# Patient Record
Sex: MALE
[De-identification: names, ages, dates, MRNs, and addresses within clinical notes are randomized per-mention and may not be internally consistent; named-entity substitution may affect disease eponyms.]

---

## 2018-01-16 ENCOUNTER — HOSPITAL ENCOUNTER (INPATIENT)
Dept: HOSPITAL 42 - ED | Age: 83
LOS: 6 days | Discharge: NURSING, REHAB OR LONG-TERM CARE | DRG: 543 | End: 2018-01-22
Attending: INTERNAL MEDICINE | Admitting: INTERNAL MEDICINE
Payer: MEDICARE

## 2018-01-16 VITALS — BODY MASS INDEX: 26.8 KG/M2

## 2018-01-16 DIAGNOSIS — G89.3: ICD-10-CM

## 2018-01-16 DIAGNOSIS — I25.2: ICD-10-CM

## 2018-01-16 DIAGNOSIS — K59.09: ICD-10-CM

## 2018-01-16 DIAGNOSIS — R53.1: ICD-10-CM

## 2018-01-16 DIAGNOSIS — R16.0: ICD-10-CM

## 2018-01-16 DIAGNOSIS — G40.909: ICD-10-CM

## 2018-01-16 DIAGNOSIS — N28.1: ICD-10-CM

## 2018-01-16 DIAGNOSIS — R59.0: ICD-10-CM

## 2018-01-16 DIAGNOSIS — K76.0: ICD-10-CM

## 2018-01-16 DIAGNOSIS — E78.5: ICD-10-CM

## 2018-01-16 DIAGNOSIS — H40.9: ICD-10-CM

## 2018-01-16 DIAGNOSIS — R10.31: ICD-10-CM

## 2018-01-16 DIAGNOSIS — C79.51: Primary | ICD-10-CM

## 2018-01-16 DIAGNOSIS — M19.90: ICD-10-CM

## 2018-01-16 DIAGNOSIS — N40.0: ICD-10-CM

## 2018-01-16 DIAGNOSIS — B95.2: ICD-10-CM

## 2018-01-16 DIAGNOSIS — M51.36: ICD-10-CM

## 2018-01-16 DIAGNOSIS — N39.0: ICD-10-CM

## 2018-01-16 DIAGNOSIS — G47.00: ICD-10-CM

## 2018-01-16 DIAGNOSIS — I25.10: ICD-10-CM

## 2018-01-16 DIAGNOSIS — C61: ICD-10-CM

## 2018-01-16 LAB
ALBUMIN SERPL-MCNC: 4.3 [, G/DL] (ref 3–4.8)
ALBUMIN/GLOB SERPL: 1.3 [,] (ref 1.1–1.8)
ALT SERPL-CCNC: 24 [, U/L] (ref 7–56)
APPEARANCE UR: (no result) [,]
APTT BLD: 31.9 [, SECONDS] (ref 25.1–36.5)
AST SERPL-CCNC: 37 [, U/L] (ref 17–59)
BACTERIA #/AREA URNS HPF: (no result) [,]
BASE EXCESS BLDV CALC-SCNC: 1.6 [, MMOL/L] (ref 0–2)
BASOPHILS # BLD AUTO: 0.01 [, K/MM3] (ref 0–2)
BASOPHILS NFR BLD: 0.2 [, %] (ref 0–3)
BILIRUB UR-MCNC: NEGATIVE [,]
BUN SERPL-MCNC: 26 [, MG/DL] (ref 7–21)
CALCIUM SERPL-MCNC: 9.5 [, MG/DL] (ref 8.4–10.5)
COLOR UR: YELLOW [,]
EOSINOPHIL # BLD: 0 [,] (ref 0–0.7)
EOSINOPHIL NFR BLD: 0.5 [, %] (ref 1.5–5)
EPI CELLS #/AREA URNS HPF: (no result) [, /HPF] (ref 0–5)
ERYTHROCYTE [DISTWIDTH] IN BLOOD BY AUTOMATED COUNT: 13.3 [, %] (ref 11.5–14.5)
GFR NON-AFRICAN AMERICAN: > 60 [,]
GLUCOSE UR STRIP-MCNC: NEGATIVE [, MG/DL]
GRANULOCYTES # BLD: 1.9 [,] (ref 1.4–6.5)
GRANULOCYTES NFR BLD: 30.1 [, %] (ref 50–68)
HGB BLD-MCNC: 11.1 [, G/DL] (ref 14–18)
INR PPP: 1.08 [,] (ref 0.93–1.08)
LEUKOCYTE ESTERASE UR-ACNC: (no result) [, LEU/UL]
LYMPHOCYTES # BLD: 4 [,] (ref 1.2–3.4)
LYMPHOCYTES NFR BLD AUTO: 62.9 [, %] (ref 22–35)
MAGNESIUM SERPL-MCNC: 2.1 [, MG/DL] (ref 1.7–2.2)
MCH RBC QN AUTO: 31.1 [, PG] (ref 25–35)
MCHC RBC AUTO-ENTMCNC: 33 [, G/DL] (ref 31–37)
MCV RBC AUTO: 94.1 [, FL] (ref 80–105)
MONOCYTES # BLD AUTO: 0.4 [,] (ref 0.1–0.6)
MONOCYTES NFR BLD: 6.3 [, %] (ref 1–6)
PH BLDV: 7.29 [,] (ref 7.32–7.43)
PH UR STRIP: 6.5 [,] (ref 4.7–8)
PLATELET # BLD: 168 [, 10^3/UL] (ref 120–450)
PMV BLD AUTO: 9.9 [, FL] (ref 7–11)
PROT UR STRIP-MCNC: (no result) [, MG/DL]
PROTHROMBIN TIME: 12.4 [, SECONDS] (ref 9.4–12.5)
RBC # BLD AUTO: 3.57 [, 10^6/UL] (ref 3.5–6.1)
RBC # UR STRIP: (no result) [,]
SP GR UR STRIP: 1.01 [,] (ref 1–1.03)
URINE NITRATE: NEGATIVE [,]
UROBILINOGEN UR STRIP-ACNC: 0.2 [, E.U./DL]
VENOUS BLOOD FIO2: 21 [, %]
VENOUS BLOOD GAS PCO2: 62 [,] (ref 40–60)
VENOUS BLOOD GAS PO2: 40 [, MM/HG] (ref 30–55)
WBC # BLD AUTO: 6.3 [, 10^3/UL] (ref 4.5–11)

## 2018-01-16 NOTE — CT
PROCEDURE:  CT Abdomen and Pelvis with contrast



HISTORY:

rt sided abdominal pain/b/l cva pain/indweeling ca



COMPARISON:

None.



TECHNIQUE:

Contrast dose: 100 cc of Omni 350



Radiation dose:



Total exam DLP = 719 mGy-cm.



This CT exam was performed using one or more of the following dose 

reduction techniques: Automated exposure control, adjustment of the 

mA and/or kV according to patient size, and/or use of iterative 

reconstruction technique.



FINDINGS:



LOWER THORAX:

Unremarkable. 



LIVER:

Unremarkable. No gross lesion or ductal dilatation. 



GALLBLADDER AND BILE DUCTS:

There is mild distention of the gallbladder but no wall thickening or 

evidence of stones. 



PANCREAS:

Unremarkable. No gross lesion or ductal dilatation.



SPLEEN:

Unremarkable. 



ADRENALS:

Unremarkable. No mass. 



KIDNEYS AND URETERS:

Unremarkable. No hydronephrosis. No solid mass. 



VASCULATURE:

Unremarkable. No aortic aneurysm. 



BOWEL:

Unremarkable. No obstruction. No gross mural thickening. There are no 

acute findings to correlate with the history of abdominal pain



APPENDIX:

Normal appendix. 



PERITONEUM:

Unremarkable. No free fluid. No free air. 



LYMPH NODES:

Multiple in large lymph nodes are seen on the left side of the pelvis 

the largest measuring 3 cm in length. Image 66 series 2. There is 

also para-aortic adenopathy and inguinal adenopathy on the left. 



BLADDER:

There is a Waddell catheter in the bladder 



REPRODUCTIVE:

There is asymmetrical enlargement of the prostate gland extending 

posterior laterally to the left. The finding is suspicious for 

pancreatic malignancy and clinical correlation is recommended. The 

finding is best seen on axial images 81 and 82 of series 2. 



BONES:

No acute fracture. 



OTHER FINDINGS:

None.



IMPRESSION:

Para-aortic, pelvic and inguinal adenopathy on the left.



Asymmetrical enlargement of the prostate suspicious for neoplasm. 

Waddell catheter in the bladder.



No acute findings to correlate with the history of abdominal pain

## 2018-01-16 NOTE — CARD
--------------- APPROVED REPORT --------------





EKG Measurement

Heart [id number]

IL [id number]

[id number]

[id number]

[id number]



<Conclusion>

Normal sinus rhythm

Right bundle branch block

Abnormal ECG

## 2018-01-16 NOTE — US
HISTORY:

ruq sono/ gb lith



COMPARISON:

None.



TECHNIQUE:

Sonographic evaluation of the right upper quadrant of the abdomen.



FINDINGS:



LIVER:

Measures 19.0 cm in length. Diffusely increased echogenicity of the 

liver parenchyma.  No mass. No intrahepatic bile duct dilatation. 



GALLBLADDER:

Unremarkable. No gallstones. 



COMMON BILE DUCT:

Measures 6 mm.  No stones. No dilatation.



PANCREAS:

Unremarkable as visualized. No mass. No ductal dilatation.



RIGHT KIDNEY:

Measures 10.7 cm in length. Normal echogenicity and cortical 

thickness.  No calculus or hydronephrosis.  Lower pole simple 

cortical cyst, 9 x 9 x 11 mm.



AORTA:

No aneurysmal dilatation.



IVC:

Unremarkable.



OTHER FINDINGS:

None .



IMPRESSION:

Mild hepatomegaly with fatty infiltration of the liver.  No evidence 

of cholelithiasis or cholecystitis.  Incidental 11 mm right lower 

pole renal cortical cyst.

## 2018-01-16 NOTE — RAD
HISTORY:

Sepsis Patient  



COMPARISON:

12/11/2016 



FINDINGS:



LUNGS:

No active pulmonary disease.



PLEURA:

No significant pleural effusion identified, no pneumothorax apparent.



CARDIOVASCULAR:

Normal.



OSSEOUS STRUCTURES:

No significant abnormalities.



VISUALIZED UPPER ABDOMEN:

Normal.



OTHER FINDINGS:

None.



IMPRESSION:

No active disease.

## 2018-01-16 NOTE — ED PDOC
Arrival/HPI





- General


Chief Complaint: Abdominal Pain


Time Seen by Provider: 18 08:31


Historian: Patient





- History of Present Illness


Narrative History of Present Illness (Text): 


18 08:39





Am 88 year old male, whose past medical history includes BPH, prostate CA, 

indwelling catheter, CAD s/p NSTEMI, intermittent UTI and seizure disorder, 

presents to the emergency department complaining of 1 month duration right 

sided abdominal pain with no exacerbative or palliative factors. The patient 

states that the pain has culminated over the past 4-5 days. He notes that it is 

a generalized abdominal pain, but is mostly a band-like pain across the lower 

abdomen radiating down his right lower extremity with associated bilateral CVA 

and groin pain, insomnia, and constipation. The patient states that the pain is 

worsened at night after reclining. The patient denies fevers, chills, headache, 

dizziness, chest pain, shortness of breath, dyspnea on exertion, cough, nausea, 

vomiting, diarrhea, neck pain, urinary changes, or any other complaint.





PMD: Dr. Thakur








Time/Duration: Other (1 month)


Symptom Onset: Sudden


Symptom Course: Worsening


Activities at Onset: Rest, Light


Context: Home





Past Medical History





- Provider Review


Nursing Documentation Reviewed: Yes





- Infectious Disease


Hx of Infectious Diseases: None





- Cardiac


Hx Cardiac Disorders: No





- Pulmonary


Hx Respiratory Disorders: No





- Neurological


Hx Neurological Disorder: Yes


Hx Seizures: Yes





- HEENT


Hx HEENT Disorder: Yes


Hx Glaucoma: Yes





- Renal


Hx Renal Disorder: No





- Endocrine/Metabolic


Hx Endocrine Disorders: No





- Hematological/Oncological


Hx Blood Disorders: No





- Integumentary


Hx Dermatological Disorder: No





- Musculoskeletal/Rheumatological


Hx Arthritis: Yes





- Genitourinary/Gynecological


Hx Prostate Cancer: Yes (treated with Luprone shot once a month)





- Psychiatric


Hx Psychophysiologic Disorder: No


Hx Substance Use: No


Other/Comment: patient has indwelling catheter





- Anesthesia


Hx Anesthesia: No





Family/Social History





- Physician Review


Nursing Documentation Reviewed: Yes


Family/Social History: No Known Family HX


Smoking Status: Never Smoked


Hx Alcohol Use: No


Hx Substance Use: No





Allergies/Home Meds


Allergies/Adverse Reactions: 


Allergies





No Known Allergies Allergy (Verified 12/15/16 14:27)


 








Home Medications: 


 Home Meds











 Medication  Instructions  Recorded  Confirmed


 


Bicalutamide [Casodex] 50 mg PO DAILY 16


 


Carbamazepine 200 mg PO TID 16


 


Cyanocobalamin (Vitamin B-12) 1,000 mcg PO DAILY 16





[B-12]   


 


Gemfibrozil 600 mg PO BID 16


 


Leuprolide [Lupron Depot]  16


 


Multivit-Min/FA/Lycopen/Lutein 1 tab PO DAILY 16





[Centrum Silver Tablet]   


 


Omega-3 Fatty Acids [Fish Oil] 100 mg PO DAILY 16


 


Omeprazole 40 mg PO DAILY 16


 


Polyethylene Glycol 3350 [Gavilax] 5 ml PO DAILY 16


 


Ubidecarenone [Coq10] 100 mg PO DAILY 16


 


Latanoprost 0.005% Opht [XALATAN 1 drp OP 18 





2.5 Ml]   


 


Magnesium Gluconate 500 mg PO DAILY 18














Review of Systems





- Physician Review


All systems were reviewed & negative as marked: Yes





- Review of Systems


Constitutional: absent: Fevers, Night Sweats


Eyes: Normal


ENT: Normal


Respiratory: absent: SOB


Cardiovascular: absent: Chest Pain, HOPKINS


Gastrointestinal: Abdominal Pain (Generalized, band -like pain along the lower 

abdomen. Mostly along the RUQ.), Constipation.  absent: Diarrhea, Nausea, 

Vomiting, Appetite Changes


Genitourinary Male: absent: Urinary Output Changes


Musculoskeletal: Back Pain (bilateral CVA pain), Other (Abdominal pain radiates 

down to the right lower extremity. ).  absent: Neck Pain


Skin: Normal


Neurological: absent: Headache, Dizziness


Endocrine: Normal


Hemo/Lymphatic: Normal


Psychiatric: Normal





Physical Exam


Vital Signs Reviewed: Yes


Vital Signs











  Temp Pulse Resp BP Pulse Ox


 


 18 14:34   71  17  158/70 H  98


 


 18 12:17   67  18  160/68 H  97


 


 18 10:10   70  18  157/67 H  98


 


 18 08:14  98.1 F  71  18  146/63  100











Temperature: Afebrile


Blood Pressure: Normal


Pulse: Regular


Respiratory Rate: Normal


Appearance: Positive for: Well-Appearing, Non-Toxic, Comfortable


Pain Distress: None


Mental Status: Positive for: Alert and Oriented X 3





- Systems Exam


Head: Present: Atraumatic, Normocephalic


Pupils: Present: PERRL


Extroacular Muscles: Present: EOMI


Conjunctiva: Present: Normal


Mouth: Present: Moist Mucous Membranes


Neck: Present: Normal Range of Motion


Respiratory/Chest: Present: Clear to Auscultation, Good Air Exchange.  No: 

Respiratory Distress, Accessory Muscle Use


Cardiovascular: Present: Regular Rate and Rhythm, Normal S1, S2.  No: Murmurs


Abdomen: Present: Tenderness (Generalized abdominal tenderness. Most tender on 

the RUQ. )


Genitourinary Male: Present: Other (Indwelling tolentino catheter draining clear 

urine. )


Back: Present: CVA Tenderness (Bilateral CVA Tenderness. )


Upper Extremity: Present: Normal Inspection.  No: Cyanosis, Edema


Lower Extremity: Present: Normal Inspection.  No: Edema


Neurological: Present: GCS=15, CN II-XII Intact, Speech Normal, Motor Func 

Grossly Intact, Normal Sensory Function, Normal Cerebellar Funct, Norm Deep 

Tendon Reflexes, Gait Normal, Memory Normal


Skin: Present: Warm, Dry, Normal Color.  No: Rashes


Psychiatric: Present: Alert, Oriented x 3, Normal Insight, Normal Concentration





Medical Decision Making


ED Course and Treatment: 


18 08:50





Impression:


An 88 year old male presents to the emergency department complaining of 1 month 

duration generalized abdominal pain, mostly along the right upper quadrant 

radiating down to his right lower extremity with associated constipation, 

insomnia, CVA and groin pain. 





Plan:


-- EKG


-- Abdomen/Pelvis CT


-- Chest X-ray 


-- Labs


-- Morphine and Ditropan


-- Blood/ Urine Culture


-- Urinalysis 


-- Reassess and disposition








Progress Notes:





EK18 09:38


Ordered, reviewed, and independently interpreted the EKG.


Rate :  73 BPM


Rhythm : NSR


Interpretation : Concomitant RBBB.  millisecond. No ischemic ST-T 

segments 








CHEST X-RAY


Dictator : Jose aG MD


Report Date : 2018 09:21:02


IMPRESSION: No active disease.





18 11:35


ct a/p scan results appreciated : no acute findings- no bony mets discovered 

however, one wonders if they may be acute and contirbuting to patient's pain. 








serial abdominal exams benign . 








PROCEDURE:  CT Abdomen and Pelvis with contrast


Dictator : Jorge Luis Martinez MD


Report Date : 2018 11:09:54





IMPRESSION:


Para-aortic, pelvic and inguinal adenopathy on the left.





Asymmetrical enlargement of the prostate suspicious for neoplasm. Tolentino 

catheter in the bladder.





No acute findings to correlate with the history of abdominal pain








GALLBLADDER ULTRASOUND


Dictator : Jose Ga MD


Report Date : 2018 13:50:49


IMPRESSION: Mild hepatomegaly with fatty infiltration of the liver.  No 

evidence of cholelithiasis or cholecystitis.  Incidental 11 mm right lower pole 

renal cortical cyst.








- Lab Interpretations


Lab Results: 








 18 09:17 





 18 08:50 





 Lab Results





18 09:17: pO2 40, VBG pH 7.29 L, VBG pCO2 62.0 H, VBG HCO3 29.8 H, VBG 

Total CO2 31.7 H, VBG O2 Sat (Calc) 76.4 H, VBG Base Excess 1.6, VBG Potassium 

3.9, Glucose 96, Lactate 1.6, FiO2 21.0, Sodium 143.0, Chloride 107.0, Venous 

Blood Potassium 3.9


18 09:17: PT 12.4, INR 1.08, APTT 31.9


18 09:17: WBC 6.3  D, RBC 3.57, Hgb 11.1 L, Hct 33.6 L, MCV 94.1, MCH 31.1

, MCHC 33.0, RDW 13.3, Plt Count 168, MPV 9.9, Gran % 30.1 L, Lymph % (Auto) 

62.9 H, Mono % (Auto) 6.3 H, Eos % (Auto) 0.5 L, Baso % (Auto) 0.2, Gran # 1.90

, Lymph # 4.0 H, Mono # 0.4, Eos # 0.0, Baso # 0.01


18 08:50: Sodium 144, Potassium 3.8, Chloride 104, Carbon Dioxide 28, 

Anion Gap 16, BUN 26 H, Creatinine 0.8, Est GFR (African Amer) > 60, Est GFR (

Non-Af Amer) > 60, Random Glucose 96, Calcium 9.5, Magnesium 2.1, Total 

Bilirubin 0.4, AST 37, ALT 24, Alkaline Phosphatase 214 H, Total Protein 7.6, 

Albumin 4.3, Globulin 3.3, Albumin/Globulin Ratio 1.3


18 08:50: Urine Color Yellow, Urine Appearance Turbid, Urine pH 6.5, Ur 

Specific Gravity 1.015, Urine Protein Trace H, Urine Glucose (UA) Negative, 

Urine Ketones Negative, Urine Blood Moderate H, Urine Nitrate Negative, Urine 

Bilirubin Negative, Urine Urobilinogen 0.2, Ur Leukocyte Esterase Small H, 

Urine RBC 5 - 10, Urine WBC 1 - 3, Ur Epithelial Cells 0 - 2, Urine Bacteria Mod








I have reviewed the lab results: Yes





- RAD Interpretation


Radiology Orders: 








18 08:32


CHEST PORTABLE [RAD] Stat 





18 08:34


ABD PELVIS PO & IV CONTRAST [CT] Stat 





18 11:46


GALL BLADDER [US] Stat 














- EKG Interpretation


Interpreted by ED Physician: Yes


Type: 12 lead EKG





- Medication Orders


Current Medication Orders: 











Discontinued Medications





Acetaminophen (Tylenol 325mg Tab)  975 mg PO STAT STA


   Stop: 18 09:46


   Last Admin: 18 09:55  Dose: 975 mg





MAR Pain/Vitals


 Document     18 09:55  JU  (Rec: 18 09:55  JU HUDSON)


     Pain Reassessment


      Is This A Pain ReAssessment?               No


     Sleep


      Is patient sleeping during reassessment?   No


     Presence of Pain


      Presence of Pain                           Yes





Docusate Sodium (Colace)  200 mg PO STAT STA


   Stop: 18 11:34


   Last Admin: 18 12:25  Dose: 200 mg





Last Bowel Movement


 Document     18 12:25  SF  (Rec: 18 12:26  SF  [id number])


     Last Bowel Movement


      Last Bowel Movement                        18





Morphine Sulfate (Morphine)  2 mg IVP STAT STA


   Stop: 18 08:34


   Last Admin: 18 08:45  Dose: 2 mg





MAR Pain Assessment


 Document     18 08:45  JU  (Rec: 18 09:57  JU ADRIAN-PC)


     Pain Reassessment


      Is this a pain reassessment?               No


     Sleep


      Is patient sleeping during reassessment?   No


     Presence of Pain


      Presence of Pain                           Yes


IVP Administration


 Document     18 08:45  SZA  (Rec: 18 09:57  JU ADRIAN-PC)


     Charges for Administration


      # of IVP Administrations                   1


Re-Assess: MAR Pain Assessment


 Document     18 09:45  SZA  (Rec: 18 11:09  JU ADRIAN-PC)


     Pain Reassessment


      Is this a pain reassessment?               No


     Sleep


      Is patient sleeping during reassessment?   No


     Presence of Pain


      Presence of Pain                           Yes


     Pain Scale Used


      Pain Scale Used                            Numeric


     Description


      Description                                Intermittent


      Intensity of Pain at present               4





Morphine Sulfate (Morphine)  4 mg IVP STAT STA


   Stop: 18 11:46


   Last Admin: 18 12:25  Dose: 4 mg





MAR Pain Assessment


 Document     18 12:25  SF  (Rec: 18 12:25  SF  [id number])


     Pain Reassessment


      Is this a pain reassessment?               Yes


     Sleep


      Is patient sleeping during reassessment?   No


     Presence of Pain


      Presence of Pain                           Yes


     Pain Scale Used


      Pain Scale Used                            Numeric


IVP Administration


 Document     18 12:25  SF  (Rec: 18 12:25  SF  [id number])


     Charges for Administration


      # of IVP Administrations                   1





Ondansetron HCl (Zofran Inj)  4 mg IVP STAT STA


   Stop: 18 11:47


   Last Admin: 18 12:26  Dose: 4 mg





IVP Administration


 Document     18 12:26  SF  (Rec: 18 12:26  SF  [id number])


     Charges for Administration


      # of IVP Administrations                   1





Oxybutynin Chloride (Ditropan Tab)  5 mg PO ONCE ONE


   Stop: 18 08:35


   Last Admin: 18 09:55  Dose: 5 mg





Trimethoprim/Sulfamethoxazole (Bactrim Ds Tab)  1 tab PO STAT STA


   PRN Reason: Protocol


   Stop: 18 11:33


   Last Admin: 18 12:25  Dose: 1 tab











- Scribe Statement


The provider has reviewed the documentation as recorded by the Elvira Puckett 





Provider Scribe Attestation:


All medical record entries made by the Scribe were at my direction and 

personally dictated by me. I have reviewed the chart and agree that the record 

accurately reflects my personal performance of the history, physical exam, 

medical decision making, and the department course for this patient. I have 

also personally directed, reviewed, and agree with the discharge instructions 

and disposition.








Disposition/Present on Arrival





- Present on Arrival


Any Indicators Present on Arrival: Yes


History of DVT/PE: No


History of Uncontrolled Diabetes: No


Urinary Catheter: Yes (inserted by Dr Jackson)


History of Decub. Ulcer: No


History Surgical Site Infection Following: None





- Disposition


Have Diagnosis and Disposition been Completed?: Yes


Diagnosis: 


 Urinary tract infection, Intractable abdominal pain, Sciatica of right side





Disposition: HOSPITALIZED


Disposition Time: 15:07


Patient Plan: Admission


Condition: FAIR


Referrals: 


Fanny Turner, [Non-Staff] - Follow up with primary


Forms:  Logentries (English)

## 2018-01-17 RX ADMIN — METRONIDAZOLE SCH MLS/HR: 500 INJECTION, SOLUTION INTRAVENOUS at 11:02

## 2018-01-17 RX ADMIN — CEFTRIAXONE SCH MLS/HR: 1 INJECTION, SOLUTION INTRAVENOUS at 10:00

## 2018-01-17 RX ADMIN — POLYETHYLENE GLYCOL 3350 SCH GM: 17 POWDER, FOR SOLUTION ORAL at 17:50

## 2018-01-17 RX ADMIN — METRONIDAZOLE SCH MLS/HR: 500 INJECTION, SOLUTION INTRAVENOUS at 15:19

## 2018-01-17 RX ADMIN — LATANOPROST SCH ML: 50 SOLUTION OPHTHALMIC at 15:35

## 2018-01-17 NOTE — CON
DATE:  01/17/2018



REQUESTIG PHYSICIAN:  Dr. Dr. Rivero



REASON FOR CONSULTATION:  I have been asked to see this 88-year-old male

known to me for several years from the office with known history of chronic

constipation, submucosal lesion in the terminal ileum which has been stable

for many years, prostate cancer, indwelling urinary catheter, coronary

artery disease, seizure disorder, who comes to the hospital with back pain

and lower abdominal pain.  Patient has been constipated for several days as

he has not been taking his MiraLax.  He denies any rectal bleeding or

melena.  He denies fevers or chills.  CT scan of the abdomen and pelvis

just showed an enlarged prostate with periaortic lymphadenopathy.  Patient

also has been diagnosed in the past with degenerative disc disease in the

lumbar spine with foraminal narrowing at L5-S1 on the left secondary to

osteophyte.



PAST MEDICAL HISTORY:  Again is notable for BPH, prostate cancer, seizure

disorder, coronary artery disease, chronic constipation, submucosal lesion

of the terminal small bowel.



SOCIAL HISTORY:  He is a .  He denies cigarette smoking or alcohol

use.  He lives at home with a son living nearby.



FAMILY HISTORY:  Noncontributory.



REVIEW OF SYSTEMS:  A 14-point review of systems is notable for back pain,

abdominal pain, right lower quadrant and difficulty walking.



MEDICATIONS AT HOME:  Include Casodex 50 mg once a day, carbamazepine 200

mg three times a day, vitamin B12 1000 mcg daily, gemfibrozil 600 mg p.o.

b.i.d., Lupron injections monthly, multivitamin, fish oil 100 mg daily,

omeprazole 40 mg once a day, polyethylene glycol daily, CoQ 100 mg daily,

and Xalatan eyedrops 1 drop daily.



PHYSICAL EXAMINATION

GENERAL:  Elderly male appearing to have lost some weight in no distress. 

He is awake and alert.

VITAL SIGNS:  Reveal temperature of 97.8, blood pressure 133/58, heart rate

of 80.  His BMI is 25.5.

HEENT:  Reveal sclerae to be white.  Conjunctivae pink.

NECK:  Supple.

CHEST:  Lungs are clear.

HEART:  Regular rate and rhythm.

ABDOMEN:  Soft, nontender.

EXTREMITIES:  Show no edema.



Laboratory data reveal white blood cell count 6.3, hemoglobin 11.1. 

Chemistries reveal BUN 26, creatinine 0.8, procalcitonin is 0.05.  AST and

ALT are normal.  Alkaline phosphatase is 214.



IMPRESSION:  An 88-year-old male with prostate cancer with metastasis with

lymphadenopathy in the periaortic area with abdominal pain in the right

lower quadrant associated with constipation.  Ultrasound of the abdomen was

negative.  CT scan of the abdomen shows no acute changes other than the

enlarged prostate and lymphadenopathy.  His abdominal pain has been

improved.  Considering all his comorbidities, we will suggest conservative

treatment.



RECOMMENDATIONS:

1.  Continue MiraLax 17 g once a day.

2.  No further GI workup planned at this time.





__________________________________________

Cristino Chou MD





DD:  01/17/2018 11:21:17

DT:  01/17/2018 11:26:35

Job # [id number]

## 2018-01-17 NOTE — MRI
EXAM:

  MR Lumbar Spine Without Intravenous Contrast



EXAM DATE/TIME:

  1/17/2018 3:33 PM



CLINICAL HISTORY:

  The patient age is 88 years old and is male; Pain; Low back pain; Patient HX: 

Lbp. Can't straighten up and trouble walking. Facility exam id and description: 

Mri spls spinal canal lumbar w/o cont



TECHNIQUE:

  Magnetic resonance images of the lumbar spine without intravenous contrast in 

multiple planes.



COMPARISON:

  No relevant prior studies available.



FINDINGS:

  Vertebrae:  Multiple T1 hypointense masses are identified within the lumbar 

vertebral bodies as well as the upper sacrum. These findings are concerning for 

metastatic disease.  The lumbar vertebral bodies are normal in height, without 

abnormal subluxation.  

  Spinal cord:  The distal end of the conus medullaris ends at L1, normal in 

position.

  Soft tissues: Small T2 hyperintense right renal cysts are visualized.  

  Lymph nodes:  There is retroperitoneal lymphadenopathy. Additional enlarged 

lymph nodes are seen adjacent to the left iliac arteries.  At the level of the 

aortic bifurcation, there is a 2.5 x 1.5 cm lymph node on the left side.



 DISCS/SPINAL CANAL/NEURAL FORAMINA: Degenerative disc disease is noted 

diffusely within the lumbar spine, with a decrease in the T2 signal intensity 

of the discs as well as disc bulge/osteophyte complexes. There is a decrease of 

disc height at L5-S1.  

  L1-L2:  There is no significant narrowing of the thecal sac or neural 

foramina.

  L2-L3:  There is no significant narrowing of the thecal sac. Mild bilateral 

neural foraminal narrowing is identified.

  L3-L4:  There is mild bilateral facet arthropathy. Mild post bulging is 

visualized, without significant narrowing of the thecal sac. Mild right and 

mild-to-moderate left neural foraminal narrowing is identified.

  L4-L5:  There is a broad-based disc bulge, with mild narrowing of the thecal 

sac and narrowing of both lateral recesses. There is bilateral facet 

arthropathy. Mild/moderate bilateral neural foramina is identified.

  L5-S1:  There is no significant narrowing of the thecal sac. Mild/moderate 

left neural foraminal narrowing is visualized. There is facet arthropathy 

bilaterally.



IMPRESSION:     

1.  Multiple masses are identified within the lumbar vertebral bodies as well 

as the upper sacrum. These findings are concerning for metastatic disease.

2.  There is retroperitoneal lymphadenopathy. Additional enlarged lymph nodes 

are seen adjacent to the left iliac arteries.

3.  Degenerative changes are noted diffusely within the lumbar spine, as 

described above.

4.  Mild narrowing of the thecal sac and narrowing of both lateral recesses are 

visualized at L4-5.

5.  Neural foraminal narrowing is identified from L2-3 through L5-S1, as 

detailed above.

6.  Incidental/non-acute findings are described above.

## 2018-01-18 RX ADMIN — CEFTRIAXONE SCH MLS/HR: 1 INJECTION, SOLUTION INTRAVENOUS at 13:18

## 2018-01-18 RX ADMIN — POLYETHYLENE GLYCOL 3350 SCH GM: 17 POWDER, FOR SOLUTION ORAL at 17:05

## 2018-01-18 RX ADMIN — OXYCODONE HYDROCHLORIDE AND ACETAMINOPHEN PRN TAB: 5; 325 TABLET ORAL at 17:10

## 2018-01-18 RX ADMIN — OXYCODONE HYDROCHLORIDE AND ACETAMINOPHEN PRN TAB: 5; 325 TABLET ORAL at 21:22

## 2018-01-18 RX ADMIN — LATANOPROST SCH ML: 50 SOLUTION OPHTHALMIC at 13:17

## 2018-01-18 RX ADMIN — POLYETHYLENE GLYCOL 3350 SCH GM: 17 POWDER, FOR SOLUTION ORAL at 13:16

## 2018-01-18 NOTE — CP.PCM.CON
History of Present Illness





- History of Present Illness


History of Present Illness: 


Mr Kingston is a 88 year old male with a history of high risk prostate cancer 

since 2016 on androgen deprivation therapy.  In 2016, he had an elevation in 

his PSA.  A TRUS with biopsy showed adenocarcinoma of the prostate, Octavio 5+4=

9 in 1/12, Octavio 4+5=9 4/12, [place] 4+4=8 2/12, [place] 4+3=7 1/12.    He 

was on ADT and anti-androgen therapy.  Over the past 3-4 weeks, he has been 

having worsening lower back pain with radiation anteriorly. In light of this, 

he came to the ED for further evaluation and management. He had a CT of the 

abdomen and pelvis on January 16, 2018 revealed para-aortic lymphadenopathy as 

well as pelvic lymphadenopathy.   A MRI of the lumbar spine on January 17, 2018 

revealed retroperitoneal lymphadenopathy.  There was asymmetric prostate 

suspicious for neoplasm. There were metastatic lesions in the lumbar and sacral 

spine.  He is referred to us for consideration of palliative radiation therapy.





Review of Systems





- Constitutional


Constitutional: Weakness





- Musculoskeletal


Musculoskeletal: Back Pain





Past Patient History





- Infectious Disease


Hx of Infectious Diseases: None





- Past Social History


Smoking Status: Never Smoked


Alcohol: None


Home Situation {Lives}: Alone





- CARDIAC


Hx Cardiac Disorders: No





- PULMONARY


Hx Respiratory Disorders: No





- NEUROLOGICAL


Hx Neurological Disorder: Yes


Hx Seizures: Yes





- HEENT


Hx HEENT Problems: Yes


Hx Glaucoma: Yes





- RENAL


Hx Chronic Kidney Disease: No





- ENDOCRINE/METABOLIC


Hx Endocrine Disorders: No





- HEMATOLOGICAL/ONCOLOGICAL


Hx Blood Disorders: No





- INTEGUMENTARY


Hx Dermatological Problems: No





- MUSCULOSKELETAL/RHEUMATOLOGICAL


Hx Arthritis: Yes





- GENITOURINARY/GYNECOLOGICAL


Hx Prostate Cancer: Yes (treated with Luprone shot once a month)





- PSYCHIATRIC


Hx Psychophysiologic Disorder: No


Hx Substance Use: No


Other/Comment: patient has indwelling catheter





- SURGICAL HISTORY


Hx Surgeries: No





- ANESTHESIA


Hx Anesthesia: No





Meds


Allergies/Adverse Reactions: 


 Allergies











Allergy/AdvReac Type Severity Reaction Status Date / Time


 


No Known Allergies Allergy   Verified 12/15/16 14:27














- Medications


Medications: 


 Current Medications





Acetaminophen (Tylenol 325mg Tab)  650 mg PO Q4H PRN


   PRN Reason: Pain, Mild (1-3)


Carbamazepine (Tegretol)  200 mg PO TID [facility]


   PRN Reason: Protocol


   Last Admin: 01/17/18 17:49 Dose:  200 mg


Docusate Sodium (Colace)  200 mg PO DAILY [facility]


Gemfibrozil (Lopid)  600 mg PO BID [facility]


   Last Admin: 01/17/18 17:50 Dose:  600 mg


Ceftriaxone Sodium (Rocephin 1 Gram Ivpb)  1 gm in 100 mls @ 200 mls/hr IV 

DAILY [facility]


   PRN Reason: Protocol


   Last Admin: 01/17/18 10:00 Dose:  200 mls/hr


Latanoprost (Xalatan Opht)  0 ml OD DAILY [facility]


   Last Admin: 01/17/18 15:35 Dose:  2.5 ml


Oxycodone/Acetaminophen (Percocet 5/325 Mg Tab)  1 tab PO Q4H PRN


   PRN Reason: Pain, moderate (4-7)


   Stop: 01/21/18 08:08


Polyethylene Glycol (Miralax)  17 gm PO BID [facility]


   Last Admin: 01/17/18 17:50 Dose:  17 gm











Physical Exam





- Eye Exam


Eye Exam: EOMI





- ENT Exam


ENT Exam: Mucous Membranes Moist





- Respiratory Exam


Respiratory Exam: Clear to Auscultation Bilateral





- Cardiovascular Exam


Cardiovascular Exam: REGULAR RHYTHM





- GI/Abdominal Exam


GI & Abdominal Exam: Normal Bowel Sounds





- Neurological Exam


Neurological exam: CN II-XII Intact, Oriented x3


Additional comments: 


Right lower extremity strength is 4/5. Left lower extremity strength is 4+/5





Results





- Vital Signs


Recent Vital Signs: 


 Last Vital Signs











Temp  97.4 F L  01/18/18 08:02


 


Pulse  82   01/18/18 08:02


 


Resp  22   01/18/18 08:02


 


BP  123/82   01/18/18 08:02


 


Pulse Ox  97   01/18/18 08:02














- Labs


Result Diagrams: 


 01/16/18 09:17





 01/16/18 08:50





Assessment & Plan





- Assessment and Plan (Free Text)


Assessment: 


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  Based on his 

MRI and symptoms, we would recommend palliative radiation therapy to the lower 

lumbar and upper sacral spine for symptomatic management of his pain. We spoke 

to him and his son about radiation therapy.  They are currently undecided about 

treatment. We would also recommend a bone scan as well as PSA given his new 

found metastases so that we know the extent of his disease. We will follow up 

with him tomorrow about his decision.

## 2018-01-18 NOTE — PN
DATE:  01/18/2018



SUBJECTIVE:  The patient has no complaints of any chest pain.  No shortness

of breath.  No headaches.  He says his back pain is controlled.



PHYSICAL EXAMINATION:

VITAL SIGNS:  Temperature is 97.5, pulse is 73, blood pressure is 133/87,

respirations 16.

GENERAL:  The patient is lying in bed, flat, comfortable.

HEENT:  No oral lesion.  Anicteric sclerae.  Moist mucosa.

NECK:  No JVD, adenopathy, or thyromegaly.

CARDIOVASCULAR:  S1 and S2, regular.  No murmurs, rubs, or gallops.

LUNGS:  Clear to auscultation bilaterally.  No wheeze, rales, or rhonchi.

ABDOMEN:  Bowel sounds are positive, soft, nontender and nondistended.

EXTREMITIES:  No cyanosis, clubbing or edema.



Multiple masses identified in the lumbar vertebral bodies as well as the

upper sacrum.  This finding is probably consistent with metastatic disease

with retroperitoneal lymphadenopathy.



ASSESSMENT:

1.  Abdominal pain, improved.

2.  Back pain secondary to metastasis from prostate cancer.

3.  Spinal stenosis.

4.  Hepatomegaly.

5.  An 11 mm right pole renal cyst.

6.  Coronary artery disease.

7.  Prostate cancer.

8.  Osteoarthritis.

9.  Seizure disorder.

10.  Gait dysfunction secondary to back pain.

11.  Pelvic adenopathy.

12.  Urinary tract infection secondary to gram-positive cocci.



PLAN:  The patient's MRI has been reviewed.  I am waiting for Dr. Jackson to

evaluate the patient.  The patient has been seen by Dr. Chou.  He want to

continue his gemfibrozil.  The patient is on MiraLax for constipation.  He

is on Rocephin because of the UTI.  He has gram-positive cocci.  The

patient is going to continue the physical therapy.  We will continue to

follow up the patient closely.  I did speak to the patient's family

yesterday to give them an update on the patient's diagnoses and plan of

care.



__________________________________________

Nathan Rivero MD

 



DD:  01/18/2018 6:15:23

DT:  01/18/2018 7:10:52

Job # [id number]

## 2018-01-18 NOTE — HP
DATE:  01/17/2018



CHIEF COMPLAINT AND HISTORY OF PRESENT ILLNESS:  This is an 88-year-old

male, who is coming into the hospital with a past medical history of

prostate cancer, BPH, has an indwelling catheter, the patient has coronary

artery disease, non-ST elevation MI history.  The patient states that he

has been having one month duration of right-sided abdominal pain.  He says

nothing makes the pain better or worse the pain, it has been getting worse

over the last 4 to 5 days.  He says that the pain is going around his

abdomen, it radiates down his right leg.  He has been having constipation, 

he says it is worse after he lays backwards.  He has no nausea.  No

vomiting. No diarrhea.  No dysuria or frequency.  He also has difficulty in

ambulating.  He says that he has been having back issues and is seeing Dr. Og.  According to the patient's son, he had a CAT scan done about

3 years ago.



REVIEW OF SYMPTOMS:  All other review of symptoms are within normal limits

except as mentioned.



ALLERGIES:  NO KNOWN DRUG ALLERGIES.



HOME MEDICATIONS:  Reviewed on the [facility].



PAST MEDICAL HISTORY:  Please see above, also prostate cancer.



SOCIAL HISTORY:  He does not smoke , drink or use drugs.



FAMILY HISTORY:  Noncontributory.



PHYSICAL EXAMINATION:

VITAL SIGNS:  He has had a temperature of 98.7, pulse of 64, blood pressure

of 138/53, respirations of 19, height is 5 feet 10 inches, weight is 178

pounds, BMI is 25.5.

GENERAL:  The patient lying in bed, uncomfortable, and in no acute

distress.

HEENT:  Atraumatic and normocephalic.  Anicteric sclerae.  Moist mucosa.

Pink conjunctivae.  No oral lesions.

NECK:  No JVD, anterior and posterior adenopathy, thyromegaly, or bruits.

CARDIOVASCULAR:  S1 and S2 regular.  No murmur, rubs, or gallop.

LUNGS:  Clear to auscultation bilaterally.  No wheezes, rales, or rhonchi.

ABDOMEN:  Bowel sounds are positive.  He has right side tenderness. No

rebound.  No guarding

EXTREMITIES:  No cyanosis, clubbing, or edema.

NEUROLOGIC:  No facial asymmetry.  Tongue is midline.  No uvula deviation. 

Power is 5/5 upper extremity and lower extremity.  Sensation intact in

upper extremity and lower extremity.

PSYCHIATRIC:  She is awake, alert and oriented x3.  No anxiety or

depression.  She has normal affect.

GENITOURINARY:  No CVA tenderness.

VASCULAR:  2+ pulses in the carotid pulses and pedal pulses.

SKIN:  No erythema or nodules

SPINE:  Shows normal curvature.



LABORATORY DATA:  White count of 6.3, hemoglobin of 11.1, INR is 1.08.  He

has a VBG that shows a pH of 7.29, his pCO2 is 62, his bicarb is 28,

procalcitonin is less than 0.05.  He has urine that shows blood as

moderate, nitrites are negative.



He has a gallbladder ultrasound that shows mild hepatomegaly with fatty

infiltration of the liver.  No evidence of cholelithiasis, but there is an

11 mm right lower lobe renal cyst.



He had abdominal CT done that shows no acute findings.



His EKG shows sinus rhythm at 73, no ST-T changes.



ASSESSMENT:

1.  Abdominal pain.

2.  Hepatomegaly.

3.  11 mm right pole renal cyst.

4.  Coronary artery disease.

5.  Prostate cancer.

6.  Osteoarthritis.

7.  Seizure disorder.

8.  Gait dysfunction secondary to back pain.

9.  Paraaortic and pelvic adenopathy .



PLAN:  The patient is going to be currently admitted to the hospital.  He

is on gemfibrozil for his dyslipidemia.  He is on MiraLax for his

constipation.  The patient is receiving carbamazepine for his seizures. The

patient has large lymph node seen on the left side, we will get Dr. Jackson to

evaluate the patient.  I have also added an MRI to evaluate the back

because of the patient's history of prostate cancer.  He is going to get

physical therapy.  I have asked physical therapy to evaluate the patient. 

He is going to be on his eyedrops.  I have also asked Dr. Chou to evaluate

the patient.  I spoke to the patient's son to give an update on the

patient's diagnosis and plan of care.  His blood cultures have been

negative.  The patient has urine cultures that are pending.  I have him on

antibiotics for possible UTI.  He is not able to ambulate because of his

back pain.





__________________________________________

Nathan Rivero MD



DD:  01/17/2018 15:37:34

DT:  01/17/2018 23:22:07

[facility] # [id number]

## 2018-01-18 NOTE — PN
DATE:  01/18/2018



SUBJECTIVE:  The patient is lying in bed.  He denies any further abdominal

pain.  He complains of some constipation.  MRI of the lumbar spine showed a

disk herniation at L4 as well as multiple lytic lesions in the lumbar

vertebral body.  He was also noted to have periaortic and retroperitoneal

lymphadenopathy.



PHYSICAL EXAMINATION

VITAL SIGNS:  Reveal temperature 97.4, blood pressure 123/82, heart rate of

82.

HEENT:  Reveals sclerae to be white.  Conjunctivae pink.

NECK:  Supple.

CHEST:  Lungs are clear.

HEART:  Reveals regular rate and rhythm.

ABDOMEN:  Soft, nontender.

EXTREMITIES:  Show no edema.



LABORATORY DATA:  Reveal white blood cell count 6.3, hemoglobin 11.1, BUN

26, creatinine 0.8.



IMPRESSION:

1.  Constipation with resolution of abdominal pain.

2.  Low back pain secondary to prostate cancer with bone mets.  He also has

retroperitoneal lymphadenopathy.  He has been on treatment for his prostate

cancer.



RECOMMENDATIONS:

1.  We will start MiraLax 17 g b.i.d.

2.  Consider radiation therapy evaluation for palliative radiation to his

lytic lesions in his back.

3.  I have discussed this case with Dr. Hugo.





__________________________________________

Cristino Chou MD



DD:  01/18/2018 10:02:28

DT:  01/18/2018 10:05:34

Job # [id number]

## 2018-01-19 RX ADMIN — OXYCODONE HYDROCHLORIDE AND ACETAMINOPHEN PRN TAB: 5; 325 TABLET ORAL at 22:52

## 2018-01-19 RX ADMIN — POLYETHYLENE GLYCOL 3350 SCH GM: 17 POWDER, FOR SOLUTION ORAL at 18:13

## 2018-01-19 RX ADMIN — POLYETHYLENE GLYCOL 3350 SCH GM: 17 POWDER, FOR SOLUTION ORAL at 09:58

## 2018-01-19 RX ADMIN — LATANOPROST SCH: 50 SOLUTION OPHTHALMIC at 10:00

## 2018-01-19 RX ADMIN — OXYCODONE HYDROCHLORIDE AND ACETAMINOPHEN PRN TAB: 5; 325 TABLET ORAL at 07:00

## 2018-01-19 NOTE — NM
PROCEDURE:  Whole Body Bone Scan



HISTORY:

pt w/ prostate cancer w/ bone mets







COMPARISON:

None available.



TECHNIQUE:

Following administration of 27.2 miCu of Tc MDP multiplanar whole 

body images were obtained.



FINDINGS:

Evidence for bony metastatic disease: Thoracolumbar spine, pelvis, 

proximal femurs, ribs, left scapula



Degenerative uptake: None.



Physiologic uptake: Normal physiologic activity in the kidneys.



Other findings: None.



IMPRESSION:

Osseous metastatic disease primarily affecting the axial skeleton.  

Additional abnormal foci of increased uptake identified in left 

greater trochanter, right lesser trochanter, left scapula/glenoid.

## 2018-01-19 NOTE — CP.PCM.PN
Subjective





- Date & Time of Evaluation


Date of Evaluation: 01/19/18


Time of Evaluation: 11:00





- Subjective


Subjective: 


Mr Kingston came down today for a simulation for radiation therapy after 

speaking with the patient.  We then held off on starting his radiation therapy 

after speaking with his son because they are currently waiting to discuss all 

his options further with Dr Jackson before they commit to any local treatment. Dr Jackson had met with them yesterday about various sytemic therapy options, and 

they wanted to speak with him again before they do anything. 





Objective





- Vital Signs/Intake and Output


Vital Signs (last 24 hours): 


 











Temp Pulse Resp BP Pulse Ox


 


 97.9 F   81   22   144/61   97 


 


 01/19/18 07:45  01/19/18 07:45  01/19/18 07:45  01/19/18 07:45  01/19/18 07:45








Intake and Output: 


 











 01/19/18 01/19/18





 06:59 18:59


 


Intake Total 840 


 


Output Total 1325 


 


Balance -485 














- Medications


Medications: 


 Current Medications





Acetaminophen (Tylenol 325mg Tab)  650 mg PO Q4H PRN


   PRN Reason: Pain, Mild (1-3)


Carbamazepine (Tegretol)  200 mg PO TID [facility]


   PRN Reason: Protocol


   Last Admin: 01/19/18 09:58 Dose:  200 mg


Docusate Sodium (Colace)  200 mg PO DAILY [facility]


   Last Admin: 01/19/18 09:57 Dose:  200 mg


Gemfibrozil (Lopid)  600 mg PO BID [facility]


   Last Admin: 01/19/18 09:58 Dose:  600 mg


Latanoprost (Xalatan Opht)  0 ml OD DAILY [facility]


   Last Admin: 01/18/18 13:17 Dose:  2.5 ml


Oxycodone/Acetaminophen (Percocet 5/325 Mg Tab)  1 tab PO Q4H PRN


   PRN Reason: Pain, moderate (4-7)


   Stop: 01/21/18 08:08


   Last Admin: 01/19/18 07:00 Dose:  1 tab


Polyethylene Glycol (Miralax)  17 gm PO BID [facility]


   Last Admin: 01/19/18 09:58 Dose:  17 gm











- Labs


Labs: 


 











PT  12.4 SECONDS (9.4-12.5)   01/16/18  09:17    


 


INR  1.08  (0.93-1.08)   01/16/18  09:17    


 


APTT  31.9 Seconds (25.1-36.5)   01/16/18  09:17

## 2018-01-19 NOTE — PN
DATE:  01/19/2018



SUBJECTIVE:  The patient has no complaints of any chest pain.  No shortness

of breath.  He says the pain medication does not ease back pain.



PHYSICAL EXAMINATION:

VITAL SIGNS:  Temperature is 97.6, pulse of 86, blood pressure of 187/70,

and respirations of 22.

GENERAL:  The patient is lying in bed, flat, comfortable.

HEENT:  No oral lesion.  Anicteric sclerae.  Moist mucosa.

NECK:  No JVD, adenopathy, or thyromegaly.

CARDIOVASCULAR:  S1 and S2, regular.  No murmurs, rubs, or gallops.

LUNGS:  Clear to auscultation bilaterally.  No wheeze, rales, or rhonchi.

ABDOMEN:  Bowel sounds are positive, soft, nontender and nondistended.

EXTREMITIES:  No cyanosis, clubbing or edema.



LABORATORY DATA:  White count of 6.3 and hemoglobin of 11.1.  Creatinine of

0.8.



ASSESSMENT:

1.  Abdominal pain, improved.

2.  Back pain secondary to metastasis from prostate cancer.

3.  Spinal stenosis.

4.  Hepatomegaly.

5.  An 11 mm right pole renal cyst.

6.  Coronary artery disease.

7.  Prostate cancer.

8.  Osteoarthritis.

9.  Seizure disorder.

11.  Gait dysfunction secondary to back pain.

12.  Pelvic adenopathy.

13.  Urinary tract infection secondary to Enterococcus.



PLAN:  The patient has Enterococcus sensitive to ampicillin.  I will change

the patient's antibiotics to ampicillin.  Now, we will discontinue the

patient's Rocephin.  I will ask Dr. Sewell to evaluate the patient for

possible radiation.  She does recommend palliative radiation therapy to

lower back.  She did speak to the patient as well as son.  I will order PSA

_____ 166.  The patient is on gemfibrozil for his dyslipidemia.  He is on

carbamazepine, initially continued and he is on eye drops.  He will most

likely need Transitional Care.





__________________________________________

Nathan Rivero MD





DD:  01/19/2018 6:05:37

DT:  01/19/2018 7:44:15

Job # [id number]

## 2018-01-19 NOTE — CON
DATE:  01/18/2018



 CONSULTATION



CHIEF COMPLAINT:  Back pain.



HISTORY OF PRESENT ILLNESS:  This is an 88-year-old male who has been

seeing my partner Dr. Delacruz for prostate cancer and urinary retention. 

Apparently, the patient had been started on Lupron and Casodex for

high-grade prostate cancer.  The patient initially had done well with a

lowering of his PSA.  The patient has a history of urinary retention and

has been seen as well in the office for home Waddell changes monthly.  In the

last few weeks, the patient's son reports that he has been having

increasing low back pain and leg pain.  He has had some difficulty

ambulating and progressive weakness.  He was brought to the emergency room

and was then admitted.  The patient was seen today by Dr. Sewell in Radiation

Oncology after a CT scan and MRI showed apparent bony metastasis to the

spine.  A  consultation was requested regarding the above.



PAST MEDICAL HISTORY:  Significant for prostate cancer, glaucoma, history

of seizures, hyperlipidemia.



MEDICATIONS:  Include Colace, Lopid, MiraLax, Percocet, Rocephin, Tegretol,

Tylenol and Xalatan.



ALLERGIES:  NO KNOWN DRUG ALLERGIES.



FAMILY HISTORY:  Noncontributory for this admission.



SOCIAL HISTORY:  No current smoking or EtOH use.



REVIEW OF SYSTEMS:  A 12-point review of systems was obtained.  Positive

for weakness, lethargy, back pain, urinary retention, weakness and numbness

of the lower extremities, memory loss and constipation.  Other systems are

negative.



PHYSICAL EXAMINATION:

GENERAL:  The patient is awake and answering questions, although he is a

poor historian.  His son is present and also helping with the history.

VITAL SIGNS:  He is afebrile at 97.4, pulse 86, /80, respirations 22.

NECK:  Supple.  There is no adenopathy.

CHEST:  Exam of the chest reveals a normal inspiratory effort.

CARDIAC:  Exam shows positive S1, S2.

ABDOMEN:  There is some mild peripheral edema noted on abdominal exam.  The

abdomen is soft, nontender, nondistended.  There is no hepatosplenomegaly. 

There is no apparent CVA tenderness.

GENITOURINARY:  Phallus is normal.  There is a Waddell catheter in place

draining clear-colored urine.  Scrotum normal.  Testes bilaterally

descended, nontender, no masses.  Epididymides are normal.



LABORATORY DATA.  Creatinine normal with a GFR greater than 60, alk phos

elevated at 214.  PSA done today is at 166, hemoglobin 11, hematocrit 33.6.

Urinalysis: Urine culture grew Enterococcus faecalis.  On radiologic exam,

the patient had a CT scan of the abdomen and pelvis which showed multiple

enlarged lymph nodes in the left side of the pelvis.  There was also

para-aortic adenopathy and inguinal adenopathy.  The MRI of the spine

showed multiple masses within the lumbar vertebral bodies as well as upper

sacral concerning for metastatic disease.  There was retroperitoneal

adenopathy, degenerative changes, mild narrowing of the thecal sac and

narrowing of both lateral recesses at L4-5, foraminal narrowing L2-3

through L5-S1.



IMPRESSION AND PLAN:  This is an 88-year-old male who appears to have

developed castrate resistant metastatic prostate cancer.  This has

progressed quickly as apparently the last PSA is from our office recently

and were low.  The patient has been seen by Dr. Sewell and offered palliative

x-ray treatment to his spine.  Urologically, for now I would recommend

discontinuing the bicalutamide.  I will check a testosterone level in the

morning.  I think the patient should be started on either Zytiga and

prednisone Xtandi as soon as possible.  As for the fairly of palliative

radiation therapy, my recommendation would be for the patient to receive

Xofigo if possible.  However, this may be difficult to arrange unless Dr. Sewell is able to provide that he here.  Alternatively, the patient could have

palliative radiation.  For now, he should be continued on analgesics.  Also

would recommend the patient be started on Xgeva which he should receive

monthly to improve his bone strength.  Thank you for allowing me to

participate the care of this patient.  I will discuss his care with Dr. Rivero, and I would also recommend and Oncology consult; possibly they

can assist in helping the patient to obtain these medications while in the

hospital.  The patient should continue to follow up with me as well for

Waddell changes and also to continue treatment of his advanced prostate

cancer.







__________________________________________

Price Jackson MD



DD:  01/18/2018 18:35:39

DT:  01/18/2018 18:46:06

Job # [id number]

## 2018-01-19 NOTE — CP.PCM.PN
Subjective





- Date & Time of Evaluation


Date of Evaluation: 01/19/18


Time of Evaluation: 09:30





- Subjective


Subjective: 


Mr Kingston has metastatic prostate cancer to the lumbar-sacral spine.  We 

spoke to him about the palliative radiation therapy today. He is interested in 

proceeding. We will simulate him today.  We will follow up on his bone scan as 

well.





Objective





- Vital Signs/Intake and Output


Vital Signs (last 24 hours): 


 











Temp Pulse Resp BP Pulse Ox


 


 97.9 F   81   22   144/61   97 


 


 01/19/18 07:45  01/19/18 07:45  01/19/18 07:45  01/19/18 07:45  01/19/18 07:45








Intake and Output: 


 











 01/19/18 01/19/18





 06:59 18:59


 


Intake Total 840 


 


Output Total 1325 


 


Balance -485 














- Medications


Medications: 


 Current Medications





Acetaminophen (Tylenol 325mg Tab)  650 mg PO Q4H PRN


   PRN Reason: Pain, Mild (1-3)


Carbamazepine (Tegretol)  200 mg PO TID [facility]


   PRN Reason: Protocol


   Last Admin: 01/18/18 17:05 Dose:  200 mg


Docusate Sodium (Colace)  200 mg PO DAILY [facility]


   Last Admin: 01/18/18 13:16 Dose:  200 mg


Gemfibrozil (Lopid)  600 mg PO BID [facility]


   Last Admin: 01/18/18 17:05 Dose:  600 mg


Latanoprost (Xalatan Opht)  0 ml OD DAILY [facility]


   Last Admin: 01/18/18 13:17 Dose:  2.5 ml


Oxycodone/Acetaminophen (Percocet 5/325 Mg Tab)  1 tab PO Q4H PRN


   PRN Reason: Pain, moderate (4-7)


   Stop: 01/21/18 08:08


   Last Admin: 01/19/18 07:00 Dose:  1 tab


Polyethylene Glycol (Miralax)  17 gm PO BID [facility]


   Last Admin: 01/18/18 17:05 Dose:  17 gm











- Labs


Labs: 


 











PT  12.4 SECONDS (9.4-12.5)   01/16/18  09:17    


 


INR  1.08  (0.93-1.08)   01/16/18  09:17    


 


APTT  31.9 Seconds (25.1-36.5)   01/16/18  09:17

## 2018-01-19 NOTE — PN
DATE:  01/19/2018



SUBJECTIVE:  Patient is lying in bed.  He complains of some low back pain

and has difficulty moving.



PHYSICAL EXAMINATION:

VITAL SIGNS:  Reveal temperature of 97.9, blood pressure of 144/61, heart

rate of 81.

HEENT:  Reveal sclerae to be white.  Conjunctivae pink.

NECK:  Supple.

CHEST:  Reveal lungs to be clear.

HEART:  Reveals regular rate and rhythm.

ABDOMEN:  Soft, nontender.  No mass.

EXTREMITIES:  Show no edema.



LABORATORY DATA:  New laboratory data reveals a PSA of 166.



IMPRESSION:

1.  Prostate cancer with bone metastasis and retroperitoneal adenopathy.

2.  Constipation.  The patient finally move his bowels with MiraLax.



RECOMMENDATIONS:

1.  Palliative chemoradiation, Radiation Therapy has seen the patient.

2.  Continue MiraLax 17 g twice a day.

3.  Await results of bone scan.





__________________________________________

Cristino Chou MD



DD:  01/19/2018 13:54:54

DT:  01/19/2018 13:56:52

Job # [id number]

## 2018-01-20 RX ADMIN — OXYCODONE HYDROCHLORIDE AND ACETAMINOPHEN PRN TAB: 5; 325 TABLET ORAL at 09:56

## 2018-01-20 RX ADMIN — POLYETHYLENE GLYCOL 3350 SCH GM: 17 POWDER, FOR SOLUTION ORAL at 17:43

## 2018-01-20 RX ADMIN — POLYETHYLENE GLYCOL 3350 SCH GM: 17 POWDER, FOR SOLUTION ORAL at 09:53

## 2018-01-20 RX ADMIN — LATANOPROST SCH ML: 50 SOLUTION OPHTHALMIC at 09:56

## 2018-01-20 RX ADMIN — OXYCODONE HYDROCHLORIDE AND ACETAMINOPHEN PRN TAB: 5; 325 TABLET ORAL at 21:12

## 2018-01-21 RX ADMIN — POLYETHYLENE GLYCOL 3350 SCH GM: 17 POWDER, FOR SOLUTION ORAL at 18:33

## 2018-01-21 RX ADMIN — POLYETHYLENE GLYCOL 3350 SCH: 17 POWDER, FOR SOLUTION ORAL at 18:36

## 2018-01-21 RX ADMIN — LATANOPROST SCH ML: 50 SOLUTION OPHTHALMIC at 09:28

## 2018-01-21 RX ADMIN — POLYETHYLENE GLYCOL 3350 SCH GM: 17 POWDER, FOR SOLUTION ORAL at 09:28

## 2018-01-21 NOTE — PN
DATE:  01/21/2018



SUBJECTIVE:  The patient has no complaints of any chest pain or shortness

of breath.  He says his back pain is better with the pain medication.



PHYSICAL EXAMINATION:

VITAL SIGNS:  Temperature is 98.2, pulse of 88, blood pressure is 167/78,

respirations 18.

GENERAL:  The patient is lying in bed, flat, comfortable.

HEENT:  No oral lesion.  Anicteric sclerae.  Moist mucosa.

NECK:  No JVD, adenopathy, or thyromegaly.

CARDIOVASCULAR:  S1 and S2, regular.  No murmurs, rubs, or gallops.

LUNGS:  Clear to auscultation bilaterally.  No wheeze, rales, or rhonchi.

ABDOMEN:  Bowel sounds are positive, soft, nontender and nondistended.

EXTREMITIES:  no cyanosis, clubbing or edema.



LABORATORY DATA:  White count 6.3, hemoglobin is 11.1, creatinine is 0.8. 

Bone scan done shows osseous metastatic disease primarily affecting the

axial skeleton.  There is an increase uptake in left greater trochanter,

left trochanter and left scapula.



ASSESSMENT:

1.  Abdominal pain resolved.

2.  Back pain secondary to metastatic disease and prostate cancer.

3.  Spinal stenosis.

4.  Hepatomegaly.

5.  An 11 mm right pole renal cyst.

6.  Coronary artery disease.

7.  Osteoarthritis.

8.  Gait dysfunction secondary to back pain.

9.  Pelvic adenopathy.

10.  Urinary tract infection secondary to enterococcus.

11.  Seizure disorder.



PLAN:  The patient is currently comfortable.  He is getting physical

therapy.  He is on his eye drops.  He is receiving carbamazepine for

seizures.  He is on Senokot for his constipation.  The patient is on Lopid

for his dyslipidemia.  He is on a fentanyl patch for pain.

He is on Colace.  He is on a heart healthy diet.  He is being followed up

Dr. Jackson and Dr. Chou.  He is waiting to go to the Transitional Care Unit

for rehab.





__________________________________________

Nathan Rivero MD







DD:  01/21/2018 11:16:38

DT:  01/21/2018 13:43:09

Job # [id number]

## 2018-01-21 NOTE — PN
DATE:  01/21/2018



SUBJECTIVE:  The patient is lying in bed, comfortable.  He has been moving

his bowels.  He still complains of occasional low back pain, although this

appears to be controlled with pain medications.  He denies any nausea,

vomiting or abdominal pain.



OBJECTIVE:

VITAL SIGNS:  Reveal temperature of 98.2, blood pressure 167/78, heart rate

of 88.

HEENT:  Reveal sclerae to be white.  Conjunctivae pink.

NECK:  Supple.

CHEST/LUNGS:  Clear.

HEART:  Exam reveals a regular rate and rhythm.

ABDOMEN:  Soft, nontender.  No mass.

EXTREMITIES:  Show no edema.



LABORATORY DATA:  There are no new laboratory data.  Bone scan revealed

multiple distant metastases to spine and ribs as well as the scapula.



IMPRESSION:

1.  Prostate cancer with bone metastases to spine and ribs.

2.  Abdominal pain which is resolved.

3.  Constipation.



RECOMMENDATIONS/PLANS:  For the patient to start radiation in the morning,

followed by palliative chemotherapy.  The patient is to receive a short

course of radiation of approximately 9 to 10 days.





__________________________________________

Cristino Chou MD



DD:  01/21/2018 13:32:59

DT:  01/21/2018 13:35:36

Job # [id number]

## 2018-01-22 VITALS
DIASTOLIC BLOOD PRESSURE: 69 MMHG | HEART RATE: 76 BPM | SYSTOLIC BLOOD PRESSURE: 132 MMHG | OXYGEN SATURATION: 96 % | TEMPERATURE: 97.4 F

## 2018-01-22 VITALS — RESPIRATION RATE: 20 BRPM

## 2018-01-22 RX ADMIN — POLYETHYLENE GLYCOL 3350 SCH GM: 17 POWDER, FOR SOLUTION ORAL at 10:54

## 2018-01-22 RX ADMIN — POLYETHYLENE GLYCOL 3350 SCH GM: 17 POWDER, FOR SOLUTION ORAL at 18:04

## 2018-01-22 RX ADMIN — LATANOPROST SCH ML: 50 SOLUTION OPHTHALMIC at 10:56

## 2018-01-22 NOTE — PN
DATE:  01/20/2018



CHIEF COMPLAINT:  Abdominal pain.



HISTORY OF PRESENT ILLNESS:  Patient with a history of prostate cancer who

now has metastatic disease, presented with abdominal and back pain. 

Imaging has revealed metastatic disease in his spine and lymph nodes.



OBJECTIVE:

GENERAL:  The patient is awake, alert and answering questions.

VITAL SIGNS:  He is afebrile 97.5, /70, pulse 80, respirations 18.

ABDOMEN:  Soft, nontender, nondistended.

BACK:  No CVA tenderness.



The patient had a bone scan which showed evidence of metastatic disease,

primarily affecting the axial skeleton.  There is increased uptake in the

left greater trochanter, right lesser trochanter, scapular glenoid on the

left.   There was nice _____ thoracolumbar spine, pelvis, proximal femurs,

ribs, and the left scapula.  Testosterone level was low at 8.  I discussed

again with the patient, he is currently feeling better.  He has been seen

by Dr. Sewell from Radiation Oncology and

palliative radiation was recommended.  I discussed with the patient that at

this point, I would agree for the plan of the palliative radiation.  When

the patient is done,  I would start him on Zytiga and prednisone or Xtandi.



Plan for now would be to try medical therapy to improve his pain and reduce

the metastatic disease along with the radiation.  I am unsure if the

patient would be a candidate for Xofigo after the radiation; however, I

will refer him for further evaluation once his acute symptoms from the

metastases have resolved.  The patient agrees with this plan.  He has

already been marked, and I would recommend to proceed with palliative

radiation.  I have been working on obtaining the Zytiga for him, and we

will start him on this as soon as he has improved and is discharged as an

outpatient.





__________________________________________

Price Jackson MD





DD:  01/20/2018 15:49:59

DT:  01/20/2018 15:52:25

Job # [id number]

## 2018-01-22 NOTE — CP.PCM.PN
Subjective





- Date & Time of Evaluation


Date of Evaluation: 01/22/18


Time of Evaluation: 01:00





- Subjective


Subjective: 


We spoke to the patient and son again today after the bone scan findings. They 

are agreeable to proceeding with the radiation. We simulated him today and will 

begin shortly.





Objective





- Vital Signs/Intake and Output


Vital Signs (last 24 hours): 


 











Temp Pulse Resp BP Pulse Ox


 


 97.8 F   81   20   136/62   98 


 


 01/22/18 07:30  01/22/18 07:30  01/22/18 07:30  01/22/18 07:30  01/22/18 07:30








Intake and Output: 


 











 01/22/18 01/22/18





 06:59 18:59


 


Intake Total 420 


 


Output Total 1700 


 


Balance -1280 














- Medications


Medications: 


 Current Medications





Acetaminophen (Tylenol 325mg Tab)  650 mg PO Q4H PRN


   PRN Reason: Pain, Mild (1-3)


   Last Admin: 01/22/18 03:26 Dose:  650 mg


Carbamazepine (Tegretol)  200 mg PO TID [facility]


   PRN Reason: Protocol


   Last Admin: 01/22/18 10:54 Dose:  200 mg


Docusate Sodium (Colace)  100 mg PO DAILY [facility]


   Last Admin: 01/22/18 10:54 Dose:  100 mg


Fentanyl (Duragesic)  1 patch TD Q72H [facility]


   Last Admin: 01/21/18 04:30 Dose:  1 patch


Gemfibrozil (Lopid)  600 mg PO BID [facility]


   Last Admin: 01/22/18 10:54 Dose:  600 mg


Latanoprost (Xalatan Opht)  0 ml OD DAILY [facility]


   Last Admin: 01/22/18 10:56 Dose:  1 ml


Oxycodone/Acetaminophen (Percocet 5/325 Mg Tab)  1 tab PO Q4H PRN


   PRN Reason: Pain, moderate (4-7)


   Stop: 01/25/18 09:54


   Last Admin: 01/22/18 10:56 Dose:  1 tab


Polyethylene Glycol (Miralax)  17 gm PO BID [facility]


   Last Admin: 01/22/18 10:54 Dose:  17 gm


Sennosides (Senokot Tab)  8.6 mg PO DAILY [facility]


   Last Admin: 01/22/18 10:54 Dose:  8.6 mg











- Labs


Labs: 


 











PT  12.4 SECONDS (9.4-12.5)   01/16/18  09:17    


 


INR  1.08  (0.93-1.08)   01/16/18  09:17    


 


APTT  31.9 Seconds (25.1-36.5)   01/16/18  09:17

## 2018-01-22 NOTE — PN
DATE:  01/22/2018



SUBJECTIVE:  The patient is lying in bed comfortable.  He still has

occasional back pain and left shoulder pain.  He denies any rectal

bleeding.  He has been having regular bowel movements.  He denies any

rectal bleeding, abdominal pain, nausea or vomiting.



OBJECTIVE:

VITAL SIGNS:  Reveal temperature of 97.8, blood pressure of 136/62, and

heart rate of 81.

HEENT:  Reveal sclerae to be white.  Conjunctivae pink.

NECK:  Supple.

CHEST:  Reveal lungs to be clear.

HEART:  Reveals regular rate and rhythm.

ABDOMEN:  Soft and nontender.

EXTREMITIES:  Show no edema.



LABORATORY DATA:  Reveal no new laboratory data.



IMPRESSION:

1.  Abdominal pain, resolved.

2.  Constipation, improved with MiraLax.

3.  Prostate cancer with multiple bone metastases including lumbar spine,

left scapula, femur, and pelvis.



RECOMMENDATIONS:  Awaiting decision on palliative chemo radiation therapy.





__________________________________________

Cristino Chou MD





DD:  01/22/2018 11:11:38

DT:  01/22/2018 11:15:26

Job # [id number]

## 2018-01-23 NOTE — DS
HISTORY OF PRESENT ILLNESS:  This is an 88-year-old male who had come into

the hospital with abdominal pain.  The patient had abdominal pain that

resolved.  He did have a history of prostate cancer and further evaluation

of his back pain indicated that he had mets.  He had bone scan done that

showed mets mostly in the axial skeleton.  There is also increase uptake in

the left great trochanter.  The patient was being followed by Urology as

well as Radiation Oncology.  Plan was to do radiation and palliative

treatment.  Dr. Jackson will be managing the patient's chemotherapy.  The

patient is feeling well and the pain is controlled with pain medications. 

He is on Percocet.  He is also found to have UTI that was secondary to

Enterococcus that was being treated.  The patient has no complaints of any

headaches or dizziness.  No nausea.  No vomiting.



PHYSICAL EXAMINATION:

VITAL SIGNS:  Temperature is 97.5, pulse of 85, blood pressure 139/57, and

respirations 18.

GENERAL:  The patient is lying in bed, flat, comfortable.

HEENT:  No oral lesion.  Anicteric sclerae.  Moist mucosa.

NECK:  No JVD, adenopathy, or thyromegaly.

CARDIOVASCULAR:  S1 and S2, regular.  No murmurs, rubs, or gallops.

LUNGS:  Clear to auscultation bilaterally.  No wheeze, rales, or rhonchi.

ABDOMEN:  Bowel sounds are positive, soft, nontender and nondistended.

EXTREMITIES:  No cyanosis, clubbing or edema.



ASSESSMENT:

1.  Abdominal pain, resolved.

2.  Back pain secondary to metastatic disease from prostate cancer.

3.  Spinal stenosis.

4.  Hepatomegaly.

5.  An 11-mm right pole renal cyst.

6.  Coronary artery disease.

7.  Osteoarthritis.

8.  Gait dysfunction secondary to back pain.

9.  Pelvic adenopathy.

10.  Urinary tract infection secondary to Enterococcus.

11.  Deconditioning.

12.  Seizure disorder.



PLAN:  The patient is still not walking that well with physical therapy. 

He has been using eye drops for his glaucoma.  He is on carbamazepine for

his seizure.  He is on Lopid for his dyslipidemia.  The patient's pain is

controlled with fentanyl patch.  He is on MiraLax for his constipation as

well as Senokot.  He is on Tylenol as needed for pain, that is mild.











He is on a heart-healthy diet.  Awaiting for discharge to the Transitional

Care Unit.  We will await for TCU bed to be available, so the patient can

be discharged.





__________________________________________

Nathan Rivero MD





DD:  01/22/2018 6:00:56

DT:  01/22/2018 10:22:42

Job # [id number]

## 2018-02-05 ENCOUNTER — HOSPITAL ENCOUNTER (OUTPATIENT)
Dept: HOSPITAL 42 - ED | Age: 83
Setting detail: OBSERVATION
LOS: 3 days | Discharge: NURSING, REHAB OR LONG-TERM CARE | End: 2018-02-08
Attending: INTERNAL MEDICINE | Admitting: INTERNAL MEDICINE
Payer: MEDICARE

## 2018-02-05 VITALS — BODY MASS INDEX: 25.7 KG/M2

## 2018-02-05 DIAGNOSIS — G89.3: ICD-10-CM

## 2018-02-05 DIAGNOSIS — C61: ICD-10-CM

## 2018-02-05 DIAGNOSIS — M54.9: ICD-10-CM

## 2018-02-05 DIAGNOSIS — I25.10: ICD-10-CM

## 2018-02-05 DIAGNOSIS — M48.00: ICD-10-CM

## 2018-02-05 DIAGNOSIS — Z87.891: ICD-10-CM

## 2018-02-05 DIAGNOSIS — N28.1: ICD-10-CM

## 2018-02-05 DIAGNOSIS — Z92.3: ICD-10-CM

## 2018-02-05 DIAGNOSIS — M19.90: ICD-10-CM

## 2018-02-05 DIAGNOSIS — R16.0: ICD-10-CM

## 2018-02-05 DIAGNOSIS — R59.1: ICD-10-CM

## 2018-02-05 DIAGNOSIS — R29.6: ICD-10-CM

## 2018-02-05 DIAGNOSIS — R53.1: Primary | ICD-10-CM

## 2018-02-05 DIAGNOSIS — N13.9: ICD-10-CM

## 2018-02-05 DIAGNOSIS — C79.51: ICD-10-CM

## 2018-02-05 DIAGNOSIS — G40.909: ICD-10-CM

## 2018-02-05 DIAGNOSIS — R26.2: ICD-10-CM

## 2018-02-05 PROCEDURE — 99285 EMERGENCY DEPT VISIT HI MDM: CPT

## 2018-02-05 PROCEDURE — 36415 COLL VENOUS BLD VENIPUNCTURE: CPT

## 2018-02-05 PROCEDURE — 82728 ASSAY OF FERRITIN: CPT

## 2018-02-05 PROCEDURE — 85025 COMPLETE CBC W/AUTO DIFF WBC: CPT

## 2018-02-05 PROCEDURE — 81001 URINALYSIS AUTO W/SCOPE: CPT

## 2018-02-05 PROCEDURE — 83550 IRON BINDING TEST: CPT

## 2018-02-05 PROCEDURE — 97116 GAIT TRAINING THERAPY: CPT

## 2018-02-05 PROCEDURE — 97162 PT EVAL MOD COMPLEX 30 MIN: CPT

## 2018-02-05 PROCEDURE — 97530 THERAPEUTIC ACTIVITIES: CPT

## 2018-02-05 PROCEDURE — 87040 BLOOD CULTURE FOR BACTERIA: CPT

## 2018-02-05 PROCEDURE — 84145 PROCALCITONIN (PCT): CPT

## 2018-02-05 PROCEDURE — 82607 VITAMIN B-12: CPT

## 2018-02-05 PROCEDURE — 87086 URINE CULTURE/COLONY COUNT: CPT

## 2018-02-05 PROCEDURE — 80053 COMPREHEN METABOLIC PANEL: CPT

## 2018-02-05 PROCEDURE — 83540 ASSAY OF IRON: CPT

## 2018-02-05 PROCEDURE — 84153 ASSAY OF PSA TOTAL: CPT

## 2018-02-05 PROCEDURE — 82746 ASSAY OF FOLIC ACID SERUM: CPT

## 2018-02-05 RX ADMIN — LATANOPROST SCH ML: 50 SOLUTION OPHTHALMIC at 22:45

## 2018-02-05 RX ADMIN — OXYCODONE HYDROCHLORIDE AND ACETAMINOPHEN SCH TAB: 5; 325 TABLET ORAL at 22:45

## 2018-02-05 NOTE — ED PDOC
Arrival/HPI





- General


Chief Complaint: Weakness/Neurological Deficit


Time Seen by Provider: 02/05/18 14:30


Historian: Patient, Family (Son Cristino)





- History of Present Illness


Time/Duration: Prior to Arrival


Symptom Onset: Gradual


Symptom Course: Unchanged


Severity Level: Severe


Activities at Onset: Rest


Associated Symptoms (Text): 





02/05/18 14:51


Patient complains of severe generalized weakness and fatigue. He lives at home 

alone and is unable to care for himself. He is unable to ambulate despite using 

a walker. He has prostate cancer with bone metastases. He was discharged from 

the hospital 2 days ago. Visiting nurse spoke with his PMD about making 

arrangements for nursing home placement, and he was directed to the emergency 

department. No fever or chills. No cough congestion or URI. He has several 

falls at home over the weekend, but denies any injury.





Past Medical History





- Infectious Disease


Hx of Infectious Diseases: None





- Cardiac


Hx Cardiac Disorders: Yes





- Pulmonary


Hx Respiratory Disorders: No





- Neurological


Hx Neurological Disorder: Yes


Hx Seizures: Yes





- HEENT


Hx HEENT Disorder: Yes


Hx Glaucoma: Yes





- Renal


Hx Renal Disorder: No





- Endocrine/Metabolic


Hx Endocrine Disorders: No





- Hematological/Oncological


Hx Blood Disorders: No





- Integumentary


Hx Dermatological Disorder: No





- Musculoskeletal/Rheumatological


Hx Arthritis: Yes





- Gastrointestinal


Hx Gastrointestinal Disorders: Yes





- Genitourinary/Gynecological


Hx Genitourinary Disorders: Yes


Hx Reproductive Disorders: No





- Psychiatric


Hx Psychophysiologic Disorder: No


Hx Substance Use: No


Other/Comment: patient has indwelling catheter





- Anesthesia


Hx Anesthesia: No





Family/Social History





- Physician Review


Nursing Documentation Reviewed: Yes


Family/Social History: Unknown Family HX


Smoking Status: Never Smoked


Hx Alcohol Use: No


Hx Substance Use: No





Allergies/Home Meds


Allergies/Adverse Reactions: 


Allergies





No Known Allergies Allergy (Verified 01/22/18 20:06)


 








Home Medications: 


 Home Meds











 Medication  Instructions  Recorded  Confirmed


 


Bicalutamide [Casodex] 50 mg PO DAILY 12/11/16 01/22/18


 


Carbamazepine 200 mg PO TID 12/11/16 01/22/18


 


Cyanocobalamin (Vitamin B-12) 1,000 mcg PO DAILY 12/11/16 01/22/18





[B-12]   


 


Gemfibrozil 600 mg PO BID 12/11/16 01/22/18


 


Multivit-Min/FA/Lycopen/Lutein 1 tab PO DAILY 12/11/16 01/22/18





[Centrum Silver Tablet]   


 


Omega-3 Fatty Acids [Fish Oil] 100 mg PO DAILY 12/11/16 01/22/18


 


Omeprazole 40 mg PO DAILY 12/11/16 01/22/18


 


Polyethylene Glycol 3350 [Gavilax] 5 ml PO DAILY 12/11/16 01/22/18


 


Ubidecarenone [Coq10] 100 mg PO DAILY 12/11/16 01/22/18


 


Latanoprost 0.005% Opht [Xalatan 1 drp OP HS 01/16/18 01/22/18





Opht]   


 


Magnesium Gluconate 500 mg PO DAILY 01/16/18 01/22/18














Review of Systems





- Physician Review


All systems were reviewed & negative as marked: Yes





- Review of Systems


Constitutional: Fatigue.  absent: Fevers


Respiratory: absent: SOB, Cough, Sputum


Cardiovascular: absent: Chest Pain, Palpitations, Syncope


Gastrointestinal: absent: Abdominal Pain, Nausea, Vomiting


Musculoskeletal: Back Pain


Neurological: absent: Headache, Dizziness, Focal Weakness





Physical Exam


Vital Signs











  Temp Pulse Resp BP Pulse Ox


 


 02/05/18 15:17  98.4 F  90  24  130/62  97











Temperature: Afebrile


Blood Pressure: Normal


Pulse: Regular


Respiratory Rate: Normal


Appearance: Positive for: Well-Appearing, Non-Toxic, Uncomfortable, Other (

Chronically ill-appearing)


Pain Distress: None


Mental Status: Positive for: Alert and Oriented X 3





- Systems Exam


Head: Present: Atraumatic, Normocephalic


Pupils: Present: PERRL


Extroacular Muscles: Present: EOMI


Conjunctiva: Present: Normal


Mouth: Present: Moist Mucous Membranes


Pharnyx: No: ERYTHEMA, EXUDATE, TONSILS ENLARGED


Neck: Present: Normal Range of Motion


Respiratory/Chest: Present: Clear to Auscultation, Good Air Exchange, Decreased 

Breath Sounds.  No: Respiratory Distress, Accessory Muscle Use


Cardiovascular: Present: Regular Rate and Rhythm, Normal S1, S2.  No: Murmurs


Abdomen: Present: Normal Bowel Sounds.  No: Tenderness, Distention, Peritoneal 

Signs, Rebound, Guarding


Back: Present: Normal Inspection, Paraspinal Tenderness.  No: CVA Tenderness, 

Midline Tenderness


Upper Extremity: Present: Normal Inspection.  No: Cyanosis, Edema


Lower Extremity: Present: Normal Inspection.  No: Edema


Neurological: Present: GCS=15, CN II-XII Intact, Speech Normal, Motor Func 

Grossly Intact, Other (Unable to ambulate)


Skin: Present: Warm, Dry, Normal Color.  No: Rashes


Psychiatric: Present: Alert, Oriented x 3, Normal Insight, Normal Concentration





Medical Decision Making


ED Course and Treatment: 





02/05/18 16:39


Seen and evaluated in the emergency department by . Patient will 

be placed on a [facility] observation bed with physical therapy consult and 

placement in the morning. Discussed with .





Disposition/Present on Arrival





- Present on Arrival


Any Indicators Present on Arrival: No


History of DVT/PE: No


History of Uncontrolled Diabetes: No


Urinary Catheter: Yes


History of Decub. Ulcer: No


History Surgical Site Infection Following: None





- Disposition


Have Diagnosis and Disposition been Completed?: Yes


Diagnosis: 


 Prostate cancer, Weakness, Unable to ambulate





Disposition: HOSPITALIZED


Disposition Time: 16:40


Patient Plan: Observation


Condition: FAIR


Discharge Instructions (ExitCare):  Weakness (ED)


Referrals: 


Sami Thakur MD [Primary Care Provider] - Follow up with primary


Forms:  [employer] (English)

## 2018-02-06 RX ADMIN — LATANOPROST SCH ML: 50 SOLUTION OPHTHALMIC at 22:32

## 2018-02-06 RX ADMIN — OXYCODONE HYDROCHLORIDE AND ACETAMINOPHEN SCH TAB: 5; 325 TABLET ORAL at 22:32

## 2018-02-06 RX ADMIN — OXYCODONE HYDROCHLORIDE AND ACETAMINOPHEN SCH TAB: 5; 325 TABLET ORAL at 09:46

## 2018-02-06 NOTE — HP
CHIEF COMPLAINT AND HISTORY OF PRESENT ILLNESS:  This is an 88-year-old

male who is coming in to the hospital after having multiple falls at home. 

The patient states that he went home from the transitional care unit 3 days

ago and was having difficulty in ambulating.  I spoke to the patient's son,

Cristino.  He said that he had been having difficulty with ambulating,

especially with weakness in the knees.  The patient says that his knees are

not strong enough to support his body at times and he is falling.  He

denies any back pain.  No new weakness in the arms or the legs, but mostly

in the knees.  He has no complaints of chest pain or shortness of breath. 

No abdominal pain or back pain.  No dysuria, frequency, or nocturia.  All

other review of symptoms are within normal limits except what was

mentioned.



ALLERGIES:  NO KNOWN DRUG ALLERGIES.



HOME MEDICATIONS:  He is on Casodex, carbamazepine, vitamin B12,

gemfibrozil, fish oil, omeprazole, GaviLAX, Xalatan.



SOCIAL HISTORY:  He does not smoke, drink, or use drugs.



FAMILY HISTORY:  Noncontributory.



PAST MEDICAL HISTORY:

1.  Metastatic hormone-resistant prostate cancer, status post radiation,

9/10 treatments.

2.  Urinary retention with chronic Waddell catheter.

3.  Back pain secondary to prostate cancer.

4.  Spinal stenosis.

5.  Hepatomegaly.

6.  Coronary artery disease.

7.  Osteoarthritis.

8.  An 11-mm right renal cyst.

9.  Gait dysfunction secondary to back pain.

10.  UTI secondary to enterococcus.

11.  Pelvic adenopathy.

12.  Seizures.



PHYSICAL EXAMINATION:

VITAL SIGNS:  He has a temperature of 98.4, pulse of 86, blood pressure is

128/65, respirations 18, O2 saturations 97%.  Height is 5 feet 10 inches,

weight is 179 pounds.

GENERAL:  The patient lying in bed, uncomfortable, and in no acute

distress.

HEENT:  Atraumatic and normocephalic.  Anicteric sclerae.  Moist mucosa.

Pink conjunctivae.  No oral lesions.

NECK:  No JVD, anterior and posterior adenopathy, thyromegaly, or bruits.

CARDIOVASCULAR:  S1 and S2 regular.  No murmur, rubs, or gallop.

LUNGS:  Clear to auscultation bilaterally.  No wheezes, rales, or rhonchi.

ABDOMEN:  Bowel sounds are positive.  Soft, nontender and nondistended.  No

hepatosplenomegaly. No rebound and no guarding.

EXTREMITIES:  No cyanosis, clubbing, or edema.

NEUROLOGIC:  No facial asymmetry.  Tongue is midline.  No uvula deviation. 

Power is 5/5 upper extremity and lower extremity.  Sensation intact in

upper extremity and lower extremity.

PSYCHIATRIC:  He is awake, alert and oriented x3.  No anxiety or

depression.  He has normal affect.

GENITOURINARY:  No CVA tenderness.

VASCULAR:  2+ pulses in the carotid pulses and pedal pulses.

SKIN:  No erythema or nodules

SPINE:  Shows normal curvature.



LABORATORY DATA:  No new labs.



ASSESSMENT:

1.  Gait dysfunction.

2.  Metastatic hormone-resistant prostate cancer, status post radiation,

9/10 treatments.

3.  Urinary retention with chronic Waddell catheter.

4.  Back pain secondary to prostate cancer.

5.  Spinal stenosis.

6.  Hepatomegaly.

7.  Coronary artery disease.

8.  Osteoarthritis.

9.  An 11-mm right renal cyst.

10.  Seizures.

11.  Deconditioning.



PLAN:  The patient is going to be admitted to the hospital.  Patient is

going to be brought in as an observation.  He is unable to ambulate.  He

will need physical therapy.  He may need subacute rehab.  I did speak to

Agnes from case management to let her know that the importance of trying

to get this patient to subacute rehab.  The patient has been given the

influenza shot.  He is on fentanyl for pain.  The patient is on Percocet

for pain as well.  He is going to continue with his Tegretol for his

seizures.  The patient is on Xalatan for his eye.  He is on a regular diet.

The patient is not complaining of any pain.  We will attempt to get his

last radiation treatment today.  Hopefully, he can be discharged to a rehab

facility.





__________________________________________

Nathan Rivero MD



DD:  02/06/2018 8:05:50

DT:  02/06/2018 9:38:45

Job # [id number]

## 2018-02-07 RX ADMIN — OXYCODONE HYDROCHLORIDE AND ACETAMINOPHEN SCH TAB: 5; 325 TABLET ORAL at 09:05

## 2018-02-07 RX ADMIN — OXYCODONE HYDROCHLORIDE AND ACETAMINOPHEN SCH TAB: 5; 325 TABLET ORAL at 21:44

## 2018-02-07 RX ADMIN — LATANOPROST SCH ML: 50 SOLUTION OPHTHALMIC at 21:45

## 2018-02-07 NOTE — CON
DATE:  02/07/2018



REASON FOR CONSULTATION:  Stage IV prostatic cancer.



CONSULT REQUESTED BY:  Dr. Hugo.



HISTORY OF PRESENT ILLNESS:  Mr. Kingston is an 88-year-old male, admitted

to the hospital with difficulty in ambulating.  He was admitted earlier in

January with abdominal pain radiating in the front and was found to have

metastatic lesions in the spine.  Nuclear bone scan showed multiple

skeletal masses in the axial skeleton, lumbosacral spine, in humerus and

femur and scapula.  He is currently on fentanyl patch 25 mcg with Percocet

for breakthrough pain.  He completed his last radiation today.  He has an

indwelling Waddell catheter.  He is complaining of generalized weakness in

legs.  He was diagnosed with prostate cancer in 2016.  He has been on

Casodex and Lupron since then.  CAT scan of the abdomen reviewed, done in

January, showed generalized lymphadenopathy.  The largest lymph node in the

abdomen measured 3 cm on the left side of the pelvis.  He has multiple

other comorbid conditions listed below.  Pain is controlled with current

medication regimen.



ALLERGIES:  No known drug allergies.



HOME MEDICATION:  Casodex, carbamazepine, B12, fish oil, omeprazole,

Xalatan.

 

PAST MEDICAL HISTORY:  Urinary retention, Waddell catheter, back pain, spinal

stenosis, hepatomegaly, coronary artery disease, osteoarthritis, renal

cyst, gait dysfunction, seizures.



PERSONAL HISTORY:  Nonsmoker, no history of alcohol abuse.



SOCIAL HISTORY:  Lives at home, alone.



FAMILY HISTORY:  Noncontributory.



REVIEW OF SYSTEMS:  As per HPI.  Rest of 12-point review of systems

reviewed negative.



PHYSICAL EXAMINATION

GENERAL:  Comfortable in bed, in no acute distress

VITAL SIGNS:  Temperature 98.8, heart rate is 80 per minute, blood pressure

120/70, respiratory rate 15 per minute, oxygen saturation 98% on room air.

HEENT:  No lymphadenopathy.  Pallor positive.

CHEST:  Air entry present and equal bilaterally.  No added sounds.

CARDIOPULMONARY:  S1 and S2 normal.  No murmur, no gallop.

ABDOMEN:  Soft, nontender, no hepatosplenomegaly.

EXTREMITIES:  No edema.  Waddell catheter draining clear urine.

SPINE:  Nontender.

SKIN:  No petechia.  No rash.

CNS:  Alert and oriented x3.  No focal sensory or motor deficit.



LABORATORY DATA:  No current labs.  Last PSA was 166.



ASSESSMENT:

1.  Prostate cancer stage IV with metastatic lesions in the bone. 

Intraabdominal lymphadenopathy.

2.  Urinary retention, has an indwelling Waddell catheter.

3.  Spinal stenosis.

4.  Coronary artery disease.

5.  Osteoarthritis.

6.  Deconditioning and gait dysfunction.



PLAN:

1.  He is currently on Casodex and Lupron.  He has metastatic lesions in

the bones and intraabdominal lymph nodes.  I would recommend Zytiga with

prednisone 5 mg p.o. b.i.d.  He will need monthly bisphosphonates for bony

metastatic lesions.  Will monitor calcium and vitamin D.  He will need

calcium and vitamin D supplements.  I had a lengthy discussion with Mr. Kingston today.  He wants me to talk to his son, Cristino, who will discuss

with Dr. Jackson.

2.  Pain is controlled with fentanyl 25 and Percocet for breakthrough pain.

Will recommend continuing the same regimen.  Bowel regimen with that, to be

given Senna, Colace b.i.d.

3.  Gait dysfunction.  He will need subacute rehab.  Will order labs for

the morning, CBC, BMP, PSA.  We will also order first dose of Zometa after

evaluation of renal function.



Thank you Dr. Hugo for allowing us to participate in Mr. Marin'

care.





__________________________________________

Shena Castro MD



DD:  02/07/2018 22:21:38

DT:  02/07/2018 23:16:27

Job # [id number]

## 2018-02-08 VITALS
RESPIRATION RATE: 20 BRPM | TEMPERATURE: 98.2 F | HEART RATE: 74 BPM | DIASTOLIC BLOOD PRESSURE: 63 MMHG | OXYGEN SATURATION: 95 % | SYSTOLIC BLOOD PRESSURE: 141 MMHG

## 2018-02-08 LAB
% IRON SATURATION: 50 % (ref 20–55)
ALBUMIN SERPL-MCNC: 3.3 G/DL (ref 3–4.8)
ALBUMIN/GLOB SERPL: 1.1 {RATIO} (ref 1.1–1.8)
ALT SERPL-CCNC: 22 U/L (ref 7–56)
APPEARANCE UR: CLEAR
AST SERPL-CCNC: 27 U/L (ref 17–59)
BACTERIA #/AREA URNS HPF: (no result) /[HPF]
BASOPHILS # BLD AUTO: 0.01 K/MM3 (ref 0–2)
BASOPHILS NFR BLD: 0.5 % (ref 0–3)
BILIRUB UR-MCNC: NEGATIVE MG/DL
BUN SERPL-MCNC: 20 MG/DL (ref 7–21)
CALCIUM SERPL-MCNC: 9.2 MG/DL (ref 8.4–10.5)
COLOR UR: YELLOW
EOSINOPHIL # BLD: 0 10*3/UL (ref 0–0.7)
EOSINOPHIL NFR BLD: 1.9 % (ref 1.5–5)
EPI CELLS #/AREA URNS HPF: (no result) /HPF (ref 0–5)
ERYTHROCYTE [DISTWIDTH] IN BLOOD BY AUTOMATED COUNT: 13.3 % (ref 11.5–14.5)
FERRITIN SERPL-MCNC: 243 NG/ML
FOLATE SERPL-MCNC: 9.5 NG/ML
GFR NON-AFRICAN AMERICAN: > 60
GLUCOSE UR STRIP-MCNC: NEGATIVE MG/DL
GRANULOCYTES # BLD: 0.6 10*3/UL (ref 1.4–6.5)
GRANULOCYTES NFR BLD: 28.4 % (ref 50–68)
HGB BLD-MCNC: 9.8 G/DL (ref 14–18)
IRON SERPL-MCNC: 115 UG/DL (ref 45–180)
LEUKOCYTE ESTERASE UR-ACNC: (no result) LEU/UL
LYMPHOCYTES # BLD: 1.2 10*3/UL (ref 1.2–3.4)
LYMPHOCYTES NFR BLD AUTO: 56.9 % (ref 22–35)
MCH RBC QN AUTO: 31.2 PG (ref 25–35)
MCHC RBC AUTO-ENTMCNC: 33.2 G/DL (ref 31–37)
MCV RBC AUTO: 93.9 FL (ref 80–105)
MONOCYTES # BLD AUTO: 0.3 10*3/UL (ref 0.1–0.6)
MONOCYTES NFR BLD: 12.3 % (ref 1–6)
PH UR STRIP: 5.5 [PH] (ref 4.7–8)
PLATELET # BLD: 97 10^3/UL (ref 120–450)
PMV BLD AUTO: 9.6 FL (ref 7–11)
PROT UR STRIP-MCNC: NEGATIVE MG/DL
RBC # BLD AUTO: 3.14 10^6/UL (ref 3.5–6.1)
RBC # UR STRIP: (no result) /UL
SP GR UR STRIP: <= 1.005 (ref 1–1.03)
TIBC SERPL-MCNC: 229 UG/DL (ref 261–462)
URINE NITRATE: NEGATIVE
UROBILINOGEN UR STRIP-ACNC: 0.2 E.U./DL
VIT B12 SERPL-MCNC: 325 PG/ML (ref 239–931)
WBC # BLD AUTO: 2.1 10^3/UL (ref 4.5–11)

## 2018-02-08 RX ADMIN — OXYCODONE HYDROCHLORIDE AND ACETAMINOPHEN SCH TAB: 5; 325 TABLET ORAL at 10:41

## 2018-02-08 NOTE — DS
HISTORY OF PRESENT ILLNESS:  This is an 88-year-old male who had come in to

the hospital because of frequent falls.  He was discharged from the

Transitional Care Unit.  He had 10 treatments of radiation for his prostate

cancer to his back.  He is waiting to go to subacute rehab facility for

physical therapy.  Patient's pain is comfortable.  He has no complaints of

any chest pain, shortness of breath, headaches, or dizziness.



PHYSICAL EXAMINATION:

VITAL SIGNS:  Temperature 97.7, pulse is 74, blood pressure is 133/60,

respirations 19.

GENERAL:  The patient is lying in bed, flat, comfortable.

HEENT:  No oral lesion.  Anicteric sclerae.  Moist mucosa.

NECK:  No JVD, adenopathy, or thyromegaly.

CARDIOVASCULAR:  S1 and S2, regular.  No murmurs, rubs, or gallops.

LUNGS:  Clear to auscultation bilaterally.  No wheeze, rales, or rhonchi.

ABDOMEN:  Bowel sounds are positive, soft, nontender and nondistended.

EXTREMITIES:  No cyanosis, clubbing or edema.



ASSESSMENT:

1.  Gait dysfunction.

2.  Falls.

3.  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

out of 10 treatments.

4.  Urinary retention with chronic Waddell catheter secondary to obstructive

uropathy.

5.  Back pain secondary to prostate cancer.

6.  Spinal stenosis.

7.  Hepatomegaly.

8.  Coronary artery disease.

9.  Osteoarthritis.

10.  An 11-mm right renal cyst.

11.  Seizure.

12.  Deconditioning.



PLAN:  The patient is on carbamazepine for his seizures.  He is going to

continue with fentanyl patch for his pain.  He is also getting Percocet for

breakthrough pain.  Patient is on gemfibrozil for his dyslipidemia.  He is

on eye drops.  He is on a regular diet.  He was seen by Physical Therapy

yesterday, and it was recommended that the patient go to subacute rehab.





__________________________________________

Nathan Rivero MD



DD:  02/07/2018 6:20:19

DT:  02/07/2018 11:19:16

Job # [id number]

## 2018-02-08 NOTE — PN
DATE:  02/08/2018



SUBJECTIVE:  The patient has no complaints of any chest pain or shortness

of breath.  No headaches.  He states that he is comfortable.



PHYSICAL EXAMINATION

GENERAL:  The patient is lying in bed, flat, comfortable.

VITAL SIGNS:  Temperature is 97.8, pulse is 78, blood pressure 134/67, and

respirations 19.

HEENT:  No oral lesion.  Anicteric sclerae.  Moist mucosa.

NECK:  No JVD, adenopathy, or thyromegaly.

CARDIOVASCULAR:  S1 and S2, regular.  No murmurs, rubs, or gallops.

LUNGS:  Clear to auscultation bilaterally.  No wheeze, rales, or rhonchi.

ABDOMEN:  Bowel sounds are positive.  Soft, nontender and nondistended.

EXTREMITIES:  No cyanosis, clubbing or edema.



ASSESSMENT

1.  Gait dysfunction.

2.  Falls.

3.  Metastatic hormone-resistant prostate cancer, status post radiation x10

treatments.

4.  Urinary retention, right obstructive uropathy with chronic Waddell

catheter.

5.  Back pain secondary to prostate cancer.

6.  Spinal stenosis.

7.  Hepatomegaly.

8.  Coronary artery disease.

9.  Osteoarthritis.

10.  An 11-mm right renal cyst.

11.  Seizures.

12.  Deconditioning.



PLAN:  The patient is awaiting placement to subacute rehabilitation.  The

, Melodie, did speak with the patient's son.  Unfortunately, [facility] is not in network.  The patient may need to go to a _____ Facility. 

I did speak to the patient's son, Cristino yesterday to explain to him

regarding the subacute rehabilitation.  He said that his dad is not able to

go to [facility] - to him, this is an insurance issue.  I also advised him

that I would not be able to take care of his dad at _____.  The patient is

on fentanyl patch - this will be continued.  He is also receiving Percocet

for breakthrough pain and Tegretol for his seizures.  He is tolerating his

regular diet.

















No other issues according to the nursing staff.











__________________________________________

Nathan Rivero MD



DD:  02/08/2018 6:23:17

DT:  02/08/2018 7:57:38

Job # [id number]

## 2018-02-08 NOTE — CP.PCM.CON
History of Present Illness





- History of Present Illness


History of Present Illness: 


88 year old male with PMH of prostate cancer on Lupron treatment once a month (

hormonal treatment), seizure disorder, glaucoma, history of Klebsiella UTI came 

in to [facility] because of generalized weakness and fatigue. He is being treated for 

the weakness with supportive and is now planned to be transferred to a rehab 

center. There was an apparent episode of fever for this patient, although there 

are no records to support this. The patient denies having fever or chills, no 

headache or dizziness, no chest pain, no SOB, no cough or rhinorrhea, no sore 

throat, no body aches, no abdominal pain, no diarrhea, no dysuria. Infectious 

Diseases consult is requested to further evaluate and manage.





Review of Systems





- Review of Systems


All systems: reviewed and no additional remarkable complaints except (as per HPI

)





Past Patient History





- Infectious Disease


Hx of Infectious Diseases: None





- Past Social History


Smoking Status: Former Smoker





- CARDIAC


Hx Cardiac Disorders: No





- PULMONARY


Hx Respiratory Disorders: Yes (H/O SMOKING CIGARETTES)


Other/Comment: EMPYEMA/SARCOIDOSIS





- NEUROLOGICAL


Hx Neurological Disorder: Yes


Hx Dizziness: Yes (syncope)


Hx Seizures: Yes (last seizure 20 yrs ago)





- HEENT


Hx HEENT Problems: Yes


Hx Cataracts: Yes (with bilateral sx)


Hx Glaucoma: Yes





- RENAL


Hx Chronic Kidney Disease: Yes


Other/Comment: chronic tolentino-Prostate ca.





- ENDOCRINE/METABOLIC


Hx Endocrine Disorders: No





- HEMATOLOGICAL/ONCOLOGICAL


Hx Blood Disorders: Yes (radiation theraphy had 9 rounds already)


Hx Cancer: Yes (prostate ca with mets to bones)





- INTEGUMENTARY


Hx Dermatological Problems: Yes


Other/Comment: 2-5-18 bilateral le edema +2 more to right .Pitting with dry 

thin scaly flakes.





- MUSCULOSKELETAL/RHEUMATOLOGICAL


Hx Arthritis: Yes





- GASTROINTESTINAL


Hx Gastrointestinal Disorders: Yes (constipation)





- GENITOURINARY/GYNECOLOGICAL


Hx Genitourinary Disorders: Yes


Hx Urinary Tract Infection: Yes





- PSYCHIATRIC


Hx Psychophysiologic Disorder: Yes


Hx Substance Use: No





- SURGICAL HISTORY


Hx Surgeries: Yes (lung sx,right foot sx-clubbed foot,bilateral cataract sx.)





- ANESTHESIA


Hx Anesthesia: No





Meds


Allergies/Adverse Reactions: 


 Allergies











Allergy/AdvReac Type Severity Reaction Status Date / Time


 


No Known Allergies Allergy   Verified 02/05/18 18:53














- Medications


Medications: 


 Current Medications





Carbamazepine (Tegretol)  200 mg PO TID [facility]


   PRN Reason: Protocol


   Last Admin: 02/07/18 17:41 Dose:  200 mg


Fentanyl (Duragesic)  1 patch TD Q72H [facility]


   Last Admin: 02/05/18 20:54 Dose:  Not Given


Gemfibrozil (Lopid)  600 mg PO BID [facility]


   Last Admin: 02/07/18 17:42 Dose:  600 mg


Latanoprost (Xalatan Opht)  0.05 ml OD HS [facility]


   Last Admin: 02/07/18 21:45 Dose:  0.05 ml


Oxycodone/Acetaminophen (Percocet 5/325 Mg Tab)  1 tab PO Q12 [facility]


   Stop: 02/08/18 22:01


   Last Admin: 02/07/18 21:44 Dose:  1 tab











Physical Exam





- Constitutional


Appears: Non-toxic, Chronically Ill





- Head Exam


Head Exam: NORMAL INSPECTION





- ENT Exam


ENT Exam: Mucous Membranes Moist





- Neck Exam


Neck exam: Negative for: Meningismus





- Respiratory Exam


Respiratory Exam: Decreased Breath Sounds





- Cardiovascular Exam


Cardiovascular Exam: +S1, +S2





- GI/Abdominal Exam


GI & Abdominal Exam: Soft.  absent: Tenderness





Results





- Vital Signs


Recent Vital Signs: 


 Last Vital Signs











Temp  97.8 F   02/07/18 16:00


 


Pulse  78   02/07/18 16:00


 


Resp  19   02/07/18 16:00


 


BP  134/67   02/07/18 16:00


 


Pulse Ox  97   02/07/18 16:00














- Labs


Result Diagrams: 


 02/08/18 05:30





 02/08/18 05:30





Assessment & Plan





- Assessment and Plan (Free Text)


Plan: 





Assessment


Leukopenia probably from cancer, no evidence of sepsis or infection identified


history of Klebsiella urinary tract infection in a patient with prostate cancer 

and increased urinary incontinence


Mechanical fall, etiology to be determined


prostate cancer on Lupron treatment once a month (hormonal treatment)


seizure disorder


glaucoma





Plan


monitor off antibiotics

## 2018-03-25 ENCOUNTER — HOSPITAL ENCOUNTER (OUTPATIENT)
Dept: HOSPITAL 42 - ED | Age: 83
Setting detail: OBSERVATION
LOS: 3 days | Discharge: NURSING, REHAB OR LONG-TERM CARE | End: 2018-03-28
Attending: INTERNAL MEDICINE | Admitting: INTERNAL MEDICINE
Payer: MEDICARE

## 2018-03-25 VITALS — BODY MASS INDEX: 24.3 KG/M2

## 2018-03-25 DIAGNOSIS — B96.20: ICD-10-CM

## 2018-03-25 DIAGNOSIS — H40.9: ICD-10-CM

## 2018-03-25 DIAGNOSIS — G89.3: ICD-10-CM

## 2018-03-25 DIAGNOSIS — I12.9: ICD-10-CM

## 2018-03-25 DIAGNOSIS — Z92.3: ICD-10-CM
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
## 2018-03-25 DIAGNOSIS — N18.9: ICD-10-CM

## 2018-03-25 DIAGNOSIS — N13.9: ICD-10-CM

## 2018-03-25 DIAGNOSIS — M48.00: ICD-10-CM

## 2018-03-25 DIAGNOSIS — Z66: ICD-10-CM

## 2018-03-25 DIAGNOSIS — Z98.42: ICD-10-CM

## 2018-03-25 DIAGNOSIS — M19.90: ICD-10-CM

## 2018-03-25 DIAGNOSIS — R29.6: ICD-10-CM

## 2018-03-25 DIAGNOSIS — N40.0: ICD-10-CM

## 2018-03-25 DIAGNOSIS — C79.51: ICD-10-CM

## 2018-03-25 DIAGNOSIS — Z98.41: ICD-10-CM

## 2018-03-25 DIAGNOSIS — N28.1: ICD-10-CM

## 2018-03-25 DIAGNOSIS — I25.10: ICD-10-CM

## 2018-03-25 DIAGNOSIS — Z87.440: ICD-10-CM

## 2018-03-25 DIAGNOSIS — Z87.891: ICD-10-CM

## 2018-03-25 DIAGNOSIS — Z85.46: ICD-10-CM

## 2018-03-25 DIAGNOSIS — C61: ICD-10-CM

## 2018-03-25 DIAGNOSIS — D86.9: ICD-10-CM
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
## 2018-03-25 DIAGNOSIS — N39.0: Primary | ICD-10-CM

## 2018-03-25 DIAGNOSIS — G40.909: ICD-10-CM

## 2018-03-25 DIAGNOSIS — Z79.818: ICD-10-CM

## 2018-03-25 DIAGNOSIS — R32: ICD-10-CM

## 2018-03-25 LAB
ALBUMIN SERPL-MCNC: 3.8 G/DL (ref 3–4.8)
ALBUMIN/GLOB SERPL: 1.2 {RATIO} (ref 1.1–1.8)
ALT SERPL-CCNC: 27 U/L (ref 7–56)
APPEARANCE UR: (no result)
AST SERPL-CCNC: 42 U/L (ref 17–59)
BACTERIA #/AREA URNS HPF: (no result) /[HPF]
BASE EXCESS BLDV CALC-SCNC: 1.8 MMOL/L (ref 0–2)
BASOPHILS # BLD AUTO: 0.01 K/MM3 (ref 0–2)
BASOPHILS NFR BLD: 0.2 % (ref 0–3)
BILIRUB UR-MCNC: NEGATIVE MG/DL
BNP SERPL-MCNC: 1200 PG/ML (ref 0–450)
BUN SERPL-MCNC: 27 MG/DL (ref 7–21)
CALCIUM SERPL-MCNC: 9.5 MG/DL (ref 8.4–10.5)
COLOR UR: (no result)
EOSINOPHIL # BLD: 0 10*3/UL (ref 0–0.7)
EOSINOPHIL NFR BLD: 0.5 % (ref 1.5–5)
EPI CELLS #/AREA URNS HPF: (no result) /HPF (ref 0–5)
ERYTHROCYTE [DISTWIDTH] IN BLOOD BY AUTOMATED COUNT: 15.1 % (ref 11.5–14.5)
GFR NON-AFRICAN AMERICAN: > 60
GLUCOSE UR STRIP-MCNC: NEGATIVE MG/DL
GRANULOCYTES # BLD: 1.83 10*3/UL (ref 1.4–6.5)
GRANULOCYTES NFR BLD: 33 % (ref 50–68)
HGB BLD-MCNC: 10.3 G/DL (ref 14–18)
INR PPP: 1.18 (ref 0.93–1.08)
LEUKOCYTE ESTERASE UR-ACNC: (no result) LEU/UL
LYMPHOCYTES # BLD: 3 10*3/UL (ref 1.2–3.4)
LYMPHOCYTES NFR BLD AUTO: 54.2 % (ref 22–35)
MCH RBC QN AUTO: 30.7 PG (ref 25–35)
MCHC RBC AUTO-ENTMCNC: 32.7 G/DL (ref 31–37)
MCV RBC AUTO: 93.8 FL (ref 80–105)
MONOCYTES # BLD AUTO: 0.7 10*3/UL (ref 0.1–0.6)
MONOCYTES NFR BLD: 12.1 % (ref 1–6)
PH BLDV: 7.34 [PH] (ref 7.32–7.43)
PH UR STRIP: 6 [PH] (ref 4.7–8)
PLATELET # BLD: 176 10^3/UL (ref 120–450)
PMV BLD AUTO: 8.8 FL (ref 7–11)
PROT UR STRIP-MCNC: 30 MG/DL
PROTHROMBIN TIME: 13.5 SECONDS (ref 9.4–12.5)
RBC # BLD AUTO: 3.36 10^6/UL (ref 3.5–6.1)
RBC # UR STRIP: (no result) /UL
SP GR UR STRIP: 1.02 (ref 1–1.03)
TROPONIN I SERPL-MCNC: < 0.01 NG/ML
UROBILINOGEN UR STRIP-ACNC: 0.2 E.U./DL
VENOUS BLOOD FIO2: 21 %
VENOUS BLOOD GAS PCO2: 53 (ref 40–60)
VENOUS BLOOD GAS PO2: 32 MM/HG (ref 30–55)
WBC # BLD AUTO: 5.5 10^3/UL (ref 4.5–11)
WBC #/AREA URNS HPF: (no result) /HPF (ref 0–6)

## 2018-03-25 PROCEDURE — 83880 ASSAY OF NATRIURETIC PEPTIDE: CPT

## 2018-03-25 PROCEDURE — 82550 ASSAY OF CK (CPK): CPT

## 2018-03-25 PROCEDURE — 71045 X-RAY EXAM CHEST 1 VIEW: CPT

## 2018-03-25 PROCEDURE — 99285 EMERGENCY DEPT VISIT HI MDM: CPT

## 2018-03-25 PROCEDURE — 83615 LACTATE (LD) (LDH) ENZYME: CPT

## 2018-03-25 PROCEDURE — 72170 X-RAY EXAM OF PELVIS: CPT

## 2018-03-25 PROCEDURE — 80053 COMPREHEN METABOLIC PANEL: CPT

## 2018-03-25 PROCEDURE — 81001 URINALYSIS AUTO W/SCOPE: CPT

## 2018-03-25 PROCEDURE — 97162 PT EVAL MOD COMPLEX 30 MIN: CPT

## 2018-03-25 PROCEDURE — 85610 PROTHROMBIN TIME: CPT

## 2018-03-25 PROCEDURE — 87040 BLOOD CULTURE FOR BACTERIA: CPT

## 2018-03-25 PROCEDURE — 82803 BLOOD GASES ANY COMBINATION: CPT

## 2018-03-25 PROCEDURE — 85025 COMPLETE CBC W/AUTO DIFF WBC: CPT

## 2018-03-25 PROCEDURE — 84484 ASSAY OF TROPONIN QUANT: CPT

## 2018-03-25 PROCEDURE — 97530 THERAPEUTIC ACTIVITIES: CPT

## 2018-03-25 PROCEDURE — 93005 ELECTROCARDIOGRAM TRACING: CPT

## 2018-03-25 PROCEDURE — 87181 SC STD AGAR DILUTION PER AGT: CPT

## 2018-03-25 PROCEDURE — 87086 URINE CULTURE/COLONY COUNT: CPT

## 2018-03-25 NOTE — ED PDOC
Arrival/HPI





- General


Chief Complaint: Weakness/Neurological Deficit


Time Seen by Provider: 03/25/18 18:03


Historian: Patient, Family





- History of Present Illness


Narrative History of Present Illness (Text): 


03/25/18 18:13


88 year old male, whose history includes prostate cancer that has metastasized, 

presents to the Emergency department due to frequent falls. Patient had been 

receiving 10 treatments of radiation to his pelvis and lower spine in January. 

After being discharged from the hospital, patient fell twice. Patient then 

spent time in a rehabilitation facility. Patient returned to his son's home 

approximately 10 days ago, and fell once yesterday and once today. Patient 

states his legs are too weak and cannot support his weight, even with the 

assistance of his walker. Patient's son is not as concerned with injury from 

the falls, but rather the weakness and lack of balance. 


Today, patient also developed a cough that causes abdominal pain that radiates 

around to the back and shoulder. Patient denies any fever, chills, chest pain, 

shortness of breath, nausea, vomiting, diarrhea, urinary symptoms, neck pain, 

headache, or any other complaints.


Time/Duration: 24 hours


Symptom Onset: Gradual


Symptom Course: Unchanged


Activities at Onset: Light


Context: Walking, Home





Past Medical History





- Provider Review


Nursing Documentation Reviewed: Yes





- Infectious Disease


Hx of Infectious Diseases: None





- Cardiac


Hx Cardiac Disorders: No





- Pulmonary


Hx Respiratory Disorders: Yes (H/O SMOKING CIGARETTES)


Other/Comment: EMPYEMA/SARCOIDOSIS





- Neurological


Hx Dizziness: Yes (syncope)


Hx Seizures: Yes (last seizure 20 yrs ago)





- HEENT


Hx Cataracts: Yes (with bilateral sx)





- Renal


Hx Renal Disorder: Yes


Other/Comment: chronic tolentino-Prostate ca.





- Endocrine/Metabolic


Hx Endocrine Disorders: No





- Hematological/Oncological


Hx Blood Disorders: Yes (radiation theraphy had 9 rounds already)


Hx Cancer: Yes (prostate ca with mets to bones)





- Integumentary


Hx Dermatological Disorder: Yes


Other/Comment: 2-5-18 bilateral le edema +2 more to right .Pitting with dry 

thin scaly flakes.





- Musculoskeletal/Rheumatological


Hx Musculoskeletal Disorders: Yes


Hx Falls: Yes (2-4-18)


Hx Unsteady Gait: Yes (walker)





- Gastrointestinal


Hx Gastrointestinal Disorders: Yes (constipation)





- Genitourinary/Gynecological


Hx Urinary Tract Infection: Yes





- Psychiatric


Hx Psychophysiologic Disorder: Yes


Hx Substance Use: No





- Anesthesia


Hx Anesthesia: No





Family/Social History





- Physician Review


Nursing Documentation Reviewed: Yes


Family/Social History: Unknown Family HX


Smoking Status: Former Smoker


Hx Alcohol Use: Yes (occasional)


Hx Substance Use: No





Allergies/Home Meds


Allergies/Adverse Reactions: 


Allergies





No Known Allergies Allergy (Verified 02/05/18 18:53)


 








Home Medications: 


 Home Meds











 Medication  Instructions  Recorded  Confirmed


 


Bicalutamide [Casodex] 1 tab PO DAILY 03/25/18 03/25/18


 


Carbamazepine [Carbatrol] 1 tab PO TID 03/25/18 03/25/18


 


Fentanyl [Fentanyl] 25 mcg TOP Q72H 03/25/18 03/25/18


 


Gemfibrozil [Lopid] 1 tab PO BID 03/25/18 03/25/18


 


Omeprazole [Omeprazole] 1 cap PO DAILY 03/25/18 03/25/18


 


oxyCODONE/Acetaminophen [Percocet 1 tab PO Q12H PRN 03/25/18 03/25/18





5/325 mg Tab]   














Review of Systems





- Physician Review


All systems were reviewed & negative as marked: Yes





- Review of Systems


Constitutional: absent: Fevers, Night Sweats


Respiratory: Cough.  absent: SOB


Cardiovascular: absent: Chest Pain


Gastrointestinal: Abdominal Pain.  absent: Diarrhea, Nausea, Vomiting


Genitourinary Male: absent: Dysuria


Musculoskeletal: Back Pain.  absent: Neck Pain


Neurological: Other (general weakness).  absent: Headache





Physical Exam


Vital Signs Reviewed: Yes


Vital Signs











  Temp Pulse Resp BP Pulse Ox


 


 03/25/18 17:54  98.2 F  98 H  20  158/60 H  98











Temperature: Afebrile


Blood Pressure: Hypertensive


Pulse: Tachycardic


Respiratory Rate: Normal


Appearance: Positive for: Well-Appearing, Non-Toxic, Comfortable


Pain Distress: None


Mental Status: Positive for: Alert and Oriented X 3





- Systems Exam


Head: Present: Atraumatic, Normocephalic


Pupils: Present: PERRL


Extroacular Muscles: Present: EOMI


Conjunctiva: Present: Normal


Mouth: Present: Moist Mucous Membranes


Neck: Present: Normal Range of Motion


Respiratory/Chest: Present: Rhonchi


Cardiovascular: Present: Regular Rate and Rhythm, Normal S1, S2.  No: Murmurs


Abdomen: Present: Normal Bowel Sounds.  No: Tenderness, Distention, Peritoneal 

Signs


Back: Present: Normal Inspection


Upper Extremity: Present: Normal Inspection.  No: Cyanosis, Edema


Lower Extremity: Present: Normal Inspection.  No: Edema


Neurological: Present: GCS=15, CN II-XII Intact, Speech Normal


Skin: Present: Warm, Dry, Normal Color.  No: Rashes


Psychiatric: Present: Alert, Oriented x 3, Normal Insight, Normal Concentration





Medical Decision Making


ED Course and Treatment: 


03/25/18 18:22


Impression:


88 year old male presents to the Emergency department due to frequent falls.





Plan:


-- Chest xray, pelvis xray


-- EKG


-- Urine culture, blood culture


-- Urinalysis


-- VBG


-- Labs


-- Reassess and disposition





Prior Visits:


Notes and results from previous visits were reviewed. Patient was last seen in 

the emergency department on 02/05/18, was diagnosed with Prostate cancer, 

Weakness, Unable to ambulate, and was hospitalized.





Progress Notes:








- Lab Interpretations


Lab Results: 








 03/25/18 19:00 





 03/25/18 19:00 





 Lab Results





03/25/18 19:00: Sodium 144, Chloride 107, Potassium 4.6, Carbon Dioxide 27, 

Anion Gap 15, BUN 27 H, Creatinine 0.9, Est GFR (African Amer) > 60, Est GFR (

Non-Af Amer) > 60, Random Glucose 95, Calcium 9.5, Total Bilirubin 0.2, AST 42, 

ALT 27, Alkaline Phosphatase 146 H D, Lactate Dehydrogenase 441, Total Creatine 

Kinase 29 L, Troponin I < 0.01  D, NT-Pro-B Natriuret Pep 1200 H, Total Protein 

7.0, Albumin 3.8, Globulin 3.2, Albumin/Globulin Ratio 1.2


03/25/18 19:00: pO2 32, VBG pH 7.34, VBG pCO2 53.0, VBG HCO3 28.6 H, VBG Total 

CO2 30.2 H, VBG O2 Sat (Calc) 59.5, VBG Base Excess 1.8, VBG Potassium 4.1, 

Sodium 141.0, Chloride 107.0, Glucose 89, Lactate 0.7, FiO2 21.0, Venous Blood 

Potassium 4.1


03/25/18 19:00: Urine Color Light yellow, Urine Appearance Cloudy, Urine pH 6.0

, Ur Specific Gravity 1.025, Urine Protein 30 H, Urine Glucose (UA) Negative, 

Urine Ketones Negative, Urine Blood Moderate H, Urine Nitrate Positive H, Urine 

Bilirubin Negative, Urine Urobilinogen 0.2, Ur Leukocyte Esterase Moderate H, 

Urine RBC 5 - 10, Urine WBC Tntc, Ur Epithelial Cells 0 - 2, Urine Bacteria 

Small


03/25/18 19:00: PT 13.5 H, INR 1.18 H


03/25/18 19:00: WBC 5.5  D, RBC 3.36 L, Hgb 10.3 L, Hct 31.5 L, MCV 93.8, MCH 

30.7, MCHC 32.7, RDW 15.1 H, Plt Count 176, MPV 8.8, Gran % 33.0 L, Lymph % (

Auto) 54.2 H, Mono % (Auto) 12.1 H, Eos % (Auto) 0.5 L, Baso % (Auto) 0.2, Gran 

# 1.83, Lymph # (Auto) 3.0, Mono # (Auto) 0.7 H, Eos # (Auto) 0.0, Baso # (Auto

) 0.01











- RAD Interpretation


Radiology Orders: 








03/25/18 18:12


CHEST PORTABLE [RAD] Stat 


PELVIS ONE VIEW [RAD] Stat 














- EKG Interpretation


EKG Interpretation (Text): 


03/25/18 18:56


EKG:


Ordered, reviewed, and independently interpreted the EKG.


Rate : 91 BPM


Rhythm : NSR


Interpretation : RBBB.


Interpreted by ED Physician: Yes


Type: 12 lead EKG





- Medication Orders


Current Medication Orders: 








Ceftriaxone Sodium (Rocephin 2 Gm Ivpb)  2 gm in 100 mls @ 100 mls/hr IVPB STAT 

[facility]


   PRN Reason: Protocol


   Stop: 03/25/18 21:44











- PA / NP / Resident Statement


MD/DO has reviewed & agrees with the documentation as recorded.





- Scribe Statement


The provider has reviewed the documentation as recorded by the [employer] Brooks





Provider Scribe Attestation:


All medical record entries made by the Scribe were at my direction and 

personally dictated by me. I have reviewed the chart and agree that the record 

accurately reflects my personal performance of the history, physical exam, 

medical decision making, and the department course for this patient. I have 

also personally directed, reviewed, and agree with the discharge instructions 

and disposition.





Disposition/Present on Arrival





- Present on Arrival


Any Indicators Present on Arrival: No


History of DVT/PE: No


History of Uncontrolled Diabetes: No


Urinary Catheter: Yes


History of Decub. Ulcer: No


History Surgical Site Infection Following: None





- Disposition


Have Diagnosis and Disposition been Completed?: Yes


Diagnosis: 


 Urinary tract infection, Weakness, Unable to ambulate





Disposition: HOSPITALIZED


Disposition Time: 21:07


Patient Plan: Admission


Condition: FAIR


Discharge Instructions (ExitCare):  Weakness (ED)


Referrals: 


Sami Thakur MD [Primary Care Provider] - Follow up with primary


Forms:  [employer] (English)

## 2018-03-26 VITALS — RESPIRATION RATE: 20 BRPM

## 2018-03-26 RX ADMIN — CEFEPIME SCH MLS/HR: 1 INJECTION, SOLUTION INTRAVENOUS at 22:26

## 2018-03-26 RX ADMIN — CEFEPIME SCH MLS/HR: 1 INJECTION, SOLUTION INTRAVENOUS at 14:29

## 2018-03-26 RX ADMIN — OXYCODONE HYDROCHLORIDE AND ACETAMINOPHEN PRN TAB: 5; 325 TABLET ORAL at 00:22

## 2018-03-26 RX ADMIN — CEFEPIME SCH MLS/HR: 1 INJECTION, SOLUTION INTRAVENOUS at 05:33

## 2018-03-26 RX ADMIN — OXYCODONE HYDROCHLORIDE AND ACETAMINOPHEN PRN TAB: 5; 325 TABLET ORAL at 06:10

## 2018-03-26 RX ADMIN — OXYCODONE HYDROCHLORIDE AND ACETAMINOPHEN PRN TAB: 5; 325 TABLET ORAL at 14:42

## 2018-03-26 RX ADMIN — OXYCODONE HYDROCHLORIDE AND ACETAMINOPHEN PRN TAB: 5; 325 TABLET ORAL at 22:34

## 2018-03-26 NOTE — RAD
PROCEDURE:  Radiographs of the pelvis.



HISTORY:

Fall Mets to bone from prostate, ? Fx



COMPARISON:

12/11/2016.



FINDINGS:



BONES:

The pelvic ring is intact.  There is diffuse bone demineralization. 

There is no acute displaced fracture or bone destruction. 







JOINTS:

There is moderate degenerative osteoarthrosis in the hip joints.  

There is also mild degenerative osteoarthrosis in the sacroiliac 

joints. 







OTHER FINDINGS:

None.



IMPRESSION:

No acute displaced fracture or dislocation.  Please note occult 

fractures cannot be excluded on plain radiographs.  If there is a 

persistent clinical concern, an MRI without contrast may be performed 

for further evaluation.

## 2018-03-26 NOTE — HP
CHIEF COMPLAINT AND HISTORY OF PRESENT ILLNESS:  This is an 88-year-old

male who is coming in to the hospital with a history of prostate cancer

with metastasis.  The patient has been having falls at home.  He has been

recently

## 2018-03-26 NOTE — CARD
--------------- APPROVED REPORT --------------





EKG Measurement

Heart [id number]

NC [id number]

[id number]

[id number]

[id number]



<Conclusion>

Normal sinus rhythm

Right bundle branch block

## 2018-03-26 NOTE — RAD
HISTORY:

Cough, Ronchi  



COMPARISON:

01/16/2018. 



FINDINGS:



LUNGS:

The lungs are hyperinflated and there is peribronchial thickening 

with chronic changes in both lungs. There is bibasilar 

atelectasis/scarring. 



PLEURA:

No significant pleural effusion identified, no pneumothorax apparent.



CARDIOVASCULAR:

Normal.



OSSEOUS STRUCTURES:

No significant abnormalities.



VISUALIZED UPPER ABDOMEN:

Normal.



OTHER FINDINGS:

None.



IMPRESSION:

No active pulmonary disease. COPD.

## 2018-03-27 RX ADMIN — CEFEPIME SCH MLS/HR: 1 INJECTION, SOLUTION INTRAVENOUS at 21:51

## 2018-03-27 RX ADMIN — OXYCODONE HYDROCHLORIDE AND ACETAMINOPHEN PRN TAB: 5; 325 TABLET ORAL at 22:48

## 2018-03-27 RX ADMIN — OXYCODONE HYDROCHLORIDE AND ACETAMINOPHEN PRN TAB: 5; 325 TABLET ORAL at 18:31

## 2018-03-27 RX ADMIN — OXYCODONE HYDROCHLORIDE AND ACETAMINOPHEN PRN TAB: 5; 325 TABLET ORAL at 11:11

## 2018-03-27 RX ADMIN — OXYCODONE HYDROCHLORIDE AND ACETAMINOPHEN PRN TAB: 5; 325 TABLET ORAL at 06:38

## 2018-03-27 RX ADMIN — CEFEPIME SCH MLS/HR: 1 INJECTION, SOLUTION INTRAVENOUS at 06:03

## 2018-03-27 RX ADMIN — CEFEPIME SCH MLS/HR: 1 INJECTION, SOLUTION INTRAVENOUS at 13:01

## 2018-03-27 NOTE — HP
CHIEF COMPLAINT AND HISTORY OF PRESENT ILLNESS:  This is an 88-year-old

male who is coming into the hospital after he had a fall.  The patient has

a history of prostate cancer with metastasis and has received radiation, 10

cycles.  The patient had gone to rehab facility after his previous

admission at [facility] and was discharged to home.  The

patient had been at home for about a week and a half and then came into the

ER because he was having difficulty in ambulating.  The patient states that

his legs are very weak.  He is having difficulty in supporting his legs. 

The patient denies any headaches or dizziness.  No dysuria or frequency. 

No nocturia.  No weakness of the arms or legs.  No chest pain.  No

shortness of breath.  All other review of symptoms are within normal limits

except what was mentioned.



ALLERGIES:  NO KNOWN DRUG ALLERGIES.



HOME MEDICATIONS:  Have been reviewed on the [facility].



PAST MEDICAL HISTORY:

1.  Metastatic hormone-resistant prostate cancer, status post radiation.

2.  Urinary retention with Waddell catheter.

3.  Back pain secondary to prostate cancer.

4.  Spinal stenosis.

5.  Hepatomegaly.

6.  Coronary artery disease.

7.  Osteoarthritis.

8.  Gait dysfunction secondary to back pain.

9.  UTI secondary to Enterococcus.

10.  Pelvic adenopathy.

11.  Seizure disorder.



SOCIAL HISTORY:  Does not smoke, drink, or use drugs.



FAMILY HISTORY:  Noncontributory.



PHYSICAL EXAMINATION:

VITAL SIGNS:  The patient has a temperature of 98.2, pulse of 98, blood

pressure is 158/60, respirations 20, O2 saturation 98%.  Height is 5 feet

10 inches.  Weight is 163 pounds.  BMI is 23.4.

GENERAL:  The patient lying in bed, uncomfortable, and in no acute

distress.

HEENT:  Atraumatic and normocephalic.  Anicteric sclerae.  Moist mucosa.

Pink conjunctivae.  No oral lesions.

NECK:  No JVD, anterior and posterior adenopathy, thyromegaly, or bruits.

CARDIOVASCULAR:  S1 and S2 regular.  No murmur, rubs, or gallop.

LUNGS:  Clear to auscultation bilaterally.  No wheezes, rales, or rhonchi.

ABDOMEN:  Bowel sounds are positive.  Soft, nontender and nondistended.  No

hepatosplenomegaly. No rebound and no guarding.

EXTREMITIES:  No cyanosis, clubbing, or edema.

NEUROLOGIC:  No facial asymmetry.  Tongue is midline.  No uvula deviation. 

Power is 5/5 upper extremity and lower extremity.  Sensation intact in

upper extremity and lower extremity.

PSYCHIATRIC:  He is awake, alert and oriented x3.  No anxiety or

depression.  He has normal affect.

GENITOURINARY:  No CVA tenderness.

VASCULAR:  2+ pulses in the carotid pulses and pedal pulses.

SKIN:  No erythema or nodules

SPINE:  Shows normal curvature.

GAIT:  The patient was not seen ambulating.



His urine culture showed gram-negative rods.



LABORATORY DATA:  Labs have been reviewed.  White count of 5.5, hemoglobin

10.3.  Sodium 144, potassium 4.6.  Troponin 0.01.  Urine shows nitrites are

positive, blood is moderate.



ASSESSMENT:

1.  Urinary tract infection.

2.  Gait dysfunction.

3.  Metastatic hormone-resistant prostate cancer.

4.  Back pain.

5.  Spinal stenosis.

6.  Hepatomegaly.

7.  Coronary artery disease.

8.  Osteoarthritis.

9.  Seizure disorder.

10.  Deconditioning.



PLAN:  The patient is currently on cefepime for antibiotics.  He had

cultures that were negative.  The patient has been started on physical

therapy.  The patient would most likely need subacute rehab.  The patient

is going to be on Percocet for pain.  He is on carbamazepine for his

seizure disorder.  I did speak to the patient's son, Cristino, to give him an

update of the patient's diagnosis and plan of care.



__________________________________________

Nathan Rivero MD



DD:  03/27/2018 9:22:37

DT:  03/27/2018 12:03:17

Job # [id number]

## 2018-03-27 NOTE — PN
DATE:  03/27/2018



SUBJECTIVE:  The patient has no complaints of any chest pain.  No shortness

of breath.  No headaches or dizziness.



PHYSICAL EXAMINATION:

VITAL SIGNS:  Temperature is 99.4, pulse is 79, blood pressure is 133/58,

respirations 20.

GENERAL:  The patient is lying in bed, flat, comfortable.

HEENT:  No oral lesion.  Anicteric sclerae.  Moist mucosa.

NECK:  No JVD, adenopathy, or thyromegaly.

CARDIOVASCULAR:  S1 and S2, regular.  No murmurs, rubs, or gallops.

LUNGS:  Clear to auscultation bilaterally.  No wheeze, rales, or rhonchi.

ABDOMEN:  Bowel sounds are positive, soft, nontender and nondistended.

EXTREMITIES:  No cyanosis, clubbing or edema.



LABORATORY DATA:  White count of 5.5, hemoglobin 10.3.  Creatinine 0.9.



ASSESSMENT:

1.  Falls.

2.  Gait dysfunction.

3.  Metastatic hormone-resistant prostate cancer, status post radiation.

4.  Urinary retention secondary to obstructive uropathy.

5.  Back pain secondary to prostate cancer.

6.  Spinal stenosis.

7.  Hepatomegaly.

8.  Coronary artery disease.

9.  Osteoarthritis.

10.  An 11-mm right renal cyst.

11.  Seizure disorder.

12.  Deconditioning.



PLAN:  The patient is currently comfortable.  He is getting physical

therapy.  He has back pain secondary to his metastatic prostate cancer.  I

appreciate Dr. Sewell's note regarding no further radiation.  The patient is

on cefepime for antibiotics, waiting for urine cultures.  The patient is on

Percocet for pain.  He is on Tegretol for his seizure disorders and a

heart-healthy diet.  The patient was seen by Physical Therapy yesterday and

it is recommended the patient go to subacute rehab.  I did speak to the

patient's son yesterday, Cristino, to give him an update on the patient's

diagnosis, plan of care.





__________________________________________

Nathan Rivero MD





DD:  03/27/2018 6:17:47

DT:  03/27/2018 8:10:04

Job # [id number]

## 2018-03-28 RX ADMIN — CEFEPIME SCH MLS/HR: 1 INJECTION, SOLUTION INTRAVENOUS at 05:58

## 2018-03-28 RX ADMIN — OXYCODONE HYDROCHLORIDE AND ACETAMINOPHEN PRN TAB: 5; 325 TABLET ORAL at 10:11

## 2018-03-28 RX ADMIN — CEFEPIME SCH: 1 INJECTION, SOLUTION INTRAVENOUS at 18:51

## 2018-03-28 NOTE — PN
DATE:  03/28/2018



SUBJECTIVE:  Patient was seen earlier this morning in room 576, bed 1.  No

fevers and no chills.



PHYSICAL EXAMINATION:

VITAL SIGNS:  Temperature is 98, blood pressure is 136/60, respiratory rate

of 20, heart rate of 85.

HEENT:  Unremarkable.

NECK:  Supple.

LUNGS:  Have decreased breath sounds.

HEART:  Normal S1, S2.

ABDOMEN:  Soft.



LABORATORY DATA:  Reveals the patient has E. coli in the urine, sensitive

to Cipro and cefepime and ertapenem and cefazolin and blood cultures are

reported to be negative.  Chemistries are noted.  Alkaline phosphatase is

elevated.  BNP is noted.  Ailyn Peter's consultation is appreciated.



ASSESSMENT AND PLAN:  An 88-year-old male who was seen earlier this morning

who has prostate cancer, admitted with Escherichia coli urinary tract

infection, low-grade fevers, tachycardia.  We switched to p.o. antibiotics

and complete therapy.  Overall prognosis is poor.







__________________________________________

Donta Alvarez MD



DD:  03/28/2018 16:53:33

DT:  03/28/2018 18:48:49

Job # [id number]

## 2018-03-28 NOTE — PN
DATE:  03/27/2018



SUBJECTIVE:  The patient is in bed, in no acute distress, nontoxic.



PHYSICAL EXAMINATION

VITAL SIGNS:  Temperature is 99, blood pressure is 130/50, respiratory rate

of 20, heart rate of 92.

HEENT:  Unremarkable.

NECK:  Supple.

LUNGS:  Have decreased breath sounds.

HEART:  Normal S1, S2.

ABDOMEN:  Soft.



LABORATORY EXAMINATION:  Reveals a white count of 5.5, hemoglobin of 10,

platelets of 176.  Chemistries reveal a BUN of 10, creatinine of 0.9. 

Urinalysis is noted and microbiology revels gram-negative serena in the urine.

The blood cultures are reported to be negative.



MEDICATIONS:  The patient is currently on cefepime.



ASSESSMENT AND PLAN:  An 88-year-old male who was seen early this morning

in 576, bed 1 with a history of prostate cancer who was admitted with

gram-negative serena urinary tract infection with low-grade fevers and

tachycardia with a pulse of 92 and 98.  We will check on the

identifications, gram-negative serena.  We will make further recommendations.





__________________________________________

Donta Alvarez MD



DD:  03/27/2018 22:21:03

DT:  03/28/2018 0:02:49

Job # [id number]

## 2018-03-28 NOTE — CP.PCM.CON
History of Present Illness





- History of Present Illness


History of Present Illness: 





Palliative consult requested by Dr MIGUELITO Rivero


Reason: Goals of care/advance care planning/ 





88 year old male with history of metastatic prostate cancer s/p palliative RT 

who presented after suffering a fall at home. He complains of difficulty 

ambulating,back pain and weakness in his legs. He denied fever, chills, nausea,

vomiting, diarrhea, chest pain shortness of breath or dysuria.  





PMHx: hormone resistant metastatic postage cancer, s/p palliate RT , BPH, 

spinal srenosis, CAD, seizure disorder, hepatomegaly, UTI and osteoarthritis.





Social History:Non smoker, no alcohol or drug use. Lives independently, but now 

needs assist with most ADL's 





Family History: Non contributory.





Advance Care Planning: The patient does not have an Advanced Directive. His son

  Cristino Kingston is his POA





Review of System: Weakness in legs, abdominal/back pain,difficulty ambulating, 

otherwise negative review





Past Patient History





- Infectious Disease


Hx of Infectious Diseases: None





- Past Social History


Smoking Status: Former Smoker





- CARDIAC


Hx Cardiac Disorders: No





- PULMONARY


Hx Respiratory Disorders: Yes (H/O SMOKING CIGARETTES)


Other/Comment: EMPYEMA/SARCOIDOSIS





- NEUROLOGICAL


Hx Dizziness: Yes (syncope)


Hx Seizures: Yes (last seizure 20 yrs ago)





- HEENT


Hx Cataracts: Yes (with bilateral sx)





- RENAL


Hx Chronic Kidney Disease: Yes


Other/Comment: chronic tolentino-Prostate ca.





- ENDOCRINE/METABOLIC


Hx Endocrine Disorders: No





- HEMATOLOGICAL/ONCOLOGICAL


Hx Blood Disorders: Yes (radiation theraphy had 9 rounds already)


Hx Cancer: Yes (prostate ca with mets to bones)





- INTEGUMENTARY


Hx Dermatological Problems: Yes


Other/Comment: 2-5-18 bilateral le edema +2 more to right .Pitting with dry 

thin scaly flakes.





- MUSCULOSKELETAL/RHEUMATOLOGICAL


Hx Falls: Yes





- GASTROINTESTINAL


Hx Gastrointestinal Disorders: Yes (constipation)





- GENITOURINARY/GYNECOLOGICAL


Hx Urinary Tract Infection: Yes





- PSYCHIATRIC


Hx Psychophysiologic Disorder: Yes


Hx Substance Use: No





- SURGICAL HISTORY


Hx Surgeries: Yes (lung sx,right foot sx-clubbed foot,bilateral cataract sx.)





- ANESTHESIA


Hx Anesthesia: No





Meds


Allergies/Adverse Reactions: 


 Allergies











Allergy/AdvReac Type Severity Reaction Status Date / Time


 


No Known Allergies Allergy   Verified 02/05/18 18:53














- Medications


Medications: 


 Current Medications





Carbamazepine (Tegretol)  200 mg PO TID RICHAR


   PRN Reason: Protocol


   Last Admin: 03/28/18 09:10 Dose:  200 mg


Fentanyl (Duragesic)  1 patch TD Q72H RICHAR


Cefepime HCl (Maxipime 1gm)  1 gm in 100 mls @ 100 mls/hr IVPB Q8 RICHAR


   PRN Reason: Protocol


   Stop: 04/04/18 06:01


   Last Admin: 03/28/18 05:58 Dose:  100 mls/hr


Oxycodone/Acetaminophen (Percocet 5/325 Mg Tab)  1 tab PO Q4 PRN


   PRN Reason: Pain, moderate (4-7)


   Stop: 03/29/18 00:01


   Last Admin: 03/28/18 10:11 Dose:  1 tab


Senna/Docusate Sodium (Senokot S 50 Mg-8.6 Mg)  1 tab PO DAILY RICHAR











Physical Exam





- Constitutional


Appears: Chronically Ill





- Head Exam


Head Exam: NORMAL INSPECTION





- Eye Exam


Eye Exam: Normal appearance, PERRL





- ENT Exam


ENT Exam: Mucous Membranes Moist, Normal Oropharynx





- Neck Exam


Neck exam: Positive for: Normal Inspection





- Respiratory Exam


Respiratory Exam: Decreased Breath Sounds, NORMAL BREATHING PATTERN





- Cardiovascular Exam


Cardiovascular Exam: REGULAR RHYTHM, +S1, +S2





- GI/Abdominal Exam


GI & Abdominal Exam: Normal Bowel Sounds, Soft, Tenderness





- Extremities Exam


Extremities exam: Positive for: normal capillary refill


Additional comments: 





1 + bilateral edema of both feet 





- Back Exam


Back exam: NORMAL INSPECTION, vertebral tenderness





- Neurological Exam


Neurological exam: Oriented x3





- Skin


Skin Exam: Dry, Pallor





- Additional Findings


Additional findings: 





Palliative performance scale rating 40%





Results





- Vital Signs


Recent Vital Signs: 


 Last Vital Signs











Temp  98.7 F   03/28/18 08:00


 


Pulse  85   03/28/18 08:00


 


Resp  20   03/28/18 08:00


 


BP  141/60   03/28/18 08:00


 


Pulse Ox  96   03/28/18 08:00














- Labs


Result Diagrams: 


 03/25/18 19:00





 03/25/18 19:00





Assessment & Plan





- Assessment and Plan (Free Text)


Assessment: 





88 year old male with history of metastatic prostate cancer, CAD, seizure 

disorder, HTN, hepatomegaly who us admitted with UTI, gait dysfunction, 

abdominal/ back pain.





Mr Kingston is alert, he complains of diffuse abdominal pain which radiates to 

back, states its 10/10. He denies urinary burning.  He state he had a bowel 

movement prior to admission. He is weak. He is having difficulty concentrating 

because he is anxious and uncomfortable. 





Patient was on Fentanyl 25 mcg prior to admission which had states been 

controlling his pain.





I also spoke with patient in the presence of his son regarding advance care 

planning. The patient does not want CPR/intubation/dialysis or permanent 

artificial feeding tube. POLST directive explained.Questions answered. POLST DNR

/DNI completed . A copy is placed in the chart





 Time spent in goals of care and advance care planning, 30 minutes


Plan: 





Pain management: Fentanyl 25mcg transdermal patch now. Continue Percocet for 

break though pain as needed. Would refer back to oncology, may benefit from 

bisphospinate therapy





Opioid induced constipation: Senna/Docusate daily.





UTI: complete antibiotic therapy





Gait dysfunction: continue physical therapy, possible transition to [facility]





Goals of care and advance care planning> POLST: DNR/DNI
History of Present Illness





- History of Present Illness


History of Present Illness: 


88 year old male with PMH of prostate cancer with metastasis on Lupron 

treatment (hormonal treatment) and radiation therapy to the pelvic area and 

lumbar spine, seizure disorder, glaucoma, history of Klebsiella UTI, was 

brought in to [facility] [place] because of increasing weakness and increasing falls. He is 

also having some dysuria. He denies flank pain, no nausea or vomiting, no 

headache or dizziness, no chest pain, no SOB but has some cough, no fever or 

chills, no abdominal pain, no diarrhea. In the ED, urinalysis shows pyuria. 

Infectious Diseases consult is requested to further evaluate and manage.





Review of Systems





- Review of Systems


All systems: reviewed and no additional remarkable complaints except (as per HPI

)





Past Patient History





- Infectious Disease


Hx of Infectious Diseases: None





- Past Social History


Smoking Status: Former Smoker





- CARDIAC


Hx Cardiac Disorders: No





- PULMONARY


Hx Respiratory Disorders: Yes (H/O SMOKING CIGARETTES)


Other/Comment: EMPYEMA/SARCOIDOSIS





- NEUROLOGICAL


Hx Dizziness: Yes (syncope)


Hx Seizures: Yes (last seizure 20 yrs ago)





- HEENT


Hx Cataracts: Yes (with bilateral sx)





- RENAL


Hx Chronic Kidney Disease: Yes


Other/Comment: chronic tolentino-Prostate ca.





- ENDOCRINE/METABOLIC


Hx Endocrine Disorders: No





- HEMATOLOGICAL/ONCOLOGICAL


Hx Blood Disorders: Yes (radiation theraphy had 9 rounds already)


Hx Cancer: Yes (prostate ca with mets to bones)





- INTEGUMENTARY


Hx Dermatological Problems: Yes


Other/Comment: 2-5-18 bilateral le edema +2 more to right .Pitting with dry 

thin scaly flakes.





- MUSCULOSKELETAL/RHEUMATOLOGICAL


Hx Falls: Yes





- GASTROINTESTINAL


Hx Gastrointestinal Disorders: Yes (constipation)





- GENITOURINARY/GYNECOLOGICAL


Hx Urinary Tract Infection: Yes





- PSYCHIATRIC


Hx Psychophysiologic Disorder: Yes


Hx Substance Use: No





- SURGICAL HISTORY


Hx Surgeries: Yes (lung sx,right foot sx-clubbed foot,bilateral cataract sx.)





- ANESTHESIA


Hx Anesthesia: No





Meds


Allergies/Adverse Reactions: 


 Allergies











Allergy/AdvReac Type Severity Reaction Status Date / Time


 


No Known Allergies Allergy   Verified 02/05/18 18:53














- Medications


Medications: 


 Current Medications





Cefepime HCl (Maxipime 1gm)  1 gm in 100 mls @ 100 mls/hr IVPB Q8 RICHAR


   PRN Reason: Protocol


   Stop: 04/04/18 06:01


   Last Admin: 03/26/18 05:33 Dose:  100 mls/hr


Non-Formulary Medication (Carbamazepine [Carbatrol])  1 tab PO TID RICHAR


Oxycodone/Acetaminophen (Percocet 5/325 Mg Tab)  1 tab PO Q4 PRN


   PRN Reason: Pain, moderate (4-7)


   Stop: 03/29/18 00:01


   Last Admin: 03/26/18 06:10 Dose:  1 tab











Physical Exam





- Constitutional


Appears: Chronically Ill





- Head Exam


Head Exam: NORMAL INSPECTION





- ENT Exam


ENT Exam: Mucous Membranes Moist





- Neck Exam


Neck exam: Negative for: Meningismus





- Respiratory Exam


Respiratory Exam: Decreased Breath Sounds





- Cardiovascular Exam


Cardiovascular Exam: +S1, +S2





- GI/Abdominal Exam


GI & Abdominal Exam: Soft.  absent: Tenderness





Results





- Vital Signs


Recent Vital Signs: 


 Last Vital Signs











Temp  98.2 F   03/25/18 17:54


 


Pulse  88   03/25/18 22:14


 


Resp  18   03/25/18 22:14


 


BP  143/67   03/25/18 22:14


 


Pulse Ox  98   03/25/18 22:14














- Labs


Result Diagrams: 


 03/25/18 19:00





 03/25/18 19:00





Assessment & Plan





- Assessment and Plan (Free Text)


Plan: 





Assessment


consider UTI in this patient with prostate cancer with metastasis


history of Klebsiella urinary tract infection


Mechanical fall, etiology to be determined


prostate cancer on Lupron treatment once a month (hormonal treatment)


seizure disorder


glaucoma





Plan


started patient on Cefepime pending blood and urine cx


will monitor clinically
History of Present Illness





- History of Present Illness


History of Present Illness: 


Mr Kingston is a 88 year old male who is known to our department. He has a 

history of high risk prostate cancer since 2016 on androgen deprivation 

therapy.  In 2016, he had an elevation in his PSA.  A TRUS with biopsy showed 

adenocarcinoma of the prostate, Octavio 5+4=9 in 1/12, Octavio 4+5=9 4/12, 

Octavio 4+4=8 2/12, Octavio 4+3=7 1/12.    He was on ADT and anti-androgen 

therapy.  Over the past 3-4 weeks, he has been having worsening lower back pain 

with radiation anteriorly.  He had a CT of the abdomen and pelvis on January 16 , 2018 revealed par-aortic lymphadenopathy as well as pelvic lymphadenopathy.   

A MRI of the lumbar spine on January 17, 2018 revealed retroperitoneal 

lymphadenopathy.  There was asymmetric prostate suspicious for neoplasm. There 

were metastatic lesions in the lumbar and sacral spine.    He received 

palliative radiation to the T12- sacrum as well as bilateral hips which he 

completed on February 6, 2018.  He stated improvement in the pain, however he 

was still having issues with deconditioning.  He stated that since we last saw 

him, he was in rehabilitation center briefly which improved his strength a 

little, however he could not stay longer there due to his insurance.  Since then

, he has been at home using a walker, however he fell over the weekend because 

of his leg weakness and inability to support his weight which was an issue with 

the last admission.  He denies any bowel incontinence. His urinary incontinence 

has been chronic.  He states that he has pain in the lower back with radiation 

to the lower abdomen and bilateral knees.  We were asked to see him for our 

input. 





Review of Systems





- Constitutional


Constitutional: Weakness





- Genitourinary


Genitourinary: Urinary Incontinence





- Musculoskeletal


Musculoskeletal: Abnormal Gait, Back Pain, Muscle Weakness





Past Patient History





- Infectious Disease


Hx of Infectious Diseases: None





- Past Social History


Smoking Status: Former Smoker


Alcohol: None


Home Situation {Lives}: Alone





- CARDIAC


Hx Cardiac Disorders: No





- PULMONARY


Hx Respiratory Disorders: Yes (H/O SMOKING CIGARETTES)


Other/Comment: EMPYEMA/SARCOIDOSIS





- NEUROLOGICAL


Hx Dizziness: Yes (syncope)


Hx Seizures: Yes (last seizure 20 yrs ago)





- HEENT


Hx Cataracts: Yes (with bilateral sx)





- RENAL


Hx Chronic Kidney Disease: Yes


Other/Comment: chronic tolentino-Prostate ca.





- ENDOCRINE/METABOLIC


Hx Endocrine Disorders: No





- HEMATOLOGICAL/ONCOLOGICAL


Hx Blood Disorders: Yes (radiation theraphy had 9 rounds already)


Hx Cancer: Yes (prostate ca with mets to bones)





- INTEGUMENTARY


Hx Dermatological Problems: Yes


Other/Comment: 2-5-18 bilateral le edema +2 more to right .Pitting with dry 

thin scaly flakes.





- MUSCULOSKELETAL/RHEUMATOLOGICAL


Hx Falls: Yes





- GASTROINTESTINAL


Hx Gastrointestinal Disorders: Yes (constipation)





- GENITOURINARY/GYNECOLOGICAL


Hx Urinary Tract Infection: Yes





- PSYCHIATRIC


Hx Psychophysiologic Disorder: Yes


Hx Substance Use: No





- SURGICAL HISTORY


Hx Surgeries: Yes (lung sx,right foot sx-clubbed foot,bilateral cataract sx.)





- ANESTHESIA


Hx Anesthesia: No





Meds


Allergies/Adverse Reactions: 


 Allergies











Allergy/AdvReac Type Severity Reaction Status Date / Time


 


No Known Allergies Allergy   Verified 02/05/18 18:53














- Medications


Medications: 


 Current Medications





Carbamazepine (Tegretol)  200 mg PO TID RICHAR


   PRN Reason: Protocol


Cefepime HCl (Maxipime 1gm)  1 gm in 100 mls @ 100 mls/hr IVPB Q8 RICHAR


   PRN Reason: Protocol


   Stop: 04/04/18 06:01


   Last Admin: 03/26/18 05:33 Dose:  100 mls/hr


Oxycodone/Acetaminophen (Percocet 5/325 Mg Tab)  1 tab PO Q4 PRN


   PRN Reason: Pain, moderate (4-7)


   Stop: 03/29/18 00:01


   Last Admin: 03/26/18 06:10 Dose:  1 tab











Physical Exam





- Eye Exam


Eye Exam: EOMI





- ENT Exam


ENT Exam: Mucous Membranes Moist





- Respiratory Exam


Respiratory Exam: Clear to Auscultation Bilateral





- Cardiovascular Exam


Cardiovascular Exam: REGULAR RHYTHM





- GI/Abdominal Exam


GI & Abdominal Exam: Normal Bowel Sounds





- Neurological Exam


Neurological exam: CN II-XII Intact, Oriented x3


Additional comments: 


Lower extremity strength is 4/5.  Upper extremity strength is intact





Results





- Vital Signs


Recent Vital Signs: 


 Last Vital Signs











Temp  99 F   03/26/18 07:42


 


Pulse  94 H  03/26/18 07:42


 


Resp  20   03/26/18 07:42


 


BP  146/65   03/26/18 07:42


 


Pulse Ox  95   03/26/18 07:42














- Labs


Result Diagrams: 


 03/25/18 19:00





 03/25/18 19:00





Assessment & Plan





- Assessment and Plan (Free Text)


Assessment: 


Mr Kingston has metastatic prostate cancer to the bone.  He completed 

palliative radiation to the lower back and hips in February 2018. He continues 

to suffer from pain in the same region that we previously treated.  He is 

deconditioned which has been an issue even from the prior admission. We stated 

that he may need more support at home given his deconditioned state and the 

fact that he lives alone.  It is not clear whether he has seen the medical 

oncologist regarding bisphosphonates for his bone metastases and continued 

androgen deprivation therapy to keep his cancer controlled. At the moment, 

there is no role for radiation.
rehabilitation facility

## 2018-03-29 VITALS
HEART RATE: 76 BPM | TEMPERATURE: 98.1 F | DIASTOLIC BLOOD PRESSURE: 62 MMHG | SYSTOLIC BLOOD PRESSURE: 136 MMHG | OXYGEN SATURATION: 93 %

## 2018-03-29 NOTE — CON
DATE:  03/28/2018



GENITOURINARY CONSULTATION



CHIEF COMPLAINT:  Weakness and falls.



HISTORY OF PRESENT ILLNESS:  This is an 88-year-old male who is known to

me.  The patient has a history of metastatic prostate cancer.  He recently

received bone radiation and was transferred to rehabilitation.  The patient

completed his rehabilitation and was then discharged home.  At home, the

patient suffered a fall and was feeling weakness and continued pain.  He

was readmitted to the hospital.  He reports his legs are very weak.  He is

having difficulty supporting his weight and ambulating without assistance. 

He denies any headaches or dizziness.  Denies any nausea or vomiting.  The

patient has an indwelling Waddell catheter for urinary retention.  He denies

any gross hematuria.   consultation was requested regarding the above.



PAST MEDICAL HISTORY:  Castrate-resistant metastatic prostate cancer,

urinary retention, back pain, spinal stenosis, coronary artery disease,

gait dysfunction, urinary tract infection, and seizure disorder.



MEDICATIONS:  Currently include Cipro, Duragesic, Percocet, Senokot,

Tegretol.  He received a dose of Maxipime.



ALLERGIES:  NO KNOWN DRUG ALLERGIES.



FAMILY HISTORY:  Noncontributory.



SOCIAL HISTORY:  No smoking or EtOH use.



REVIEW OF SYSTEMS:  The patient is awake and alert, answering questions. 

He complains of weakness, lethargy, back pain, difficulty ambulating,

urinary retention, and some constipation.  Other systems are negative.



PHYSICAL EXAMINATION:

GENERAL:  The patient is awake, alert, and answering questions.

VITAL SIGNS:  He is afebrile.  Temperature of 98.7, pulse 85, /60,

respirations are 20.

NECK:  Supple.  There is no adenopathy.

CHEST:  Revealed normal inspiratory effort.

CARDIAC:  Positive S1, S2.  There is mild-to-moderate peripheral edema

noted.

ABDOMEN:  Soft, nontender, nondistended.  There is no hepatosplenomegaly. 

There is no costovertebral angle tenderness.

GENITOURINARY:  Phallus is normal.  Waddell catheter in place draining

clear-colored urine.  Scrotum is normal.  Testes bilaterally descended,

nontender, no masses, mildly atrophic.  Epididymis are normal.

EXTREMITIES:  There is mild-to-moderate edema.  No cyanosis.



LABORATORY DATA:  WBC count 5.5, creatinine 0.9 with a GFR of greater than

60.  BNP of 1200, alk phos 146.  Urinalysis 5 to 10 rbc's, too numerous to

count wbc's, positive nitrites.



RADIOLOGIC DATA:  Pelvic x-ray showed arthritis in the sacroiliac joints

and hip joints, no acute fractures noted.



IMPRESSION AND PLAN:  This is an 88-year-old male with castrate-resistant

metastatic prostate cancer.  Urologically, I have discussed with the

patient and the patient's son that the plan would be to start the patient

on Xtandi or Zytiga and prednisone.  The patient was supposed to follow up

in my office after leaving rehab; however, he suffered a fall at home and

was readmitted.  Plan for now will be pain control and continue

rehabilitation.  I would recommend a consult with Oncology.  Dr. Castro has

seen him in the past.  Possibly she can facilitate getting the patient the

medication while he is in the hospital and starting him at that time. 

Alternatively, we can consider starting chemotherapy which would be the

next step if the advanced hormonal therapy does not work; however, I will

leave this decision to Dr. Castro.  For now, Waddell catheter should be

remaining in place.  I will check when the patient's last leuprolide

injection was, as he may be due and that we can give to him while he is at

the hospital.  Thank you for allowing me to participate in the care of this

patient.  He is critically ill and in guarded condition.  I will follow him

with you.





__________________________________________

Price Jackson MD





DD:  03/28/2018 18:06:32

DT:  03/28/2018 18:11:21

Job # [id number]

## 2018-03-29 NOTE — DS
HISTORY OF PRESENT ILLNESS:  This is an 88-year-old male who had come into

the hospital and was having difficulty in walking.  The patient was found

to have urinary tract infection secondary to gram-negative rods.  The

patient has no complaints of any headaches or dizziness.  No nausea, no

vomiting.



PHYSICAL EXAMINATION:

VITAL SIGNS:  Temperature is 98.7, pulse of 85, blood pressure is 141/60,

respirations 20, O2 saturation 96%.

GENERAL:  The patient is lying in bed, flat, comfortable.

HEENT:  No oral lesion.  Anicteric sclerae.  Moist mucosa.

NECK:  No JVD, adenopathy, or thyromegaly.

CARDIOVASCULAR:  S1 and S2, regular.  No murmurs, rubs, or gallops.

LUNGS:  Clear to auscultation bilaterally.  No wheeze, rales, or rhonchi.

ABDOMEN:  Bowel sounds are positive, soft, nontender and nondistended.

EXTREMITIES:  No cyanosis, clubbing, or edema.



ASSESSMENT:

1.  Urinary tract infection secondary to gram-negative rods.

2.  Gait dysfunction.

3.  Metastatic-hormone resistant prostate cancer.

4.  Back pain.

5.  Spinal stenosis.

6.  Hepatomegaly.

7.  Coronary artery disease.

8.  Osteoarthritis.

9.  Seizure disorder.

10.  Deconditioning.



PLAN:  The patient is currently on cefepime for antibiotics.  The patient

is on Percocet for pain.  The patient is going to be on Tegretol for

seizure disorder.  The patient is going to subacute rehab, I did speak to

the .  The patient is on a heart-healthy diet.  The patient

was seen by Dr. Sewell for the prostate cancer, no further radiation is

warranted.







__________________________________________

Nathan Rivero MD



DD:  03/28/2018 9:05:37

DT:  03/28/2018 13:04:50

Job # [id number]

## 2018-05-21 ENCOUNTER — HOSPITAL ENCOUNTER (INPATIENT)
Dept: HOSPITAL 42 - ED | Age: 83
LOS: 4 days | Discharge: HOME | DRG: 194 | End: 2018-05-25
Attending: INTERNAL MEDICINE | Admitting: INTERNAL MEDICINE
Payer: MEDICARE

## 2018-05-21 VITALS — BODY MASS INDEX: 24 KG/M2

## 2018-05-21 DIAGNOSIS — C61: ICD-10-CM

## 2018-05-21 DIAGNOSIS — C79.51: ICD-10-CM
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
## 2018-05-21 DIAGNOSIS — Z86.2: ICD-10-CM

## 2018-05-21 DIAGNOSIS — Z79.818: ICD-10-CM

## 2018-05-21 DIAGNOSIS — J18.9: Primary | ICD-10-CM

## 2018-05-21 DIAGNOSIS — N40.0: ICD-10-CM

## 2018-05-21 DIAGNOSIS — R56.9: ICD-10-CM

## 2018-05-21 DIAGNOSIS — G89.3: ICD-10-CM

## 2018-05-21 DIAGNOSIS — F03.90: ICD-10-CM

## 2018-05-21 DIAGNOSIS — Z66: ICD-10-CM

## 2018-05-21 DIAGNOSIS — H40.9: ICD-10-CM

## 2018-05-21 DIAGNOSIS — Z92.3: ICD-10-CM

## 2018-05-21 DIAGNOSIS — Z87.891: ICD-10-CM

## 2018-05-21 DIAGNOSIS — Z87.440: ICD-10-CM

## 2018-05-21 LAB
ALBUMIN SERPL-MCNC: 3 G/DL (ref 3–4.8)
ALBUMIN/GLOB SERPL: 0.9 {RATIO} (ref 1.1–1.8)
ALT SERPL-CCNC: 24 U/L (ref 7–56)
AST SERPL-CCNC: 25 U/L (ref 17–59)
BASOPHILS # BLD AUTO: 0.02 K/MM3 (ref 0–2)
BASOPHILS NFR BLD: 0.2 % (ref 0–3)
BUN SERPL-MCNC: 18 MG/DL (ref 7–21)
CALCIUM SERPL-MCNC: 8.5 MG/DL (ref 8.4–10.5)
EOSINOPHIL # BLD: 0 10*3/UL (ref 0–0.7)
EOSINOPHIL NFR BLD: 0.1 % (ref 1.5–5)
ERYTHROCYTE [DISTWIDTH] IN BLOOD BY AUTOMATED COUNT: 14.7 % (ref 11.5–14.5)
GFR NON-AFRICAN AMERICAN: > 60
GRANULOCYTES # BLD: 7.37 10*3/UL (ref 1.4–6.5)
GRANULOCYTES NFR BLD: 63.6 % (ref 50–68)
HGB BLD-MCNC: 11.4 G/DL (ref 14–18)
LYMPHOCYTES # BLD: 3.1 10*3/UL (ref 1.2–3.4)
LYMPHOCYTES NFR BLD AUTO: 27 % (ref 22–35)
MCH RBC QN AUTO: 30.2 PG (ref 25–35)
MCHC RBC AUTO-ENTMCNC: 33.7 G/DL (ref 31–37)
MCV RBC AUTO: 89.7 FL (ref 80–105)
MONOCYTES # BLD AUTO: 1.1 10*3/UL (ref 0.1–0.6)
MONOCYTES NFR BLD: 9.1 % (ref 1–6)
PLATELET # BLD: 251 10^3/UL (ref 120–450)
PMV BLD AUTO: 9.4 FL (ref 7–11)
RBC # BLD AUTO: 3.77 10^6/UL (ref 3.5–6.1)
TROPONIN I SERPL-MCNC: < 0.01 NG/ML
WBC # BLD AUTO: 11.6 10^3/UL (ref 4.5–11)

## 2018-05-21 RX ADMIN — CEFEPIME SCH MLS/HR: 1 INJECTION, SOLUTION INTRAVENOUS at 21:55

## 2018-05-21 NOTE — CARD
--------------- APPROVED REPORT --------------





EKG Measurement

Heart [id number]

TN [id number]

[id number]

[id number]

[id number]



<Conclusion>

Normal sinus rhythm

Normal ECG

## 2018-05-21 NOTE — RAD
HISTORY:

possible pneumonia  



COMPARISON:

03/25/2018 



FINDINGS:



LUNGS:

Patchy alveolar infiltrates are seen in both upper lobes.



PLEURA:

No significant pleural effusion identified, no pneumothorax apparent.



CARDIOVASCULAR:

Normal.



OSSEOUS STRUCTURES:

No significant abnormalities.



VISUALIZED UPPER ABDOMEN:

Normal.



OTHER FINDINGS:

None.



IMPRESSION:

Bilateral upper lobe pneumonia

## 2018-05-21 NOTE — ED PDOC
Arrival/HPI





- General


Chief Complaint: Shortness Of Breath


Time Seen by Provider: 05/21/18 11:31


Historian: Patient





- History of Present Illness


Narrative History of Present Illness (Text): 





05/21/18 11:30


88 year old male, whose PMH includes prostate CA with metastasize to bones, and 

seizure, who presents to the emergency department complaining of productive 

cough since last week. Patient reports his PMD started him on Mucinex, but it 

had no significant relief. His visiting nurse came and recommended him to come 

to the emergency department for evaluation. Son states he has been feeling 

drowsy with worsening cough. Patient denies chest pain, headache, fever, nausea

, vomiting, diarrhea, or other complaints. 





Time/Duration: 1 week


Symptom Onset: Gradual


Symptom Course: Worsening


Activities at Onset: Rest


Context: Home





Past Medical History





- Provider Review


Nursing Documentation Reviewed: Yes





- Infectious Disease


Hx of Infectious Diseases: None





- Cardiac


Hx Cardiac Disorders: No





- Pulmonary


Hx Respiratory Disorders: Yes (H/O SMOKING CIGARETTES)


Other/Comment: EMPYEMA/SARCOIDOSIS





- Neurological


Hx Dizziness: Yes (syncope)


Hx Seizures: Yes (last seizure 20 yrs ago)





- HEENT


Hx Cataracts: Yes (with bilateral sx)





- Renal


Hx Renal Disorder: Yes


Other/Comment: chronic tolentino-Prostate ca.





- Endocrine/Metabolic


Hx Endocrine Disorders: No





- Hematological/Oncological


Hx Blood Disorders: Yes (radiation theraphy had 9 rounds already)


Hx Cancer: Yes (prostate ca with mets to bones)





- Integumentary


Hx Dermatological Disorder: Yes


Other/Comment: 2-5-18 bilateral le edema +2 more to right .Pitting with dry 

thin scaly flakes.





- Musculoskeletal/Rheumatological


Hx Falls: Yes





- Gastrointestinal


Hx Gastrointestinal Disorders: Yes (constipation)





- Genitourinary/Gynecological


Hx Urinary Tract Infection: Yes





- Psychiatric


Hx Psychophysiologic Disorder: Yes


Hx Substance Use: No





- Anesthesia


Hx Anesthesia: No





Family/Social History





- Physician Review


Nursing Documentation Reviewed: Yes


Family/Social History: Unknown Family HX


Smoking Status: Former Smoker


Hx Alcohol Use: Yes (occasional)


Hx Substance Use: No





Allergies/Home Meds


Allergies/Adverse Reactions: 


Allergies





No Known Allergies Allergy (Verified 02/05/18 18:53)


 








Home Medications: 


 Home Meds











 Medication  Instructions  Recorded  Confirmed


 


Carbamazepine [Carbatrol] 1 tab PO TID 03/25/18 03/25/18


 


oxyCODONE/Acetaminophen [Percocet 1 tab PO Q12H PRN 03/25/18 03/25/18





5/325 mg Tab]   














Review of Systems





- Review of Systems


Constitutional: absent: Fevers


Eyes: absent: Vision Changes


Respiratory: Cough.  absent: SOB


Cardiovascular: absent: Chest Pain


Gastrointestinal: absent: Abdominal Pain


Genitourinary Male: absent: Dysuria


Musculoskeletal: absent: Back Pain


Skin: absent: Rash


Neurological: Other (drowsiness ).  absent: Headache


Endocrine: absent: Diaphoresis





Physical Exam


Vital Signs Reviewed: Yes


Vital Signs











  Temp Pulse Resp BP Pulse Ox


 


 05/21/18 12:27   98 H  20  116/56 L  96


 


 05/21/18 11:23  98.3 F  108 H  19  146/61  92 L











Temperature: Afebrile


Blood Pressure: Normal


Pulse: Tachycardic


Respiratory Rate: Agonal


Appearance: Positive for: Well-Appearing, Non-Toxic, Comfortable


Pain Distress: None


Mental Status: Positive for: Alert and Oriented X 3





- Systems Exam


Head: Present: Atraumatic


Pupils: Present: PERRL


Extroacular Muscles: Present: EOMI


Conjunctiva: Present: Normal


Respiratory/Chest: Present: Rhonchi (bilateral rhonchi ).  No: Clear to 

Auscultation, Respiratory Distress, Accessory Muscle Use, Rales


Cardiovascular: Present: Regular Rate and Rhythm, Normal S1, S2.  No: Murmurs


Abdomen: Present: Normal Bowel Sounds.  No: Tenderness, Distention, Peritoneal 

Signs, Rebound, Guarding


Genitourinary Male: Present: Other (Tolentino catheter in place)


Lower Extremity: Present: Normal Inspection, NORMAL PULSES, Normal ROM, 

Neurovascularly Intact, Capillary Refill < 2 s.  No: Edema, Cyanosis, Tenderness

, Swelling, Erythema, Deformity


Neurological: Present: GCS=15, CN II-XII Intact, Speech Normal


Skin: Present: Warm, Dry, Normal Color.  No: Rashes


Psychiatric: Present: Alert, Oriented x 3, Normal Insight, Normal Concentration





Medical Decision Making


ED Course and Treatment: 





05/21/18 


Impression:


88 year old male, with rhonchi on bilateral lungs complaining of productive 

cough since one week. 





Plan:


-- EKG


-- Chest X-ray


-- Labs


-- Reassess and disposition








Progress Notes:


 EKG:


Ordered, reviewed, and independently interpreted the EKG.


Rate : 100 BPM


Rhythm : sinus tachycardia


Interpretation : LAD. No ST-segment elevations or depressions, no T-wave 

inversions, normal intervals.





05/21/18 12:20


Chest X-ray: Creator : Jorge Luis Martinez MD


COMPARISON: 03/25/2018 


FINDINGS:


LUNGS: Patchy alveolar infiltrates are seen in both upper lobes.


PLEURA: No significant pleural effusion identified, no pneumothorax apparent.


CARDIOVASCULAR: Normal.


OSSEOUS STRUCTURES: No significant abnormalities.


VISUALIZED UPPER ABDOMEN: Normal.


OTHER FINDINGS: None.


IMPRESSION: Bilateral upper lobe pneumonia











05/21/18 13:28


Patient was diagnosed with PNA and will be admitted for airborne observation. 





- Lab Interpretations


Lab Results: 








 05/21/18 11:48 





 05/21/18 11:48 





 Lab Results





05/21/18 11:48: Sodium 136, Potassium 3.6, Chloride 99, Carbon Dioxide 25, 

Anion Gap 16, BUN 18, Creatinine 0.5 L, Est GFR ( Amer) > 60, Est GFR (

Non-Af Amer) > 60, Random Glucose 109, Calcium 8.5, Phosphorus 3.0, Magnesium 

1.8, Total Bilirubin 0.4, AST 25, ALT 24, Alkaline Phosphatase 91, Troponin I < 

0.01, Total Protein 6.3, Albumin 3.0, Globulin 3.3, Albumin/Globulin Ratio 0.9 L


05/21/18 11:48: WBC 11.6 H D, RBC 3.77, Hgb 11.4 L, Hct 33.8 L, MCV 89.7  D, 

MCH 30.2, MCHC 33.7, RDW 14.7 H, Plt Count 251, MPV 9.4, Gran % 63.6, Lymph % (

Auto) 27.0, Mono % (Auto) 9.1 H, Eos % (Auto) 0.1 L, Baso % (Auto) 0.2, Gran # 

7.37 H, Lymph # (Auto) 3.1, Mono # (Auto) 1.1 H, Eos # (Auto) 0.0, Baso # (Auto

) 0.02








I have reviewed the lab results: Yes





- RAD Interpretation


Radiology Orders: 








05/21/18 11:34


CHEST PORTABLE [RAD] Stat 











: Radiologist





- EKG Interpretation


Interpreted by ED Physician: Yes


Type: 12 lead EKG





- Medication Orders


Current Medication Orders: 








Cefepime HCl (Maxipime 2gm)  2 gm in 100 mls @ 100 mls/hr IVPB STAT STA


   PRN Reason: Protocol


   Stop: 05/21/18 14:14


Vancomycin HCl 1.25 gm/ Sodium (Chloride)  500 mls @ 167 mls/hr IVPB ONCE ONE


   Stop: 05/21/18 16:29





Discontinued Medications





Azithromycin (Zithromax)  500 mg PO STAT STA


   PRN Reason: Protocol


   Stop: 05/21/18 13:18











- Scribe Statement


The provider has reviewed the documentation as recorded by the Elvira Zuniga





Provider Scribe Attestation:


All medical record entries made by the Scribe were at my direction and 

personally dictated by me. I have reviewed the chart and agree that the record 

accurately reflects my personal performance of the history, physical exam, 

medical decision making, and the department course for this patient. I have 

also personally directed, reviewed, and agree with the discharge instructions 

and disposition. 





Disposition/Present on Arrival





- Present on Arrival


History of DVT/PE: No


History of Uncontrolled Diabetes: No


Urinary Catheter: No


History of Decub. Ulcer: No


History Surgical Site Infection Following: None





- Disposition


Referrals: 


Smai Thakur MD [Primary Care Provider] - Follow up with primary


Forms:  [employer] (English)

## 2018-05-22 RX ADMIN — CEFEPIME SCH MLS/HR: 1 INJECTION, SOLUTION INTRAVENOUS at 21:05

## 2018-05-22 RX ADMIN — VANCOMYCIN HYDROCHLORIDE SCH MLS/HR: 1 INJECTION, POWDER, LYOPHILIZED, FOR SOLUTION INTRAVENOUS at 05:24

## 2018-05-22 RX ADMIN — CEFEPIME SCH MLS/HR: 1 INJECTION, SOLUTION INTRAVENOUS at 17:53

## 2018-05-22 RX ADMIN — CEFEPIME SCH MLS/HR: 1 INJECTION, SOLUTION INTRAVENOUS at 05:21

## 2018-05-22 RX ADMIN — VANCOMYCIN HYDROCHLORIDE SCH MLS/HR: 1 INJECTION, POWDER, LYOPHILIZED, FOR SOLUTION INTRAVENOUS at 18:57

## 2018-05-22 NOTE — CP.PCM.HP
<Trinity Bo - Last Filed: 05/22/18 12:32>





History of Present Illness





- History of Present Illness


History of Present Illness: 


H&P for LARRY Lechuga PGY2





This is an 89yo male with past medical history with metastatic prostate cancer s

/p ADT and palliative radiation, seizures, glaucoma, UTI and falls who came to 

ED for cough and lethargy x 1 week. Patient states he wasn't feeling well for 

the past week. He was coughing phlegm, but unsure of what color it is. He tried 

Mucinex that was suggested by his PMD without any relief. Patient denies fever, 

chills, chest pain, shortness of breath, nausea/vomiting/diarrhea, numbness or 

tingling. Upon examination, patient is awake and alert but A&O x 1-2. I spoke 

with his son and this is not his baseline. Patient has been lethargic for the 

past week and has been in and out of the hospital and rehab facilities since 

January. 








Past medical history: Metastatic prostate cancer s/p ADT and palliative 

radiation, seizures, glaucoma 


Past surgical history: bilateral cataract surgery 


Home meds: As per MAR


Allergies: NKDA


Social history: Denies EtOH, drug or tobacco use. 





Present on Admission





- Present on Admission


Any Indicators Present on Admission: No





Review of Systems





- Review of Systems


All systems: reviewed and no additional remarkable complaints except


Review of Systems: 





12 point ROS reviewed as per HPI and are otherwise negative. 





Past Patient History





- Infectious Disease


Hx of Infectious Diseases: None





- Past Social History


Smoking Status: Never Smoked





- CARDIAC


Hx Cardiac Disorders: No


Hx Peripheral Edema: Yes (b/l foot/ankle edema +1)





- PULMONARY


Hx Respiratory Disorders: Yes (H/O SMOKING CIGARETTES)


Other/Comment: EMPYEMA/SARCOIDOSIS





- NEUROLOGICAL


Hx Dizziness: Yes (syncope)


Hx Seizures: Yes (last seizure 20 yrs ago)





- HEENT


Hx Cataracts: Yes (with bilateral sx)


Hx Glaucoma: Yes





- RENAL


Hx Chronic Kidney Disease: Yes


Other/Comment: chronic tolentino-Prostate ca.





- ENDOCRINE/METABOLIC


Hx Endocrine Disorders: No





- HEMATOLOGICAL/ONCOLOGICAL


Hx Blood Disorders: Yes (radiation therapy had 9 rounds already)


Hx Cancer: Yes (prostate ca with mets to bones)


Other/Comment: lupro injections once a month





- INTEGUMENTARY


Hx Dermatological Problems: Yes


Other/Comment: b/l ankle foot edema +1 multiple fading bruses to both lower legs





- MUSCULOSKELETAL/RHEUMATOLOGICAL


Hx Musculoskeletal Disorders: Yes


Hx Arthritis: Yes


Hx Back Pain: Yes (sciatica)


Hx Falls: Yes (recent frequent)


Hx Unsteady Gait: Yes (was using walker now bedridden)





- GASTROINTESTINAL


Hx Gastrointestinal Disorders: Yes (constipation)





- GENITOURINARY/GYNECOLOGICAL


Hx Urinary Tract Infection: Yes





- PSYCHIATRIC


Hx Psychophysiologic Disorder: Yes


Hx Substance Use: No





- SURGICAL HISTORY


Hx Surgeries: Yes (lung sx,right foot sx-clubbed foot,bilateral cataract sx.)





- ANESTHESIA


Hx Anesthesia: No





Meds


Allergies/Adverse Reactions: 


 Allergies











Allergy/AdvReac Type Severity Reaction Status Date / Time


 


No Known Allergies Allergy   Verified 02/05/18 18:53














Physical Exam





- Constitutional


Appears: No Acute Distress





- Head Exam


Head Exam: ATRAUMATIC, NORMAL INSPECTION, NORMOCEPHALIC





- Eye Exam


Eye Exam: Normal appearance, PERRL


Pupil Exam: NORMAL ACCOMODATION, PERRL





- ENT Exam


ENT Exam: Mucous Membranes Moist





- Respiratory Exam


Respiratory Exam: Decreased Breath Sounds (on apices ), NORMAL BREATHING 

PATTERN.  absent: Rales, Rhonchi, Wheezes





- Cardiovascular Exam


Cardiovascular Exam: REGULAR RHYTHM, +S1, +S2.  absent: Gallop, Rubs, Systolic 

Murmur





- GI/Abdominal Exam


GI & Abdominal Exam: Normal Bowel Sounds, Soft.  absent: Mass, Rebound, Rigid, 

Tenderness





- Extremities Exam


Extremities exam: Positive for: normal inspection.  Negative for: calf 

tenderness, pedal edema





- Neurological Exam


Neurological exam: Alert, CN II-XII Intact





- Psychiatric Exam


Psychiatric exam: Normal Affect, Normal Mood





- Skin


Skin Exam: Dry, Warm





Results





- Vital Signs


Recent Vital Signs: 





 Last Vital Signs











Temp  97.7 F   05/21/18 23:19


 


Pulse  94 H  05/21/18 23:19


 


Resp  16   05/21/18 23:19


 


BP  121/52 L  05/21/18 23:19


 


Pulse Ox  94 L  05/21/18 23:19














- Labs


Result Diagrams: 


 05/21/18 11:48





 05/21/18 11:48





Assessment & Plan





- Assessment and Plan (Free Text)


Assessment: 


This is an 89yo male with past medical history with metastatic prostate cancer s

/p ADT and palliative radiation, seizures, glaucoma who was admitted for 





1. Hospital Acquired Pneumonia 


2. Delirium 


3. Seizures 


4. 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 


Plan: 


Patient is on IV antibiotics (Cefepime, Doxycycline and Vancomycin). Patient is 

on airborne precaution. ID is on consult. Septic work up is in progress. We 

will continue home  seizure medication, Carbamazapime as well as his pain 

medication for his cancer. As for his delirium, this can be secondary to the 

pneumonia. Recommend continuous patient education and re-orientation. We will 

get a swallow eval since patient has been spitting up a lot of his food as per 

nursing staff. Physical therapy evaluation pending. Patient will be on GI and 

DVT prophylaxis. He is DNR/DNI as per POLST form which was done at last 

admission. 





Case see, discussed and reviewed with Dr. Rivero. 


LARRY Bo PGY2





- Date & Time


Date: 05/22/18


Time: 12:49





<Nathan Rivero S - Last Filed: 05/22/18 16:17>





Results





- Vital Signs


Recent Vital Signs: 





 Last Vital Signs











Temp  97.8 F   05/22/18 15:14


 


Pulse  91 H  05/22/18 15:14


 


Resp  18   05/22/18 15:14


 


BP  133/56 L  05/22/18 15:14


 


Pulse Ox  98   05/22/18 15:14














- Labs


Result Diagrams: 


 05/21/18 11:48





 05/21/18 11:48


Labs: 





 Laboratory Results - last 24 hr











  05/22/18





  07:00


 


Procalcitonin  0.21














Assessment & Plan





- Assessment and Plan (Free Text)


Plan: 





Pt seen and examined. I have reviewed the note of the medical resident and 

agree with it. I have discussed the assessment and plan with the resident.  I 

have reviewed the patient's labs and medications. Pt's son was updated on the 

diagnosis and plan. ID consulted. He is confused. He has a pneumonia on CXR. 

His medical records were reviewed by me from the last admission.

## 2018-05-23 LAB
APPEARANCE UR: CLEAR
BACTERIA #/AREA URNS HPF: (no result) /[HPF]
BILIRUB UR-MCNC: NEGATIVE MG/DL
COLOR UR: (no result)
EPI CELLS #/AREA URNS HPF: (no result) /HPF (ref 0–5)
GLUCOSE UR STRIP-MCNC: NEGATIVE MG/DL
LEUKOCYTE ESTERASE UR-ACNC: (no result) LEU/UL
PH UR STRIP: 6 [PH] (ref 4.7–8)
PROT UR STRIP-MCNC: 30 MG/DL
RBC # UR STRIP: (no result) /UL
RBC #/AREA URNS HPF: (no result) /HPF (ref 0–2)
SP GR UR STRIP: 1.01 (ref 1–1.03)
UROBILINOGEN UR STRIP-ACNC: 0.2 E.U./DL

## 2018-05-23 RX ADMIN — CEFEPIME SCH MLS/HR: 1 INJECTION, SOLUTION INTRAVENOUS at 21:38

## 2018-05-23 RX ADMIN — CEFEPIME SCH MLS/HR: 1 INJECTION, SOLUTION INTRAVENOUS at 04:59

## 2018-05-23 RX ADMIN — CEFEPIME SCH MLS/HR: 1 INJECTION, SOLUTION INTRAVENOUS at 13:57

## 2018-05-23 NOTE — PN
DATE:  05/23/2018



SUBJECTIVE:  The patient was seen earlier this morning.  No fevers, no

chills.  Chronically ill.



PHYSICAL EXAMINATION:

VITAL SIGNS:  Temperature is 97, blood pressure is 130/60, respiratory rate

of 18.

HEENT:  Unremarkable.

NECK:  Supple.

LUNGS:  Have decreased breath sounds.

HEART:  Normal S1, S2.

ABDOMEN:  Soft, nontender.



LABORATORY DATA:  Reveals a white count of 11,000, hemoglobin of 11,

platelets of 251.  Chemistry reveals BUN of 18, creatinine of 0.5. 

Serology is noted.  Microbiology is reviewed.  Blood cultures are negative.

The patient had a CAT scan of the chest, which revealed multifocal

infiltrates in upper and lower lobes consistent with pneumonia.  Of note is

that the patient's urine for Legionella antigen is negative.  The patient's

procalcitonin is 0.21.  Blood cultures are negative.  The patient's

creatinine is normal.  Currently on cefepime and doxycycline.



ASSESSMENT AND PLAN:  This is an 88-year-old male with history of prostate

cancer with metastasis to the bone, history of Escherichia coli. urinary

tract infection, history of Klebsiella urinary tract infection, seizures,

glaucoma, admitted now with:

1.  Bilaterally healthcare-associated pneumonia with a normal

procalcitonin, negative blood cultures, currently on cefepime and

doxycycline.  We maybe able to discontinue the cefepime in the next 24

hours and complete the short course of doxycycline and we will also order a

urinalysis.  Overall prognosis is quite poor.





__________________________________________

Donta Alvarez MD



DD:  05/23/2018 11:14:43

DT:  05/23/2018 13:27:25

Job # [id number]

## 2018-05-23 NOTE — CP.PCM.PN
<Trinity Bo - Last Filed: 05/23/18 09:55>





Subjective





- Date & Time of Evaluation


Date of Evaluation: 05/23/18


Time of Evaluation: 08:00





- Subjective


Subjective: 


Progress Note for LARRY Lechuga PGY2





Patient seen and examined at bedside. There were no acute overnight events as 

per nursing staff. Patient was lethargic on examination, but arousable. He 

denies chest pain, shortness of breath, nausea/vomiting/diarrhea, fever or 

chills, numbness/tingling, dysuria or hematuria. 





Objective





- Vital Signs/Intake and Output


Vital Signs (last 24 hours): 


 











Temp Pulse Resp BP Pulse Ox


 


 97.8 F   91 H  18   133/56 L  98 


 


 05/22/18 15:14  05/22/18 15:14  05/22/18 15:14  05/22/18 15:14  05/22/18 15:14








Intake and Output: 


 











 05/22/18 05/23/18





 18:59 06:59


 


Intake Total  120


 


Output Total  400


 


Balance  -280














- Medications


Medications: 


 Current Medications





Carbamazepine (Tegretol)  200 mg PO TID RICHAR


   PRN Reason: Protocol


   Last Admin: 05/22/18 17:53 Dose:  200 mg


Doxycycline Hyclate (Doryx)  100 mg PO Q12 [facility]AR


   PRN Reason: Protocol


   Last Admin: 05/22/18 21:04 Dose:  100 mg


Famotidine (Pepcid)  40 mg PO HS [facility]


   Last Admin: 05/22/18 21:05 Dose:  40 mg


Fentanyl (Duragesic)  1 patch TD Q72H [facility]


   Last Admin: 05/21/18 22:10 Dose:  1 patch


Fentanyl (Duragesic)  1 patch TD Q72H [facility]


   Last Admin: 05/22/18 18:57 Dose:  1 patch


Heparin Sodium (Porcine) (Heparin)  5,000 units SC Q12 [facility]


   PRN Reason: Protocol


   Last Admin: 05/22/18 21:04 Dose:  5,000 units


Cefepime HCl (Maxipime 2gm)  2 gm in 100 mls @ 100 mls/hr IVPB Q8 [facility]


   PRN Reason: Protocol


   Stop: 05/26/18 22:01


   Last Admin: 05/23/18 04:59 Dose:  100 mls/hr


Oxycodone/Acetaminophen (Percocet 5/325 Mg Tab)  1 tab PO Q4H PRN


   PRN Reason: Pain, moderate (4-7)


   Stop: 05/24/18 21:50











- Constitutional


Appears: No Acute Distress





- Head Exam


Head Exam: ATRAUMATIC, NORMAL INSPECTION, NORMOCEPHALIC





- Eye Exam


Eye Exam: Normal appearance


Pupil Exam: NORMAL ACCOMODATION





- ENT Exam


ENT Exam: Mucous Membranes Moist





- Respiratory Exam


Respiratory Exam: Clear to Ausculation Bilateral, NORMAL BREATHING PATTERN.  

absent: Rales, Rhonchi, Wheezes





- Cardiovascular Exam


Cardiovascular Exam: REGULAR RHYTHM, +S1, +S2.  absent: Gallop, Rubs, Murmur





- GI/Abdominal Exam


GI & Abdominal Exam: Soft, Normal Bowel Sounds.  absent: Tenderness, Mass, 

Rebound





- Extremities Exam


Extremities Exam: Normal Inspection.  absent: Calf Tenderness, Pedal Edema





- Neurological Exam


Neurological Exam: Awake, CN II-XII Intact.  absent: Oriented x3 (A&O x 1-2)





- Psychiatric Exam


Psychiatric exam: Normal Affect, Normal Mood





- Skin


Skin Exam: Dry, Warm





Assessment and Plan





- Assessment and Plan (Free Text)


Assessment: 


This is an 89yo male with past medical history with metastatic prostate cancer s

/p ADT and palliative radiation, seizures, glaucoma who was admitted for 





1. Hospital Acquired Pneumonia 


2. Delirium 


3. Seizures 


4. 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 


Plan: 


Septic work up showed low procalcitonin and blood cultures were negative. Chest 

CT done, final read pending. Patient is on Cefepime and Doxycycline. I spoke 

with ID who reports that the pneumonia is most likely viral secondary to 

aspiration while on chronic pain medication for cancer. Speech and swallow 

evaluated patient who did not see any dysphagia. Try to avoid feeding if 

lethargic. Continue Carbamazapime for history of seizures. Continue pain 

medication. Continue education and re-orientation for delirium. He is on GI and 

DVT prophylaxis. Patient is DNR/DNI as per POLST. Patient lives alone and was 

supposedly discussing hospice in the past. Will consult palliative care. 





Case see, discussed and reviewed with Dr. Rivero. 


LARRY Bo PGY2








<Nathan Rivero - Last Filed: 05/23/18 21:30>





Objective





- Vital Signs/Intake and Output


Vital Signs (last 24 hours): 


 











Temp Pulse Resp BP Pulse Ox


 


 98.4 F   90   20   127/64   97 


 


 05/23/18 14:59  05/23/18 14:59  05/23/18 14:59  05/23/18 14:59  05/23/18 14:59








Intake and Output: 


 











 05/23/18 05/24/18





 18:59 06:59


 


Intake Total 500 


 


Output Total 350 


 


Balance 150 














- Medications


Medications: 


 Current Medications





Carbamazepine (Tegretol)  200 mg PO TID [facility]ICHAR


   PRN Reason: Protocol


   Last Admin: 05/23/18 17:19 Dose:  200 mg


Doxycycline Hyclate (Doryx)  100 mg PO Q12 [facility]


   PRN Reason: Protocol


   Last Admin: 05/23/18 09:07 Dose:  100 mg


Famotidine (Pepcid)  40 mg PO HS [facility]


   Last Admin: 05/22/18 21:05 Dose:  40 mg


Fentanyl (Duragesic)  1 patch TD Q72H [facility]


   Last Admin: 05/21/18 22:10 Dose:  1 patch


Fentanyl (Duragesic)  1 patch TD Q72H [facility]


   Last Admin: 05/22/18 18:57 Dose:  1 patch


Heparin Sodium (Porcine) (Heparin)  5,000 units SC Q12 [facility]


   PRN Reason: Protocol


   Last Admin: 05/23/18 09:08 Dose:  5,000 units


Cefepime HCl (Maxipime 2gm)  2 gm in 100 mls @ 100 mls/hr IVPB Q8 [facility]


   PRN Reason: Protocol


   Stop: 05/26/18 22:01


   Last Admin: 05/23/18 13:57 Dose:  100 mls/hr


Oxycodone/Acetaminophen (Percocet 5/325 Mg Tab)  1 tab PO Q4H PRN


   PRN Reason: Pain, moderate (4-7)


   Stop: 05/24/18 21:50











Assessment and Plan





- Assessment and Plan (Free Text)


Plan: 





Pt seen and examined. I have reviewed the note of the medical resident and 

agree with it. I have discussed the assessment and plan with the resident.  I 

have reviewed the patient's labs and medications. Pt is more alert today. He is 

able to communicate better. Will continue with diet. He is DNR. No pain. CT 

chest reviewed.

## 2018-05-23 NOTE — CT
PROCEDURE:  CT Chest without contrast



HISTORY:

b/l upper infiltrate



COMPARISON:

None.



TECHNIQUE:

Contiguous axial images were obtained through the chest without 

intravenous contrast enhancement. Sagittal and coronal 

reconstructions were performed.







Radiation dose (DLP): 491 mGy-cm. 



This CT exam was performed using one or more of the following dose 

reduction techniques: Automated exposure control, adjustment of the 

mA and/or kV according to patient size, and/or use of iterative 

reconstruction technique.



FINDINGS:



LUNGS:

Multi focal infiltrates are seen in the upper and lower lobes 

bilaterally consistent with pneumonia 



MEDIASTINUM:

Unremarkable thoracic aorta. No aneurysm. Normal sized heart. Main 

pulmonary artery unremarkable. No vascular congestion. No 

lymphadenopathy.



PLEURA:

No pleural fluid. No pneumothorax.



BONES:

Multiple sclerotic lesions are seen in the spine in the sternum 

consistent with metastatic disease 



UPPER ABDOMEN:

Grossly unremarkable.



OTHER FINDINGS:

None.



IMPRESSION:

Multi focal infiltrates are seen in the upper and lower lobes 

bilaterally consistent with pneumonia

## 2018-05-23 NOTE — CON
DATE:  05/22/2018



LOCATION:  The patient is seen in room 576, bed 1 earlier today.  The

patient's son was at the bed in the room next to the patient's bed.



CHIEF COMPLAINT:  Shortness of breath and cough times several days.



HISTORY OF PRESENT ILLNESS:  This is an 88-year-old male with history of

prostate cancer with metastases to the bone, history of E. coli urinary

tract infection, history of Klebsiella urinary tract infection, history of

seizures and glaucoma, who was admitted with diagnosis of pneumonia and

tuberculosis screening.  The patient states he is not having any fevers or

chills.  He does have shortness of breath and cough and cough has a few

days' duration, nonproductive.  He has not had night sweats.  No recent

weight loss.



PAST MEDICAL HISTORY:  Significant for prostate cancer with metastases to

the bone, treated with Lupron hormonal therapy, had radiation to the pelvic

and also with E. coli urinary tract infection, Klebsiella urinary tract

infection, seizures and glaucoma.



REVIEW OF SYSTEMS:  The patient's review of systems also reveals 12-point

review of systems is performed.



PAST SURGICAL HISTORY:  Significant for right foot surgery, lung surgery,

bilateral cataract surgery.



ALLERGIES:  THE PATIENT HAS NO KNOWN ALLERGIES.



SOCIAL HISTORY:  He is [place] in [place], never has been outside of

[place], never had any tuberculosis exposure, has never been in [facility]

and is not working in the healthcare history.  He is an .



MEDICATIONS AT HOME:  Include oxycodone, _____, Duragesic patch and

Tegretol and Carbatrol.



PHYSICAL EXAMINATIN:

GENERAL:  He is in bed, appearing chronically ill, debilitated, wasting.

VITAL SIGNS:  Temperature of 98, blood pressure is 120/50, respiratory rate

of 19, heart rate of 103, BMI of only 24.

HEENT:  Examination of HEENT is temporal wasting.

NECK:  Supple.

LUNGS:  Have decreased breath sounds.

HEART:  Normal S1, S2.

ABDOMEN:  Soft, nontender.  No organomegaly.  No rebound.  No guarding.  No

masses.



LABORATORY DATA:  Laboratory examination reveals a white count 11,600,

hemoglobin of 11, platelets of 251, a 63% granulocytosis.  Chemistries

reveals a BUN of 18, creatinine of 0.5, procalcitonin 0.21.  Microbiology

reveals the blood cultures are negative.  The patient had a chest x-ray,

which reveals patchy alveolar infiltrates, upper lobes.



ASSESSMENT AND PLAN:  An 88-year-old male with prostate cancer with

metastases to the bone, Escherichia coli urinary tract infection,

Klebsiella urinary tract infection, seizures, glaucoma, admitted with

shortness of breath, found to have tachycardia and positive infiltrate.  #1

is bilateral healthcare-associated upper lobe pneumonia with a normal

procalcitonin, less likely it is bacterial.  I strongly doubt tuberculosis

in this patient.  We will discontinue the isolation.  The patient has never

been out of [place] and is not having any fevers and chills.  No risk

factor for tuberculosis.  He did not get BCG treatment for the prostate

cancer, was given hormonal Lupron.  We will treat the patient with

doxycycline and cefepime.  Discontinue the IV vancomycin since the blood

cultures are negative.  We will order a CAT scan of the chest.  We will

also order a QuantiFERON and we will follow with you.  Overall prognosis is

quite poor for this patient who has what appears chronically ill, end

stage, consider hospice setting for this patient.





__________________________________________

Donta Alvarez MD





DD:  05/22/2018 19:21:43

DT:  05/22/2018 19:24:06

Job # [id number]

## 2018-05-24 VITALS — RESPIRATION RATE: 18 BRPM

## 2018-05-24 RX ADMIN — CEFEPIME SCH MLS/HR: 1 INJECTION, SOLUTION INTRAVENOUS at 05:27

## 2018-05-24 RX ADMIN — CEFEPIME SCH MLS/HR: 1 INJECTION, SOLUTION INTRAVENOUS at 14:24

## 2018-05-24 RX ADMIN — CEFEPIME SCH MLS/HR: 1 INJECTION, SOLUTION INTRAVENOUS at 21:47

## 2018-05-24 NOTE — CP.PCM.CON
History of Present Illness





- History of Present Illness


History of Present Illness: 





Palliative consult requested by Dr MIGUELITO Rivero


Reason: Goals of care





88 year old with history of metastatic prostate cancer, seizures, glaucoma and 

UTI who presented to ED with weakness, lethargy and productive cough of one 

week duration. The patient denied fever, chills, nausea, vomiting, chest pain, 

abdominal pain. Chest x ray showed bilateral upper lobe pneumonia which was 

confirmed by CT. Labs; WBC 11.6, Hgb 11.4, Plt 251, Chem/LFT's normal.





Vital Signs; T 98.0. P 89, /80, Resp 18.





PMHx: dementia, glaucoma, UTI, falls, metastatic prostate cancer s/p radiation 

therapy, BPH, indwelling tolentino, seizures. 





Social History: Former smoker, no alcohol or drug use. 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 

caretaker. 





Family History: Non contributory 





Advance care Planing: POLST:DNR/DNI. His son Cristino Hodges is his POA





Review of Systems: AS per HPI, 12 point review others negative





Past Patient History





- Infectious Disease


Hx of Infectious Diseases: None





- Past Social History


Smoking Status: Never Smoked





- CARDIAC


Hx Cardiac Disorders: No





- PULMONARY


Hx Respiratory Disorders: Yes (H/O SMOKING CIGARETTES)


Other/Comment: EMPYEMA/SARCOIDOSIS





- NEUROLOGICAL


Hx Dizziness: Yes (syncope)


Hx Seizures: Yes (last seizure 20 yrs ago)





- HEENT


Hx Cataracts: Yes (with bilateral sx)


Hx Glaucoma: Yes





- RENAL


Hx Chronic Kidney Disease: Yes


Other/Comment: chronic tolentino-Prostate ca.





- ENDOCRINE/METABOLIC


Hx Endocrine Disorders: No





- HEMATOLOGICAL/ONCOLOGICAL


Hx Blood Disorders: Yes (radiation therapy had 9 rounds already)


Hx Cancer: Yes (prostate ca with mets to bones)


Other/Comment: lupro injections once a month





- INTEGUMENTARY


Hx Dermatological Problems: Yes


Other/Comment: b/l ankle foot edema +1 multiple fading bruses to both lower legs





- MUSCULOSKELETAL/RHEUMATOLOGICAL


Hx Arthritis: Yes





- GASTROINTESTINAL


Hx Gastrointestinal Disorders: Yes (constipation)





- GENITOURINARY/GYNECOLOGICAL


Hx Urinary Tract Infection: Yes





- PSYCHIATRIC


Hx Psychophysiologic Disorder: Yes


Hx Substance Use: No





- SURGICAL HISTORY


Hx Surgeries: Yes (lung sx,right foot sx-clubbed foot,bilateral cataract sx.)





- ANESTHESIA


Hx Anesthesia: No





Meds


Home Medications: 


 Home Medication List











 Medication  Instructions  Recorded  Confirmed  Type


 


Doxycycline Hyclate [Doryx] 100 mg PO Q12 #6 cap 05/24/18  Rx











Allergies/Adverse Reactions: 


 Allergies











Allergy/AdvReac Type Severity Reaction Status Date / Time


 


No Known Allergies Allergy   Verified 02/05/18 18:53














- Medications


Medications: 


 Current Medications





Carbamazepine (Tegretol)  200 mg PO TID [facility]ICHAR


   PRN Reason: Protocol


   Last Admin: 05/24/18 14:25 Dose:  200 mg


Doxycycline Hyclate (Doryx)  100 mg PO Q12 [facility]ICHAR


   PRN Reason: Protocol


   Last Admin: 05/24/18 11:11 Dose:  100 mg


Famotidine (Pepcid)  40 mg PO HS [facility]


   Last Admin: 05/23/18 21:39 Dose:  40 mg


Fentanyl (Duragesic)  1 patch TD Q72H [facility]


   Last Admin: 05/21/18 22:10 Dose:  1 patch


Fentanyl (Duragesic)  1 patch TD Q72H [facility]


   Last Admin: 05/22/18 18:57 Dose:  1 patch


Heparin Sodium (Porcine) (Heparin)  5,000 units SC Q12 [facility]ICHAR


   PRN Reason: Protocol


   Last Admin: 05/24/18 11:11 Dose:  5,000 units


Cefepime HCl (Maxipime 2gm)  2 gm in 100 mls @ 100 mls/hr IVPB Q8 [facility]


   PRN Reason: Protocol


   Stop: 05/26/18 22:01


   Last Admin: 05/24/18 14:24 Dose:  100 mls/hr


Oxycodone/Acetaminophen (Percocet 5/325 Mg Tab)  1 tab PO Q4H PRN


   PRN Reason: Pain, moderate (4-7)


   Stop: 05/24/18 21:50











Physical Exam





- Constitutional


Appears: Chronically Ill





- Head Exam


Head Exam: NORMAL INSPECTION





- Eye Exam


Eye Exam: Normal appearance, PERRL





- ENT Exam


ENT Exam: Mucous Membranes Moist, Normal Oropharynx





- Neck Exam


Neck exam: Positive for: Normal Inspection





- Respiratory Exam


Respiratory Exam: Decreased Breath Sounds, Rhonchi, NORMAL BREATHING PATTERN





- Cardiovascular Exam


Cardiovascular Exam: REGULAR RHYTHM, +S1, +S2





- GI/Abdominal Exam


GI & Abdominal Exam: Normal Bowel Sounds, Soft


Additional comments: 





no tenderness





-  Exam


Additional comments: 





indwelling tolentino


 





- Extremities Exam


Extremities exam: Positive for: pedal edema, pedal pulses present


Additional comments: 





right club foot





- Neurological Exam


Neurological exam: Alert


Additional comments: 





oriented to self





- Skin


Skin Exam: Dry, Pallor





- Additional Findings


Additional findings: 





palliative performance scale rating 50 % 





Results





- Vital Signs


Recent Vital Signs: 


 Last Vital Signs











Temp  98 F   05/24/18 15:23


 


Pulse  89   05/24/18 15:23


 


Resp  18   05/24/18 15:23


 


BP  142/50 L  05/24/18 15:23


 


Pulse Ox  94 L  05/24/18 15:23














- Labs


Result Diagrams: 


 05/21/18 11:48





 05/21/18 11:48


Labs: 


 Laboratory Results - last 24 hr











  05/23/18





  18:45


 


Urine Color  Light yellow


 


Urine Appearance  Clear


 


Urine pH  6.0


 


Ur Specific Gravity  1.015


 


Urine Protein  30 H


 


Urine Glucose (UA)  Negative


 


Urine Ketones  Negative


 


Urine Blood  Trace-intact H


 


Urine Nitrate  Negative


 


Urine Bilirubin  Negative


 


Urine Urobilinogen  0.2


 


Ur Leukocyte Esterase  Trace H


 


Urine RBC  0 - 2


 


Urine WBC  1 - 3


 


Ur Epithelial Cells  0 - 2


 


Urine Bacteria  Few














Assessment & Plan





- Assessment and Plan (Free Text)


Assessment: 





88 year old male history of prostate cancer admitted with pneumonia, delirium, 

seizures and pain secondary to metastatic prostate cancer to bone.





 The paitns son Cristino and I spoke via phone. Cristino is aware of his fathers 

medical condition, reason for hospitalization and prognosis. Cristino and I have 

discussed hospice services in the past. Cristino understands that his father is 

meets criteria and is eligible for hospice care. Son states he has been 

thinking about this transition but hasn't been ready to proceed with next step. 

I offered to initiate process while here at the hospital, but Cristino not ready at 

this time. Physical support given. Cristino aware that his father to return home 

tomorrow. Cristino has [address] caret[address] in [address]  and anticipating discharge.





Time spent speaking with son regarding goals of care and hospice services 20 

minutes


Plan: 





Pneumonia:Cepfepime therapy completed. Continue on PO Doxycycline





Pain: Continue Fentanyl 25 mcg transdermal patch. Percocet as needed for 

breakthrough pain





Delirium: Close observation,fall prevention, re- orientation .





Seizure : Continue Tegretol.





Goals of care

## 2018-05-24 NOTE — CP.PCM.PN
<Trinity Bo - Last Filed: 05/24/18 12:44>





Subjective





- Date & Time of Evaluation


Date of Evaluation: 05/24/18


Time of Evaluation: 07:00





- Subjective


Subjective: 


Progress Note for LARRY Lechuga PGY2





Patient seen and examined at bedside. As per nursing staff, patient had some 

confusion overnight and did not get much sleep. Patient is resting in bed upon 

examination. He is A&O to person and place. He denies chest pain, shortness of 

breath, nausea/vomiting/diarrhea, fever or chills, numbness/tingling, dysuria 

or hematuria. 





Objective





- Vital Signs/Intake and Output


Vital Signs (last 24 hours): 


 











Temp Pulse Resp BP Pulse Ox


 


 98.2 F   96 H  20   144/68   97 


 


 05/24/18 08:37  05/24/18 08:37  05/24/18 08:37  05/24/18 08:37  05/24/18 08:37








Intake and Output: 


 











 05/24/18 05/24/18





 06:59 18:59


 


Intake Total 980 


 


Output Total 1025 


 


Balance -45 














- Medications


Medications: 


 Current Medications





Carbamazepine (Tegretol)  200 mg PO TID [facility]


   PRN Reason: Protocol


   Last Admin: 05/24/18 11:12 Dose:  200 mg


Doxycycline Hyclate (Doryx)  100 mg PO Q12 [facility]


   PRN Reason: Protocol


   Last Admin: 05/24/18 11:11 Dose:  100 mg


Famotidine (Pepcid)  40 mg PO HS [facility]


   Last Admin: 05/23/18 21:39 Dose:  40 mg


Fentanyl (Duragesic)  1 patch TD Q72H [facility]


   Last Admin: 05/21/18 22:10 Dose:  1 patch


Fentanyl (Duragesic)  1 patch TD Q72H [facility]


   Last Admin: 05/22/18 18:57 Dose:  1 patch


Heparin Sodium (Porcine) (Heparin)  5,000 units SC Q12 RICHAR


   PRN Reason: Protocol


   Last Admin: 05/24/18 11:11 Dose:  5,000 units


Cefepime HCl (Maxipime 2gm)  2 gm in 100 mls @ 100 mls/hr IVPB Q8 [facility]


   PRN Reason: Protocol


   Stop: 05/26/18 22:01


   Last Admin: 05/24/18 05:27 Dose:  100 mls/hr


Oxycodone/Acetaminophen (Percocet 5/325 Mg Tab)  1 tab PO Q4H PRN


   PRN Reason: Pain, moderate (4-7)


   Stop: 05/24/18 21:50











- Constitutional


Appears: No Acute Distress, Chronically Ill





- Head Exam


Head Exam: ATRAUMATIC, NORMAL INSPECTION, NORMOCEPHALIC





- Eye Exam


Eye Exam: Normal appearance, PERRL


Pupil Exam: NORMAL ACCOMODATION, PERRL





- ENT Exam


ENT Exam: Mucous Membranes Moist





- Neck Exam


Neck Exam: Full ROM





- Respiratory Exam


Respiratory Exam: Clear to Ausculation Bilateral, NORMAL BREATHING PATTERN.  

absent: Rales, Rhonchi, Wheezes





- Cardiovascular Exam


Cardiovascular Exam: REGULAR RHYTHM, +S1, +S2.  absent: Gallop, Rubs, Murmur





- GI/Abdominal Exam


GI & Abdominal Exam: Soft, Normal Bowel Sounds.  absent: Rigid, Tenderness, Mass

, Rebound





- Neurological Exam


Neurological Exam: Awake, CN II-XII Intact.  absent: Oriented x3





- Skin


Skin Exam: Dry, Warm





Assessment and Plan





- Assessment and Plan (Free Text)


Assessment: 





This is an 89yo male with past medical history with metastatic prostate cancer s

/p ADT and palliative radiation, seizures, glaucoma who was admitted for 





1. Hospital Acquired Pneumonia 


2. Delirium 


3. Seizures 


4. 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


5. Indwelling tolentino catheter  


Plan: 





Patient is s/p treatment with Cefepime. He will continue PO Doxycycline. ID 

recommendations were appreciated. We will continue education and re-orientation 

for his delirium. We will continue pain control and carbamazapime. Patient is 

DNR/DNI and palliative care is on consult. He is on GI and DVT 

prophylaxis.nPlan is for patient to be discharged home tomorrow with his 24hr 

health aid. A Prescription for Doxycycline was sent to [facility]'s pharmacy. I 

spoke with his son Cristino today who stated that he agrees with plan of care. 





Case see, discussed and reviewed with Dr. Rivero. 


LARRY Bo PGY2





<Nathan Rivero S - Last Filed: 05/24/18 15:53>





Objective





- Vital Signs/Intake and Output


Vital Signs (last 24 hours): 


 











Temp Pulse Resp BP Pulse Ox


 


 98 F   89   18   142/50 L  94 L


 


 05/24/18 15:23  05/24/18 15:23  05/24/18 15:23  05/24/18 15:23  05/24/18 15:23








Intake and Output: 


 











 05/24/18 05/24/18





 06:59 18:59


 


Intake Total 980 


 


Output Total 1025 


 


Balance -45 














- Medications


Medications: 


 Current Medications





Carbamazepine (Tegretol)  200 mg PO TID [facility]


   PRN Reason: Protocol


   Last Admin: 05/24/18 14:25 Dose:  200 mg


Doxycycline Hyclate (Doryx)  100 mg PO Q12 [facility]


   PRN Reason: Protocol


   Last Admin: 05/24/18 11:11 Dose:  100 mg


Famotidine (Pepcid)  40 mg PO HS [facility]


   Last Admin: 05/23/18 21:39 Dose:  40 mg


Fentanyl (Duragesic)  1 patch TD Q72H [facility]


   Last Admin: 05/21/18 22:10 Dose:  1 patch


Fentanyl (Duragesic)  1 patch TD Q72H [facility]


   Last Admin: 05/22/18 18:57 Dose:  1 patch


Heparin Sodium (Porcine) (Heparin)  5,000 units SC Q12 [facility]


   PRN Reason: Protocol


   Last Admin: 05/24/18 11:11 Dose:  5,000 units


Cefepime HCl (Maxipime 2gm)  2 gm in 100 mls @ 100 mls/hr IVPB Q8 [facility]


   PRN Reason: Protocol


   Stop: 05/26/18 22:01


   Last Admin: 05/24/18 14:24 Dose:  100 mls/hr


Oxycodone/Acetaminophen (Percocet 5/325 Mg Tab)  1 tab PO Q4H PRN


   PRN Reason: Pain, moderate (4-7)


   Stop: 05/24/18 21:50











Assessment and Plan





- Assessment and Plan (Free Text)


Plan: 





Pt seen and examined. I have reviewed the note of the medical resident and 

agree with it. I have discussed the assessment and plan with the resident.  I 

have reviewed the patient's labs and medications. Pt is going home today. He 

has 24 h care at home.

## 2018-05-25 VITALS
SYSTOLIC BLOOD PRESSURE: 130 MMHG | OXYGEN SATURATION: 96 % | HEART RATE: 89 BPM | TEMPERATURE: 98.3 F | DIASTOLIC BLOOD PRESSURE: 51 MMHG

## 2018-05-25 NOTE — CP.PCM.DIS
<Trinity Bo - Last Filed: 05/25/18 06:36>





Provider





- Provider


Date of Admission: 


05/21/18 13:29





Attending physician: 


Nathan Rivero MD





Primary care physician: 


Sami Thakur MD





Consults: 





ID: Dr. Alvarez


Palliative: Iman Paramontdevang 


Time Spent in preparation of Discharge (in minutes): 35





Hospital Course





- Lab Results


Lab Results: 


 Most Recent Lab Values











WBC  11.6 10^3/ul (4.5-11.0)  H D 05/21/18  11:48    


 


RBC  3.77 10^6/uL (3.5-6.1)   05/21/18  11:48    


 


Hgb  11.4 g/dL (14.0-18.0)  L  05/21/18  11:48    


 


Hct  33.8 % (42.0-52.0)  L  05/21/18  11:48    


 


MCV  89.7 fl (80.0-105.0)  D 05/21/18  11:48    


 


MCH  30.2 pg (25.0-35.0)   05/21/18  11:48    


 


MCHC  33.7 g/dl (31.0-37.0)   05/21/18  11:48    


 


RDW  14.7 % (11.5-14.5)  H  05/21/18  11:48    


 


Plt Count  251 10^3/uL (120.0-450.0)   05/21/18  11:48    


 


MPV  9.4 fl (7.0-11.0)   05/21/18  11:48    


 


Gran %  63.6 % (50.0-68.0)   05/21/18  11:48    


 


Lymph % (Auto)  27.0 % (22.0-35.0)   05/21/18  11:48    


 


Mono % (Auto)  9.1 % (1.0-6.0)  H  05/21/18  11:48    


 


Eos % (Auto)  0.1 % (1.5-5.0)  L  05/21/18  11:48    


 


Baso % (Auto)  0.2 % (0.0-3.0)   05/21/18  11:48    


 


Gran #  7.37  (1.4-6.5)  H  05/21/18  11:48    


 


Lymph # (Auto)  3.1  (1.2-3.4)   05/21/18  11:48    


 


Mono # (Auto)  1.1  (0.1-0.6)  H  05/21/18  11:48    


 


Eos # (Auto)  0.0  (0.0-0.7)   05/21/18  11:48    


 


Baso # (Auto)  0.02 K/mm3 (0.0-2.0)   05/21/18  11:48    


 


Sodium  136 mmol/L (132-148)   05/21/18  11:48    


 


Potassium  3.6 mmol/L (3.6-5.0)   05/21/18  11:48    


 


Chloride  99 mmol/L ()   05/21/18  11:48    


 


Carbon Dioxide  25 mmol/L (21-33)   05/21/18  11:48    


 


Anion Gap  16  (10-20)   05/21/18  11:48    


 


BUN  18 mg/dL (7-21)   05/21/18  11:48    


 


Creatinine  0.5 mg/dl (0.8-1.5)  L  05/21/18  11:48    


 


Est GFR ( Amer)  > 60   05/21/18  11:48    


 


Est GFR (Non-Af Amer)  > 60   05/21/18  11:48    


 


Random Glucose  109 mg/dL ()   05/21/18  11:48    


 


Calcium  8.5 mg/dL (8.4-10.5)   05/21/18  11:48    


 


Phosphorus  3.0 mg/dL (2.5-4.5)   05/21/18  11:48    


 


Magnesium  1.8 mg/dL (1.7-2.2)   05/21/18  11:48    


 


Total Bilirubin  0.4 mg/dL (0.2-1.3)   05/21/18  11:48    


 


AST  25 U/L (17-59)   05/21/18  11:48    


 


ALT  24 U/L (7-56)   05/21/18  11:48    


 


Alkaline Phosphatase  91 U/L ()   05/21/18  11:48    


 


Troponin I  < 0.01 ng/mL  05/21/18  11:48    


 


Total Protein  6.3 g/dL (5.8-8.3)   05/21/18  11:48    


 


Albumin  3.0 g/dL (3.0-4.8)   05/21/18  11:48    


 


Globulin  3.3 gm/dL  05/21/18  11:48    


 


Albumin/Globulin Ratio  0.9  (1.1-1.8)  L  05/21/18  11:48    


 


Procalcitonin  0.21 NG/ML (0.19-0.49)   05/22/18  07:00    


 


Urine Color  Light yellow  (YELLOW)   05/23/18  18:45    


 


Urine Appearance  Clear  (CLEAR)   05/23/18  18:45    


 


Urine pH  6.0  (4.7-8.0)   05/23/18  18:45    


 


Ur Specific Gravity  1.015  (1.005-1.035)   05/23/18  18:45    


 


Urine Protein  30 mg/dL (<30 mg/dL)  H  05/23/18  18:45    


 


Urine Glucose (UA)  Negative mg/dL (NEGATIVE)   05/23/18  18:45    


 


Urine Ketones  Negative mg/dL (NEGATIVE)   05/23/18  18:45    


 


Urine Blood  Trace-intact  (NEGATIVE)  H  05/23/18  18:45    


 


Urine Nitrate  Negative  (NEGATIVE)   05/23/18  18:45    


 


Urine Bilirubin  Negative  (NEGATIVE)   05/23/18  18:45    


 


Urine Urobilinogen  0.2 E.U./dL (<1 E.U./dL)   05/23/18  18:45    


 


Ur Leukocyte Esterase  Trace Martita/uL (NEGATIVE)  H  05/23/18  18:45    


 


Urine RBC  0 - 2 /hpf (0-2)   05/23/18  18:45    


 


Urine WBC  1 - 3 /hpf (0-6)   05/23/18  18:45    


 


Ur Epithelial Cells  0 - 2 /hpf (0-5)   05/23/18  18:45    


 


Urine Bacteria  Few  (NEG)   05/23/18  18:45    


 


Ur L.pneumophila Ag  Negative  (NEGATIVE)   05/22/18  15:33    














- Hospital Course


Hospital Course: 


This is an 89yo male with past medical history with metastatic prostate cancer s

/p ADT and palliative radiation, indwelling tolentino, seizures, glaucoma who was 

admitted for hospital acquired pneumonia and delirium. Imaging and labs were 

seen and reviewed. CT chest showed multi-focal pneumonia. Septic work up was 

negative. Patient was placed on Doxycycline and Cefepime. ID was on consult and 

recommended 3 more days of Doxycycline, which was sent to patient's pharmacy. 

Patient was continued on his home medication for pain and seizures. Delirium 

improved. Recommend frequent education and re-orientation. Patient is DNR/DNI 

as per POLST. Palliative care was consulted for possible plans for hospice care 

in the future. This was discussed with patient's son Cristino who states he is not 

ready to place his father on hospice at this time, but will consider it in the 

future. Patient will be discharged home and has 24hr health care. He will 

follow up with his PMD as outpatient. 








- Date & Time of H&P


Date of H&P: 05/22/18


Time of H&P: 07:00





Discharge Exam





- Head Exam


Head Exam: NORMAL INSPECTION





- Eye Exam


Eye Exam: Normal appearance, PERRL


Pupil Exam: NORMAL ACCOMODATION





- Respiratory Exam


Respiratory Exam: Clear to PA & Lateral, NORMAL BREATHING PATTERN, 

UNREMARKABLE.  absent: Rhonchi, Wheezes, Respiratory Distress





- Cardiovascular Exam


Cardiovascular Exam: REGULAR RHYTHM, +S1, +S2.  absent: Gallop, Rubs, Systolic 

Murmur





- GI/Abdominal Exam


GI & Abdominal Exam: Normal Bowel Sounds, Soft, Unremarkable.  absent: Mass, 

Rebound, Rigid, Tenderness





- Extremities Exam


Extremities exam: normal inspection





- Neurological Exam


Neurological exam: CN II-XII Intact





- Skin


Skin Exam: Dry, Warm





Discharge Plan





- Discharge Medications


Prescriptions: 


Doxycycline Hyclate [Doryx] 100 mg PO Q12 #6 cap





- Follow Up Plan


Condition: GOOD


Disposition: HOME/ ROUTINE


Instructions:  Delirium (Confusion)


Additional Instructions: 


1. Follow up with PMD, Dr. Thakur as outpatient 


2. Antibiotics: Doxycycline 100mg twice per day x 3 days was sent to [facility] 


3. Make sure patient is awake and alert during feeding





Referrals: 


Sami Thakur MD [Primary Care Provider] - 





<Nathan Rivero - Last Filed: 05/25/18 08:28>





Provider





- Provider


Date of Admission: 


05/21/18 13:29





Attending physician: 


Nathan Rivero MD





Primary care physician: 


Sami Thakur MD








Hospital Course





- Lab Results


Lab Results: 


 Most Recent Lab Values











WBC  11.6 10^3/ul (4.5-11.0)  H D 05/21/18  11:48    


 


RBC  3.77 10^6/uL (3.5-6.1)   05/21/18  11:48    


 


Hgb  11.4 g/dL (14.0-18.0)  L  05/21/18  11:48    


 


Hct  33.8 % (42.0-52.0)  L  05/21/18  11:48    


 


MCV  89.7 fl (80.0-105.0)  D 05/21/18  11:48    


 


MCH  30.2 pg (25.0-35.0)   05/21/18  11:48    


 


MCHC  33.7 g/dl (31.0-37.0)   05/21/18  11:48    


 


RDW  14.7 % (11.5-14.5)  H  05/21/18  11:48    


 


Plt Count  251 10^3/uL (120.0-450.0)   05/21/18  11:48    


 


MPV  9.4 fl (7.0-11.0)   05/21/18  11:48    


 


Gran %  63.6 % (50.0-68.0)   05/21/18  11:48    


 


Lymph % (Auto)  27.0 % (22.0-35.0)   05/21/18  11:48    


 


Mono % (Auto)  9.1 % (1.0-6.0)  H  05/21/18  11:48    


 


Eos % (Auto)  0.1 % (1.5-5.0)  L  05/21/18  11:48    


 


Baso % (Auto)  0.2 % (0.0-3.0)   05/21/18  11:48    


 


Gran #  7.37  (1.4-6.5)  H  05/21/18  11:48    


 


Lymph # (Auto)  3.1  (1.2-3.4)   05/21/18  11:48    


 


Mono # (Auto)  1.1  (0.1-0.6)  H  05/21/18  11:48    


 


Eos # (Auto)  0.0  (0.0-0.7)   05/21/18  11:48    


 


Baso # (Auto)  0.02 K/mm3 (0.0-2.0)   05/21/18  11:48    


 


Sodium  136 mmol/L (132-148)   05/21/18  11:48    


 


Potassium  3.6 mmol/L (3.6-5.0)   05/21/18  11:48    


 


Chloride  99 mmol/L ()   05/21/18  11:48    


 


Carbon Dioxide  25 mmol/L (21-33)   05/21/18  11:48    


 


Anion Gap  16  (10-20)   05/21/18  11:48    


 


BUN  18 mg/dL (7-21)   05/21/18  11:48    


 


Creatinine  0.5 mg/dl (0.8-1.5)  L  05/21/18  11:48    


 


Est GFR ( Amer)  > 60   05/21/18  11:48    


 


Est GFR (Non-Af Amer)  > 60   05/21/18  11:48    


 


Random Glucose  109 mg/dL ()   05/21/18  11:48    


 


Calcium  8.5 mg/dL (8.4-10.5)   05/21/18  11:48    


 


Phosphorus  3.0 mg/dL (2.5-4.5)   05/21/18  11:48    


 


Magnesium  1.8 mg/dL (1.7-2.2)   05/21/18  11:48    


 


Total Bilirubin  0.4 mg/dL (0.2-1.3)   05/21/18  11:48    


 


AST  25 U/L (17-59)   05/21/18  11:48    


 


ALT  24 U/L (7-56)   05/21/18  11:48    


 


Alkaline Phosphatase  91 U/L ()   05/21/18  11:48    


 


Troponin I  < 0.01 ng/mL  05/21/18  11:48    


 


Total Protein  6.3 g/dL (5.8-8.3)   05/21/18  11:48    


 


Albumin  3.0 g/dL (3.0-4.8)   05/21/18  11:48    


 


Globulin  3.3 gm/dL  05/21/18  11:48    


 


Albumin/Globulin Ratio  0.9  (1.1-1.8)  L  05/21/18  11:48    


 


Procalcitonin  0.21 NG/ML (0.19-0.49)   05/22/18  07:00    


 


Urine Color  Light yellow  (YELLOW)   05/23/18  18:45    


 


Urine Appearance  Clear  (CLEAR)   05/23/18  18:45    


 


Urine pH  6.0  (4.7-8.0)   05/23/18  18:45    


 


Ur Specific Gravity  1.015  (1.005-1.035)   05/23/18  18:45    


 


Urine Protein  30 mg/dL (<30 mg/dL)  H  05/23/18  18:45    


 


Urine Glucose (UA)  Negative mg/dL (NEGATIVE)   05/23/18  18:45    


 


Urine Ketones  Negative mg/dL (NEGATIVE)   05/23/18  18:45    


 


Urine Blood  Trace-intact  (NEGATIVE)  H  05/23/18  18:45    


 


Urine Nitrate  Negative  (NEGATIVE)   05/23/18  18:45    


 


Urine Bilirubin  Negative  (NEGATIVE)   05/23/18  18:45    


 


Urine Urobilinogen  0.2 E.U./dL (<1 E.U./dL)   05/23/18  18:45    


 


Ur Leukocyte Esterase  Trace Martita/uL (NEGATIVE)  H  05/23/18  18:45    


 


Urine RBC  0 - 2 /hpf (0-2)   05/23/18  18:45    


 


Urine WBC  1 - 3 /hpf (0-6)   05/23/18  18:45    


 


Ur Epithelial Cells  0 - 2 /hpf (0-5)   05/23/18  18:45    


 


Urine Bacteria  Few  (NEG)   05/23/18  18:45    


 


Ur L.pneumophila Ag  Negative  (NEGATIVE)   05/22/18  15:33    














- Hospital Course


Hospital Course: 





Pt seen and examined. Agree with above note of medical resident. Son is aware 

of pt going home today. Labs and medications reviewed. Culture is negative. 

DNR.

## 2018-05-25 NOTE — PN
DATE:  05/24/2018



SUBJECTIVE:  The patient is in bed, in no acute distress.  Nontoxic.



PHYSICAL EXAMINATION

VITAL SIGNS:  Temperature is 97, blood pressure is 140/60, respiratory rate

of 18.

HEENT:  Unremarkable.

NECK:  Supple.

LUNGS:  Have decreased breath sounds.

HEART:  Normal S1 and S2.

ABDOMEN:  Soft, nontender.



LABORATORY DATA:  Reveals a white count of 11,600, hemoglobin of 11,

platelets of 251.  Chemistries reveals a BUN of 18, creatinine of 0.5. 

Urinalysis is noted, and serology is noted.  Urine for Legionella antigen

is negative.  The patient's procalcitonin is 0.21.  Microbiology, no

growth.



ASSESSMENT AND PLAN:  This is an 88-year-old male, who was seen earlier

today in 576, bed 1, chronically ill, debilitated with history of prostate

cancer with metastasis to the bone, history of Escherichia coli urinary

tract infection, history of Klebsiella urinary tract infections, seizures,

with bilateral healthcare-associated pneumonia, normal procalcitonin. 

Blood cultures no growth.  Currently on cefepime and doxycycline.  The

patient had a CAT scan of the chest yesterday, which showed multifocal

infiltrates and Dr. Rivero's note is reviewed, and Ailyn Peter's note

is also reviewed.  _____ He is okay for a possible hospice setting.  From

Infectious Disease point of view, we will discontinue the cefepime. 

Recommend 4 to 7 days of antibiotics.  The patient has already received 4

days of cefepime.  We will discontinue cefepime and continue another 3 days

of doxycycline p.o.





__________________________________________

Donta Alvarez MD



DD:  05/24/2018 22:21:26

DT:  05/24/2018 23:17:42

Job # [id number]

## 2018-05-26 LAB — TB ANTIGEN MINUS NIL: 0.03 IU/ML

## 2018-10-01 ENCOUNTER — HOSPITAL ENCOUNTER (INPATIENT)
Dept: HOSPITAL 42 - ED | Age: 83
LOS: 8 days | Discharge: HOME | DRG: 872 | End: 2018-10-09
Attending: INTERNAL MEDICINE | Admitting: INTERNAL MEDICINE
Payer: MEDICARE

## 2018-10-01 VITALS — BODY MASS INDEX: 24.7 KG/M2

## 2018-10-01 DIAGNOSIS — Z87.891: ICD-10-CM

## 2018-10-01 DIAGNOSIS — R74.0: ICD-10-CM

## 2018-10-01 DIAGNOSIS — D86.9: ICD-10-CM

## 2018-10-01 DIAGNOSIS — F03.90: ICD-10-CM

## 2018-10-01 DIAGNOSIS — C79.51: ICD-10-CM

## 2018-10-01 DIAGNOSIS — N39.0: ICD-10-CM

## 2018-10-01 DIAGNOSIS — G40.909: ICD-10-CM

## 2018-10-01 DIAGNOSIS — K59.00: ICD-10-CM

## 2018-10-01 DIAGNOSIS — H40.9: ICD-10-CM

## 2018-10-01 DIAGNOSIS — Z79.818: ICD-10-CM

## 2018-10-01 DIAGNOSIS — N17.9: ICD-10-CM

## 2018-10-01 DIAGNOSIS — A41.59: Primary | ICD-10-CM

## 2018-10-01 DIAGNOSIS — Z87.01: ICD-10-CM

## 2018-10-01 DIAGNOSIS — C61: ICD-10-CM

## 2018-10-01 DIAGNOSIS — R65.20: ICD-10-CM

## 2018-10-01 DIAGNOSIS — Z66: ICD-10-CM

## 2018-10-01 LAB
ALBUMIN SERPL-MCNC: 3.7 G/DL (ref 3–4.8)
ALBUMIN/GLOB SERPL: 1 {RATIO} (ref 1.1–1.8)
ALT SERPL-CCNC: 16 U/L (ref 7–56)
APPEARANCE UR: (no result)
APTT BLD: 27.9 SECONDS (ref 25.1–36.5)
AST SERPL-CCNC: 36 U/L (ref 17–59)
BACTERIA #/AREA URNS HPF: (no result) /[HPF]
BASE EXCESS BLDV CALC-SCNC: -2.9 MMOL/L (ref 0–2)
BASOPHILS # BLD AUTO: 0 K/MM3 (ref 0–2)
BASOPHILS NFR BLD: 0 % (ref 0–3)
BILIRUB DIRECT SERPL-MCNC: 0.6 MG/DL (ref 0–0.4)
BILIRUB UR-MCNC: NEGATIVE MG/DL
BNP SERPL-MCNC: 4640 PG/ML (ref 0–450)
BUN SERPL-MCNC: 60 MG/DL (ref 7–21)
CALCIUM SERPL-MCNC: 8.5 MG/DL (ref 8.4–10.5)
COLOR UR: (no result)
EOSINOPHIL # BLD: 0 10*3/UL (ref 0–0.7)
EOSINOPHIL NFR BLD: 0.1 % (ref 1.5–5)
ERYTHROCYTE [DISTWIDTH] IN BLOOD BY AUTOMATED COUNT: 13.2 % (ref 11.5–14.5)
GFR NON-AFRICAN AMERICAN: 44
GLUCOSE UR STRIP-MCNC: NEGATIVE MG/DL
GRANULOCYTES # BLD: 5.85 10*3/UL (ref 1.4–6.5)
GRANULOCYTES NFR BLD: 78.4 % (ref 50–68)
HGB BLD-MCNC: 11.3 G/DL (ref 14–18)
INR PPP: 1.55
LEUKOCYTE ESTERASE UR-ACNC: (no result) LEU/UL
LIPASE SERPL-CCNC: 31 U/L (ref 23–300)
LYMPHOCYTES # BLD: 1.6 10*3/UL (ref 1.2–3.4)
LYMPHOCYTES NFR BLD AUTO: 20.7 % (ref 22–35)
MCH RBC QN AUTO: 31.1 PG (ref 25–35)
MCHC RBC AUTO-ENTMCNC: 34.5 G/DL (ref 31–37)
MCV RBC AUTO: 90.4 FL (ref 80–105)
MONOCYTES # BLD AUTO: 0.1 10*3/UL (ref 0.1–0.6)
MONOCYTES NFR BLD: 0.8 % (ref 1–6)
PH BLDV: 7.52 [PH] (ref 7.32–7.43)
PH UR STRIP: 8.5 [PH] (ref 4.7–8)
PLATELET # BLD: 157 10^3/UL (ref 120–450)
PMV BLD AUTO: 9.5 FL (ref 7–11)
PROT UR STRIP-MCNC: 100 MG/DL
PROTHROMBIN TIME: 17.8 SECONDS (ref 9.4–12.5)
RBC # BLD AUTO: 3.63 10^6/UL (ref 3.5–6.1)
RBC # UR STRIP: (no result) /UL
RBC #/AREA URNS HPF: (no result) /HPF (ref 0–2)
SP GR UR STRIP: 1.01 (ref 1–1.03)
TROPONIN I SERPL-MCNC: 0.06 NG/ML
UROBILINOGEN UR STRIP-ACNC: 0.2 E.U./DL
VENOUS BLOOD FIO2: 21 %
VENOUS BLOOD GAS PCO2: 22 (ref 40–60)
VENOUS BLOOD GAS PO2: 158 MM/HG (ref 30–55)
WBC # BLD AUTO: 7.5 10^3/UL (ref 4.5–11)
WBC #/AREA URNS HPF: (no result) /HPF (ref 0–6)

## 2018-10-01 NOTE — CT
Date of service: 



10/01/2018



PROCEDURE:  CT HEAD WITHOUT CONTRAST.



HISTORY:

ams



COMPARISON:

12/11/2016



TECHNIQUE:

Axial computed tomography images were obtained through the head/brain 

without intravenous contrast.  



Radiation dose:



Total exam DLP = 921 mGy-cm.



This CT exam was performed using one or more of the following dose 

reduction techniques: Automated exposure control, adjustment of the 

mA and/or kV according to patient size, and/or use of iterative 

reconstruction technique.



FINDINGS:



HEMORRHAGE:

No intracranial hemorrhage. 



BRAIN:

No mass effect or edema.  No atrophy or chronic microvascular 

ischemic changes.



VENTRICLES:

Unremarkable. No hydrocephalus. 



CALVARIUM:

Unremarkable.



PARANASAL SINUSES:

Unremarkable as visualized. No significant inflammatory changes.



MASTOID AIR CELLS:

Unremarkable as visualized. No inflammatory changes.



OTHER FINDINGS:

None.



IMPRESSION:

No acute findings

## 2018-10-01 NOTE — CT
Date of service: 



10/01/2018



PROCEDURE:  CT Abdomen and Pelvis without intravenous contrast



HISTORY:

sepsis h/o of metastatic prostate ca



COMPARISON:

1/16/2018



TECHNIQUE:

Without contrast.. 



Contrast dose: 0



Radiation dose:



Total exam DLP = 1038.86 mGy-cm.



This CT exam was performed using one or more of the following dose 

reduction techniques: Automated exposure control, adjustment of the 

mA and/or kV according to patient size, and/or use of iterative 

reconstruction technique.



FINDINGS:



LOWER THORAX:

1.6 cm irregular left lower lobe nodule.  Recommend further 

evaluation with CT examination of the chest.  Trace left pleural 

effusion. 



LIVER:

Unremarkable. No gross lesion or ductal dilatation.  



GALLBLADDER AND BILE DUCTS:

Unremarkable. 



PANCREAS:

Focal irregular increased attenuation of the retroperitoneal fat 

adjacent to the pancreatic tail.  This could represent focal 

pancreatitis.  Please correlate with clinical and laboratory 

evaluation.  No jose peripancreatic fluid is appreciated.  There is 

no pancreatic mass or ductal dilatation seen.  



SPLEEN:

Unremarkable. 



ADRENALS:

Unremarkable. No mass. 



KIDNEYS AND URETERS:

Unremarkable. No hydronephrosis. No solid mass. 



VASCULATURE:

Unremarkable. No aortic aneurysm. 



BOWEL:

Unremarkable. No obstruction. No gross mural thickening. 



APPENDIX:

Unremarkable. Normal appendix. 



PERITONEUM:

Unremarkable. No free fluid. No free air. 



LYMPH NODES:

Unremarkable. No enlarged lymph nodes. 



BLADDER:

Decompressed around Waddell catheter balloon 



REPRODUCTIVE:

Enlarged prostate. 



BONES:

Multifocal sclerotic osseous metastasis consistent with known 

metastatic prostate malignancy.  This involves thoracolumbar 

vertebral bodies and sacrum, the sternum, and multiple bilateral 

ribs. 



OTHER FINDINGS:

None.



IMPRESSION:

Possible acute focal pancreatitis adjacent to the tip of the 

pancreatic tail.  Please correlate with clinical and laboratory 

evaluation.  Enlarged prostate.  Multifocal sclerotic osseous 

metastasis consistent with known prostate malignancy.  Waddell 

catheter.  Trace left pleural effusion.  1.6 cm irregular nodule in 

left lower lobe for which further evaluation is advised with CT 

examination of the chest.

## 2018-10-01 NOTE — RAD
Date of service: 



10/01/2018



HISTORY:

 sepsis 



COMPARISON:

5/21/2018 



FINDINGS:



LUNGS:

No active pulmonary disease.



PLEURA:

No significant pleural effusion identified, no pneumothorax apparent.



CARDIOVASCULAR:

Normal.



OSSEOUS STRUCTURES:

No significant abnormalities.



VISUALIZED UPPER ABDOMEN:

Normal.



OTHER FINDINGS:

None.



IMPRESSION:

No active disease.

## 2018-10-01 NOTE — CARD
--------------- APPROVED REPORT --------------





Date of service: 10/01/2018



EKG Measurement

Heart [id number]

VA [id number]

[id number]

[id number]

[id number]



<Conclusion>

Sinus tachycardia with fusion complexes

Right bundle branch block

Inferior infarct, age undetermined

Abnormal ECG

## 2018-10-01 NOTE — ED PDOC
Arrival/HPI





- General


Time Seen by Provider: 10/01/18 13:00


Historian: Family


EM Caveat: Altered Mental Status





- Critical Care


Critical Care Minutes: 30 minutes





- History of Present Illness


Narrative History of Present Illness (Text): 





10/01/18 13:17


88 year old male, DNR/DNI, with past medical history of metastatic prostate 

cancer s/p ADT and palliative radiation, seizures, glaucoma, UTI, dementia with 

mild confusion at baseline and falls, presents to the Emergency department 

accompanied by son for evaluation of increasing AMS and shortness of breath 

since 4am this morning. Patient was admitted to the hospital in April for 

similar complaints and was treated for pneumonia and UTI. As per son, who is 

patient's POA, patient is more confused than baseline and has not been receiving

treatment for his malignancy. Son also informs patient is DNR/DNI. At bedside, 

patient is awake, alert and is able to follow some commands but does verbalize 

any complaints. HPI and ROS limited secondary to patient's AMS.





PMD: Dr. Thakur





Time/Duration: 4-6 hours


Symptom Onset: Gradual


Symptom Course: Unchanged


Activities at Onset: Light


Context: Home





Past Medical History





- Provider Review


Nursing Documentation Reviewed: Yes





- Infectious Disease


Hx of Infectious Diseases: None





- Cardiac


Hx Cardiac Disorders: No





- Pulmonary


Hx Respiratory Disorders: Yes (H/O SMOKING CIGARETTES)


Other/Comment: EMPYEMA/SARCOIDOSIS





- Neurological


Hx Dizziness: Yes (syncope)


Hx Seizures: Yes (last seizure 20 yrs ago)





- HEENT


Hx Cataracts: Yes (with bilateral sx)





- Renal


Hx Renal Disorder: Yes


Other/Comment: chronic tolentino-Prostate ca.





- Endocrine/Metabolic


Hx Endocrine Disorders: No





- Hematological/Oncological


Hx Blood Disorders: Yes (radiation theraphy had 9 rounds already)


Hx Cancer: Yes (prostate ca with mets to bones)





- Integumentary


Hx Dermatological Disorder: Yes


Other/Comment: 2-5-18 bilateral le edema +2 more to right .Pitting with dry thin

scaly flakes.





- Musculoskeletal/Rheumatological


Hx Falls: Yes





- Gastrointestinal


Hx Gastrointestinal Disorders: Yes (constipation)





- Genitourinary/Gynecological


Hx Urinary Tract Infection: Yes





- Psychiatric


Hx Psychophysiologic Disorder: Yes


Hx Substance Use: No





- Anesthesia


Hx Anesthesia: No





Family/Social History





- Physician Review


Nursing Documentation Reviewed: Yes


Family/Social History: No Known Family HX


Smoking Status: Former Smoker


Hx Alcohol Use: Yes (occasional)


Hx Substance Use: No





Allergies/Home Meds


Allergies/Adverse Reactions: 


Allergies





No Known Allergies Allergy (Verified 10/01/18 21:18)


   











Review of Systems





- Review of Systems


Systems not reviewed;Unavailable: Altered Mental Status


Constitutional: Fevers


Respiratory: SOB


Neurological: Other (AMS)





Physical Exam


Vital Signs Reviewed: Yes





Vital Signs











  Temp Pulse Resp BP Pulse Ox


 


 10/01/18 13:09  104.6 F H  126 H  18  113/59 L  95











Temperature: Febrile


Blood Pressure: Normal


Pulse: Tachycardic


Respiratory Rate: Normal


Appearance: Positive for: Ill-Appearing, Uncomfortable, Cachectic, Other (Pale)


Pain Distress: None


Mental Status: Positive for: Alert and Oriented X 3


Finger Stick Blood Glucose: 104





- Systems Exam


Head: Present: Atraumatic, Normocephalic


Pupils: Present: PERRL


Extroacular Muscles: Present: EOMI


Conjunctiva: Present: Normal


Mouth: Present: Moist Mucous Membranes


Respiratory/Chest: Present: Other (Coarse breath sounds bilaterally).  No: 

Respiratory Distress, Accessory Muscle Use


Cardiovascular: Present: Regular Rate and Rhythm, Normal S1, S2.  No: Murmurs


Abdomen: Present: Tenderness (nonfocal abdominal tenderness).  No: Distention, 

Peritoneal Signs


Back: Present: Normal Inspection


Upper Extremity: Present: Normal Inspection.  No: Cyanosis, Edema


Lower Extremity: Present: Normal Inspection.  No: Edema


Neurological: Present: GCS=15, CN II-XII Intact


Skin: Present: Warm, Dry, Pale.  No: Rashes


Psychiatric: Present: Alert





Medical Decision Making


ED Course and Treatment: 





10/01/18 13:14


Impression: 88 year old male presents to the Emergency department for evaluation

of increasing AMS and fever  ro sepsis pna, uti





Plan:


-- VBG


-- EKG


-- Labs


-- Chest X-ray


-- IV Fluids


-- Tylenol


-- Vancomycin


-- Zosyn


-- Blood Culture


-- Urine Culture


-- Urinalysis


-- Influenza A B


-- Reassess and disposition





Prior Visits:


Notes and results from previous visits were reviewed. 





Progress Notes:





10/01/18 13:10


EKG: Ordered, reviewed, and independently interpreted the EKG.


Rate : 126 BPM


Rhythm : Sinus Tachycardia


Interpretation : RBBB. No interval change from previous.





10/01/18 15:35


Chest X-ray reviewed by radiologist, shows no acute processes.





CT of Abdomen/Pelvis reviewed by radiologist, shows: 


FINDINGS:


LOWER THORAX:


1.6 cm irregular left lower lobe nodule.  Recommend further evaluation with CT 

examination of the chest.  Trace left pleural effusion. 


LIVER:


Unremarkable. No gross lesion or ductal dilatation.  


GALLBLADDER AND BILE DUCTS:


Unremarkable. 


PANCREAS:


Focal irregular increased attenuation of the retroperitoneal fat adjacent to the

pancreatic tail.  This could represent focal pancreatitis.  Please correlate 

with clinical and laboratory evaluation.  No jsoe peripancreatic fluid is 

appreciated.  There is no pancreatic mass or ductal dilatation seen.  


SPLEEN:


Unremarkable. 


ADRENALS:


Unremarkable. No mass. 


KIDNEYS AND URETERS:


Unremarkable. No hydronephrosis. No solid mass. 


VASCULATURE:


Unremarkable. No aortic aneurysm. 


BOWEL:


Unremarkable. No obstruction. No gross mural thickening. 


APPENDIX:


Unremarkable. Normal appendix. 


PERITONEUM:


Unremarkable. No free fluid. No free air. 


LYMPH NODES:


Unremarkable. No enlarged lymph nodes. 


BLADDER:


Decompressed around Tolentino catheter balloon 


REPRODUCTIVE:


Enlarged prostate. 


BONES:


Multifocal sclerotic osseous metastasis consistent with known metastatic 

prostate malignancy.  This involves thoracolumbar vertebral bodies and sacrum, 

the sternum, and multiple bilateral ribs. 


OTHER FINDINGS:


None.


IMPRESSION:


Possible acute focal pancreatitis adjacent to the tip of the pancreatic tail.  

Please correlate with clinical and laboratory evaluation.  Enlarged prostate.  

Multifocal sclerotic osseous metastasis consistent with known prostate 

malignancy.  Tolentino catheter.  Trace left pleural effusion.  1.6 cm irregular 

nodule in left lower lobe for which further evaluation is advised with CT 

examination of the chest





10/01/18 15:50


CT head reviewed by radiologist, shows no acute findings. 


10/03/18 17:44


pt accepted by brian. antibitoics doesd. urine positive. dni dnr poor 

prognosis. 





- Critical Care


Critical Care Minutes: 30 minutes





- Lab Interpretations


Lab Results: 





                                   Lab Results





10/01/18 13:01: POC Glucose (mg/dL) 104











- RAD Interpretation


Radiology Orders: 











10/01/18 13:14


CHEST PORTABLE [RAD] Stat 











: Radiologist





- Medication Orders


Current Medication Orders: 











Sodium Chloride (Sodium Chloride 0.9%)  1,000 mls @ 999 mls/hr IV .Q1H1M STA


   Stop: 10/01/18 14:15


Sodium Chloride (Sodium Chloride 0.9%)  1,000 mls @ 999 mls/hr IV .Q1H1M STA


   Stop: 10/01/18 14:15











- Scribe Statement


The provider has reviewed the documentation as recorded by the Scribe


Andres Polk.





All medical record entries made by the Samibe were at my direction and 

personally dictated by me. I have reviewed the chart and agree that the record 

accurately reflects my personal performance of the history, physical exam, 

medical decision making, and the department course for this patient. I have also

 personally directed, reviewed, and agree with the discharge instructions and 

disposition.





Disposition/Present on Arrival





- Present on Arrival


Any Indicators Present on Arrival: No


History of DVT/PE: No


History of Uncontrolled Diabetes: No


Urinary Catheter: No


History Surgical Site Infection Following: None





- Disposition


Have Diagnosis and Disposition been Completed?: Yes


Diagnosis: 


 Urinary tract infection, Sepsis, Altered mental state





Disposition: HOSPITALIZED


Disposition Time: 02:00


Patient Problems: 


                             Current Active Problems











Problem Status Onset


 


Altered mental state Acute 


 


Sepsis Acute 


 


Urinary tract infection Acute 











Condition: GUARDED

## 2018-10-02 LAB
ALBUMIN SERPL-MCNC: 3.2 G/DL (ref 3–4.8)
ALBUMIN/GLOB SERPL: 0.9 {RATIO} (ref 1.1–1.8)
ALT SERPL-CCNC: 21 U/L (ref 7–56)
AST SERPL-CCNC: 51 U/L (ref 17–59)
BUN SERPL-MCNC: 58 MG/DL (ref 7–21)
CALCIUM SERPL-MCNC: 8.1 MG/DL (ref 8.4–10.5)
ERYTHROCYTE [DISTWIDTH] IN BLOOD BY AUTOMATED COUNT: 14 % (ref 11.5–14.5)
GFR NON-AFRICAN AMERICAN: 52
HGB BLD-MCNC: 10.2 G/DL (ref 14–18)
MCH RBC QN AUTO: 30.7 PG (ref 25–35)
MCHC RBC AUTO-ENTMCNC: 32.6 G/DL (ref 31–37)
MCV RBC AUTO: 94.3 FL (ref 80–105)
PLATELET # BLD: 150 10^3/UL (ref 120–450)
PMV BLD AUTO: 10 FL (ref 7–11)
RBC # BLD AUTO: 3.32 10^6/UL (ref 3.5–6.1)
WBC # BLD AUTO: 16.8 10^3/UL (ref 4.5–11)

## 2018-10-02 RX ADMIN — MEROPENEM SCH MLS/HR: 1 INJECTION INTRAVENOUS at 10:25

## 2018-10-02 RX ADMIN — MEROPENEM SCH MLS/HR: 1 INJECTION INTRAVENOUS at 21:27

## 2018-10-02 NOTE — CP.PCM.HP
<Trinity Bo - Last Filed: 10/02/18 12:38>





History of Present Illness





- History of Present Illness


History of Present Illness: 





H&P for LARRY Lechuga PGY3





This is an 87yo male with past medical history metastatic prostate cancer s/p 

ADT and palliative radiate, seizures, glaucoma, dementia, UTI w/ indwelling 

tolentino (+ for E.coli and Enterococcus in the past) who was brought to ED by son 

for AMS. Patient was lethargic on exam and A&O x 2. History was obtained through

patient as well as previous records. Son was also at bedside who states that he 

was called by the 24hr health aid who said the patient had a fever and was 

hyperventilating. They decided to call the ambulance and take him to the ED. 

Patient states he has been having some lower abdominal pain. According to ED 

record, son states patient was feeling short of breath and was more confused 

than normal. In the ED, head CT did not show any acute disease and CXR did not 

show evidence of pneumonia. Patient was found to be febrile with positive UA for

UTI. This AM patient states he does not have shortness of breath, chest pain, 

nausea/vomiting/diarrhea, numbness/tingling, fever/chills. 








Past medical history:  metastatic prostate cancer s/p ADT and palliative 

radiate, seizures, glaucoma, dementia, UTI w/ indwelling tolentino (+ for E.coli and

Enterococcus in the past) 


Past surgical history: bilateral cataract surgery 


Home meds: As per MAR


Allergies: NKDA


Social history: Denies EtOH, drug or tobacco use. 


Family history: non-contributory 





Present on Admission





- Present on Admission


Any Indicators Present on Admission: Yes


Urinary Catheter: Yes





Review of Systems





- Review of Systems


All systems: reviewed and no additional remarkable complaints except


Review of Systems: 





12 point ROS reviewed as per HPI and is otherwise negative





Past Patient History





- Infectious Disease


Hx of Infectious Diseases: None





- Past Social History


Smoking Status: Former Smoker





- CARDIAC


Hx Cardiac Disorders: No





- PULMONARY


Hx Respiratory Disorders: Yes (H/O SMOKING CIGARETTES)


Hx Pneumonia: Yes


Other/Comment: EMPYEMA/SARCOIDOSIS





- NEUROLOGICAL


Hx Neurological Disorder: Yes


Hx Dizziness: Yes (syncope)


Hx Seizures: Yes (last seizure 20 yrs ago)





- HEENT


Hx HEENT Problems: Yes


Hx Cataracts: Yes (with bilateral sx)





- RENAL


Hx Chronic Kidney Disease: Yes


Other/Comment: chronic tolentino-Prostate ca.





- ENDOCRINE/METABOLIC


Hx Endocrine Disorders: No





- HEMATOLOGICAL/ONCOLOGICAL


Hx Blood Disorders: Yes (radiation theraphy had 9 rounds already)


Hx Cancer: Yes (prostate ca with mets to bones)





- INTEGUMENTARY


Hx Dermatological Problems: Yes


Other/Comment: 2-5-18 bilateral le edema +2 more to right .Pitting with dry thin

scaly flakes.





- MUSCULOSKELETAL/RHEUMATOLOGICAL


Hx Musculoskeletal Disorders: Yes


Hx Falls: Yes





- GASTROINTESTINAL


Hx Gastrointestinal Disorders: Yes (constipation)





- GENITOURINARY/GYNECOLOGICAL


Hx Urinary Tract Infection: Yes





- PSYCHIATRIC


Hx Psychophysiologic Disorder: Yes


Hx Substance Use: No





- SURGICAL HISTORY


Hx Surgeries: Yes (lung sx,right foot sx-clubbed foot,bilateral cataract sx.)





- ANESTHESIA


Hx Anesthesia: No





Meds


Allergies/Adverse Reactions: 


                                    Allergies











Allergy/AdvReac Type Severity Reaction Status Date / Time


 


No Known Allergies Allergy   Verified 10/01/18 21:18














Physical Exam





- Constitutional


Appears: No Acute Distress





- Head Exam


Head Exam: ATRAUMATIC, NORMAL INSPECTION, NORMOCEPHALIC





- Eye Exam


Eye Exam: Normal appearance, PERRL


Pupil Exam: NORMAL ACCOMODATION, PERRL





- ENT Exam


ENT Exam: Mucous Membranes Moist





- Respiratory Exam


Respiratory Exam: Clear to Auscultation Bilateral, NORMAL BREATHING PATTERN.  

absent: Rales, Rhonchi, Wheezes





- Cardiovascular Exam


Cardiovascular Exam: REGULAR RHYTHM, +S1, +S2.  absent: Gallop, Rubs, Systolic 

Murmur





- GI/Abdominal Exam


GI & Abdominal Exam: Normal Bowel Sounds, Soft, Tenderness (suprapubic ).  

absent: Mass, Rebound, Rigid





- Extremities Exam


Extremities exam: Negative for: calf tenderness, pedal edema





- Neurological Exam


Neurological exam: Alert, CN II-XII Intact





- Psychiatric Exam


Psychiatric exam: Normal Affect, Normal Mood





- Skin


Skin Exam: Dry, Warm





Results





- Vital Signs


Recent Vital Signs: 





                                Last Vital Signs











Temp  97.8 F   10/02/18 00:01


 


Pulse  89   10/02/18 01:45


 


Resp  20   10/02/18 00:01


 


BP  104/58 L  10/02/18 00:01


 


Pulse Ox  97   10/02/18 00:01














- Labs


Result Diagrams: 


                                 10/02/18 06:30





                                 10/02/18 06:30


Labs: 





                         Laboratory Results - last 24 hr











  10/01/18 10/01/18 10/01/18





  13:01 13:10 13:10


 


WBC    7.5  D


 


RBC    3.63


 


Hgb    11.3 L


 


Hct    32.8 L


 


MCV    90.4


 


MCH    31.1


 


MCHC    34.5


 


RDW    13.2


 


Plt Count    157


 


MPV    9.5


 


Gran %    78.4 H


 


Lymph % (Auto)    20.7 L


 


Mono % (Auto)    0.8 L


 


Eos % (Auto)    0.1 L


 


Baso % (Auto)    0.0


 


Gran #    5.85


 


Lymph # (Auto)    1.6


 


Mono # (Auto)    0.1


 


Eos # (Auto)    0.0


 


Baso # (Auto)    0.00


 


PT   


 


INR   


 


APTT   


 


pO2   158 H 


 


VBG pH   7.52 H 


 


VBG pCO2   22.0 L 


 


VBG HCO3   18.0 L 


 


VBG Total CO2   18.7 L 


 


VBG O2 Sat (Calc)   97.3 H 


 


VBG Base Excess   -2.9 L 


 


VBG Potassium   4.7 


 


Sodium   125.0 L 


 


Chloride   96.0 L 


 


Glucose   108 


 


Lactate   1.8 


 


FiO2   21.0 


 


Potassium   


 


Carbon Dioxide   


 


Anion Gap   


 


BUN   


 


Creatinine   


 


Est GFR ( Amer)   


 


Est GFR (Non-Af Amer)   


 


POC Glucose (mg/dL)  104  


 


Random Glucose   


 


Calcium   


 


Magnesium   


 


Total Bilirubin   


 


Direct Bilirubin   


 


AST   


 


ALT   


 


Alkaline Phosphatase   


 


Lactate Dehydrogenase   


 


Total Creatine Kinase   


 


Troponin I   


 


NT-Pro-B Natriuret Pep   


 


Total Protein   


 


Albumin   


 


Globulin   


 


Albumin/Globulin Ratio   


 


Lipase   


 


Venous Blood Potassium   4.7 


 


Urine Color   


 


Urine Appearance   


 


Urine pH   


 


Ur Specific Gravity   


 


Urine Protein   


 


Urine Glucose (UA)   


 


Urine Ketones   


 


Urine Blood   


 


Urine Nitrate   


 


Urine Bilirubin   


 


Urine Urobilinogen   


 


Ur Leukocyte Esterase   


 


Urine RBC   


 


Urine WBC   


 


Urine Bacteria   


 


Influenza Typ A,B (EIA)   














  10/01/18 10/01/18 10/01/18





  13:10 13:10 13:50


 


WBC   


 


RBC   


 


Hgb   


 


Hct   


 


MCV   


 


MCH   


 


MCHC   


 


RDW   


 


Plt Count   


 


MPV   


 


Gran %   


 


Lymph % (Auto)   


 


Mono % (Auto)   


 


Eos % (Auto)   


 


Baso % (Auto)   


 


Gran #   


 


Lymph # (Auto)   


 


Mono # (Auto)   


 


Eos # (Auto)   


 


Baso # (Auto)   


 


PT  17.8 H  


 


INR  1.55  


 


APTT  27.9  


 


pO2   


 


VBG pH   


 


VBG pCO2   


 


VBG HCO3   


 


VBG Total CO2   


 


VBG O2 Sat (Calc)   


 


VBG Base Excess   


 


VBG Potassium   


 


Sodium   129 L 


 


Chloride   95 L 


 


Glucose   


 


Lactate   


 


FiO2   


 


Potassium   4.9 


 


Carbon Dioxide   17 L 


 


Anion Gap   22 H 


 


BUN   60 H 


 


Creatinine   1.5 


 


Est GFR ( Amer)   53 


 


Est GFR (Non-Af Amer)   44 


 


POC Glucose (mg/dL)   


 


Random Glucose   111 H 


 


Calcium   8.5 


 


Magnesium   1.6 L 


 


Total Bilirubin   0.7 


 


Direct Bilirubin   0.6 H 


 


AST   36 


 


ALT   16 


 


Alkaline Phosphatase   150 H D 


 


Lactate Dehydrogenase   606 


 


Total Creatine Kinase   109 


 


Troponin I   0.06  D 


 


NT-Pro-B Natriuret Pep   4640 H 


 


Total Protein   7.3 


 


Albumin   3.7 


 


Globulin   3.7 


 


Albumin/Globulin Ratio   1.0 L 


 


Lipase   31 


 


Venous Blood Potassium   


 


Urine Color    Dark yellow


 


Urine Appearance    Sl cloudy


 


Urine pH    8.5


 


Ur Specific Gravity    1.015


 


Urine Protein    100 H


 


Urine Glucose (UA)    Negative


 


Urine Ketones    Negative


 


Urine Blood    Large H


 


Urine Nitrate    Positive H


 


Urine Bilirubin    Negative


 


Urine Urobilinogen    0.2


 


Ur Leukocyte Esterase    Large H


 


Urine RBC    Tntc


 


Urine WBC    Tntc


 


Urine Bacteria    Large


 


Influenza Typ A,B (EIA)   














  10/01/18





  15:30


 


WBC 


 


RBC 


 


Hgb 


 


Hct 


 


MCV 


 


MCH 


 


MCHC 


 


RDW 


 


Plt Count 


 


MPV 


 


Gran % 


 


Lymph % (Auto) 


 


Mono % (Auto) 


 


Eos % (Auto) 


 


Baso % (Auto) 


 


Gran # 


 


Lymph # (Auto) 


 


Mono # (Auto) 


 


Eos # (Auto) 


 


Baso # (Auto) 


 


PT 


 


INR 


 


APTT 


 


pO2 


 


VBG pH 


 


VBG pCO2 


 


VBG HCO3 


 


VBG Total CO2 


 


VBG O2 Sat (Calc) 


 


VBG Base Excess 


 


VBG Potassium 


 


Sodium 


 


Chloride 


 


Glucose 


 


Lactate 


 


FiO2 


 


Potassium 


 


Carbon Dioxide 


 


Anion Gap 


 


BUN 


 


Creatinine 


 


Est GFR ( Amer) 


 


Est GFR (Non-Af Amer) 


 


POC Glucose (mg/dL) 


 


Random Glucose 


 


Calcium 


 


Magnesium 


 


Total Bilirubin 


 


Direct Bilirubin 


 


AST 


 


ALT 


 


Alkaline Phosphatase 


 


Lactate Dehydrogenase 


 


Total Creatine Kinase 


 


Troponin I 


 


NT-Pro-B Natriuret Pep 


 


Total Protein 


 


Albumin 


 


Globulin 


 


Albumin/Globulin Ratio 


 


Lipase 


 


Venous Blood Potassium 


 


Urine Color 


 


Urine Appearance 


 


Urine pH 


 


Ur Specific Gravity 


 


Urine Protein 


 


Urine Glucose (UA) 


 


Urine Ketones 


 


Urine Blood 


 


Urine Nitrate 


 


Urine Bilirubin 


 


Urine Urobilinogen 


 


Ur Leukocyte Esterase 


 


Urine RBC 


 


Urine WBC 


 


Urine Bacteria 


 


Influenza Typ A,B (EIA)  Negative for flu a/b














Assessment & Plan





- Assessment and Plan (Free Text)


Assessment: 


This is an 87yo male with past medical history metastatic prostate cancer s/p 

ADT and palliative radiate, seizures, glaucoma, dementia, UTI w/ indwelling 

tolentino (+ for E.coli and Enterococcus in the past) who is admitted for 





1. AMS


   - most likely secondary to UTI with underlying dementia 


2. Sepsis secondary to UTI


   - UA positive 


3. Seizures 


4. Metastatic prostate ca 


   - currently not on treatment 


Plan: 


ID is on consult. Septic work up pending. Patient started on Merrem. Tolentino 

catheter changed in ED. Tylenol prn fever. Continue home meds of Tegetrol. 

Patient is NPO for now and will get formal swallow eval. Will advance diet after

evaluation. As per POLST, patient is DNR/DNI. Patient has 24hr home health aid 

and is mostly bed bound. He will be d/c home once he is ready for d/c. 





Case seen, discussed and reviewed with Dr. Rivero. 


LARRY Bo PGY3





- Date & Time


Date: 10/02/18


Time: 12:39





<Nathan Rivero S - Last Filed: 10/02/18 15:54>





Results





- Vital Signs


Recent Vital Signs: 





                                Last Vital Signs











Temp  97.6 F   10/02/18 12:00


 


Pulse  92 H  10/02/18 14:00


 


Resp  18   10/02/18 12:00


 


BP  103/49 L  10/02/18 12:00


 


Pulse Ox  97   10/02/18 06:00














- Labs


Result Diagrams: 


                                 10/02/18 06:30





                                 10/02/18 06:30


Labs: 





                         Laboratory Results - last 24 hr











  10/01/18 10/02/18 10/02/18





  15:30 06:30 06:30


 


WBC   16.8 H D 


 


RBC   3.32 L 


 


Hgb   10.2 L 


 


Hct   31.3 L 


 


MCV   94.3  D 


 


MCH   30.7 


 


MCHC   32.6 


 


RDW   14.0 


 


Plt Count   150 


 


MPV   10.0 


 


Sodium    134


 


Potassium    4.3


 


Chloride    101


 


Carbon Dioxide    21


 


Anion Gap    17


 


BUN    58 H


 


Creatinine    1.3


 


Est GFR ( Amer)    > 60


 


Est GFR (Non-Af Amer)    52


 


Random Glucose    69 L


 


Calcium    8.1 L


 


Phosphorus    6.4 H


 


Magnesium    2.1


 


Total Bilirubin    0.4


 


AST    51


 


ALT    21


 


Alkaline Phosphatase    98


 


Total Protein    6.7


 


Albumin    3.2


 


Globulin    3.6


 


Albumin/Globulin Ratio    0.9 L


 


Influenza Typ A,B (EIA)  Negative for flu a/b  














Assessment & Plan





- Assessment and Plan (Free Text)


Plan: 





Pt seen and examined by me. I have reviewed the note by the medical resident. 

The case was discussed and reviewed with the resident. I reviewed the 

medications and labs.Pt with confusion due to delirium. He may have UTI. Pt was 

started on IV Abx. Pt is DNR/DNI.Pt with an aid at home.

## 2018-10-03 RX ADMIN — MEROPENEM SCH MLS/HR: 1 INJECTION INTRAVENOUS at 13:06

## 2018-10-03 RX ADMIN — MEROPENEM SCH MLS/HR: 1 INJECTION INTRAVENOUS at 21:59

## 2018-10-03 RX ADMIN — POLYETHYLENE GLYCOL 3350 SCH GM: 17 POWDER, FOR SOLUTION ORAL at 10:05

## 2018-10-03 RX ADMIN — POLYETHYLENE GLYCOL 3350 SCH GM: 17 POWDER, FOR SOLUTION ORAL at 17:57

## 2018-10-03 NOTE — PN
DATE:  10/03/2018



SUBJECTIVE:  Patient is in bed in no acute distress.



PHYSICAL EXAMINATION:

VITAL SIGNS:  Temperature of 98, blood pressure is 130/60, respiratory rate

of 18, heart rate of 93.

HEENT:  Examination of HEENT is unremarkable.

NECK:  Supple.

LUNGS:  Have decreased breath sounds.

HEART:  Normal S1, S2.

ABDOMEN:  Soft, nontender.



LABORATORY EXAMINATION:  White count of 16,800, hemoglobin of 10.  BUN of

58, creatinine of 1.3.  Urinalysis is noted.  Influenza is negative. 

Microbiology reveals gram-negative serena in the blood and gram-negative serena

in the urine.



ASSESSMENT AND PLAN:  An 88-year-old male with history of prostate cancer,

metastasis to the bone, history of Escherichia coli, chronic Waddell

catheter, Klebsiella urinary tract infection, history of seizures,

glaucoma.  Admitted with fever and leukocytosis.

1.  Severe sepsis with gram-negative serena bacteremia secondary to

gram-negative serena in the urine as the source with acute kidney injury.  We

will treat with meropenem pending identification of gram-negative serena in

the blood and urine.





__________________________________________

Donta Alvarez MD





DD:  10/03/2018 22:23:40

DT:  10/03/2018 22:25:31

Job # [id number]

## 2018-10-03 NOTE — CON
DATE:  10/02/2018



LOCATION:  The patient was seen earlier this morning in room 277, bed 1.



CHIEF COMPLAINT:  Weakness times days.



HISTORY OF PRESENT ILLNESS:  This is an 88-year-old male with history of

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 coli urinary tract

infection, chronic Waddell catheter, history of Klebsiella urinary tract

infections, history of seizures, glaucoma.  The patient has received Lupron

hormonal therapy, had radiation to the pelvis.  Infectious Disease

consultation requested.



REVIEW OF SYSTEMS:  Reveals the patient is a poor historian.  There has

been fevers reported.  Mild shortness of breath and no chest pain, no

abdominal pain, diarrhea.  No headaches or blurred vision.  No new rash, no

joint pain.  A 12-point review of systems was performed.



PAST MEDICAL HISTORY:  Significant for prostate cancer, metastasis to the

bone, E. coli urinary tract infection, Klebsiella urinary tract infection,

seizures, glaucoma.  The patient had radiation, on Lupron therapy and the

patient with renal disease, arthritis.



PAST SURGICAL HISTORY:  Significant for right foot surgery, lung surgery,

bilateral cataract surgery.



ALLERGIES:  THE PATIENT HAS NO KNOWN ALLERGIES.



MEDICATIONS AT HOME:  Reviewed.



PHYSICAL EXAMINATION

VITAL SIGNS:  The patient's temperature is 97, T-max is 104.6, heart rate

of 98, up to 126, respiratory rate of 20, blood pressure is 103/40.

HEENT:  Examination of HEENT is unremarkable.

NECK:  Supple.

LUNGS:  Have decreased breath sounds.

HEART:  Normal S1, S2.

ABDOMEN:  Soft, nontender.  No organomegaly.  No rebound.



LABORATORY DATA:  Laboratory examination reveals a white count is up to

16,800.  It was 7.85 yesterday.  Coagulation is noted.  Chemistries are

noted.  The patient's creatinine of 1.5.  It was 0.5.  BNP is 4640. 

Alkaline phosphatase 150.  Urinalysis is noted, wbc's too numerous to

count, large bacteria.  Influenza is negative.  Blood cultures are reported

to be Gram-negative serena in the blood.



ASSESSMENT AND PLAN:  An 88-year-old male with prostate cancer with

metastasis to the bone with history of Escherichia coli., Klebsiella

urinary tract infection, history of glaucoma, admitted with a temperature

of 104, leukocytosis with severe sepsis, Gram-negative serena bacteremia urine

as the source from chronic Waddell catheter and acute kidney injury.  We will

treat the patient with meropenem pending identification with Gram-negative

serena in the blood.  We are also awaiting for urine culture.  The patient

does have an elevated alkaline phosphatase, did have a CAT scan of the

abdomen and pelvis, unremarkable gallbladder and bile duct.  We will follow

with you.





__________________________________________

Donta Alvarez MD



DD:  10/02/2018 20:02:33

DT:  10/03/2018 2:20:25

Job # [id number]

## 2018-10-03 NOTE — CP.PCM.PN
<Trinity Bo - Last Filed: 10/03/18 11:03>





Subjective





- Date & Time of Evaluation


Date of Evaluation: 10/03/18


Time of Evaluation: 07:00





- Subjective


Subjective: 


Medicine Progress note for LARRY Lechuga PGY3





Patient seen and examined at bedside. No acute overnight events as per nursing 

staff. Patient is awake and alert this AM. Mentation has improved. He is 

complaining of suprapubic tenderness but otherwise denies chest pain, shortness 

of breath, nausea/vomiting/diarrhea, fever/chills, numbness/tingling. 





Objective





- Vital Signs/Intake and Output


Vital Signs (last 24 hours): 


                                        











Temp Pulse Resp BP Pulse Ox


 


 98.6 F   108 H  22   128/68   96 


 


 10/03/18 06:00  10/03/18 06:00  10/03/18 06:00  10/03/18 06:00  10/03/18 06:00








Intake and Output: 


                                        











 10/03/18 10/03/18





 06:59 18:59


 


Intake Total 720 


 


Output Total 1900 


 


Balance -1180 














- Medications


Medications: 


                               Current Medications





Acetaminophen (Tylenol 325mg Tab)  650 mg PO Q6H PRN


   PRN Reason: Fever >100.4 F


Carbamazepine (Tegretol)  200 mg PO TID [facility]; Protocol


   Last Admin: 10/02/18 17:22 Dose:  200 mg


Meropenem (Merrem Iv 1 Gm Premix)  50 mls @ 100 mls/hr IVPB Q12 [facility]; Protocol


   Last Admin: 10/02/18 21:27 Dose:  100 mls/hr











- Labs


Labs: 


                                        





                                 10/02/18 06:30 





                                 10/02/18 06:30 





                                        











PT  17.8 SECONDS (9.4-12.5)  H  10/01/18  13:10    


 


INR  1.55   10/01/18  13:10    


 


APTT  27.9 Seconds (25.1-36.5)   10/01/18  13:10    














- Constitutional


Appears: No Acute Distress





- Head Exam


Head Exam: ATRAUMATIC, NORMAL INSPECTION, NORMOCEPHALIC





- Eye Exam


Eye Exam: Normal appearance, PERRL


Pupil Exam: NORMAL ACCOMODATION, PERRL





- ENT Exam


ENT Exam: Mucous Membranes Moist





- Respiratory Exam


Respiratory Exam: Clear to Ausculation Bilateral, NORMAL BREATHING PATTERN.  

absent: Rales, Rhonchi, Wheezes





- Cardiovascular Exam


Cardiovascular Exam: REGULAR RHYTHM, +S1, +S2.  absent: Gallop, Rubs, Murmur





- GI/Abdominal Exam


GI & Abdominal Exam: Soft, Tenderness (suprapubic ), Normal Bowel Sounds.  

absent: Rigid, Mass, Rebound





- Extremities Exam


Extremities Exam: Normal Capillary Refill, Normal Inspection.  absent: Calf 

Tenderness, Pedal Edema





- Neurological Exam


Neurological Exam: Alert, Awake, CN II-XII Intact.  absent: Oriented x3 (A&O x 

1-2 )





- Psychiatric Exam


Psychiatric exam: Normal Affect, Normal Mood





- Skin


Skin Exam: Dry, Warm





Assessment and Plan





- Assessment and Plan (Free Text)


Assessment: 


1. AMS- resolved 


   - secondary to UTI with underlying dementia 


2. Sepsis secondary to UTI


   - UA positive 


3. Seizures 


4. Metastatic prostate ca 


   - currently not on treatment 


5. Constipation 


Plan: 





Labs and imaging reviewed. Awaiting urine culture. ID is on consult. Patient is 

on Merrem. Will continue Tegetrol for history of seizures. Patient's mentation 

is now at baseline. He passed swallow eval and diet adjusted accordingly. 

Patient placed on Miralax for constipation. Patient has not had BM in 2 days as 

per nurse. I spoke with son at length today to give him an update. The son 

verbalized and agreed with the plan of possible d/c home tomorrow once urine 

culture comes back. 





Case seen, discussed and reviewed with Dr. Rivero. 


LARRY Bo PGY3 





<Nathan Rivero S - Last Filed: 10/09/18 20:41>





Objective





- Vital Signs/Intake and Output


Vital Signs (last 24 hours): 


                                        











Temp Pulse Resp BP Pulse Ox


 


 97.8 F   94 H  18   112/50 L  97 


 


 10/09/18 14:00  10/09/18 14:00  10/09/18 14:00  10/09/18 14:00  10/09/18 14:00











- Labs


Labs: 


                                        





                                 10/07/18 06:30 





                                 10/08/18 07:30 





                                        











PT  17.8 SECONDS (9.4-12.5)  H  10/01/18  13:10    


 


INR  1.55   10/01/18  13:10    


 


APTT  27.9 Seconds (25.1-36.5)   10/01/18  13:10    














Assessment and Plan





- Assessment and Plan (Free Text)


Plan: 





Pt seen and examined by me. This is a late entry.I have reviewed the note by the

medical resident. The case was discussed and reviewed with the resident. I 

reviewed the medications and labs. Pt with sepsis and is on IV Abx. He is being 

followed by ID. He is on Miralax for constipation. D/C planning for him to go 

home with services. He is on Tegretol for Sz d/o. Eating well. Pain is 

controlled.

## 2018-10-04 RX ADMIN — MEROPENEM SCH MLS/HR: 1 INJECTION INTRAVENOUS at 10:37

## 2018-10-04 RX ADMIN — MEROPENEM SCH MLS/HR: 1 INJECTION INTRAVENOUS at 23:11

## 2018-10-04 RX ADMIN — POLYETHYLENE GLYCOL 3350 SCH: 17 POWDER, FOR SOLUTION ORAL at 10:38

## 2018-10-04 NOTE — CP.PCM.DIS
Provider





- Provider


Date of Admission: 


10/01/18 15:39





Attending physician: 


Nathan Rivero MD





Primary care physician: 


Sami Thakur MD








Hospital Course





- Lab Results


Lab Results: 


                                  Micro Results





10/01/18 20:14   Urine,Clean Catch   Urine Culture - Preliminary


                            Gram Negative Ben


10/01/18 13:10   Blood   Gram Stain - Final


10/01/18 13:10   Blood   Gram Stain - Final





                             Most Recent Lab Values











WBC  16.8 10^3/ul (4.5-11.0)  H D 10/02/18  06:30    


 


RBC  3.32 10^6/uL (3.5-6.1)  L  10/02/18  06:30    


 


Hgb  10.2 g/dL (14.0-18.0)  L  10/02/18  06:30    


 


Hct  31.3 % (42.0-52.0)  L  10/02/18  06:30    


 


MCV  94.3 fl (80.0-105.0)  D 10/02/18  06:30    


 


MCH  30.7 pg (25.0-35.0)   10/02/18  06:30    


 


MCHC  32.6 g/dl (31.0-37.0)   10/02/18  06:30    


 


RDW  14.0 % (11.5-14.5)   10/02/18  06:30    


 


Plt Count  150 10^3/uL (120.0-450.0)   10/02/18  06:30    


 


MPV  10.0 fl (7.0-11.0)   10/02/18  06:30    


 


Gran %  78.4 % (50.0-68.0)  H  10/01/18  13:10    


 


Lymph % (Auto)  20.7 % (22.0-35.0)  L  10/01/18  13:10    


 


Mono % (Auto)  0.8 % (1.0-6.0)  L  10/01/18  13:10    


 


Eos % (Auto)  0.1 % (1.5-5.0)  L  10/01/18  13:10    


 


Baso % (Auto)  0.0 % (0.0-3.0)   10/01/18  13:10    


 


Gran #  5.85  (1.4-6.5)   10/01/18  13:10    


 


Lymph # (Auto)  1.6  (1.2-3.4)   10/01/18  13:10    


 


Mono # (Auto)  0.1  (0.1-0.6)   10/01/18  13:10    


 


Eos # (Auto)  0.0  (0.0-0.7)   10/01/18  13:10    


 


Baso # (Auto)  0.00 K/mm3 (0.0-2.0)   10/01/18  13:10    


 


PT  17.8 SECONDS (9.4-12.5)  H  10/01/18  13:10    


 


INR  1.55   10/01/18  13:10    


 


APTT  27.9 Seconds (25.1-36.5)   10/01/18  13:10    


 


pO2  158 mm/Hg (30-55)  H  10/01/18  13:10    


 


VBG pH  7.52  (7.32-7.43)  H  10/01/18  13:10    


 


VBG pCO2  22.0  (40-60)  L  10/01/18  13:10    


 


VBG HCO3  18.0 mmol/l (21-28)  L  10/01/18  13:10    


 


VBG Total CO2  18.7 mmol.L (22-28)  L  10/01/18  13:10    


 


VBG O2 Sat (Calc)  97.3 % (40-65)  H  10/01/18  13:10    


 


VBG Base Excess  -2.9 mmol/L (0.0-2.0)  L  10/01/18  13:10    


 


VBG Potassium  4.7 mmol/L (3.6-5.2)   10/01/18  13:10    


 


Sodium  125.0 mmol/L (132-148)  L  10/01/18  13:10    


 


Chloride  96.0 mmol/L ()  L  10/01/18  13:10    


 


Glucose  108 mg/dl ()   10/01/18  13:10    


 


Lactate  1.8 mmol/L (0.7-2.1)   10/01/18  13:10    


 


FiO2  21.0 %  10/01/18  13:10    


 


Sodium  134 mmol/L (132-148)   10/02/18  06:30    


 


Potassium  4.3 mmol/L (3.6-5.0)   10/02/18  06:30    


 


Chloride  101 mmol/L ()   10/02/18  06:30    


 


Carbon Dioxide  21 mmol/L (21-33)   10/02/18  06:30    


 


Anion Gap  17  (10-20)   10/02/18  06:30    


 


BUN  58 mg/dL (7-21)  H  10/02/18  06:30    


 


Creatinine  1.3 mg/dl (0.8-1.5)   10/02/18  06:30    


 


Est GFR ( Amer)  > 60   10/02/18  06:30    


 


Est GFR (Non-Af Amer)  52   10/02/18  06:30    


 


POC Glucose (mg/dL)  104 mg/dL ()   10/01/18  13:01    


 


Random Glucose  69 mg/dL ()  L  10/02/18  06:30    


 


Calcium  8.1 mg/dL (8.4-10.5)  L  10/02/18  06:30    


 


Phosphorus  6.4 mg/dL (2.5-4.5)  H  10/02/18  06:30    


 


Magnesium  2.1 mg/dL (1.7-2.2)   10/02/18  06:30    


 


Total Bilirubin  0.4 mg/dL (0.2-1.3)   10/02/18  06:30    


 


Direct Bilirubin  0.6 mg/dL (0.0-0.4)  H  10/01/18  13:10    


 


AST  51 U/L (17-59)   10/02/18  06:30    


 


ALT  21 U/L (7-56)   10/02/18  06:30    


 


Alkaline Phosphatase  98 U/L ()   10/02/18  06:30    


 


Lactate Dehydrogenase  606 U/L (333-699)   10/01/18  13:10    


 


Total Creatine Kinase  109 U/L ()   10/01/18  13:10    


 


Troponin I  0.06 ng/mL D 10/01/18  13:10    


 


NT-Pro-B Natriuret Pep  4640 pg/mL (0-450)  H  10/01/18  13:10    


 


Total Protein  6.7 g/dL (5.8-8.3)   10/02/18  06:30    


 


Albumin  3.2 g/dL (3.0-4.8)   10/02/18  06:30    


 


Globulin  3.6 gm/dL  10/02/18  06:30    


 


Albumin/Globulin Ratio  0.9  (1.1-1.8)  L  10/02/18  06:30    


 


Lipase  31 U/L ()   10/01/18  13:10    


 


Venous Blood Potassium  4.7 mmol/L (3.6-5.2)   10/01/18  13:10    


 


Urine Color  Dark yellow  (YELLOW)   10/01/18  13:50    


 


Urine Appearance  Sl cloudy  (CLEAR)   10/01/18  13:50    


 


Urine pH  8.5  (4.7-8.0)   10/01/18  13:50    


 


Ur Specific Gravity  1.015  (1.005-1.035)   10/01/18  13:50    


 


Urine Protein  100 mg/dL (<30 mg/dL)  H  10/01/18  13:50    


 


Urine Glucose (UA)  Negative mg/dL (NEGATIVE)   10/01/18  13:50    


 


Urine Ketones  Negative mg/dL (NEGATIVE)   10/01/18  13:50    


 


Urine Blood  Large  (NEGATIVE)  H  10/01/18  13:50    


 


Urine Nitrate  Positive  (NEGATIVE)  H  10/01/18  13:50    


 


Urine Bilirubin  Negative  (NEGATIVE)   10/01/18  13:50    


 


Urine Urobilinogen  0.2 E.U./dL (<1 E.U./dL)   10/01/18  13:50    


 


Ur Leukocyte Esterase  Large Martita/uL (NEGATIVE)  H  10/01/18  13:50    


 


Urine RBC  Tntc /hpf (0-2)   10/01/18  13:50    


 


Urine WBC  Tntc /hpf (0-6)   10/01/18  13:50    


 


Urine Bacteria  Large  (NEG)   10/01/18  13:50    


 


Influenza Typ A,B (EIA)  Negative for flu a/b  (NEGATIVE)   10/01/18  15:30    














Discharge Exam





- Head Exam


Head Exam: ATRAUMATIC, NORMAL INSPECTION, NORMOCEPHALIC





Discharge Plan





- Follow Up Plan


Condition: GUARDED


Disposition: HOME/ ROUTINE

## 2018-10-04 NOTE — CP.PCM.PN
<Trinity Bo - Last Filed: 10/04/18 13:42>





Subjective





- Date & Time of Evaluation


Date of Evaluation: 10/04/18


Time of Evaluation: 07:00





- Subjective


Subjective: 





Medicine Progress Note for LARRY Lechuga PGY3





Patient seen and examined at bedside. There were no acute overnight events as 

per nursing staff. Patient is pleasantly confused. He reports some pain in his 

suprapubic region upon palpation, but denies chest pain, shortness of breath, 

nausea/vomiting/diarrhea, fever/chills, numbness/tingling. 





Objective





- Vital Signs/Intake and Output


Vital Signs (last 24 hours): 


                                        











Temp Pulse Resp BP Pulse Ox


 


 98.7 F   86   20   163/71 H  97 


 


 10/04/18 12:00  10/04/18 12:00  10/04/18 12:00  10/04/18 12:00  10/04/18 06:00








Intake and Output: 


                                        











 10/04/18 10/04/18





 06:59 18:59


 


Intake Total 1480 


 


Output Total 1900 


 


Balance -420 














- Medications


Medications: 


                               Current Medications





Acetaminophen (Tylenol 325mg Tab)  650 mg PO Q6H PRN


   PRN Reason: Fever >100.4 F


Carbamazepine (Tegretol)  200 mg PO TID [facility]; Protocol


   Last Admin: 10/04/18 13:28 Dose:  200 mg


Fentanyl (Duragesic)  1 patch TD Q72H [facility]


   Last Admin: 10/03/18 13:06 Dose:  1 patch


Meropenem (Merrem Iv 1 Gm Premix)  50 mls @ 100 mls/hr IVPB Q12 [facility]; Protocol


   Last Admin: 10/04/18 10:37 Dose:  100 mls/hr


Polyethylene Glycol (Miralax)  17 gm PO BID [facility]


   Last Admin: 10/04/18 10:38 Dose:  Not Given











- Labs


Labs: 


                                        





                                 10/02/18 06:30 





                                 10/02/18 06:30 





                                        











PT  17.8 SECONDS (9.4-12.5)  H  10/01/18  13:10    


 


INR  1.55   10/01/18  13:10    


 


APTT  27.9 Seconds (25.1-36.5)   10/01/18  13:10    














- Constitutional


Appears: No Acute Distress





- Head Exam


Head Exam: ATRAUMATIC, NORMAL INSPECTION, NORMOCEPHALIC





- Eye Exam


Eye Exam: Normal appearance, PERRL


Pupil Exam: NORMAL ACCOMODATION





- ENT Exam


ENT Exam: Mucous Membranes Moist





- Respiratory Exam


Respiratory Exam: Clear to Ausculation Bilateral, NORMAL BREATHING PATTERN.  

absent: Rales, Rhonchi, Wheezes





- Cardiovascular Exam


Cardiovascular Exam: REGULAR RHYTHM, +S1, +S2.  absent: Gallop, Rubs, Murmur





- GI/Abdominal Exam


GI & Abdominal Exam: Soft, Tenderness (suprapubic ), Normal Bowel Sounds.  

absent: Rigid, Mass, Rebound





- Extremities Exam


Extremities Exam: Normal Inspection.  absent: Calf Tenderness, Pedal Edema





- Neurological Exam


Neurological Exam: Alert, Awake, CN II-XII Intact





- Psychiatric Exam


Psychiatric exam: Normal Affect, Normal Mood





- Skin


Skin Exam: Dry, Warm





Assessment and Plan





- Assessment and Plan (Free Text)


Assessment: 


1. Sepsis 


   - secondary to UTI and bacteremia


   - blood cultures + for proteus 


   - Urine culture + for Citrobacter


2. Seizures 


3. Metastatic prostate ca 


   - currently not on treatment 


4. Constipation - resolved 


Plan: 


Labs and imaging reviewed. Patient had CT A/P which showed possible focal 

pancreatitis, but lipase as negative and patient does not have upper abdominal 

pain on exam. ID is following and patient is on Merrem. Repeat blood cultures se

nt. Pain is controlled with fentanyl patch. He is on Miralax once daily. Will 

continue Tegetrol for seizures. I spoke with son today to give him an update on 

the plan. 





Case seen, discussed and reviewed with Dr. Rivero. 


LARRY Bo PGY3





<Nathan Rivero S - Last Filed: 10/07/18 14:58>





Objective





- Vital Signs/Intake and Output


Vital Signs (last 24 hours): 


                                        











Temp Pulse Resp BP Pulse Ox


 


 98.2 F   78   20   130/68   97 


 


 10/07/18 08:44  10/07/18 08:44  10/07/18 08:44  10/07/18 08:44  10/07/18 10:15








Intake and Output: 


                                        











 10/07/18 10/07/18





 06:59 18:59


 


Output Total 500 


 


Balance -500 














- Medications


Medications: 


                               Current Medications





Acetaminophen (Tylenol 325mg Tab)  650 mg PO Q6H PRN


   PRN Reason: Fever >100.4 F


Carbamazepine (Tegretol)  200 mg PO TID [facility]; Protocol


   Last Admin: 10/07/18 13:17 Dose:  200 mg


Fentanyl (Duragesic)  1 patch TD Q72H [facility]


   Last Admin: 10/06/18 15:47 Dose:  1 patch


Meropenem (Merrem Iv 1 Gm Premix)  50 mls @ 100 mls/hr IVPB Q12 [facility]; Protocol


   Last Admin: 10/07/18 09:48 Dose:  100 mls/hr


Polyethylene Glycol (Miralax)  17 gm PO DAILY [facility]


   Last Admin: 10/07/18 09:49 Dose:  17 gm











- Labs


Labs: 


                                        





                                 10/07/18 06:30 





                                 10/07/18 06:30 





                                        











PT  17.8 SECONDS (9.4-12.5)  H  10/01/18  13:10    


 


INR  1.55   10/01/18  13:10    


 


APTT  27.9 Seconds (25.1-36.5)   10/01/18  13:10    














Assessment and Plan





- Assessment and Plan (Free Text)


Plan: 





Pt seen and examined by me. I have reviewed the note by the medical resident. 

The case was discussed and reviewed with the resident. I reviewed the 

medications and labs. Pt with sepsis. CT of ab/pelvis was reviewed. ID is 

following pt. Continue with Miralax for constipation. He is on a Fentanyl patch 

for pain. He is DNR.

## 2018-10-05 RX ADMIN — MEROPENEM SCH MLS/HR: 1 INJECTION INTRAVENOUS at 22:25

## 2018-10-05 RX ADMIN — MEROPENEM SCH MLS/HR: 1 INJECTION INTRAVENOUS at 10:44

## 2018-10-05 RX ADMIN — POLYETHYLENE GLYCOL 3350 SCH GM: 17 POWDER, FOR SOLUTION ORAL at 10:45

## 2018-10-05 NOTE — PN
DATE:  10/04/2018



SUBJECTIVE:  Patient is in bed in no acute distress, nontoxic.  No fevers

and chills.



PHYSICAL EXAMINATION:

VITAL SIGNS:  Temperature is 97, blood pressure is 160/70, respiratory rate

of 18.

HEENT:  Unremarkable.

NECK:  Supple.

LUNGS:  Have decreased breath sounds.

HEART:  Normal S1, S2.

ABDOMEN:  Soft.



LABORATORY EXAMINATION:  Reveals the urine has Proteus mirabilis sensitive

to Cipro, sensitive to ertapenem, sensitive to meropenem and Zosyn.  The

urine culture has Citrobacter and sensitivity is noted.



ASSESSMENT AND PLAN:  An 88-year-old male with a history of prostate cancer

with metastasis to the bone with a history of Escherichia coli urinary

tract infection, Klebsiella urinary tract infection, admitted with severe

sepsis with Proteus mirabilis bacteremia, however, the urine culture is

growing Citrobacter in the urine and the patient with acute kidney injury,

on meropenem.  We will check on the sensitivity, CAT scan of the abdomen

and pelvis.  Concerned about GI source, not evident as far as biliary tree

is concerned.  We will follow with you.





__________________________________________

Donta Alvarez MD





DD:  10/04/2018 22:23:38

DT:  10/04/2018 22:25:12

Job # [id number]

## 2018-10-05 NOTE — CP.PCM.PN
<Trinity Bo - Last Filed: 10/05/18 11:10>





Subjective





- Date & Time of Evaluation


Date of Evaluation: 10/05/18


Time of Evaluation: 07:00





- Subjective


Subjective: 





Medicine Progress Note for LARRY Lechuga PGY3





Patient seen and examined at bedside. There were no acute overnight events as 

per nursing staff. He is a little confused this AM. He reports some pain in his 

RLQ of his abdomen that does not radiate. Patient denies chest pain, shortness 

of breath, nausea/vomiting/diarrhea, fever/chills, numbness/tingling. 








Objective





- Vital Signs/Intake and Output


Vital Signs (last 24 hours): 


                                        











Temp Pulse Resp BP Pulse Ox


 


 98 F   91 H  18   159/74 H  94 L


 


 10/05/18 08:30  10/05/18 08:30  10/05/18 08:30  10/05/18 08:30  10/05/18 08:30








Intake and Output: 


                                        











 10/05/18 10/05/18





 06:59 18:59


 


Intake Total 640 


 


Output Total 1000 


 


Balance -360 














- Medications


Medications: 


                               Current Medications





Acetaminophen (Tylenol 325mg Tab)  650 mg PO Q6H PRN


   PRN Reason: Fever >100.4 F


Carbamazepine (Tegretol)  200 mg PO TID [facility]; Protocol


   Last Admin: 10/05/18 10:45 Dose:  200 mg


Fentanyl (Duragesic)  1 patch TD Q72H [facility]


   Last Admin: 10/03/18 13:06 Dose:  1 patch


Meropenem (Merrem Iv 1 Gm Premix)  50 mls @ 100 mls/hr IVPB Q12 [facility]; Protocol


   Last Admin: 10/05/18 10:44 Dose:  100 mls/hr


Polyethylene Glycol (Miralax)  17 gm PO DAILY [facility]


   Last Admin: 10/05/18 10:45 Dose:  17 gm











- Labs


Labs: 


                                        





                                 10/02/18 06:30 





                                 10/02/18 06:30 





                                        











PT  17.8 SECONDS (9.4-12.5)  H  10/01/18  13:10    


 


INR  1.55   10/01/18  13:10    


 


APTT  27.9 Seconds (25.1-36.5)   10/01/18  13:10    














- Constitutional


Appears: No Acute Distress





- Head Exam


Head Exam: ATRAUMATIC, NORMAL INSPECTION, NORMOCEPHALIC





- Eye Exam


Eye Exam: Normal appearance, PERRL


Pupil Exam: NORMAL ACCOMODATION, PERRL





- ENT Exam


ENT Exam: Mucous Membranes Moist





- Respiratory Exam


Respiratory Exam: Clear to Ausculation Bilateral, NORMAL BREATHING PATTERN.  

absent: Rales, Rhonchi, Wheezes





- Cardiovascular Exam


Cardiovascular Exam: REGULAR RHYTHM, +S1, +S2.  absent: Gallop, Rubs, Murmur





- GI/Abdominal Exam


GI & Abdominal Exam: Soft, Tenderness (RLQ and suprapubic ), Normal Bowel 

Sounds.  absent: Rigid, Mass, Rebound





- Extremities Exam


Extremities Exam: Normal Inspection.  absent: Calf Tenderness, Pedal Edema





- Neurological Exam


Neurological Exam: Alert, Awake, CN II-XII Intact, Oriented x3





- Psychiatric Exam


Psychiatric exam: Normal Affect, Normal Mood





- Skin


Skin Exam: Dry, Warm





Assessment and Plan





- Assessment and Plan (Free Text)


Assessment: 


1. Sepsis 


   - secondary to UTI and bacteremia


   - blood cultures + for proteus 


   - Urine culture + for Citrobacter


2. Seizures 


3. Metastatic prostate ca 


   - currently not on treatment 


Plan: 


Labs and imaging reviewed. Repeat blood cultures pending. Pain is controlled and

patient on stool softener. He is tolerating diet. Patient is on Tegetrol for 

seizures. ID is on consult and patient is on Merrem for the infection. I spoke 

with son today. 





Case seen, discussed and reviewed with Dr. Rivero. 


LARRY Bo PGY3





<Nathan Rivero S - Last Filed: 10/07/18 14:56>





Objective





- Vital Signs/Intake and Output


Vital Signs (last 24 hours): 


                                        











Temp Pulse Resp BP Pulse Ox


 


 98.2 F   78   20   130/68   97 


 


 10/07/18 08:44  10/07/18 08:44  10/07/18 08:44  10/07/18 08:44  10/07/18 10:15








Intake and Output: 


                                        











 10/07/18 10/07/18





 06:59 18:59


 


Output Total 500 


 


Balance -500 














- Medications


Medications: 


                               Current Medications





Acetaminophen (Tylenol 325mg Tab)  650 mg PO Q6H PRN


   PRN Reason: Fever >100.4 F


Carbamazepine (Tegretol)  200 mg PO TID [facility]; Protocol


   Last Admin: 10/07/18 13:17 Dose:  200 mg


Fentanyl (Duragesic)  1 patch TD Q72H [facility]


   Last Admin: 10/06/18 15:47 Dose:  1 patch


Meropenem (Merrem Iv 1 Gm Premix)  50 mls @ 100 mls/hr IVPB Q12 [facility]; Protocol


   Last Admin: 10/07/18 09:48 Dose:  100 mls/hr


Polyethylene Glycol (Miralax)  17 gm PO DAILY [facility]


   Last Admin: 10/07/18 09:49 Dose:  17 gm











- Labs


Labs: 


                                        





                                 10/07/18 06:30 





                                 10/07/18 06:30 





                                        











PT  17.8 SECONDS (9.4-12.5)  H  10/01/18  13:10    


 


INR  1.55   10/01/18  13:10    


 


APTT  27.9 Seconds (25.1-36.5)   10/01/18  13:10    














Assessment and Plan





- Assessment and Plan (Free Text)


Plan: 





Pt seen and examined by me. I have reviewed the note by the medical resident. 

The case was discussed and reviewed with the resident. I reviewed the 

medications and labs. Pt with sepsis and on Abx. His pain is controlled. He is 

eating well. 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 Family has been 

updated. He is DNR.

## 2018-10-05 NOTE — CP.PCM.PN
Subjective





- Date & Time of Evaluation


Date of Evaluation: 10/05/18


Time of Evaluation: 09:50





- Subjective


Subjective: 





Comfortable in bed, no abdominal pain, no nausea or vomiting, no fevers.





Objective





- Vital Signs/Intake and Output


Vital Signs (last 24 hours): 


                                        











Temp Pulse Resp BP Pulse Ox


 


 97.7 F   94 H  18   123/53 L  96 


 


 10/05/18 14:00  10/05/18 14:00  10/05/18 14:00  10/05/18 14:00  10/05/18 14:00








Intake and Output: 


                                        











 10/05/18 10/05/18





 06:59 18:59


 


Intake Total 640 


 


Output Total 1000 750


 


Balance -360 -750














- Medications


Medications: 


                               Current Medications





Acetaminophen (Tylenol 325mg Tab)  650 mg PO Q6H PRN


   PRN Reason: Fever >100.4 F


Carbamazepine (Tegretol)  200 mg PO TID [facility]; Protocol


   Last Admin: 10/05/18 14:12 Dose:  200 mg


Fentanyl (Duragesic)  1 patch TD Q72H [facility]


   Last Admin: 10/03/18 13:06 Dose:  1 patch


Meropenem (Merrem Iv 1 Gm Premix)  50 mls @ 100 mls/hr IVPB Q12 [facility]; Protocol


   Last Admin: 10/05/18 10:44 Dose:  100 mls/hr


Polyethylene Glycol (Miralax)  17 gm PO DAILY [facility]


   Last Admin: 10/05/18 10:45 Dose:  17 gm











- Labs


Labs: 


                                        





                                 10/02/18 06:30 





                                 10/02/18 06:30 





                                        











PT  17.8 SECONDS (9.4-12.5)  H  10/01/18  13:10    


 


INR  1.55   10/01/18  13:10    


 


APTT  27.9 Seconds (25.1-36.5)   10/01/18  13:10    














- Constitutional


Appears: Chronically Ill





- Head Exam


Head Exam: NORMAL INSPECTION





- Neck Exam


Neck Exam: absent: Meningismus





- Respiratory Exam


Respiratory Exam: Decreased Breath Sounds





- Cardiovascular Exam


Cardiovascular Exam: +S1, +S2





- GI/Abdominal Exam


GI & Abdominal Exam: Soft.  absent: Tenderness





Assessment and Plan





- Assessment and Plan (Free Text)


Plan: 





Assessment


Proteus bacteremia, source unclear, concerned about intra-abdominal infection


history of UTI in this patient with prostate cancer with metastasis


history of Klebsiella urinary tract infection


Mechanical fall, etiology to be determined


prostate cancer on Lupron treatment once a month (hormonal treatment)


seizure disorder


glaucoma





Plan


continue Merrem and follow up blood cx

## 2018-10-06 RX ADMIN — POLYETHYLENE GLYCOL 3350 SCH GM: 17 POWDER, FOR SOLUTION ORAL at 09:28

## 2018-10-06 RX ADMIN — MEROPENEM SCH MLS/HR: 1 INJECTION INTRAVENOUS at 22:07

## 2018-10-06 RX ADMIN — MEROPENEM SCH MLS/HR: 1 INJECTION INTRAVENOUS at 09:27

## 2018-10-06 NOTE — CP.PCM.PN
Subjective





- Date & Time of Evaluation


Date of Evaluation: 10/06/18


Time of Evaluation: 14:35





- Subjective


Subjective: 





No fevers, comfortable.





Objective





- Vital Signs/Intake and Output


Vital Signs (last 24 hours): 


                                        











Temp Pulse Resp BP Pulse Ox


 


 98 F   93 H  18   136/66   97 


 


 10/06/18 08:22  10/06/18 08:22  10/06/18 08:22  10/06/18 08:22  10/06/18 11:13








Intake and Output: 


                                        











 10/06/18 10/06/18





 06:59 18:59


 


Intake Total 180 600


 


Output Total  400


 


Balance 180 200














- Medications


Medications: 


                               Current Medications





Acetaminophen (Tylenol 325mg Tab)  650 mg PO Q6H PRN


   PRN Reason: Fever >100.4 F


Carbamazepine (Tegretol)  200 mg PO TID [facility]; Protocol


   Last Admin: 10/06/18 15:46 Dose:  200 mg


Fentanyl (Duragesic)  1 patch TD Q72H [facility]


   Last Admin: 10/06/18 15:47 Dose:  1 patch


Meropenem (Merrem Iv 1 Gm Premix)  50 mls @ 100 mls/hr IVPB Q12 [facility]; Protocol


   Last Admin: 10/06/18 09:27 Dose:  100 mls/hr


Polyethylene Glycol (Miralax)  17 gm PO DAILY [facility]


   Last Admin: 10/06/18 09:28 Dose:  17 gm











- Labs


Labs: 


                                        





                                 10/02/18 06:30 





                                 10/02/18 06:30 





                                        











PT  17.8 SECONDS (9.4-12.5)  H  10/01/18  13:10    


 


INR  1.55   10/01/18  13:10    


 


APTT  27.9 Seconds (25.1-36.5)   10/01/18  13:10    














- Constitutional


Appears: Non-toxic, Chronically Ill





- Head Exam


Head Exam: NORMAL INSPECTION





- Respiratory Exam


Respiratory Exam: Decreased Breath Sounds





- Cardiovascular Exam


Cardiovascular Exam: +S1, +S2





- GI/Abdominal Exam


GI & Abdominal Exam: Soft.  absent: Tenderness





Assessment and Plan





- Assessment and Plan (Free Text)


Plan: 





Assessment


Proteus bacteremia, source unclear, concerned about intra-abdominal infection


history of UTI in this patient with prostate cancer with metastasis


history of Klebsiella urinary tract infection


Mechanical fall, etiology to be determined


prostate cancer on Lupron treatment once a month (hormonal treatment)


seizure disorder


glaucoma





Plan


continue Merrem day 5 to complete 14 days of therapy and follow up repeat blood 

cx

## 2018-10-06 NOTE — CP.PCM.PN
<Trinity Bo - Last Filed: 10/06/18 12:53>





Subjective





- Date & Time of Evaluation


Date of Evaluation: 10/06/18


Time of Evaluation: 07:00





- Subjective


Subjective: 





Medicine Progress Note for LARRY Lechuga PGY3





Patient seen and examined at bedside. There were no acute overnight events as 

per nursing staff. Patient is resting comfortably in bed. He states he feels 

well and would like to go home. He denies abdominal pain, chest pain, shortness 

of breath, nausea/vomiting/diarrhea, fever/chills, numbness/tingling or any 

pain. 





Objective





- Vital Signs/Intake and Output


Vital Signs (last 24 hours): 


                                        











Temp Pulse Resp BP Pulse Ox


 


 98 F   93 H  18   136/66   97 


 


 10/06/18 08:22  10/06/18 08:22  10/06/18 08:22  10/06/18 08:22  10/06/18 11:13








Intake and Output: 


                                        











 10/06/18 10/06/18





 06:59 18:59


 


Intake Total 180 


 


Balance 180 














- Medications


Medications: 


                               Current Medications





Acetaminophen (Tylenol 325mg Tab)  650 mg PO Q6H PRN


   PRN Reason: Fever >100.4 F


Carbamazepine (Tegretol)  200 mg PO TID [facility]; Protocol


   Last Admin: 10/06/18 09:28 Dose:  200 mg


Fentanyl (Duragesic)  1 patch TD Q72H [facility]


   Last Admin: 10/03/18 13:06 Dose:  1 patch


Meropenem (Merrem Iv 1 Gm Premix)  50 mls @ 100 mls/hr IVPB Q12 [facility]; Protocol


   Last Admin: 10/06/18 09:27 Dose:  100 mls/hr


Polyethylene Glycol (Miralax)  17 gm PO DAILY [facility]


   Last Admin: 10/06/18 09:28 Dose:  17 gm











- Labs


Labs: 


                                        





                                 10/02/18 06:30 





                                 10/02/18 06:30 





                                        











PT  17.8 SECONDS (9.4-12.5)  H  10/01/18  13:10    


 


INR  1.55   10/01/18  13:10    


 


APTT  27.9 Seconds (25.1-36.5)   10/01/18  13:10    














- Constitutional


Appears: No Acute Distress





- Head Exam


Head Exam: ATRAUMATIC, NORMAL INSPECTION, NORMOCEPHALIC





- Eye Exam


Eye Exam: Normal appearance, PERRL


Pupil Exam: NORMAL ACCOMODATION, PERRL





- ENT Exam


ENT Exam: Mucous Membranes Moist





- Respiratory Exam


Respiratory Exam: Clear to Ausculation Bilateral, NORMAL BREATHING PATTERN.  

absent: Rales, Rhonchi, Wheezes





- Cardiovascular Exam


Cardiovascular Exam: REGULAR RHYTHM, +S1, +S2.  absent: Gallop, Rubs, Murmur





- GI/Abdominal Exam


GI & Abdominal Exam: Soft, Tenderness (suprapubic ), Normal Bowel Sounds.  

absent: Rigid, Mass, Rebound





- Extremities Exam


Extremities Exam: Normal Capillary Refill, Normal Inspection.  absent: Calf 

Tenderness, Pedal Edema





- Neurological Exam


Neurological Exam: Alert, Awake, CN II-XII Intact.  absent: Oriented x3





- Skin


Skin Exam: Dry, Intact, Warm





Assessment and Plan





- Assessment and Plan (Free Text)


Assessment: 





1. Sepsis 


   - secondary to UTI and bacteremia


   - blood cultures + for proteus 


   - Urine culture + for Citrobacter


   - repeat blood cultures negative thus far 


2. Seizures 


3. Metastatic prostate ca 


   - currently not on treatment 


Plan: 


Labs and imaging reviewed. Repeat blood cultures negative thus far. Patient on 

Merrem. Will speak to ID regarding recommendations for antibiotics for d/c. Pain

is controlled and patient is tolerating diet. Continue Tegetrol for seizures. I 

spoke with son today regarding discharge plan. 





Case seen, discussed and reviewed with Dr. Rivero. 


LARRY Bo PGY3








<Nathan Rivero S - Last Filed: 10/06/18 14:25>





Objective





- Vital Signs/Intake and Output


Vital Signs (last 24 hours): 


                                        











Temp Pulse Resp BP Pulse Ox


 


 98 F   93 H  18   136/66   97 


 


 10/06/18 08:22  10/06/18 08:22  10/06/18 08:22  10/06/18 08:22  10/06/18 11:13








Intake and Output: 


                                        











 10/06/18 10/06/18





 06:59 18:59


 


Intake Total 180 


 


Balance 180 














- Medications


Medications: 


                               Current Medications





Acetaminophen (Tylenol 325mg Tab)  650 mg PO Q6H PRN


   PRN Reason: Fever >100.4 F


Carbamazepine (Tegretol)  200 mg PO TID [facility]; Protocol


   Last Admin: 10/06/18 09:28 Dose:  200 mg


Fentanyl (Duragesic)  1 patch TD Q72H [facility]


   Last Admin: 10/03/18 13:06 Dose:  1 patch


Meropenem (Merrem Iv 1 Gm Premix)  50 mls @ 100 mls/hr IVPB Q12 [facility]; Protocol


   Last Admin: 10/06/18 09:27 Dose:  100 mls/hr


Polyethylene Glycol (Miralax)  17 gm PO DAILY [facility]


   Last Admin: 10/06/18 09:28 Dose:  17 gm











- Labs


Labs: 


                                        





                                 10/02/18 06:30 





                                 10/02/18 06:30 





                                        











PT  17.8 SECONDS (9.4-12.5)  H  10/01/18  13:10    


 


INR  1.55   10/01/18  13:10    


 


APTT  27.9 Seconds (25.1-36.5)   10/01/18  13:10    














Assessment and Plan





- Assessment and Plan (Free Text)


Plan: 





Pt seen and examined by me. I have reviewed the note by the medical resident. 

The case was discussed and reviewed with the resident. I reviewed the 

medications and labs. PT is on Abx. ID following pt. Tolerating diet. Tegetrol 

for Sz. Plan to D/C home. Pt is awake and alert. Delerium has improved. The pt 

has Prostate Ca but is stable at this point. No active intervention is needed.

## 2018-10-07 LAB
ALBUMIN SERPL-MCNC: 3.1 G/DL (ref 3–4.8)
ALBUMIN/GLOB SERPL: 0.9 {RATIO} (ref 1.1–1.8)
ALT SERPL-CCNC: 155 U/L (ref 7–56)
AST SERPL-CCNC: 145 U/L (ref 17–59)
BUN SERPL-MCNC: 26 MG/DL (ref 7–21)
CALCIUM SERPL-MCNC: 8.6 MG/DL (ref 8.4–10.5)
ERYTHROCYTE [DISTWIDTH] IN BLOOD BY AUTOMATED COUNT: 13.7 % (ref 11.5–14.5)
GFR NON-AFRICAN AMERICAN: > 60
HGB BLD-MCNC: 10.4 G/DL (ref 14–18)
MCH RBC QN AUTO: 30.4 PG (ref 25–35)
MCHC RBC AUTO-ENTMCNC: 31.9 G/DL (ref 31–37)
MCV RBC AUTO: 95.3 FL (ref 80–105)
PLATELET # BLD: 196 10^3/UL (ref 120–450)
PMV BLD AUTO: 9.4 FL (ref 7–11)
RBC # BLD AUTO: 3.42 10^6/UL (ref 3.5–6.1)
WBC # BLD AUTO: 7.6 10^3/UL (ref 4.5–11)

## 2018-10-07 RX ADMIN — MEROPENEM SCH MLS/HR: 1 INJECTION INTRAVENOUS at 22:36

## 2018-10-07 RX ADMIN — MEROPENEM SCH MLS/HR: 1 INJECTION INTRAVENOUS at 09:48

## 2018-10-07 RX ADMIN — POLYETHYLENE GLYCOL 3350 SCH GM: 17 POWDER, FOR SOLUTION ORAL at 09:49

## 2018-10-07 NOTE — CP.PCM.PN
Subjective





- Date & Time of Evaluation


Date of Evaluation: 10/07/18


Time of Evaluation: 12:25





- Subjective


Subjective: 


Comfortable, no fevers, no abdominal pain, no nausea.





Objective





- Vital Signs/Intake and Output


Vital Signs (last 24 hours): 


                                        











Temp Pulse Resp BP Pulse Ox


 


 98.2 F   78   20   130/68   97 


 


 10/07/18 08:44  10/07/18 08:44  10/07/18 08:44  10/07/18 08:44  10/07/18 08:44








Intake and Output: 


                                        











 10/07/18 10/07/18





 06:59 18:59


 


Output Total 500 


 


Balance -500 














- Medications


Medications: 


                               Current Medications





Acetaminophen (Tylenol 325mg Tab)  650 mg PO Q6H PRN


   PRN Reason: Fever >100.4 F


Carbamazepine (Tegretol)  200 mg PO TID [facility]; Protocol


   Last Admin: 10/06/18 17:41 Dose:  Not Given


Fentanyl (Duragesic)  1 patch TD Q72H [facility]


   Last Admin: 10/06/18 15:47 Dose:  1 patch


Meropenem (Merrem Iv 1 Gm Premix)  50 mls @ 100 mls/hr IVPB Q12 [facility]; Protocol


   Last Admin: 10/06/18 22:07 Dose:  100 mls/hr


Polyethylene Glycol (Miralax)  17 gm PO DAILY [facility]


   Last Admin: 10/06/18 09:28 Dose:  17 gm











- Labs


Labs: 


                                        





                                 10/07/18 06:30 





                                 10/07/18 06:30 





                                        











PT  17.8 SECONDS (9.4-12.5)  H  10/01/18  13:10    


 


INR  1.55   10/01/18  13:10    


 


APTT  27.9 Seconds (25.1-36.5)   10/01/18  13:10    














- Constitutional


Appears: No Acute Distress, Chronically Ill





- Head Exam


Head Exam: NORMAL INSPECTION





- Respiratory Exam


Respiratory Exam: Decreased Breath Sounds





- Cardiovascular Exam


Cardiovascular Exam: +S1, +S2





- GI/Abdominal Exam


GI & Abdominal Exam: Soft.  absent: Tenderness





Assessment and Plan





- Assessment and Plan (Free Text)


Plan: 





Assessment


Proteus bacteremia, source unclear, concerned about intra-abdominal infection


history of UTI in this patient with prostate cancer with metastasis


history of Klebsiella urinary tract infection


Mechanical fall, etiology to be determined


prostate cancer on Lupron treatment once a month (hormonal treatment)


seizure disorder


glaucoma





Plan


continue Merrem day 6 to complete 14 days of therapy and follow up repeat blood 

cx


AST, ALT elevated - will get abdominal ultrasound

## 2018-10-07 NOTE — CP.PCM.PN
<Trinity Bo - Last Filed: 10/07/18 09:28>





Subjective





- Date & Time of Evaluation


Date of Evaluation: 10/07/18


Time of Evaluation: 07:00





- Subjective


Subjective: 





Medicine Progress Note for LARRY Lechuga PGY3





Patient seen and examined at bedside. I spoke with nursing staff and they did 

not report any acute overnight events. Patient reports feeling well this 

morning. He denies chest pain, shortness of breath, abdominal pain, 

nausea/vomiting/diarrhea, fever/chills, numbness/tingling. 





Objective





- Vital Signs/Intake and Output


Vital Signs (last 24 hours): 


                                        











Temp Pulse Resp BP Pulse Ox


 


 98.5 F   83   18   135/60   96 


 


 10/06/18 22:00  10/06/18 22:00  10/06/18 22:00  10/06/18 22:00  10/06/18 22:00








Intake and Output: 


                                        











 10/06/18 10/07/18





 18:59 06:59


 


Intake Total 600 


 


Output Total 400 500


 


Balance 200 -500














- Medications


Medications: 


                               Current Medications





Acetaminophen (Tylenol 325mg Tab)  650 mg PO Q6H PRN


   PRN Reason: Fever >100.4 F


Carbamazepine (Tegretol)  200 mg PO TID [facility]; Protocol


   Last Admin: 10/06/18 17:41 Dose:  Not Given


Fentanyl (Duragesic)  1 patch TD Q72H [facility]


   Last Admin: 10/06/18 15:47 Dose:  1 patch


Meropenem (Merrem Iv 1 Gm Premix)  50 mls @ 100 mls/hr IVPB Q12 [facility]; Protocol


   Last Admin: 10/06/18 22:07 Dose:  100 mls/hr


Polyethylene Glycol (Miralax)  17 gm PO DAILY [facility]


   Last Admin: 10/06/18 09:28 Dose:  17 gm











- Labs


Labs: 


                                        





                                 10/02/18 06:30 





                                 10/02/18 06:30 





                                        











PT  17.8 SECONDS (9.4-12.5)  H  10/01/18  13:10    


 


INR  1.55   10/01/18  13:10    


 


APTT  27.9 Seconds (25.1-36.5)   10/01/18  13:10    














- Constitutional


Appears: No Acute Distress





- Head Exam


Head Exam: ATRAUMATIC, NORMAL INSPECTION, NORMOCEPHALIC





- Eye Exam


Eye Exam: Normal appearance, PERRL


Pupil Exam: NORMAL ACCOMODATION





- ENT Exam


ENT Exam: Mucous Membranes Moist





- Respiratory Exam


Respiratory Exam: Clear to Ausculation Bilateral, NORMAL BREATHING PATTERN.  

absent: Rales, Rhonchi, Wheezes





- Cardiovascular Exam


Cardiovascular Exam: REGULAR RHYTHM, +S1, +S2.  absent: Gallop, Rubs, Murmur





- GI/Abdominal Exam


GI & Abdominal Exam: Soft, Tenderness (suprapubic ), Normal Bowel Sounds.  

absent: Rigid, Mass, Rebound





- Extremities Exam


Extremities Exam: Normal Capillary Refill, Normal Inspection.  absent: Calf 

Tenderness, Pedal Edema





- Neurological Exam


Neurological Exam: Alert, Awake, CN II-XII Intact





- Psychiatric Exam


Psychiatric exam: Normal Affect, Normal Mood





- Skin


Skin Exam: Dry, Intact, Normal Color, Warm





Assessment and Plan





- Assessment and Plan (Free Text)


Assessment: 





1. Sepsis - improved


   - secondary to UTI and bacteremia


   - blood cultures + for proteus 


   - Urine culture + for Citrobacter


   - repeat blood cultures negative thus far 


2. Transaminitis 


   - can be secondary to antibiotics 


3. Seizures 


4. Metastatic prostate ca 


   - currently not on treatment 


Plan: 


Labs and imaging reviewed. Patient is on IV Merrem. I spoke with Dr. Mercado who 

recommends patient to complete 14days of antibiotics. This is day #6. Patient 

can be d/c home with Ertapenem tomorrow once PICC line is established. Patient 

has transaminitis. Will monitor. Pain is controlled and he is tolerating diet. 

Continue Carbamazipine for seizures. Spoke with son today about the plan. 





Case seen, discussed and reviewed with Dr. Rivero. 


LARRY Bo PGY3





<Nathan Rivero S - Last Filed: 10/07/18 14:55>





Objective





- Vital Signs/Intake and Output


Vital Signs (last 24 hours): 


                                        











Temp Pulse Resp BP Pulse Ox


 


 98.2 F   78   20   130/68   97 


 


 10/07/18 08:44  10/07/18 08:44  10/07/18 08:44  10/07/18 08:44  10/07/18 10:15








Intake and Output: 


                                        











 10/07/18 10/07/18





 06:59 18:59


 


Output Total 500 


 


Balance -500 














- Medications


Medications: 


                               Current Medications





Acetaminophen (Tylenol 325mg Tab)  650 mg PO Q6H PRN


   PRN Reason: Fever >100.4 F


Carbamazepine (Tegretol)  200 mg PO TID [facility]; Protocol


   Last Admin: 10/07/18 13:17 Dose:  200 mg


Fentanyl (Duragesic)  1 patch TD Q72H [facility]


   Last Admin: 10/06/18 15:47 Dose:  1 patch


Meropenem (Merrem Iv 1 Gm Premix)  50 mls @ 100 mls/hr IVPB Q12 [facility]; Protocol


   Last Admin: 10/07/18 09:48 Dose:  100 mls/hr


Polyethylene Glycol (Miralax)  17 gm PO DAILY [facility]


   Last Admin: 10/07/18 09:49 Dose:  17 gm











- Labs


Labs: 


                                        





                                 10/07/18 06:30 





                                 10/07/18 06:30 





                                        











PT  17.8 SECONDS (9.4-12.5)  H  10/01/18  13:10    


 


INR  1.55   10/01/18  13:10    


 


APTT  27.9 Seconds (25.1-36.5)   10/01/18  13:10    














Assessment and Plan





- Assessment and Plan (Free Text)


Assessment: 





Pt seen and examined by me. I have reviewed the note by the medical resident. 

The case was discussed and reviewed with the resident. I reviewed the 

medications and labs. Pt with sepsis. He is on IV Abx. He has elevated LFTs. 

Will follow. Pain is controlled. He is DNR. He is going to go home once cleared.

He is eating well and denies any pain.

## 2018-10-08 LAB
ALBUMIN SERPL-MCNC: 3.2 G/DL (ref 3–4.8)
ALBUMIN/GLOB SERPL: 0.9 {RATIO} (ref 1.1–1.8)
ALT SERPL-CCNC: 167 U/L (ref 7–56)
AST SERPL-CCNC: 156 U/L (ref 17–59)
BUN SERPL-MCNC: 23 MG/DL (ref 7–21)
CALCIUM SERPL-MCNC: 8.4 MG/DL (ref 8.4–10.5)
GFR NON-AFRICAN AMERICAN: > 60

## 2018-10-08 PROCEDURE — B54MZZA ULTRASONOGRAPHY OF RIGHT UPPER EXTREMITY VEINS, GUIDANCE: ICD-10-PCS | Performed by: INTERNAL MEDICINE

## 2018-10-08 PROCEDURE — 05HY33Z INSERTION OF INFUSION DEVICE INTO UPPER VEIN, PERCUTANEOUS APPROACH: ICD-10-PCS | Performed by: INTERNAL MEDICINE

## 2018-10-08 RX ADMIN — POLYETHYLENE GLYCOL 3350 SCH GM: 17 POWDER, FOR SOLUTION ORAL at 10:07

## 2018-10-08 RX ADMIN — MEROPENEM SCH MLS/HR: 1 INJECTION INTRAVENOUS at 05:51

## 2018-10-08 RX ADMIN — MEROPENEM SCH MLS/HR: 1 INJECTION INTRAVENOUS at 14:07

## 2018-10-08 RX ADMIN — MEROPENEM SCH MLS/HR: 1 INJECTION INTRAVENOUS at 21:21

## 2018-10-08 NOTE — CP.PCM.DIS
<Trinity Bo - Last Filed: 10/08/18 13:22>





Provider





- Provider


Date of Admission: 


10/01/18 15:39





Attending physician: 


Nathan Rivero MD





Primary care physician: 


Sami Thakur MD





Consults: 





ID: [id number] 


Time Spent in preparation of Discharge (in minutes): 35





Hospital Course





- Lab Results


Lab Results: 


                                  Micro Results





10/04/18 18:27   Blood   Blood Culture - Preliminary


                            NO GROWTH AFTER 3 DAYS


10/04/18 18:27   Blood   Blood Culture - Preliminary


                            NO GROWTH AFTER 3 DAYS


10/01/18 13:10   Blood   Blood Culture - Final


                            Proteus Mirabilis


10/01/18 13:10   Blood   Gram Stain - Final


10/01/18 13:10   Blood   Blood Culture - Final


                            Proteus Mirabilis


10/01/18 13:10   Blood   Gram Stain - Final


10/01/18 20:14   Urine,Clean Catch   Urine Culture - Final


                            Citrobacter Diversus





                             Most Recent Lab Values











WBC  7.6 10^3/ul (4.5-11.0)  D 10/07/18  06:30    


 


RBC  3.42 10^6/uL (3.5-6.1)  L  10/07/18  06:30    


 


Hgb  10.4 g/dL (14.0-18.0)  L  10/07/18  06:30    


 


Hct  32.6 % (42.0-52.0)  L  10/07/18  06:30    


 


MCV  95.3 fl (80.0-105.0)   10/07/18  06:30    


 


MCH  30.4 pg (25.0-35.0)   10/07/18  06:30    


 


MCHC  31.9 g/dl (31.0-37.0)   10/07/18  06:30    


 


RDW  13.7 % (11.5-14.5)   10/07/18  06:30    


 


Plt Count  196 10^3/uL (120.0-450.0)   10/07/18  06:30    


 


MPV  9.4 fl (7.0-11.0)   10/07/18  06:30    


 


Gran %  78.4 % (50.0-68.0)  H  10/01/18  13:10    


 


Lymph % (Auto)  20.7 % (22.0-35.0)  L  10/01/18  13:10    


 


Mono % (Auto)  0.8 % (1.0-6.0)  L  10/01/18  13:10    


 


Eos % (Auto)  0.1 % (1.5-5.0)  L  10/01/18  13:10    


 


Baso % (Auto)  0.0 % (0.0-3.0)   10/01/18  13:10    


 


Gran #  5.85  (1.4-6.5)   10/01/18  13:10    


 


Lymph # (Auto)  1.6  (1.2-3.4)   10/01/18  13:10    


 


Mono # (Auto)  0.1  (0.1-0.6)   10/01/18  13:10    


 


Eos # (Auto)  0.0  (0.0-0.7)   10/01/18  13:10    


 


Baso # (Auto)  0.00 K/mm3 (0.0-2.0)   10/01/18  13:10    


 


PT  17.8 SECONDS (9.4-12.5)  H  10/01/18  13:10    


 


INR  1.55   10/01/18  13:10    


 


APTT  27.9 Seconds (25.1-36.5)   10/01/18  13:10    


 


pO2  158 mm/Hg (30-55)  H  10/01/18  13:10    


 


VBG pH  7.52  (7.32-7.43)  H  10/01/18  13:10    


 


VBG pCO2  22.0  (40-60)  L  10/01/18  13:10    


 


VBG HCO3  18.0 mmol/l (21-28)  L  10/01/18  13:10    


 


VBG Total CO2  18.7 mmol.L (22-28)  L  10/01/18  13:10    


 


VBG O2 Sat (Calc)  97.3 % (40-65)  H  10/01/18  13:10    


 


VBG Base Excess  -2.9 mmol/L (0.0-2.0)  L  10/01/18  13:10    


 


VBG Potassium  4.7 mmol/L (3.6-5.2)   10/01/18  13:10    


 


Sodium  125.0 mmol/L (132-148)  L  10/01/18  13:10    


 


Chloride  96.0 mmol/L ()  L  10/01/18  13:10    


 


Glucose  108 mg/dl ()   10/01/18  13:10    


 


Lactate  1.8 mmol/L (0.7-2.1)   10/01/18  13:10    


 


FiO2  21.0 %  10/01/18  13:10    


 


Sodium  139 mmol/L (132-148)   10/08/18  07:30    


 


Potassium  4.0 mmol/L (3.6-5.0)   10/08/18  07:30    


 


Chloride  106 mmol/L ()   10/08/18  07:30    


 


Carbon Dioxide  26 mmol/L (21-33)   10/08/18  07:30    


 


Anion Gap  11  (10-20)   10/08/18  07:30    


 


BUN  23 mg/dL (7-21)  H  10/08/18  07:30    


 


Creatinine  0.5 mg/dl (0.8-1.5)  L  10/08/18  07:30    


 


Est GFR ( Amer)  > 60   10/08/18  07:30    


 


Est GFR (Non-Af Amer)  > 60   10/08/18  07:30    


 


POC Glucose (mg/dL)  104 mg/dL ()   10/01/18  13:01    


 


Random Glucose  87 mg/dL ()   10/08/18  07:30    


 


Calcium  8.4 mg/dL (8.4-10.5)   10/08/18  07:30    


 


Phosphorus  6.4 mg/dL (2.5-4.5)  H  10/02/18  06:30    


 


Magnesium  2.1 mg/dL (1.7-2.2)   10/02/18  06:30    


 


Total Bilirubin  0.3 mg/dL (0.2-1.3)   10/08/18  07:30    


 


Direct Bilirubin  0.6 mg/dL (0.0-0.4)  H  10/01/18  13:10    


 


AST  156 U/L (17-59)  H  10/08/18  07:30    


 


ALT  167 U/L (7-56)  H  10/08/18  07:30    


 


Alkaline Phosphatase  138 U/L ()  H  10/08/18  07:30    


 


Lactate Dehydrogenase  606 U/L (333-699)   10/01/18  13:10    


 


Total Creatine Kinase  109 U/L ()   10/01/18  13:10    


 


Troponin I  0.06 ng/mL D 10/01/18  13:10    


 


NT-Pro-B Natriuret Pep  4640 pg/mL (0-450)  H  10/01/18  13:10    


 


Total Protein  6.9 g/dL (5.8-8.3)   10/08/18  07:30    


 


Albumin  3.2 g/dL (3.0-4.8)   10/08/18  07:30    


 


Globulin  3.6 gm/dL  10/08/18  07:30    


 


Albumin/Globulin Ratio  0.9  (1.1-1.8)  L  10/08/18  07:30    


 


Lipase  31 U/L ()   10/01/18  13:10    


 


Venous Blood Potassium  4.7 mmol/L (3.6-5.2)   10/01/18  13:10    


 


Urine Color  Dark yellow  (YELLOW)   10/01/18  13:50    


 


Urine Appearance  Sl cloudy  (CLEAR)   10/01/18  13:50    


 


Urine pH  8.5  (4.7-8.0)   10/01/18  13:50    


 


Ur Specific Gravity  1.015  (1.005-1.035)   10/01/18  13:50    


 


Urine Protein  100 mg/dL (<30 mg/dL)  H  10/01/18  13:50    


 


Urine Glucose (UA)  Negative mg/dL (NEGATIVE)   10/01/18  13:50    


 


Urine Ketones  Negative mg/dL (NEGATIVE)   10/01/18  13:50    


 


Urine Blood  Large  (NEGATIVE)  H  10/01/18  13:50    


 


Urine Nitrate  Positive  (NEGATIVE)  H  10/01/18  13:50    


 


Urine Bilirubin  Negative  (NEGATIVE)   10/01/18  13:50    


 


Urine Urobilinogen  0.2 E.U./dL (<1 E.U./dL)   10/01/18  13:50    


 


Ur Leukocyte Esterase  Large Martita/uL (NEGATIVE)  H  10/01/18  13:50    


 


Urine RBC  Tntc /hpf (0-2)   10/01/18  13:50    


 


Urine WBC  Tntc /hpf (0-6)   10/01/18  13:50    


 


Urine Bacteria  Large  (NEG)   10/01/18  13:50    


 


Influenza Typ A,B (EIA)  Negative for flu a/b  (NEGATIVE)   10/01/18  15:30    














- Hospital Course


Hospital Course: 





This is an 87yo male with past medical history metastatic prostate cancer s/p 

ADT and palliative radiate, seizures, glaucoma, dementia, UTI w/ indwelling 

tolentino (+ for E.coli and Enterococcus in the past) who came for lethargy and AMS.

Patient was found to have sepsis with positive urine culture for citrobacter and

blood culture for proteus. CT A/P was done which did not show acute abnormality.

Patient was place on IV Merrem. ID was consulted. AMS resolved. Patient's home 

medication was continued. Patient completed 7 out of 14 days of IV antibiotics. 

He had midline placed. He has a 24hr health aid. He will be d.c home with IV 

Ertapenem once daily for 7 days. Patient was found to have mild transaminitis. 

He had abdominal ultrasound done which showed increased hepatic echogenicity, 

but no acute disease in preliminary read. Will follow up final read. Patient was

given script to have repeat blood work done in one week after antibiotics are 

completed. I spoke with son who verbalized and agreed with discharge plan. 








- Date & Time of H&P


Date of H&P: 10/02/18


Time of H&P: 06:30





Discharge Exam





- Head Exam


Head Exam: NORMAL INSPECTION, NORMOCEPHALIC





- Eye Exam


Eye Exam: Normal appearance, PERRL


Pupil Exam: NORMAL ACCOMODATION, PERRL





- ENT Exam


ENT Exam: Mucous Membranes Moist





- Neck Exam


Neck exam: Full Rom





- Respiratory Exam


Respiratory Exam: Clear to PA & Lateral, NORMAL BREATHING PATTERN, UNREMARKABLE.

 absent: Rales, Rhonchi, Wheezes, Respiratory Distress





- Cardiovascular Exam


Cardiovascular Exam: REGULAR RHYTHM, +S1, +S2.  absent: Gallop, Rubs, Systolic 

Murmur





- GI/Abdominal Exam


GI & Abdominal Exam: Normal Bowel Sounds, Soft, Tenderness (mild LLQ/RLQ), 

Unremarkable.  absent: Mass, Rebound, Rigid





- Extremities Exam


Extremities exam: normal inspection





- Neurological Exam


Neurological exam: Alert, CN II-XII Intact





- Skin


Skin Exam: Dry, Intact, Normal Color, Warm





Discharge Plan





- Follow Up Plan


Condition: GUARDED


Disposition: HOME/ ROUTINE


Instructions:  Urinary Tract Infections in Adults, Sepsis in Adults, How to Care

for a Central Line Catheter, Influenza Virus Vaccine (Inactivated), Pneumococcal

Polysaccharide Vaccine (23-Valent)


Additional Instructions: 


1. Continue home medications





2. Complete IV antibiotics for  1 week 





3. Repeat liver enzyme test after antibiotics. 


Referrals: 


Sami Thakur MD [Primary Care Provider] - 





<Nathan Rivero - Last Filed: 10/09/18 18:24>





Provider





- Provider


Date of Admission: 


10/01/18 15:39





Attending physician: 


Nathan Rivero MD





Primary care physician: 


Sami Thakur MD








Hospital Course





- Lab Results


Lab Results: 


                                  Micro Results





10/04/18 18:27   Blood   Blood Culture - Preliminary


                            NO GROWTH AFTER 4 DAYS


10/04/18 18:27   Blood   Blood Culture - Preliminary


                            NO GROWTH AFTER 4 DAYS


10/01/18 13:10   Blood   Blood Culture - Final


                            Proteus Mirabilis


10/01/18 13:10   Blood   Gram Stain - Final


10/01/18 13:10   Blood   Blood Culture - Final


                            Proteus Mirabilis


10/01/18 13:10   Blood   Gram Stain - Final


10/01/18 20:14   Urine,Clean Catch   Urine Culture - Final


                            Citrobacter Diversus





                             Most Recent Lab Values











WBC  7.6 10^3/ul (4.5-11.0)  D 10/07/18  06:30    


 


RBC  3.42 10^6/uL (3.5-6.1)  L  10/07/18  06:30    


 


Hgb  10.4 g/dL (14.0-18.0)  L  10/07/18  06:30    


 


Hct  32.6 % (42.0-52.0)  L  10/07/18  06:30    


 


MCV  95.3 fl (80.0-105.0)   10/07/18  06:30    


 


MCH  30.4 pg (25.0-35.0)   10/07/18  06:30    


 


MCHC  31.9 g/dl (31.0-37.0)   10/07/18  06:30    


 


RDW  13.7 % (11.5-14.5)   10/07/18  06:30    


 


Plt Count  196 10^3/uL (120.0-450.0)   10/07/18  06:30    


 


MPV  9.4 fl (7.0-11.0)   10/07/18  06:30    


 


Gran %  78.4 % (50.0-68.0)  H  10/01/18  13:10    


 


Lymph % (Auto)  20.7 % (22.0-35.0)  L  10/01/18  13:10    


 


Mono % (Auto)  0.8 % (1.0-6.0)  L  10/01/18  13:10    


 


Eos % (Auto)  0.1 % (1.5-5.0)  L  10/01/18  13:10    


 


Baso % (Auto)  0.0 % (0.0-3.0)   10/01/18  13:10    


 


Gran #  5.85  (1.4-6.5)   10/01/18  13:10    


 


Lymph # (Auto)  1.6  (1.2-3.4)   10/01/18  13:10    


 


Mono # (Auto)  0.1  (0.1-0.6)   10/01/18  13:10    


 


Eos # (Auto)  0.0  (0.0-0.7)   10/01/18  13:10    


 


Baso # (Auto)  0.00 K/mm3 (0.0-2.0)   10/01/18  13:10    


 


PT  17.8 SECONDS (9.4-12.5)  H  10/01/18  13:10    


 


INR  1.55   10/01/18  13:10    


 


APTT  27.9 Seconds (25.1-36.5)   10/01/18  13:10    


 


pO2  158 mm/Hg (30-55)  H  10/01/18  13:10    


 


VBG pH  7.52  (7.32-7.43)  H  10/01/18  13:10    


 


VBG pCO2  22.0  (40-60)  L  10/01/18  13:10    


 


VBG HCO3  18.0 mmol/l (21-28)  L  10/01/18  13:10    


 


VBG Total CO2  18.7 mmol.L (22-28)  L  10/01/18  13:10    


 


VBG O2 Sat (Calc)  97.3 % (40-65)  H  10/01/18  13:10    


 


VBG Base Excess  -2.9 mmol/L (0.0-2.0)  L  10/01/18  13:10    


 


VBG Potassium  4.7 mmol/L (3.6-5.2)   10/01/18  13:10    


 


Sodium  125.0 mmol/L (132-148)  L  10/01/18  13:10    


 


Chloride  96.0 mmol/L ()  L  10/01/18  13:10    


 


Glucose  108 mg/dl ()   10/01/18  13:10    


 


Lactate  1.8 mmol/L (0.7-2.1)   10/01/18  13:10    


 


FiO2  21.0 %  10/01/18  13:10    


 


Sodium  139 mmol/L (132-148)   10/08/18  07:30    


 


Potassium  4.0 mmol/L (3.6-5.0)   10/08/18  07:30    


 


Chloride  106 mmol/L ()   10/08/18  07:30    


 


Carbon Dioxide  26 mmol/L (21-33)   10/08/18  07:30    


 


Anion Gap  11  (10-20)   10/08/18  07:30    


 


BUN  23 mg/dL (7-21)  H  10/08/18  07:30    


 


Creatinine  0.5 mg/dl (0.8-1.5)  L  10/08/18  07:30    


 


Est GFR ( Amer)  > 60   10/08/18  07:30    


 


Est GFR (Non-Af Amer)  > 60   10/08/18  07:30    


 


POC Glucose (mg/dL)  104 mg/dL ()   10/01/18  13:01    


 


Random Glucose  87 mg/dL ()   10/08/18  07:30    


 


Calcium  8.4 mg/dL (8.4-10.5)   10/08/18  07:30    


 


Phosphorus  6.4 mg/dL (2.5-4.5)  H  10/02/18  06:30    


 


Magnesium  2.1 mg/dL (1.7-2.2)   10/02/18  06:30    


 


Total Bilirubin  0.3 mg/dL (0.2-1.3)   10/08/18  07:30    


 


Direct Bilirubin  0.6 mg/dL (0.0-0.4)  H  10/01/18  13:10    


 


AST  156 U/L (17-59)  H  10/08/18  07:30    


 


ALT  167 U/L (7-56)  H  10/08/18  07:30    


 


Alkaline Phosphatase  138 U/L ()  H  10/08/18  07:30    


 


Lactate Dehydrogenase  606 U/L (333-699)   10/01/18  13:10    


 


Total Creatine Kinase  109 U/L ()   10/01/18  13:10    


 


Troponin I  0.06 ng/mL D 10/01/18  13:10    


 


NT-Pro-B Natriuret Pep  4640 pg/mL (0-450)  H  10/01/18  13:10    


 


Total Protein  6.9 g/dL (5.8-8.3)   10/08/18  07:30    


 


Albumin  3.2 g/dL (3.0-4.8)   10/08/18  07:30    


 


Globulin  3.6 gm/dL  10/08/18  07:30    


 


Albumin/Globulin Ratio  0.9  (1.1-1.8)  L  10/08/18  07:30    


 


Lipase  31 U/L ()   10/01/18  13:10    


 


Venous Blood Potassium  4.7 mmol/L (3.6-5.2)   10/01/18  13:10    


 


Urine Color  Dark yellow  (YELLOW)   10/01/18  13:50    


 


Urine Appearance  Sl cloudy  (CLEAR)   10/01/18  13:50    


 


Urine pH  8.5  (4.7-8.0)   10/01/18  13:50    


 


Ur Specific Gravity  1.015  (1.005-1.035)   10/01/18  13:50    


 


Urine Protein  100 mg/dL (<30 mg/dL)  H  10/01/18  13:50    


 


Urine Glucose (UA)  Negative mg/dL (NEGATIVE)   10/01/18  13:50    


 


Urine Ketones  Negative mg/dL (NEGATIVE)   10/01/18  13:50    


 


Urine Blood  Large  (NEGATIVE)  H  10/01/18  13:50    


 


Urine Nitrate  Positive  (NEGATIVE)  H  10/01/18  13:50    


 


Urine Bilirubin  Negative  (NEGATIVE)   10/01/18  13:50    


 


Urine Urobilinogen  0.2 E.U./dL (<1 E.U./dL)   10/01/18  13:50    


 


Ur Leukocyte Esterase  Large Martita/uL (NEGATIVE)  H  10/01/18  13:50    


 


Urine RBC  Tntc /hpf (0-2)   10/01/18  13:50    


 


Urine WBC  Tntc /hpf (0-6)   10/01/18  13:50    


 


Urine Bacteria  Large  (NEG)   10/01/18  13:50    


 


Influenza Typ A,B (EIA)  Negative for flu a/b  (NEGATIVE)   10/01/18  15:30    














- Hospital Course


Hospital Course: 





Pt seen and examined by me. This is a late entry. I have reviewed the note by 

the medical resident. The case was discussed and reviewed with the resident. I r

eviewed the medications and labs. Pt with sepsis that has improved. Pt will 

continue with IV Abx. He has a midline. He will get his Abx at homes. His US of 

the liver was done and reviewed. He has prostate cancer and no further treatment

will be done.

## 2018-10-08 NOTE — US
Date of service: 



10/08/2018



HISTORY:

liver enzymes elevation



COMPARISON:

None.



TECHNIQUE:

Sonographic evaluation of the abdomen.



FINDINGS:



LIVER:

Measures 19.2 cm.  Diffusely increased echogenicity of the liver 

parenchyma. Consistent with fatty infiltration.  Smooth contour.  No 

mass.  No biliary dilatation.  Normal hepatopetal portal venous flow. 



GALLBLADDER:

Unremarkable. No gallstones.



COMMON BILE DUCT:

Measures  mm. 5



PANCREAS:

Unremarkable as visualized. No mass. No ductal dilatation.



RIGHT KIDNEY:

Measures 10.4cm.  Normal echogenicity. No calculus, mass, or 

hydronephrosis.



LEFT KIDNEY:

Measures 11.6cm.  Normal echogenicity. No calculus, mass, or 

hydronephrosis.



SPLEEN:

Normal in size and contour. No mass.



AORTA:

No aneurysmal dilatation. 



IVC:

Unremarkable. 



OTHER FINDINGS:

None. 



IMPRESSION:

Mild hepatomegaly with diffuse fatty infiltration of the liver.  No 

evidence of biliary obstruction.  Otherwise unremarkable examination.

## 2018-10-08 NOTE — CP.PCM.PN
Subjective





- Date & Time of Evaluation


Date of Evaluation: 10/08/18


Time of Evaluation: 11:50





- Subjective


Subjective: 





Afebrile, no fevers, not in distress.





Objective





- Vital Signs/Intake and Output


Vital Signs (last 24 hours): 


                                        











Temp Pulse Resp BP Pulse Ox


 


 98.2 F   83   16   120/44 L  96 


 


 10/08/18 14:00  10/08/18 14:00  10/08/18 14:00  10/08/18 14:00  10/08/18 14:00








Intake and Output: 


                                        











 10/08/18 10/09/18





 18:59 06:59


 


Intake Total 420 


 


Balance 420 














- Medications


Medications: 


                               Current Medications





Acetaminophen (Tylenol 325mg Tab)  650 mg PO Q6H PRN


   PRN Reason: Fever >100.4 F


Carbamazepine (Tegretol)  200 mg PO TID RICHAR; Protocol


   Last Admin: 10/08/18 18:04 Dose:  200 mg


Fentanyl (Duragesic)  1 patch TD Q72H RICHAR


   Last Admin: 10/06/18 15:47 Dose:  1 patch


Meropenem (Merrem Iv 1 Gm Premix)  1 gm in 50 mls @ 100 mls/hr IVPB Q8 RICHAR; 

Protocol


   Last Admin: 10/08/18 21:21 Dose:  100 mls/hr











- Labs


Labs: 


                                        





                                 10/07/18 06:30 





                                 10/08/18 07:30 





                                        











PT  17.8 SECONDS (9.4-12.5)  H  10/01/18  13:10    


 


INR  1.55   10/01/18  13:10    


 


APTT  27.9 Seconds (25.1-36.5)   10/01/18  13:10    














- Constitutional


Appears: Chronically Ill





- Head Exam


Head Exam: NORMAL INSPECTION





- Respiratory Exam


Respiratory Exam: Decreased Breath Sounds





- Cardiovascular Exam


Cardiovascular Exam: +S1, +S2





- GI/Abdominal Exam


GI & Abdominal Exam: Soft.  absent: Tenderness





Assessment and Plan





- Assessment and Plan (Free Text)


Plan: 





Assessment


Proteus bacteremia, source unclear, concerned about intra-abdominal infection


history of UTI in this patient with prostate cancer with metastasis


history of Klebsiella urinary tract infection


Mechanical fall, etiology to be determined


prostate cancer on Lupron treatment once a month (hormonal treatment)


seizure disorder


glaucoma





Plan


continue Merrem day 7 to complete 14 days of therapy; repeat blood cx are 

negative


AST, ALT elevated - abdominal ultrasound is showing fatty liver and that is 

probably the reason for the mild AST, ALT elevation

## 2018-10-09 VITALS — DIASTOLIC BLOOD PRESSURE: 50 MMHG | HEART RATE: 94 BPM | OXYGEN SATURATION: 97 % | SYSTOLIC BLOOD PRESSURE: 112 MMHG

## 2018-10-09 VITALS — TEMPERATURE: 97.8 F | RESPIRATION RATE: 18 BRPM

## 2018-10-09 RX ADMIN — MEROPENEM SCH MLS/HR: 1 INJECTION INTRAVENOUS at 05:52

## 2018-10-09 RX ADMIN — MEROPENEM SCH MLS/HR: 1 INJECTION INTRAVENOUS at 14:00

## 2018-10-09 NOTE — CP.PCM.PN
Subjective





- Date & Time of Evaluation


Date of Evaluation: 10/09/18


Time of Evaluation: 12:30





- Subjective


Subjective: 





No fevers, not in distress.





Objective





- Vital Signs/Intake and Output


Vital Signs (last 24 hours): 


                                        











Temp Pulse Resp BP Pulse Ox


 


 97.8 F   94 H  18   112/50 L  97 


 


 10/09/18 14:00  10/09/18 14:00  10/09/18 14:00  10/09/18 14:00  10/09/18 14:00











- Labs


Labs: 


                                        





                                 10/07/18 06:30 





                                 10/08/18 07:30 





                                        











PT  17.8 SECONDS (9.4-12.5)  H  10/01/18  13:10    


 


INR  1.55   10/01/18  13:10    


 


APTT  27.9 Seconds (25.1-36.5)   10/01/18  13:10    














- Constitutional


Appears: Chronically Ill





- Head Exam


Head Exam: NORMAL INSPECTION





- Respiratory Exam


Respiratory Exam: Decreased Breath Sounds





- Cardiovascular Exam


Cardiovascular Exam: +S1, +S2





- GI/Abdominal Exam


GI & Abdominal Exam: Soft.  absent: Tenderness





Assessment and Plan





- Assessment and Plan (Free Text)


Plan: 





Assessment


Proteus bacteremia, source unclear, concerned about intra-abdominal infection


history of UTI in this patient with prostate cancer with metastasis


history of Klebsiella urinary tract infection


Mechanical fall, etiology to be determined


prostate cancer on Lupron treatment once a month (hormonal treatment)


seizure disorder


glaucoma





Plan


continue Merrem day 8 to complete 14 days of therapy; repeat blood cx are 

negative


AST, ALT elevated - abdominal ultrasound is showing fatty liver and that is 

probably the reason for the mild AST, ALT elevation

## 2018-10-09 NOTE — CP.PCM.PN
<Trinity Bo - Last Filed: 10/09/18 08:47>





Subjective





- Date & Time of Evaluation


Date of Evaluation: 10/09/18


Time of Evaluation: 07:00





- Subjective


Subjective: 





Medicine Progress Note for Sherice Lechuga PGY3





Patient seen and examined at bedside. There were no acute overnight events as p

er nursing staff. Patient states he had loose stools yesterday which have 

resolved. Patient denies abdominal pain, chest pain, shortness of breath, 

nausea/vomiting/diarrhea, fever/chills, numbness/tingling. 





Objective





- Vital Signs/Intake and Output


Vital Signs (last 24 hours): 


                                        











Temp Pulse Resp BP Pulse Ox


 


 98 F   81   20   176/102 H  95 


 


 10/08/18 22:38  10/08/18 22:38  10/08/18 22:38  10/08/18 22:38  10/08/18 22:38








Intake and Output: 


                                        











 10/08/18 10/09/18





 18:59 06:59


 


Intake Total 420 180


 


Output Total  1900


 


Balance 420 -1720














- Medications


Medications: 


                               Current Medications





Acetaminophen (Tylenol 325mg Tab)  650 mg PO Q6H PRN


   PRN Reason: Fever >100.4 F


Carbamazepine (Tegretol)  200 mg PO TID RICHAR; Protocol


   Last Admin: 10/08/18 18:04 Dose:  200 mg


Fentanyl (Duragesic)  1 patch TD Q72H RICHAR


   Last Admin: 10/06/18 15:47 Dose:  1 patch


Meropenem (Merrem Iv 1 Gm Premix)  1 gm in 50 mls @ 100 mls/hr IVPB Q8 RICHAR; 

Protocol


   Last Admin: 10/09/18 05:52 Dose:  100 mls/hr











- Labs


Labs: 


                                        





                                 10/07/18 06:30 





                                 10/08/18 07:30 





                                        











PT  17.8 SECONDS (9.4-12.5)  H  10/01/18  13:10    


 


INR  1.55   10/01/18  13:10    


 


APTT  27.9 Seconds (25.1-36.5)   10/01/18  13:10    














- Constitutional


Appears: No Acute Distress





- Head Exam


Head Exam: ATRAUMATIC, NORMAL INSPECTION, NORMOCEPHALIC





- Eye Exam


Eye Exam: EOMI, Normal appearance, PERRL


Pupil Exam: NORMAL ACCOMODATION, PERRL





- ENT Exam


ENT Exam: Mucous Membranes Moist





- Neck Exam


Neck Exam: Full ROM, Normal Inspection.  absent: Tenderness





- Respiratory Exam


Respiratory Exam: Clear to Ausculation Bilateral, NORMAL BREATHING PATTERN.  

absent: Rales, Rhonchi, Wheezes





- Cardiovascular Exam


Cardiovascular Exam: REGULAR RHYTHM, +S1, +S2.  absent: Gallop, Rubs, Murmur





- GI/Abdominal Exam


GI & Abdominal Exam: Soft, Normal Bowel Sounds.  absent: Rigid, Tenderness, 

Mass, Rebound





- Extremities Exam


Extremities Exam: Normal Inspection.  absent: Calf Tenderness, Pedal Edema





- Neurological Exam


Neurological Exam: Alert, Awake, CN II-XII Intact





- Psychiatric Exam


Psychiatric exam: Normal Affect, Normal Mood





- Skin


Skin Exam: Dry, Normal Color, Warm





Assessment and Plan





- Assessment and Plan (Free Text)


Assessment: 


1. Sepsis - improved


   - secondary to UTI and bacteremia


   - blood cultures + for proteus 


   - Urine culture + for Citrobacter


   - repeat blood cultures negative thus far 


2. Transaminitis 


   - can be secondary to antibiotics 


3. Seizures 


4. Metastatic prostate ca 


   - currently not on treatment 


Plan: 


Labs and imaging reviewed. Patient was supposed to be d/c yesterday home with IV

ertapenem once daily. Awaiting insurance approval. Once approved patient can be 

d/c home. Abdominal US was done which did not show any acute abnormalities. Pain

is controlled and patient is tolerating diet. Will continue Tegetrol and Merrem.

Patient has Midline in place. I spoke with son to update him on the plan. 





Case seen, discussed and reviewed with Dr. Rivero. 


LARRY Bo PGY3





<Nathan Rivero S - Last Filed: 10/09/18 17:52>





Objective





- Vital Signs/Intake and Output


Vital Signs (last 24 hours): 


                                        











Temp Pulse Resp BP Pulse Ox


 


 97.8 F   94 H  18   112/50 L  97 


 


 10/09/18 14:00  10/09/18 14:00  10/09/18 14:00  10/09/18 14:00  10/09/18 14:00








Intake and Output: 


                                        











 10/09/18 10/09/18





 06:59 18:59


 


Intake Total 180 


 


Output Total 1900 


 


Balance -1720 














- Labs


Labs: 


                                        





                                 10/07/18 06:30 





                                 10/08/18 07:30 





                                        











PT  17.8 SECONDS (9.4-12.5)  H  10/01/18  13:10    


 


INR  1.55   10/01/18  13:10    


 


APTT  27.9 Seconds (25.1-36.5)   10/01/18  13:10    














Assessment and Plan





- Assessment and Plan (Free Text)


Plan: 





Pt seen and examined by me. I have reviewed the note by the medical resident. 

The case was discussed and reviewed with the resident. I reviewed the 

medications and labs. Pt with sepsis and is going to continue with Ertapenem. He

is eating well. He denies any pain. He has been able to sleep. He has a midline 

for IV Abx. Plan is for D/C and finish outpt Abx.

## 2018-12-19 ENCOUNTER — HOSPITAL ENCOUNTER (EMERGENCY)
Dept: HOSPITAL 42 - ED | Age: 83
Discharge: HOME | End: 2018-12-19
Payer: MEDICARE

## 2018-12-19 VITALS
HEART RATE: 91 BPM | OXYGEN SATURATION: 100 % | SYSTOLIC BLOOD PRESSURE: 123 MMHG | DIASTOLIC BLOOD PRESSURE: 60 MMHG | TEMPERATURE: 98.2 F

## 2018-12-19 VITALS — RESPIRATION RATE: 18 BRPM

## 2018-12-19 VITALS — BODY MASS INDEX: 24.7 KG/M2

## 2018-12-19 DIAGNOSIS — Z87.891: ICD-10-CM

## 2018-12-19 DIAGNOSIS — Z85.46: ICD-10-CM

## 2018-12-19 DIAGNOSIS — N39.0: Primary | ICD-10-CM

## 2018-12-19 DIAGNOSIS — T83.9XXA: ICD-10-CM

## 2018-12-19 LAB
APPEARANCE UR: (no result)
BACTERIA #/AREA URNS HPF: (no result) /HPF
BILIRUB UR-MCNC: NEGATIVE MG/DL
COLOR UR: YELLOW
EPI CELLS #/AREA URNS HPF: (no result) /HPF (ref 0–5)
GLUCOSE UR STRIP-MCNC: NEGATIVE MG/DL
LEUKOCYTE ESTERASE UR-ACNC: (no result) LEU/UL
PH UR STRIP: >=9 [PH] (ref 4.7–8)
PROT UR STRIP-MCNC: 30 MG/DL
RBC # UR STRIP: (no result) /UL
RBC #/AREA URNS HPF: (no result) /HPF (ref 0–2)
SP GR UR STRIP: 1.01 (ref 1–1.03)
UROBILINOGEN UR STRIP-ACNC: 0.2 E.U./DL
WBC #/AREA URNS HPF: (no result) /HPF (ref 0–6)

## 2018-12-19 NOTE — ED PDOC
Arrival/HPI





- General


Chief Complaint: Male Genitourinary


Time Seen by Provider: 12/19/18 02:55


Historian: Patient, Family





- History of Present Illness


Narrative History of Present Illness (Text): 


12/19/18 03:03


Javier Kingston is an  88 year old male, whose past medical history in 

metastatic prostate cancer s/p ADT and palliative radiations, seizures, 

dementia, recurrent UTIs with inswelling folwy and glaucoma, who presents to the

Emergency department brought in by EMS accompanied by son complaining of urinary

retention. As per son, patient was fine earlier this evening as emptied his 

tolentino bag at 21:00. Son states at 22:00 patient began complaining of lower 

abdominal pain and noted patient's tolentino was not draining urine. Patient denies 

any fever, chills, nausea, vomiting, diarrhea, back pain, neck pain, headache, 

dizziness, or any other complaints.


Symptom Onset: Gradual


Symptom Course: Unchanged


Activities at Onset: Light


Context: Home





Past Medical History





- Provider Review


Nursing Documentation Reviewed: Yes





- Infectious Disease


Hx of Infectious Diseases: None





- Cardiac


Hx Pacemaker: No





- Pulmonary


Hx Respiratory Disorders: Yes (H/O SMOKING CIGARETTES)


Other/Comment: EMPYEMA/SARCOIDOSIS





- Neurological


Hx Dizziness: Yes (syncope)


Hx Seizures: Yes (last seizure 20 yrs ago)





- HEENT


Hx Cataracts: Yes (with bilateral sx)





- Renal


Hx Renal Disorder: Yes


Other/Comment: chronic tolentino-Prostate ca.





- Endocrine/Metabolic


Hx Endocrine Disorders: No





- Hematological/Oncological


Hx Cancer: Yes (prostate)





- Integumentary


Hx Dermatological Disorder: Yes


Other/Comment: 2-5-18 bilateral le edema +2 more to right .Pitting with dry thin

scaly flakes.





- Musculoskeletal/Rheumatological


Hx Falls: Yes





- Gastrointestinal


Hx Gastrointestinal Disorders: Yes (constipation)





- Genitourinary/Gynecological


Hx Urinary Tract Infection: Yes





- Psychiatric


Hx Psychophysiologic Disorder: Yes


Hx Substance Use: No





- Surgical History


Hx Mastectomy: No





- Anesthesia


Hx Anesthesia: No





Family/Social History





- Physician Review


Nursing Documentation Reviewed: Yes


Family/Social History: Unknown Family HX


Smoking Status: Former Smoker


Hx Alcohol Use: Yes (occasional)


Hx Substance Use: No





Allergies/Home Meds


Allergies/Adverse Reactions: 


Allergies





No Known Allergies Allergy (Verified 10/01/18 21:18)


   











Review of Systems





- Physician Review


All systems were reviewed & negative as marked: Yes





- Review of Systems


Constitutional: absent: Fevers


Respiratory: absent: SOB


Cardiovascular: absent: Chest Pain


Gastrointestinal: Abdominal Pain.  absent: Diarrhea, Nausea, Vomiting


Genitourinary Male: Urinary Output Changes (+urinary retention)





Physical Exam


Vital Signs Reviewed: Yes


Temperature: Afebrile


Blood Pressure: Normal


Pulse: Regular


Respiratory Rate: Normal


Appearance: Positive for: Well-Appearing, Non-Toxic, Comfortable


Pain Distress: None


Mental Status: Positive for: Alert and Oriented X 3





- Systems Exam


Head: Present: Atraumatic, Normocephalic


Pupils: Present: PERRL


Extroacular Muscles: Present: EOMI


Conjunctiva: Present: Normal


Mouth: Present: Moist Mucous Membranes


Neck: Present: Normal Range of Motion


Respiratory/Chest: Present: Clear to Auscultation, Good Air Exchange.  No: 

Respiratory Distress, Accessory Muscle Use


Cardiovascular: Present: Regular Rate and Rhythm, Normal S1, S2.  No: Murmurs


Abdomen: Present: Tenderness (Mild lower abdominal tenderness).  No: Distention,

Peritoneal Signs


Back: Present: Normal Inspection


Upper Extremity: Present: Normal Inspection.  No: Cyanosis, Edema


Lower Extremity: Present: Normal Inspection.  No: Edema


Neurological: Present: GCS=15, CN II-XII Intact, Speech Normal


Skin: Present: Warm, Dry, Normal Color.  No: Rashes


Psychiatric: Present: Alert, Oriented x 3, Normal Insight, Normal Concentration





Medical Decision Making


ED Course and Treatment: 


12/19/18 03:03


Impression:


88 year old male complaining of urinary retention with lower abdominal pain.





Plan:


-- Tolentino catheter change


-- Urinalysis, urine cultures


-- Reassess and disposition





Prior Visits:


Notes and results from previous visits were reviewed.





Progress Notes:


Tolentino catheter removed and replaced by RN without difficulty. Pt tolerated 

procedure well. Tolentino draining >900cc of urine.





12/19/18 05:14


On re-evaluation, patient feels better and is in no acute distress. I have 

discussed the results and plan with the patient and family, who express 

understanding. Family in agreement with plan to be discharged home. Patient is 

stable for discharge. Patient and family were instructed to follow up with 

physician or return if symptoms worsen or new concerning symptoms arise.








- Lab Interpretations


I have reviewed the lab results: Yes





- Scribe Statement


The provider has reviewed the documentation as recorded by the Scribe


Erica Sparkszon





Provider Scribe Attestation:


All medical record entries made by the Scribe were at my direction and 

personally dictated by me. I have reviewed the chart and agree that the record 

accurately reflects my personal performance of the history, physical exam, 

medical decision making, and the department course for this patient. I have also

personally directed, reviewed, and agree with the discharge instructions and 

disposition.








Disposition/Present on Arrival





- Present on Arrival


Any Indicators Present on Arrival: No


History of DVT/PE: No


History of Uncontrolled Diabetes: No


Urinary Catheter: No


History of Decub. Ulcer: No


History Surgical Site Infection Following: None





- Disposition


Have Diagnosis and Disposition been Completed?: Yes


Diagnosis: 


 Urinary tract infection, Tolentino catheter problem





Disposition: HOME/ ROUTINE


Disposition Time: 05:15


Condition: STABLE


Discharge Instructions (ExitCare):  Urinary Tract Infections in Adults


Prescriptions: 


Cephalexin [Keflex] 500 mg PO BID #14 capsule


Referrals: 


Sami Thakur MD [Primary Care Provider] - Follow up with primary


Forms:  [facility] (English)

## 2019-01-24 ENCOUNTER — HOSPITAL ENCOUNTER (INPATIENT)
Dept: HOSPITAL 42 - ED | Age: 84
LOS: 5 days | Discharge: NURSING, REHAB OR LONG-TERM CARE | DRG: 698 | End: 2019-01-29
Attending: INTERNAL MEDICINE | Admitting: INTERNAL MEDICINE
Payer: MEDICARE

## 2019-01-24 VITALS — BODY MASS INDEX: 27.6 KG/M2

## 2019-01-24 DIAGNOSIS — N39.0: ICD-10-CM

## 2019-01-24 DIAGNOSIS — R31.0: ICD-10-CM

## 2019-01-24 DIAGNOSIS — G89.29: ICD-10-CM

## 2019-01-24 DIAGNOSIS — E87.5: ICD-10-CM

## 2019-01-24 DIAGNOSIS — Z87.440: ICD-10-CM

## 2019-01-24 DIAGNOSIS — F05: ICD-10-CM

## 2019-01-24 DIAGNOSIS — E87.1: ICD-10-CM

## 2019-01-24 DIAGNOSIS — T83.511A: Primary | ICD-10-CM

## 2019-01-24 DIAGNOSIS — Z79.818: ICD-10-CM
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
## 2019-01-24 DIAGNOSIS — C61: ICD-10-CM

## 2019-01-24 DIAGNOSIS — Z99.3: ICD-10-CM

## 2019-01-24 DIAGNOSIS — B96.4: ICD-10-CM

## 2019-01-24 DIAGNOSIS — Z87.891: ICD-10-CM

## 2019-01-24 DIAGNOSIS — B95.2: ICD-10-CM

## 2019-01-24 DIAGNOSIS — G40.909: ICD-10-CM

## 2019-01-24 DIAGNOSIS — F03.90: ICD-10-CM

## 2019-01-24 DIAGNOSIS — R10.30: ICD-10-CM

## 2019-01-24 DIAGNOSIS — A41.9: ICD-10-CM

## 2019-01-24 DIAGNOSIS — H40.9: ICD-10-CM

## 2019-01-24 LAB
ALBUMIN SERPL-MCNC: 4.3 G/DL (ref 3–4.8)
ALBUMIN/GLOB SERPL: 1.1 {RATIO} (ref 1.1–1.8)
ALT SERPL-CCNC: 17 U/L (ref 7–56)
APPEARANCE UR: (no result)
AST SERPL-CCNC: 32 U/L (ref 17–59)
BACTERIA #/AREA URNS HPF: (no result) /HPF
BASE EXCESS BLDV CALC-SCNC: -2.2 MMOL/L (ref 0–2)
BASOPHILS # BLD AUTO: 0 K/MM3 (ref 0–2)
BASOPHILS NFR BLD: 0 % (ref 0–3)
BILIRUB UR-MCNC: NEGATIVE MG/DL
BUN SERPL-MCNC: 41 MG/DL (ref 7–21)
CALCIUM SERPL-MCNC: 9.3 MG/DL (ref 8.4–10.5)
COLOR UR: (no result)
EOSINOPHIL # BLD: 0 10*3/UL (ref 0–0.7)
EOSINOPHIL NFR BLD: 0.1 % (ref 1.5–5)
EPI CELLS #/AREA URNS HPF: (no result) /HPF (ref 0–5)
ERYTHROCYTE [DISTWIDTH] IN BLOOD BY AUTOMATED COUNT: 13 % (ref 11.5–14.5)
GFR NON-AFRICAN AMERICAN: > 60
GLUCOSE UR STRIP-MCNC: NEGATIVE MG/DL
GRANULOCYTES # BLD: 10.53 10*3/UL (ref 1.4–6.5)
GRANULOCYTES NFR BLD: 81.8 % (ref 50–68)
HGB BLD-MCNC: 12.4 G/DL (ref 14–18)
LEUKOCYTE ESTERASE UR-ACNC: (no result) LEU/UL
LYMPHOCYTES # BLD: 1.7 10*3/UL (ref 1.2–3.4)
LYMPHOCYTES NFR BLD AUTO: 13 % (ref 22–35)
MCH RBC QN AUTO: 30.5 PG (ref 25–35)
MCHC RBC AUTO-ENTMCNC: 34 G/DL (ref 31–37)
MCV RBC AUTO: 89.7 FL (ref 80–105)
MONOCYTES # BLD AUTO: 0.7 10*3/UL (ref 0.1–0.6)
MONOCYTES NFR BLD: 5.1 % (ref 1–6)
OSMOLALITY,URINE: 223 MOSM/KG (ref 300–1000)
PH BLDV: 7.36 [PH] (ref 7.32–7.43)
PH UR STRIP: 8.5 [PH] (ref 4.7–8)
PLATELET # BLD: 196 10^3/UL (ref 120–450)
PMV BLD AUTO: 8.9 FL (ref 7–11)
PROT UR STRIP-MCNC: 100 MG/DL
RBC # BLD AUTO: 4.07 10^6/UL (ref 3.5–6.1)
RBC # UR STRIP: (no result) /UL
SP GR UR STRIP: 1.02 (ref 1–1.03)
UROBILINOGEN UR STRIP-ACNC: 0.2 E.U./DL
VENOUS BLOOD FIO2: 21 %
VENOUS BLOOD GAS PCO2: 41 (ref 40–60)
VENOUS BLOOD GAS PO2: 49 MM/HG (ref 30–55)
WBC # BLD AUTO: 12.9 10^3/UL (ref 4.5–11)
WBC #/AREA URNS HPF: (no result) /HPF (ref 0–6)

## 2019-01-24 RX ADMIN — MEROPENEM SCH MLS/HR: 1 INJECTION INTRAVENOUS at 14:56

## 2019-01-24 RX ADMIN — MEROPENEM SCH MLS/HR: 1 INJECTION INTRAVENOUS at 21:21

## 2019-01-24 NOTE — ED PDOC
Arrival/HPI





- General


Time Seen by Provider: 01/24/19 05:33


Historian: Patient





- History of Present Illness


Narrative History of Present Illness (Text): 





01/24/19 05:42


89 year old male, whose past medical history in metastatic prostate cancer s/p 

ADT and palliative radiations, seizures, dementia, recurrent UTIs with 

indwelling tolentino and glaucoma, who presents to the Emergency department brought 

in by EMS accompanied by son complaining of lower abdominal pain, since 

yesterday.  Patient states tolentino was last changed on 1/15/19, and is in place 

due to his prostate issues.  Patient states he noticed his tolentino wasn't draining

yesterday.  Patient is unsure of abdominal pain started before or after tolentino 

stopped draining.  Tolentino bag has a small amount of dark, tea colored, urine.  

Patient denies any fevers, chills, headache, dizziness, chest pain, shortness of

breath, cough, nausea, vomiting, diarrhea, or any other complaint. 





Time/Duration: Prior to Arrival, 24 hours


Symptom Onset: Gradual


Symptom Course: Unchanged


Quality: Pressure


Activities at Onset: Light





Past Medical History





- Provider Review


Nursing Documentation Reviewed: Yes





- Infectious Disease


Hx of Infectious Diseases: None





- Cardiac


Hx Pacemaker: No





- Pulmonary


Hx Respiratory Disorders: Yes (H/O SMOKING CIGARETTES)


Other/Comment: EMPYEMA/SARCOIDOSIS





- Neurological


Hx Dizziness: Yes (syncope)


Hx Seizures: Yes (last seizure 20 yrs ago)





- HEENT


Hx Cataracts: Yes (with bilateral sx)





- Renal


Hx Renal Disorder: Yes


Other/Comment: chronic tolentino-Prostate ca.





- Endocrine/Metabolic


Hx Endocrine Disorders: No





- Hematological/Oncological


Hx Cancer: Yes (prostate)





- Integumentary


Hx Dermatological Disorder: Yes


Other/Comment: 2-5-18 bilateral le edema +2 more to right .Pitting with dry thin

scaly flakes.





- Musculoskeletal/Rheumatological


Hx Falls: Yes





- Gastrointestinal


Hx Gastrointestinal Disorders: Yes (constipation)





- Genitourinary/Gynecological


Hx Urinary Tract Infection: Yes





- Psychiatric


Hx Psychophysiologic Disorder: Yes


Hx Substance Use: No





- Surgical History


Hx Mastectomy: No





- Anesthesia


Hx Anesthesia: No





Family/Social History





- Physician Review


Nursing Documentation Reviewed: Yes


Family/Social History: No Known Family HX


Smoking Status: Former Smoker


Hx Alcohol Use: Yes (occasional)


Hx Substance Use: No





Allergies/Home Meds


Allergies/Adverse Reactions: 


Allergies





No Known Allergies Allergy (Verified 01/24/19 16:26)


   








Home Medications: 


                                    Home Meds











 Medication  Instructions  Recorded  Confirmed


 


Fentanyl [Duragesic Patch] 50 mcg TD Q72 01/24/19 01/24/19


 


Gemfibrozil [Lopid] 600 mg PO BID 01/24/19 01/24/19


 


Omeprazole 40 mg PO DAILY 01/24/19 01/24/19














Review of Systems





- Physician Review


All systems were reviewed & negative as marked: Yes





- Review of Systems


Constitutional: absent: Fevers, Night Sweats


Respiratory: absent: SOB, Cough


Cardiovascular: absent: Chest Pain


Gastrointestinal: Abdominal Pain.  absent: Diarrhea, Nausea, Vomiting


Neurological: absent: Headache, Dizziness





Physical Exam


Vital Signs Reviewed: Yes





Vital Signs











  Temp Pulse Resp BP Pulse Ox


 


 01/24/19 05:34  98.2 F  100 H  17  174/80 H  97











Temperature: Afebrile


Blood Pressure: Hypertensive


Pulse: Tachycardic


Respiratory Rate: Normal


Appearance: Positive for: Well-Appearing, Non-Toxic, Comfortable


Pain Distress: None


Mental Status: Positive for: Alert and Oriented X 3





- Systems Exam


Head: Present: Atraumatic, Normocephalic


Pupils: Present: PERRL


Extroacular Muscles: Present: EOMI


Conjunctiva: Present: Normal


Mouth: Present: Moist Mucous Membranes


Neck: Present: Normal Range of Motion


Respiratory/Chest: Present: Clear to Auscultation, Good Air Exchange.  No: 

Respiratory Distress, Accessory Muscle Use


Cardiovascular: Present: Regular Rate and Rhythm, Normal S1, S2.  No: Murmurs


Abdomen: Present: Tenderness (Tenderness in right and left lower quadrants), 

Other (Tolentino in place).  No: Distention, Peritoneal Signs


Back: Present: Normal Inspection


Upper Extremity: Present: Normal Inspection.  No: Cyanosis, Edema


Lower Extremity: Present: Normal Inspection.  No: Edema


Neurological: Present: GCS=15, CN II-XII Intact, Speech Normal


Skin: Present: Warm, Dry, Normal Color.  No: Rashes


Psychiatric: Present: Alert, Oriented x 3, Normal Insight, Normal Concentration





Medical Decision Making


ED Course and Treatment: 





01/24/19 05:49


Impression: 89 year old male presents with lower abdominal pain.





Plan:


-- CMP 


-- CBC


-- Urinalysis 


-- Reassess and disposition





Prior Visits:


Notes and results from previous visits were reviewed.





Progress Notes: 


UA done and UTI noted. Patient tachycardic with leukocytosis, so will admit for 

further management.





Case discussed with Dr. Rivero who accepts patient to his service for 

admission.





- Scribe Statement


The provider has reviewed the documentation as recorded by the Samibe


Jose Cao





Provider Scribe Attestation:


All medical record entries made by the Scribe were at my direction and 

personally dictated by me. I have reviewed the chart and agree that the record 

accurately reflects my personal performance of the history, physical exam, 

medical decision making, and the department course for this patient. I have also

 personally directed, reviewed, and agree with the discharge instructions and 

disposition.











Disposition/Present on Arrival





- Present on Arrival


Any Indicators Present on Arrival: No


History of DVT/PE: No


History of Uncontrolled Diabetes: No


Urinary Catheter: No


History of Decub. Ulcer: No


History Surgical Site Infection Following: None





- Disposition


Have Diagnosis and Disposition been Completed?: Yes


Diagnosis: 


 Urinary tract infection





Disposition: HOSPITALIZED


Disposition Time: 06:54


Patient Problems: 


                             Current Active Problems











Problem Status Onset


 


Sepsis Acute 


 


Urinary tract infection Acute 











Condition: FAIR

## 2019-01-24 NOTE — CP.PCM.CON
History of Present Illness





- History of Present Illness


History of Present Illness: 


89 year old male with PMH of prostate cancer with metastasis on Lupron treatment

(hormonal treatment) and radiation therapy to the pelvic area and lumbar spine, 

seizure disorder, glaucoma, history of Klebsiella UTI, history of Proteus b

acteremia was brought in to [facility] [place] because of lower abdominal pain for the past 1-2

days, associated with dark colored urine. The patient denies diarrhea, no 

constipation, no fever or chills, no nausea or vomiting, no headache or 

dizziness, no chest pain, no SOB, no sore throat, no rhinorrhea, no dysphagia, 

no easy bruisability. Urinalysis is showing hematuria and pyuria. Infectious 

diseases consult is requested to further evaluate and manage.





Past Medical and surgical history: as above


Personal and social history: denies alcohol abuse, no illicit drug use, no 

smokin


family history: non-contributory





Review of Systems





- Review of Systems


All systems: reviewed and no additional remarkable complaints except (as per 

HPI)





Past Patient History





- Infectious Disease


Hx of Infectious Diseases: None





- Past Social History


Smoking Status: Former Smoker





- CARDIAC


Hx Pacemaker: No





- PULMONARY


Hx Respiratory Disorders: Yes (H/O SMOKING CIGARETTES)


Other/Comment: EMPYEMA/SARCOIDOSIS





- NEUROLOGICAL


Hx Dizziness: Yes (syncope)


Hx Seizures: Yes (last seizure 20 yrs ago)





- HEENT


Hx Cataracts: Yes (with bilateral sx)





- RENAL


Hx Chronic Kidney Disease: Yes


Other/Comment: chronic tolentino-Prostate ca.





- ENDOCRINE/METABOLIC


Hx Endocrine Disorders: No





- HEMATOLOGICAL/ONCOLOGICAL


Hx Cancer: Yes (prostate)





- INTEGUMENTARY


Hx Dermatological Problems: Yes


Other/Comment: 2-5-18 bilateral le edema +2 more to right .Pitting with dry thin

scaly flakes.





- MUSCULOSKELETAL/RHEUMATOLOGICAL


Hx Falls: Yes





- GASTROINTESTINAL


Hx Gastrointestinal Disorders: Yes (constipation)





- GENITOURINARY/GYNECOLOGICAL


Hx Urinary Tract Infection: Yes





- PSYCHIATRIC


Hx Psychophysiologic Disorder: Yes


Hx Substance Use: No





- SURGICAL HISTORY


Hx Mastectomy: No





- ANESTHESIA


Hx Anesthesia: No





Meds


Allergies/Adverse Reactions: 


                                    Allergies











Allergy/AdvReac Type Severity Reaction Status Date / Time


 


No Known Allergies Allergy   Verified 10/01/18 21:18














- Medications


Medications: 


                               Current Medications





Carbamazepine (Tegretol)  200 mg PO TID RICHAR; Protocol


Sodium Chloride (Sodium Chloride 0.9%)  1,000 mls @ 60 mls/hr IV .[id number] RICHAR


Vancomycin HCl 1.5 gm/ Sodium (Chloride)  500 mls @ 167 mls/hr IVPB ONCE ONE; 

Protocol


   Stop: 01/24/19 12:34


Meropenem (Merrem Iv 1 Gm Premix)  50 mls @ 100 mls/hr IVPB Q8 RICHAR; Protocol











Physical Exam





- Constitutional


Appears: Non-toxic, No Acute Distress, Chronically Ill





- Head Exam


Head Exam: NORMAL INSPECTION





- ENT Exam


ENT Exam: Mucous Membranes Moist





- Neck Exam


Neck exam: Negative for: Lymphadenopathy, Meningismus





- Respiratory Exam


Respiratory Exam: absent: Rales, Rhonchi





- Cardiovascular Exam


Cardiovascular Exam: +S1, +S2





- GI/Abdominal Exam


GI & Abdominal Exam: Soft, Tenderness (mild, lower quadrants).  absent: 

Distended, Firm, Guarding, Rebound


Additional comments: 





indwelling Tolentino catheter in place, with dark colored urine





Results





- Vital Signs


Recent Vital Signs: 


                                Last Vital Signs











Temp  98.6 F   01/24/19 08:30


 


Pulse  100 H  01/24/19 08:30


 


Resp  18   01/24/19 08:30


 


BP  139/52 L  01/24/19 08:30


 


Pulse Ox  96   01/24/19 08:30














- Labs


Result Diagrams: 


                                 01/24/19 06:10





                                 01/24/19 06:10


Labs: 


                         Laboratory Results - last 24 hr











  01/24/19 01/24/19 01/24/19





  06:10 06:10 06:10


 


WBC  12.9 H  


 


RBC  4.07  


 


Hgb  12.4 L D  


 


Hct  36.5 L  


 


MCV  89.7  D  


 


MCH  30.5  


 


MCHC  34.0  


 


RDW  13.0  


 


Plt Count  196  


 


MPV  8.9  


 


Gran %  81.8 H  


 


Lymph % (Auto)  13.0 L  


 


Mono % (Auto)  5.1  


 


Eos % (Auto)  0.1 L  


 


Baso % (Auto)  0.0  


 


Gran #  10.53 H  


 


Lymph # (Auto)  1.7  


 


Mono # (Auto)  0.7 H  


 


Eos # (Auto)  0.0  


 


Baso # (Auto)  0.00  


 


pO2   


 


VBG pH   


 


VBG pCO2   


 


VBG HCO3   


 


VBG Total CO2   


 


VBG O2 Sat (Calc)   


 


VBG Base Excess   


 


VBG Potassium   


 


Glucose   


 


Lactate   


 


FiO2   


 


Sodium    126 L


 


Potassium    5.5 H


 


Chloride    91 L


 


Carbon Dioxide    21


 


Anion Gap    19


 


BUN    41 H


 


Creatinine    0.9


 


Est GFR ( Amer)    > 60


 


Est GFR (Non-Af Amer)    > 60


 


Random Glucose    171 H


 


Calcium    9.3


 


Total Bilirubin    0.5


 


AST    32


 


ALT    17


 


Alkaline Phosphatase    145 H


 


Total Protein    8.0


 


Albumin    4.3


 


Globulin    3.8


 


Albumin/Globulin Ratio    1.1


 


Venous Blood Potassium   


 


Urine Color   Red 


 


Urine Appearance   Cloudy 


 


Urine pH   8.5 


 


Ur Specific Gravity   1.020 


 


Urine Protein   100 H 


 


Urine Glucose (UA)   Negative 


 


Urine Ketones   Negative 


 


Urine Blood   Large H 


 


Urine Nitrate   Positive H 


 


Urine Bilirubin   Negative 


 


Urine Urobilinogen   0.2 


 


Ur Leukocyte Esterase   Moderate H 


 


Urine RBC   2 - 5 H 


 


Urine WBC   15 - 20 H 


 


Ur Epithelial Cells   0 - 2 


 


Urine Bacteria   Many 














  01/24/19





  07:25


 


WBC 


 


RBC 


 


Hgb 


 


Hct 


 


MCV 


 


MCH 


 


MCHC 


 


RDW 


 


Plt Count 


 


MPV 


 


Gran % 


 


Lymph % (Auto) 


 


Mono % (Auto) 


 


Eos % (Auto) 


 


Baso % (Auto) 


 


Gran # 


 


Lymph # (Auto) 


 


Mono # (Auto) 


 


Eos # (Auto) 


 


Baso # (Auto) 


 


pO2  49


 


VBG pH  7.36


 


VBG pCO2  41.0


 


VBG HCO3  23.2


 


VBG Total CO2  24.5


 


VBG O2 Sat (Calc)  86.9 H


 


VBG Base Excess  -2.2 L


 


VBG Potassium  5.2


 


Glucose  TEST NOT PERFORMED


 


Lactate  1.6


 


FiO2  21.0


 


Sodium  123.0 L


 


Potassium 


 


Chloride  92.0 L


 


Carbon Dioxide 


 


Anion Gap 


 


BUN 


 


Creatinine 


 


Est GFR ( Amer) 


 


Est GFR (Non-Af Amer) 


 


Random Glucose 


 


Calcium 


 


Total Bilirubin 


 


AST 


 


ALT 


 


Alkaline Phosphatase 


 


Total Protein 


 


Albumin 


 


Globulin 


 


Albumin/Globulin Ratio 


 


Venous Blood Potassium  5.2


 


Urine Color 


 


Urine Appearance 


 


Urine pH 


 


Ur Specific Gravity 


 


Urine Protein 


 


Urine Glucose (UA) 


 


Urine Ketones 


 


Urine Blood 


 


Urine Nitrate 


 


Urine Bilirubin 


 


Urine Urobilinogen 


 


Ur Leukocyte Esterase 


 


Urine RBC 


 


Urine WBC 


 


Ur Epithelial Cells 


 


Urine Bacteria 














Assessment & Plan





- Assessment and Plan (Free Text)


Plan: 





Assessment


systemic inflammatory response syndrome, consider sepsis due to complicated UTI 

in this patient with indwelling Tolentino catheter and metastatic prostate cancer


history of Proteus bacteremia, source unclear, concerned about intra-abdominal 

infection


history of UTI in this patient with prostate cancer with metastasis


history of Klebsiella urinary tract infection


Mechanical fall, etiology to be determined


prostate cancer on Lupron treatment once a month (hormonal treatment)


seizure disorder


glaucoma





Plan


started Vancomycin and Merrem pending blood and urine cx


would recommends Urology evaluation because of the dark colored urine


will monitor clinically

## 2019-01-24 NOTE — RAD
Date of service: 



01/24/2019



PROCEDURE:  CHEST RADIOGRAPH, 1 VIEW



HISTORY:

r/o pneumonia



COMPARISON:

10/01/2018



FINDINGS:



LUNGS:

Clear.



PLEURA:

No pneumothorax or pleural fluid seen.



CARDIOVASCULAR:

 No aortic atherosclerotic calcification present. Mild cardiomegaly



OSSEOUS STRUCTURES:

No significant abnormalities.



VISUALIZED UPPER ABDOMEN:

Normal.



OTHER FINDINGS:

None. 



IMPRESSION:

No active disease.

## 2019-01-24 NOTE — CP.PCM.HP
<Nayeli Kaplan - Last Filed: 01/24/19 19:38>





History of Present Illness





- History of Present Illness


History of Present Illness: 





89yo male PMHx metastatic prostate cancer s/p ADT and palliative radiation on 

Lupron, seizures, glaucoma, dementia, UTI w/ indwelling tolentino (+ for E.coli, 

Klebsiella, and Enterococcus in the past) brought to ED by son for ower 

abdominal pain for the past 1-2 days, associated with dark colored urine. 

Patient was slightly lethargic and history obtained from both son and patient. 

Patient has visiting nurse come every month to change catheter; last change was 

1/15/19. However over the past 1-2 days he was noted to be tired and with 

decreased and dark UO. Patient denied other complaints of fever, chills, 

headache, dizziness, chest pain, palpitations, SOB, cough, nausea, vomiting, 

bowel complaints, pain/swelling in his legs bilaterally.  





PMHx: metastatic prostate cancer s/p ADT and palliative radiation on Lupron, 

seizures, glaucoma, dementia, UTI w/ indwelling tolentino (+ for E.coli, Klebsiella,

and Enterococcus in the past)


PSurgHx: bilateral cataract surgery 


Home meds: As per MAR


Allergies: NKDA


SocHx: Denies EtOH, drug or tobacco use. Patient lives at home and has a 24 hr 

home aid. he is not active and is wheelchair bound. 


FamHx: non-contributory 





Present on Admission





- Present on Admission


Any Indicators Present on Admission: Yes


Urinary Catheter: Yes





Review of Systems





- Review of Systems


All systems: reviewed and no additional remarkable complaints except


Review of Systems: 





as per HPI





Past Patient History





- Infectious Disease


Hx of Infectious Diseases: None





- Past Social History


Smoking Status: Former Smoker





- CARDIAC


Hx Pacemaker: No





- PULMONARY


Hx Respiratory Disorders: Yes (H/O SMOKING CIGARETTES)


Other/Comment: EMPYEMA/SARCOIDOSIS





- NEUROLOGICAL


Hx Dizziness: Yes (syncope)


Hx Seizures: Yes (last seizure 20 yrs ago)





- HEENT


Hx Cataracts: Yes (with bilateral sx)





- RENAL


Hx Chronic Kidney Disease: Yes


Other/Comment: chronic tolentino-Prostate ca.





- ENDOCRINE/METABOLIC


Hx Endocrine Disorders: No





- HEMATOLOGICAL/ONCOLOGICAL


Hx Cancer: Yes (prostate)





- INTEGUMENTARY


Hx Dermatological Problems: Yes


Other/Comment: 2-5-18 bilateral le edema +2 more to right .Pitting with dry thin

scaly flakes.





- MUSCULOSKELETAL/RHEUMATOLOGICAL


Hx Falls: Yes





- GASTROINTESTINAL


Hx Gastrointestinal Disorders: Yes (constipation)





- GENITOURINARY/GYNECOLOGICAL


Hx Urinary Tract Infection: Yes





- PSYCHIATRIC


Hx Psychophysiologic Disorder: Yes


Hx Substance Use: No





- SURGICAL HISTORY


Hx Mastectomy: No





- ANESTHESIA


Hx Anesthesia: No





Meds


Allergies/Adverse Reactions: 


                                    Allergies











Allergy/AdvReac Type Severity Reaction Status Date / Time


 


No Known Allergies Allergy   Verified 01/24/19 16:26














Physical Exam





- Constitutional


Appears: Non-toxic, No Acute Distress, Chronically Ill





- Head Exam


Head Exam: ATRAUMATIC, NORMAL INSPECTION, NORMOCEPHALIC





- Eye Exam


Eye Exam: EOMI, Normal appearance.  absent: Conjunctival injection, Scleral 

icterus





- ENT Exam


ENT Exam: Mucous Membranes Moist





- Neck Exam


Neck exam: Positive for: Normal Inspection.  Negative for: Lymphadenopathy





- Respiratory Exam


Respiratory Exam: Decreased Breath Sounds, NORMAL BREATHING PATTERN.  absent: 

Accessory Muscle Use, Respiratory Distress





- Cardiovascular Exam


Cardiovascular Exam: +S1, +S2





- GI/Abdominal Exam


GI & Abdominal Exam: Normal Bowel Sounds, Soft.  absent: Distended, Firm, 

Guarding, Rigid, Tenderness





-  Exam


Additional comments: 





tolentino in place- hematuria noted





- Extremities Exam


Extremities exam: Positive for: normal capillary refill, normal inspection, 

pedal pulses present.  Negative for: pedal edema





- Neurological Exam


Neurological exam: Alert, Oriented x3





- Psychiatric Exam


Psychiatric exam: Normal Affect, Normal Mood





- Skin


Skin Exam: Dry, Intact





Results





- Vital Signs


Recent Vital Signs: 





                                Last Vital Signs











Temp  98.2 F   01/24/19 05:34


 


Pulse  101 H  01/24/19 07:00


 


Resp  15   01/24/19 07:00


 


BP  128/69   01/24/19 07:00


 


Pulse Ox  95   01/24/19 07:00














- Labs


Result Diagrams: 


                                 01/24/19 06:10





                                 01/24/19 06:10


Labs: 





                         Laboratory Results - last 24 hr











  01/24/19 01/24/19 01/24/19





  06:10 06:10 06:10


 


WBC  12.9 H  


 


RBC  4.07  


 


Hgb  12.4 L D  


 


Hct  36.5 L  


 


MCV  89.7  D  


 


MCH  30.5  


 


MCHC  34.0  


 


RDW  13.0  


 


Plt Count  196  


 


MPV  8.9  


 


Gran %  81.8 H  


 


Lymph % (Auto)  13.0 L  


 


Mono % (Auto)  5.1  


 


Eos % (Auto)  0.1 L  


 


Baso % (Auto)  0.0  


 


Gran #  10.53 H  


 


Lymph # (Auto)  1.7  


 


Mono # (Auto)  0.7 H  


 


Eos # (Auto)  0.0  


 


Baso # (Auto)  0.00  


 


pO2   


 


VBG pH   


 


VBG pCO2   


 


VBG HCO3   


 


VBG Total CO2   


 


VBG O2 Sat (Calc)   


 


VBG Base Excess   


 


VBG Potassium   


 


Lactate   


 


FiO2   


 


Sodium    126 L


 


Potassium    5.5 H


 


Chloride    91 L


 


Carbon Dioxide    21


 


Anion Gap    19


 


BUN    41 H


 


Creatinine    0.9


 


Est GFR ( Amer)    > 60


 


Est GFR (Non-Af Amer)    > 60


 


Random Glucose    171 H


 


Calcium    9.3


 


Total Bilirubin    0.5


 


AST    32


 


ALT    17


 


Alkaline Phosphatase    145 H


 


Total Protein    8.0


 


Albumin    4.3


 


Globulin    3.8


 


Albumin/Globulin Ratio    1.1


 


Venous Blood Potassium   


 


Urine Color   Red 


 


Urine Appearance   Cloudy 


 


Urine pH   8.5 


 


Ur Specific Gravity   1.020 


 


Urine Protein   100 H 


 


Urine Glucose (UA)   Negative 


 


Urine Ketones   Negative 


 


Urine Blood   Large H 


 


Urine Nitrate   Positive H 


 


Urine Bilirubin   Negative 


 


Urine Urobilinogen   0.2 


 


Ur Leukocyte Esterase   Moderate H 


 


Urine RBC   2 - 5 H 


 


Urine WBC   15 - 20 H 


 


Ur Epithelial Cells   0 - 2 


 


Urine Bacteria   Many 














  01/24/19





  07:25


 


WBC 


 


RBC 


 


Hgb 


 


Hct 


 


MCV 


 


MCH 


 


MCHC 


 


RDW 


 


Plt Count 


 


MPV 


 


Gran % 


 


Lymph % (Auto) 


 


Mono % (Auto) 


 


Eos % (Auto) 


 


Baso % (Auto) 


 


Gran # 


 


Lymph # (Auto) 


 


Mono # (Auto) 


 


Eos # (Auto) 


 


Baso # (Auto) 


 


pO2  49


 


VBG pH  7.36


 


VBG pCO2  41.0


 


VBG HCO3  23.2


 


VBG Total CO2  24.5


 


VBG O2 Sat (Calc)  86.9 H


 


VBG Base Excess  -2.2 L


 


VBG Potassium  5.2


 


Lactate  1.6


 


FiO2  21.0


 


Sodium  123.0 L


 


Potassium 


 


Chloride  92.0 L


 


Carbon Dioxide 


 


Anion Gap 


 


BUN 


 


Creatinine 


 


Est GFR ( Amer) 


 


Est GFR (Non-Af Amer) 


 


Random Glucose 


 


Calcium 


 


Total Bilirubin 


 


AST 


 


ALT 


 


Alkaline Phosphatase 


 


Total Protein 


 


Albumin 


 


Globulin 


 


Albumin/Globulin Ratio 


 


Venous Blood Potassium  5.2


 


Urine Color 


 


Urine Appearance 


 


Urine pH 


 


Ur Specific Gravity 


 


Urine Protein 


 


Urine Glucose (UA) 


 


Urine Ketones 


 


Urine Blood 


 


Urine Nitrate 


 


Urine Bilirubin 


 


Urine Urobilinogen 


 


Ur Leukocyte Esterase 


 


Urine RBC 


 


Urine WBC 


 


Ur Epithelial Cells 


 


Urine Bacteria 














Assessment & Plan





- Assessment and Plan (Free Text)


Assessment: 





1. Sepsis likely secondary to complicated UTI with indwelling tolentino catheter


2. Delirium likely secondary to UTI


3. Abdominal pain likely secondary to urinary retention


4. Chronic indwelling tolentino catheter


5. Prostate ca on Lupro monthly


6. Hyponatremia


7. Gross hematuria


8. Hyperkalemia


9. Chronic pain


10. Seizure disorder


11. Wheelchair bound


12. Glaucoma


Plan: 





Patient's blood work, vitals, and imaging noted. Patient has blood and urine 

cultures pending. Merrem and Vancomycin started as per ID for suspected UTI. 

Procalcitonin <0.05. CXR unremarkable. Will monitor patient closely for temp. If

patient becomes delirious recommend redirection. Monitor patient's Is and Os 

strictly. In light of patient's hyponatremia- f/u Urine Na, Urine Osm, Serum 

Osm. Monitor patient potassium. Continue patient's tegretol for seizure 

disorder. Seizure precautions and prn ativan in place. Urology consulted for 

gross hematuria with history of chronic indwelling tolentino. Patient's fentanyl 

patch on hold at this time as it is changed every 72 hours- last change was 

1/23/18. PT eval placed for deconditioning. Patient HoB above 30 degrees and 

fall risk protocol in place. HHD diet ordered. Will continue to monitor patient 

closely.





Discussed with Dr. Josefa Kaplan PGY3





<Nathan Rivero - Last Filed: 01/24/19 20:32>





Results





- Vital Signs


Recent Vital Signs: 





                                Last Vital Signs











Temp  98.6 F   01/24/19 08:30


 


Pulse  10 L  01/24/19 19:21


 


Resp  18   01/24/19 19:21


 


BP  139/52 L  01/24/19 08:30


 


Pulse Ox  96   01/24/19 08:30














- Labs


Result Diagrams: 


                                 01/24/19 06:10





                                 01/24/19 06:10


Labs: 





                         Laboratory Results - last 24 hr











  01/24/19 01/24/19 01/24/19





  06:10 06:10 06:10


 


WBC  12.9 H  


 


RBC  4.07  


 


Hgb  12.4 L D  


 


Hct  36.5 L  


 


MCV  89.7  D  


 


MCH  30.5  


 


MCHC  34.0  


 


RDW  13.0  


 


Plt Count  196  


 


MPV  8.9  


 


Gran %  81.8 H  


 


Lymph % (Auto)  13.0 L  


 


Mono % (Auto)  5.1  


 


Eos % (Auto)  0.1 L  


 


Baso % (Auto)  0.0  


 


Gran #  10.53 H  


 


Lymph # (Auto)  1.7  


 


Mono # (Auto)  0.7 H  


 


Eos # (Auto)  0.0  


 


Baso # (Auto)  0.00  


 


pO2   


 


VBG pH   


 


VBG pCO2   


 


VBG HCO3   


 


VBG Total CO2   


 


VBG O2 Sat (Calc)   


 


VBG Base Excess   


 


VBG Potassium   


 


Glucose   


 


Lactate   


 


FiO2   


 


Sodium    126 L


 


Potassium    5.5 H


 


Chloride    91 L


 


Carbon Dioxide    21


 


Anion Gap    19


 


BUN    41 H


 


Creatinine    0.9


 


Est GFR ( Amer)    > 60


 


Est GFR (Non-Af Amer)    > 60


 


Random Glucose    171 H


 


Serum Osmolality   


 


Calcium    9.3


 


Total Bilirubin    0.5


 


AST    32


 


ALT    17


 


Alkaline Phosphatase    145 H


 


Total Protein    8.0


 


Albumin    4.3


 


Globulin    3.8


 


Albumin/Globulin Ratio    1.1


 


Procalcitonin   


 


Venous Blood Potassium   


 


Urine Color   Red 


 


Urine Appearance   Cloudy 


 


Urine pH   8.5 


 


Ur Specific Gravity   1.020 


 


Urine Protein   100 H 


 


Urine Glucose (UA)   Negative 


 


Urine Ketones   Negative 


 


Urine Blood   Large H 


 


Urine Nitrate   Positive H 


 


Urine Bilirubin   Negative 


 


Urine Urobilinogen   0.2 


 


Ur Leukocyte Esterase   Moderate H 


 


Urine RBC   2 - 5 H 


 


Urine WBC   15 - 20 H 


 


Ur Epithelial Cells   0 - 2 


 


Urine Bacteria   Many 


 


Urine Osmolality   


 


Ur Random Sodium   














  01/24/19 01/24/19 01/24/19





  06:10 07:25 07:25


 


WBC   


 


RBC   


 


Hgb   


 


Hct   


 


MCV   


 


MCH   


 


MCHC   


 


RDW   


 


Plt Count   


 


MPV   


 


Gran %   


 


Lymph % (Auto)   


 


Mono % (Auto)   


 


Eos % (Auto)   


 


Baso % (Auto)   


 


Gran #   


 


Lymph # (Auto)   


 


Mono # (Auto)   


 


Eos # (Auto)   


 


Baso # (Auto)   


 


pO2    49


 


VBG pH    7.36


 


VBG pCO2    41.0


 


VBG HCO3    23.2


 


VBG Total CO2    24.5


 


VBG O2 Sat (Calc)    86.9 H


 


VBG Base Excess    -2.2 L


 


VBG Potassium    5.2


 


Glucose    TEST NOT PERFORMED


 


Lactate    1.6


 


FiO2    21.0


 


Sodium    123.0 L


 


Potassium   


 


Chloride    92.0 L


 


Carbon Dioxide   


 


Anion Gap   


 


BUN   


 


Creatinine   


 


Est GFR ( Amer)   


 


Est GFR (Non-Af Amer)   


 


Random Glucose   


 


Serum Osmolality  273  


 


Calcium   


 


Total Bilirubin   


 


AST   


 


ALT   


 


Alkaline Phosphatase   


 


Total Protein   


 


Albumin   


 


Globulin   


 


Albumin/Globulin Ratio   


 


Procalcitonin   < 0.05 L 


 


Venous Blood Potassium    5.2


 


Urine Color   


 


Urine Appearance   


 


Urine pH   


 


Ur Specific Gravity   


 


Urine Protein   


 


Urine Glucose (UA)   


 


Urine Ketones   


 


Urine Blood   


 


Urine Nitrate   


 


Urine Bilirubin   


 


Urine Urobilinogen   


 


Ur Leukocyte Esterase   


 


Urine RBC   


 


Urine WBC   


 


Ur Epithelial Cells   


 


Urine Bacteria   


 


Urine Osmolality   


 


Ur Random Sodium   














  01/24/19





  15:37


 


WBC 


 


RBC 


 


Hgb 


 


Hct 


 


MCV 


 


MCH 


 


MCHC 


 


RDW 


 


Plt Count 


 


MPV 


 


Gran % 


 


Lymph % (Auto) 


 


Mono % (Auto) 


 


Eos % (Auto) 


 


Baso % (Auto) 


 


Gran # 


 


Lymph # (Auto) 


 


Mono # (Auto) 


 


Eos # (Auto) 


 


Baso # (Auto) 


 


pO2 


 


VBG pH 


 


VBG pCO2 


 


VBG HCO3 


 


VBG Total CO2 


 


VBG O2 Sat (Calc) 


 


VBG Base Excess 


 


VBG Potassium 


 


Glucose 


 


Lactate 


 


FiO2 


 


Sodium 


 


Potassium 


 


Chloride 


 


Carbon Dioxide 


 


Anion Gap 


 


BUN 


 


Creatinine 


 


Est GFR ( Amer) 


 


Est GFR (Non-Af Amer) 


 


Random Glucose 


 


Serum Osmolality 


 


Calcium 


 


Total Bilirubin 


 


AST 


 


ALT 


 


Alkaline Phosphatase 


 


Total Protein 


 


Albumin 


 


Globulin 


 


Albumin/Globulin Ratio 


 


Procalcitonin 


 


Venous Blood Potassium 


 


Urine Color 


 


Urine Appearance 


 


Urine pH 


 


Ur Specific Gravity 


 


Urine Protein 


 


Urine Glucose (UA) 


 


Urine Ketones 


 


Urine Blood 


 


Urine Nitrate 


 


Urine Bilirubin 


 


Urine Urobilinogen 


 


Ur Leukocyte Esterase 


 


Urine RBC 


 


Urine WBC 


 


Ur Epithelial Cells 


 


Urine Bacteria 


 


Urine Osmolality  223 L


 


Ur Random Sodium  16














Assessment & Plan





- Assessment and Plan (Free Text)


Plan: 





Pt seen and examined by me. I have reviewed the note of the medical resident and

I agree with it. I have discussed the assessment and plan with the resident. I 

have reviewed the medications and the last labs.Pt with sepsis and will be on 

Merrem and Vanco. ID will be consulted. CXR was reviewed. Pt with Delerium due 

to sepsis and UTI. Pt with hyponatremia and will need further workup. He will 

continue with Tegretol for his Sz d/o. Tolentino was changed. Spoke to son at 

bedside. He is home bound. He has hx of Prostate Ca and has seen urology and 

followed up.

## 2019-01-25 LAB
ALBUMIN SERPL-MCNC: 3.5 G/DL (ref 3–4.8)
ALBUMIN/GLOB SERPL: 1 {RATIO} (ref 1.1–1.8)
ALT SERPL-CCNC: 26 U/L (ref 7–56)
AST SERPL-CCNC: 35 U/L (ref 17–59)
BASOPHILS # BLD AUTO: 0.01 K/MM3 (ref 0–2)
BASOPHILS NFR BLD: 0.2 % (ref 0–3)
BUN SERPL-MCNC: 34 MG/DL (ref 7–21)
CALCIUM SERPL-MCNC: 9 MG/DL (ref 8.4–10.5)
EOSINOPHIL # BLD: 0 10*3/UL (ref 0–0.7)
EOSINOPHIL NFR BLD: 0.2 % (ref 1.5–5)
ERYTHROCYTE [DISTWIDTH] IN BLOOD BY AUTOMATED COUNT: 13.3 % (ref 11.5–14.5)
GFR NON-AFRICAN AMERICAN: > 60
GRANULOCYTES # BLD: 3.27 10*3/UL (ref 1.4–6.5)
GRANULOCYTES NFR BLD: 49.4 % (ref 50–68)
HGB BLD-MCNC: 9.6 G/DL (ref 14–18)
LYMPHOCYTES # BLD: 2.8 10*3/UL (ref 1.2–3.4)
LYMPHOCYTES NFR BLD AUTO: 42.2 % (ref 22–35)
MCH RBC QN AUTO: 30.5 PG (ref 25–35)
MCHC RBC AUTO-ENTMCNC: 33.2 G/DL (ref 31–37)
MCV RBC AUTO: 91.7 FL (ref 80–105)
MONOCYTES # BLD AUTO: 0.5 10*3/UL (ref 0.1–0.6)
MONOCYTES NFR BLD: 8 % (ref 1–6)
PLATELET # BLD: 146 10^3/UL (ref 120–450)
PMV BLD AUTO: 8.9 FL (ref 7–11)
RBC # BLD AUTO: 3.15 10^6/UL (ref 3.5–6.1)
WBC # BLD AUTO: 6.6 10^3/UL (ref 4.5–11)

## 2019-01-25 RX ADMIN — MEROPENEM SCH MLS/HR: 1 INJECTION INTRAVENOUS at 13:43

## 2019-01-25 RX ADMIN — MEROPENEM SCH MLS/HR: 1 INJECTION INTRAVENOUS at 21:11

## 2019-01-25 RX ADMIN — VANCOMYCIN HYDROCHLORIDE SCH MLS/HR: 1 INJECTION, POWDER, LYOPHILIZED, FOR SOLUTION INTRAVENOUS at 14:58

## 2019-01-25 RX ADMIN — MEROPENEM SCH MLS/HR: 1 INJECTION INTRAVENOUS at 05:00

## 2019-01-25 RX ADMIN — PANTOPRAZOLE SODIUM SCH MG: 40 TABLET, DELAYED RELEASE ORAL at 08:10

## 2019-01-25 NOTE — CP.PCM.PN
Subjective





- Date & Time of Evaluation


Date of Evaluation: 01/25/19


Time of Evaluation: 10:10





- Subjective


Subjective: 





Comfortable in bed, not in distress, still with dark urine. No fevers.





Objective





- Vital Signs/Intake and Output


Vital Signs (last 24 hours): 


                                        











Temp Pulse Resp BP Pulse Ox


 


 98.6 F   100 H  18   139/52 L  96 


 


 01/24/19 08:30  01/24/19 08:30  01/24/19 08:30  01/24/19 08:30  01/24/19 08:30











- Medications


Medications: 


                               Current Medications





Carbamazepine (Tegretol)  200 mg PO TID [facility]; Protocol


   Last Admin: 01/24/19 11:38 Dose:  200 mg


Sodium Chloride (Sodium Chloride 0.9%)  1,000 mls @ 60 mls/hr IV .[id number] [facility]


   Last Admin: 01/24/19 11:40 Dose:  60 mls/hr


Meropenem (Merrem Iv 1 Gm Premix)  1 gm in 50 mls @ 100 mls/hr IVPB Q8 [facility]; 

Protocol











- Labs


Labs: 


                                        





                                 01/24/19 06:10 





                                 01/24/19 06:10 











- Constitutional


Appears: No Acute Distress, Chronically Ill





- Head Exam


Head Exam: NORMAL INSPECTION





- Respiratory Exam


Respiratory Exam: Decreased Breath Sounds





- Cardiovascular Exam


Cardiovascular Exam: +S1, +S2





- GI/Abdominal Exam


GI & Abdominal Exam: Soft.  absent: Tenderness





Assessment and Plan





- Assessment and Plan (Free Text)


Plan: 


Assessment


consider sepsis due to complicated UTI in this patient with indwelling [place] 

catheter and metastatic prostate cancer, growing gram positive cocci and gram 

negative bacilli in the urine


history of Proteus bacteremia, source unclear, concerned about intra-abdominal 

infection


history of UTI in this patient with prostate cancer with metastasis


history of Klebsiella urinary tract infection


Mechanical fall, etiology to be determined


prostate cancer on Lupron treatment once a month (hormonal treatment)


seizure disorder


glaucoma





Plan


continue Vancomycin and Merrem day 2 pending identification and sensitivities of

the bacteria in the urine; blood cx are negative so far


follow up Urology evaluation and recommendations 


will continue to monitor clinically

## 2019-01-26 LAB
ALBUMIN SERPL-MCNC: 3.7 G/DL (ref 3–4.8)
ALBUMIN/GLOB SERPL: 1 {RATIO} (ref 1.1–1.8)
ALT SERPL-CCNC: 28 U/L (ref 7–56)
AST SERPL-CCNC: 42 U/L (ref 17–59)
BASOPHILS # BLD AUTO: 0.01 K/MM3 (ref 0–2)
BASOPHILS NFR BLD: 0.2 % (ref 0–3)
BUN SERPL-MCNC: 32 MG/DL (ref 7–21)
CALCIUM SERPL-MCNC: 9.1 MG/DL (ref 8.4–10.5)
EOSINOPHIL # BLD: 0 10*3/UL (ref 0–0.7)
EOSINOPHIL NFR BLD: 0.5 % (ref 1.5–5)
ERYTHROCYTE [DISTWIDTH] IN BLOOD BY AUTOMATED COUNT: 13.2 % (ref 11.5–14.5)
GFR NON-AFRICAN AMERICAN: > 60
HGB BLD-MCNC: 10.3 G/DL (ref 14–18)
LYMPHOCYTES # BLD: 2.3 10*3/UL (ref 1.2–3.4)
LYMPHOCYTES NFR BLD AUTO: 40.5 % (ref 22–35)
MCH RBC QN AUTO: 30.7 PG (ref 25–35)
MCHC RBC AUTO-ENTMCNC: 33 G/DL (ref 31–37)
MCV RBC AUTO: 92.9 FL (ref 80–105)
MONOCYTES # BLD AUTO: 0.7 10*3/UL (ref 0.1–0.6)
MONOCYTES NFR BLD: 12.3 % (ref 1–6)
PLATELET # BLD: 143 10^3/UL (ref 120–450)
PMV BLD AUTO: 8.9 FL (ref 7–11)
RBC # BLD AUTO: 3.36 10^6/UL (ref 3.5–6.1)
WBC # BLD AUTO: 5.6 10^3/UL (ref 4.5–11)

## 2019-01-26 RX ADMIN — PANTOPRAZOLE SODIUM SCH MG: 40 TABLET, DELAYED RELEASE ORAL at 10:12

## 2019-01-26 RX ADMIN — VANCOMYCIN HYDROCHLORIDE SCH MLS/HR: 1 INJECTION, POWDER, LYOPHILIZED, FOR SOLUTION INTRAVENOUS at 02:31

## 2019-01-26 RX ADMIN — PIPERACILLIN AND TAZOBACTAM SCH MLS/HR: 3; .375 INJECTION, POWDER, LYOPHILIZED, FOR SOLUTION INTRAVENOUS; PARENTERAL at 21:13

## 2019-01-26 RX ADMIN — MEROPENEM SCH MLS/HR: 1 INJECTION INTRAVENOUS at 05:38

## 2019-01-26 RX ADMIN — PIPERACILLIN AND TAZOBACTAM SCH MLS/HR: 3; .375 INJECTION, POWDER, LYOPHILIZED, FOR SOLUTION INTRAVENOUS; PARENTERAL at 14:54

## 2019-01-26 NOTE — CP.PCM.PN
<EusebioNayeli carroll - Last Filed: 01/26/19 12:57>





Subjective





- Date & Time of Evaluation


Date of Evaluation: 01/25/19


Time of Evaluation: 07:00





- Subjective


Subjective: 





[facility] progress note for Dr. Rivero





Patient seen and examined at bedside. Nursing reported no acute events 

overnight. Patient making good clear urine and denied fever, chills, headache, 

dizziness, chest pain, palpitations, SOB, cough, abd pain, nausea, vomiting, 

bowel complaints, pain/swelling legs bilaterally. 





Objective





- Vital Signs/Intake and Output


Vital Signs (last 24 hours): 


                                        











Temp Pulse Resp BP Pulse Ox


 


 97.6 F   95 H  18   141/70   97 


 


 01/26/19 06:00  01/26/19 06:00  01/26/19 06:00  01/26/19 06:00  01/26/19 06:00








Intake and Output: 


                                        











 01/26/19 01/26/19





 06:59 18:59


 


Intake Total 300 


 


Output Total 1500 


 


Balance -1200 














- Medications


Medications: 


                               Current Medications





Carbamazepine (Tegretol)  200 mg PO TID [facility]; Protocol


   Last Admin: 01/26/19 10:24 Dose:  200 mg


Fentanyl (Duragesic)  1 patch TD Q72 [facility]


   Last Admin: 01/25/19 15:34 Dose:  1 patch


Piperacillin Sod/Tazobactam Sod (Zosyn 3.375 In Ns 100ml)  100 mls @ 25 mls/hr 

IVPB Q8 [facility]; Protocol


   Stop: 02/05/19 14:01


Lorazepam (Ativan)  1 mg IVP Q6H PRN; Protocol


   PRN Reason: Seizure activity


Pantoprazole Sodium (Protonix Ec Tab)  40 mg PO ACB [facility]


   Last Admin: 01/26/19 10:12 Dose:  40 mg











- Labs


Labs: 


                                        





                                 01/26/19 06:35 





                                 01/26/19 06:35 











- Additional Findings


Additional findings: 





- Constitutional


Appears: Non-toxic, No Acute Distress





- Head Exam


Head Exam: ATRAUMATIC, NORMAL INSPECTION, NORMOCEPHALIC





- Eye Exam


Eye Exam: EOMI, Normal appearance, PERRL.  absent: Conjunctival injection, 

Scleral icterus





- ENT Exam


ENT Exam: Mucous Membranes Moist





- Neck Exam


Neck Exam: absent: Lymphadenopathy





- Respiratory Exam


Respiratory Exam: Decreased Breath Sounds, Clear to Ausculation Bilateral, 

NORMAL BREATHING PATTERN.  absent: Respiratory Distress





- Cardiovascular Exam


Cardiovascular Exam: +S1, +S2.  absent: Bradycardia, Tachycardia





- GI/Abdominal Exam


GI & Abdominal Exam: Soft, Normal Bowel Sounds.  absent: Firm, Guarding, Rigid, 

Tenderness





- Extremities Exam


Extremities Exam: Normal Capillary Refill.  absent: Calf Tenderness, Pedal Edema





- Neurological Exam


Neurological Exam: Alert, Awake, CN II-XII Intact





- Psychiatric Exam


Psychiatric exam: Normal Affect, Normal Mood





- Skin


Skin Exam: Dry, Intact, Normal Color, Warm





Assessment and Plan





- Assessment and Plan (Free Text)


Assessment: 





1. Sepsis likely secondary to complicated UTI with indwelling tolentino catheter- 

improving


2. Acute UTI pending culture and sensitivity


3. Delirium likely secondary to UTI- improved


4. Abdominal pain likely secondary to urinary retention- resolved


5. Chronic indwelling tolentino catheter


6. Prostate ca on Lupro monthly


7. Chronic pain


8. Seizure disorder


9. Wheelchair bound


10. Glaucoma


Plan: 





Patient's blood work, vitals, and imaging noted. Continue Merrem and Vancomycin 

as per ID. Patient blood cultures prelim negative x 2 and urine culture +Proteus

and +E. Faecalis pending sensitivity. Procalcitonin <0.05. CXR unremarkable. 

Will monitor patient closely for temp. If patient becomes delirious recommend 

redirection. Monitor patient's Is and Os strictly. Continue patient's tegretol 

for seizure disorder. Seizure precautions and prn ativan in place. Urology 

consulted- f/u reccs. Patent home dose Fentanyl patch restarted after confirming

with son.  PT on board. HoB above 30 degrees and fall risk protocol in place. 

HHD diet ordered. Will continue to monitor patient closely.





Discussed with Dr. Josefa Kaplan PGY3





<Nathan Rivero S - Last Filed: 01/26/19 19:50>





Objective





- Vital Signs/Intake and Output


Vital Signs (last 24 hours): 


                                        











Temp Pulse Resp BP Pulse Ox


 


 97.6 F   95 H  18   141/70   97 


 


 01/26/19 06:00  01/26/19 06:00  01/26/19 06:00  01/26/19 06:00  01/26/19 06:00











- Medications


Medications: 


                               Current Medications





Carbamazepine (Tegretol)  200 mg PO TID [facility]; Protocol


   Last Admin: 01/26/19 17:51 Dose:  200 mg


Fentanyl (Duragesic)  1 patch TD Q72 [facility]


   Last Admin: 01/25/19 15:34 Dose:  1 patch


Piperacillin Sod/Tazobactam Sod (Zosyn 3.375 In Ns 100ml)  100 mls @ 25 mls/hr 

IVPB Q8 [facility]; Protocol


   Stop: 02/05/19 14:01


   Last Admin: 01/26/19 14:54 Dose:  25 mls/hr


Lorazepam (Ativan)  1 mg IVP Q6H PRN; Protocol


   PRN Reason: Seizure activity


Pantoprazole Sodium (Protonix Ec Tab)  40 mg PO ACB [facility]


   Last Admin: 01/26/19 10:12 Dose:  40 mg











- Labs


Labs: 


                                        





                                 01/26/19 06:35 





                                 01/26/19 06:35 











Assessment and Plan





- Assessment and Plan (Free Text)


Plan: 





Pt seen and examined by me. I have reviewed the note of the medical resident and

I agree with it. I have discussed the assessment and plan with the resident. I 

have reviewed the medications and the last labs. Pt with sepsis due to UTI that 

has improved. He will continue with Tegretol for his seizures. The hyperkalemia 

nd hyponatremia have improved. Pt in on Fentanyl patch for pain. Eating well.

## 2019-01-26 NOTE — RAD
Date of service: 



01/26/2019



HISTORY:

 spiked temp overnight 



COMPARISON:

Comparison is made with 01/24/2019



TECHNIQUE:

Chest PA and lateral



FINDINGS:



LUNGS:

No significant interval changes noted.  Again seen are small 

opacities at the left lower lung.



PLEURA:

No significant pleural effusion identified. No pneumothorax apparent.



CARDIOVASCULAR:

No aortic atherosclerotic calcification present.



Normal cardiac size. No pulmonary vascular congestion. 



OSSEOUS STRUCTURES:

No significant abnormalities.



VISUALIZED UPPER ABDOMEN:

Normal.



OTHER FINDINGS:

None.



IMPRESSION:

No significant interval changes noted since the prior exam.

## 2019-01-26 NOTE — PN
DATE:  01/26/2019



SUBJECTIVE:  The patient is seen earlier today in 561, bed 1.  He is eating

breakfast.  No fevers, no chills.



PHYSICAL EXAMINATION:

VITAL SIGNS:  Temperature is 97, T-max was 101.1 yesterday, heart rate of

111, respiratory rate of 18.

HEENT:  Unremarkable.

NECK:  Supple.

LUNGS:  Have decreased breath sounds.

HEART:  Normal S1, S2.

ABDOMEN:  Soft and nontender.



LABORATORY DATA:  White count of 5.6, hemoglobin of 10, platelets of 143

and the chemistries reveals a BUN of 32, creatinine of 0.5.  Procalcitonin

is 0.05.  Urinalysis is noted and microbiology reveals urine culture with

Proteus mirabilis sensitive to Cipro, ertapenem, gentamicin, meropenem,

Zosyn and Enterococcus which is sensitive to ampicillin, that was

01/24/2019.  Blood cultures are no growth.  Review of orders reveals the

patient to be on IV vancomycin and meropenem.



ASSESSMENT AND PLAN:  This is an 89-year-old male who is comfortable and

seen earlier today, having breakfast, doing well.  Sepsis with a

complicated urinary tract infection with Proteus and Enterococcus with

metastatic prostate cancer with a history of Proteus bacteremia, history of

prostate cancer, day #3 of vancomycin, meropenem, we will discontinue the

vancomycin and meropenem and treat with Zosyn which has both the

Enterococcus and Proteus activity.







__________________________________________

Donta Alvarez MD





DD:  01/26/2019 11:46:36

DT:  01/26/2019 11:48:53

Job # [id number]

## 2019-01-27 LAB
ALBUMIN SERPL-MCNC: 3.8 G/DL (ref 3–4.8)
ALBUMIN/GLOB SERPL: 1 {RATIO} (ref 1.1–1.8)
ALT SERPL-CCNC: 28 U/L (ref 7–56)
AST SERPL-CCNC: 53 U/L (ref 17–59)
BASOPHILS # BLD AUTO: 0.01 K/MM3 (ref 0–2)
BASOPHILS NFR BLD: 0.2 % (ref 0–3)
BUN SERPL-MCNC: 24 MG/DL (ref 7–21)
CALCIUM SERPL-MCNC: 9.1 MG/DL (ref 8.4–10.5)
EOSINOPHIL # BLD: 0 10*3/UL (ref 0–0.7)
EOSINOPHIL NFR BLD: 0.5 % (ref 1.5–5)
ERYTHROCYTE [DISTWIDTH] IN BLOOD BY AUTOMATED COUNT: 13.2 % (ref 11.5–14.5)
GFR NON-AFRICAN AMERICAN: > 60
HGB BLD-MCNC: 10.5 G/DL (ref 14–18)
LYMPHOCYTES # BLD: 2.7 10*3/UL (ref 1.2–3.4)
LYMPHOCYTES NFR BLD AUTO: 47.1 % (ref 22–35)
MCH RBC QN AUTO: 31.1 PG (ref 25–35)
MCHC RBC AUTO-ENTMCNC: 33.5 G/DL (ref 31–37)
MCV RBC AUTO: 92.6 FL (ref 80–105)
MONOCYTES # BLD AUTO: 0.6 10*3/UL (ref 0.1–0.6)
MONOCYTES NFR BLD: 9.7 % (ref 1–6)
PLATELET # BLD: 160 10^3/UL (ref 120–450)
PMV BLD AUTO: 9.3 FL (ref 7–11)
RBC # BLD AUTO: 3.38 10^6/UL (ref 3.5–6.1)
WBC # BLD AUTO: 5.8 10^3/UL (ref 4.5–11)

## 2019-01-27 RX ADMIN — PIPERACILLIN AND TAZOBACTAM SCH MLS/HR: 3; .375 INJECTION, POWDER, LYOPHILIZED, FOR SOLUTION INTRAVENOUS; PARENTERAL at 05:10

## 2019-01-27 RX ADMIN — GUAIFENESIN AND DEXTROMETHORPHAN HYDROBROMIDE SCH TAB: 600; 30 TABLET, EXTENDED RELEASE ORAL at 14:46

## 2019-01-27 RX ADMIN — GUAIFENESIN AND DEXTROMETHORPHAN HYDROBROMIDE SCH TAB: 600; 30 TABLET, EXTENDED RELEASE ORAL at 17:58

## 2019-01-27 RX ADMIN — PANTOPRAZOLE SODIUM SCH MG: 40 TABLET, DELAYED RELEASE ORAL at 08:22

## 2019-01-27 RX ADMIN — PIPERACILLIN AND TAZOBACTAM SCH MLS/HR: 3; .375 INJECTION, POWDER, LYOPHILIZED, FOR SOLUTION INTRAVENOUS; PARENTERAL at 22:01

## 2019-01-27 RX ADMIN — PIPERACILLIN AND TAZOBACTAM SCH MLS/HR: 3; .375 INJECTION, POWDER, LYOPHILIZED, FOR SOLUTION INTRAVENOUS; PARENTERAL at 14:46

## 2019-01-27 NOTE — PN
DATE:  01/27/2019



SUBJECTIVE:  The patient seen earlier today in 561, bed 1.  He has had an

uneventful night.  He says he is comfortable.  There has been no fevers and

no chills.



PHYSICAL EXAMINATION

VITAL SIGNS:  Temperature is 97, blood pressure is 160/80, respiratory rate

of 18, heart rate of 99.

HEENT:  Unremarkable.

NECK:  Supple.

LUNGS:  Have decreased breath sounds.

HEART:  Normal S1, S2.

ABDOMEN:  Soft.



LABORATORY EXAMINATION:  Reveals a white count of 5.8, hemoglobin of 10,

platelets of 160.  BUN of 24, creatinine of 0.5.  Urinalysis is noted. 

Microbiology reviewed and review of orders reveals the patient to be on

Zosyn.



The patient's chest x-ray from yesterday reveals no significant change

since there is a small opacity of the left lower lung.



ASSESSMENT AND PLAN:  An 89-year-old male who is comfortable and seen

earlier today.  He is doing well and complicated urinary tract infection

with Proteus, Enterococcus with metastatic prostate cancer and with a

history of Proteus bacteremia, history of prostate cancer.  Today is day #4

of Zosyn for Proteus and Enterococcus, complicated urinary tract infection.

We will continue the Zosyn at this time.  We will follow with you.







__________________________________________

Donta Alvarez MD





DD:  01/27/2019 12:26:16

DT:  01/27/2019 12:28:06

Job # [id number]

## 2019-01-27 NOTE — CP.PCM.PN
<EusebioNayeli carroll - Last Filed: 01/27/19 11:18>





Subjective





- Date & Time of Evaluation


Date of Evaluation: 01/27/19


Time of Evaluation: 07:15





- Subjective


Subjective: 





[facility] progress note for Dr. Rivero





Patient seen and examined at bedside. As per nursing patient was afebrile with 

no acute events. Patient is voiding well and abdominal discomfort has resolved. 

Denied acute complaints of fever, chills, headache, chest pain, SOB, abd pain, 

nausea, vomiting, bowel complaints, pain/swelling in his legs bilaterally. 





Objective





- Vital Signs/Intake and Output


Vital Signs (last 24 hours): 


                                        











Temp Pulse Resp BP Pulse Ox


 


 97.3 F L  99 H  16   163/81 H  98 


 


 01/27/19 06:00  01/27/19 06:00  01/27/19 06:00  01/27/19 06:00  01/27/19 06:00








Intake and Output: 


                                        











 01/27/19 01/27/19





 06:59 18:59


 


Intake Total 120 


 


Output Total 1200 


 


Balance -1080 














- Medications


Medications: 


                               Current Medications





Carbamazepine (Tegretol)  200 mg PO TID [facility]; Protocol


   Last Admin: 01/26/19 17:51 Dose:  200 mg


Fentanyl (Duragesic)  1 patch TD Q72 [facility]


   Last Admin: 01/25/19 15:34 Dose:  1 patch


Piperacillin Sod/Tazobactam Sod (Zosyn 3.375 In Ns 100ml)  100 mls @ 25 mls/hr 

IVPB Q8 [facility]; Protocol


   Stop: 02/05/19 14:01


   Last Admin: 01/27/19 05:10 Dose:  25 mls/hr


Lorazepam (Ativan)  1 mg IVP Q6H PRN; Protocol


   PRN Reason: Seizure activity


   Last Admin: 01/26/19 21:39 Dose:  1 mg


Pantoprazole Sodium (Protonix Ec Tab)  40 mg PO ACB [facility]


   Last Admin: 01/26/19 10:12 Dose:  40 mg











- Labs


Labs: 


                                        





                                 01/26/19 06:35 





                                 01/26/19 06:35 











- Additional Findings


Additional findings: 





- Constitutional


Appears: Non-toxic, No Acute Distress





- Head Exam


Head Exam: ATRAUMATIC, NORMAL INSPECTION, NORMOCEPHALIC





- Eye Exam


Eye Exam: EOMI, Normal appearance, PERRL.  absent: Conjunctival injection, 

Scleral icterus





- ENT Exam


ENT Exam: Mucous Membranes Moist





- Neck Exam


Neck Exam: absent: Lymphadenopathy





- Respiratory Exam


Respiratory Exam: Decreased Breath Sounds, Clear to Ausculation Bilateral, 

NORMAL BREATHING PATTERN.  absent: Respiratory Distress





- Cardiovascular Exam


Cardiovascular Exam: +S1, +S2.  absent: Bradycardia, Tachycardia





- GI/Abdominal Exam


GI & Abdominal Exam: Soft, Normal Bowel Sounds.  absent: Firm, Guarding, Rigid, 

Tenderness





- Extremities Exam


Extremities Exam: Normal Capillary Refill.  absent: Calf Tenderness, Pedal Edema





- Neurological Exam


Neurological Exam: Alert, Awake, CN II-XII Intact





- Psychiatric Exam


Psychiatric exam: Normal Affect, Normal Mood





- Skin


Skin Exam: Dry, Intact, Normal Color, Warm





Assessment and Plan





- Assessment and Plan (Free Text)


Assessment: 





1. Sepsis likely secondary to complicated UTI with indwelling tolentino catheter- 

improving


2. Acute UTI 2/2 Proteus Mirabilis and E. Faecalis


3. Delirium likely secondary to UTI- resolved


4. Abdominal pain likely secondary to urinary retention- resolved


5. Chronic indwelling tolentino catheter


6. Prostate ca on Lupro monthly


7. Chronic pain


8. Seizure disorder


9. Wheelchair bound


10. Glaucoma


Plan: 





Patient's vitals, blood work, and imaging reviewed in chart. Repeat CXR no 

change from on admission. Pending repeat blood and urine cx from when patient 

spiked temp 2 days prior. Patient has been afebrile for 48 hours since. Urine 

culture on admission +Proteus and +E. Faecalis. Patient's abx adjusted as per 

ID. Started on Zosyn for 10 days. Patient received 3 days of Vanc and Merrem. 

Procalcitonin <0.05. Will monitor patient closely for temp. If patient becomes 

delirious recommend redirection. Monitor patient's Is and Os strictly. Patient 

has negative fluid balance. Continue patient's tegretol for seizure disorder. 

PRN ativan for seizures in place. Urology consulted- f/u reccs. Patent pain 

controlled with home Fentanyl patch. PT on board. HoB above 30 degrees and fall 

risk protocol in place. HHD diet ordered. GI ppx and SCDs on board. High fall 

risk and Seizure precautions in place.





Discussed with Dr. Josefa Kaplan PGY3





<Nathan Rivero - Last Filed: 01/27/19 11:49>





Objective





- Vital Signs/Intake and Output


Vital Signs (last 24 hours): 


                                        











Temp Pulse Resp BP Pulse Ox


 


 97.3 F L  99 H  16   163/81 H  96 


 


 01/27/19 08:30  01/27/19 08:30  01/27/19 08:30  01/27/19 08:30  01/27/19 08:30








Intake and Output: 


                                        











 01/27/19 01/27/19





 06:59 18:59


 


Intake Total 120 


 


Output Total 1200 


 


Balance -1080 














- Medications


Medications: 


                               Current Medications





Carbamazepine (Tegretol)  200 mg PO TID [facility]; Protocol


   Last Admin: 01/27/19 10:12 Dose:  200 mg


Fentanyl (Duragesic)  1 patch TD Q72 [facility]


   Last Admin: 01/25/19 15:34 Dose:  1 patch


Piperacillin Sod/Tazobactam Sod (Zosyn 3.375 In Ns 100ml)  100 mls @ 25 mls/hr 

IVPB Q8 [facility]; Protocol


   Stop: 02/05/19 14:01


   Last Admin: 01/27/19 05:10 Dose:  25 mls/hr


Lorazepam (Ativan)  1 mg IVP Q6H PRN; Protocol


   PRN Reason: Seizure activity


   Last Admin: 01/26/19 21:39 Dose:  1 mg


Pantoprazole Sodium (Protonix Ec Tab)  40 mg PO ACB [facility]


   Last Admin: 01/27/19 08:22 Dose:  40 mg











- Labs


Labs: 


                                        





                                 01/27/19 07:30 





                                 01/27/19 07:30 











Assessment and Plan





- Assessment and Plan (Free Text)


Plan: 





Pt seen and examined by me. I have reviewed the note of the medical resident and

I agree with it. I have discussed the assessment and plan with the resident. I 

have reviewed the medications and the last labs.Pt with sepsis due to UTI that 

has improved. Pt is on Zosyn for Abx. The UCx is positive. THe pt's delerium is 

resolved. Hyponatremia and hyperkalemia is resolved. Pain is controlled for F

entanyl patch. He is eating well.

## 2019-01-27 NOTE — PCM.URO
Urology Progress Note





- Subjective


Other: maintain tolentino.  continue  antibiotics.  will follow along





- Objective


Lab Results Last 24 Hours: 


                         Laboratory Results - last 24 hr











  01/27/19 01/27/19





  07:30 07:30


 


WBC  5.8 


 


RBC  3.38 L 


 


Hgb  10.5 L 


 


Hct  31.3 L 


 


MCV  92.6 


 


MCH  31.1 


 


MCHC  33.5 


 


RDW  13.2 


 


Plt Count  160 


 


MPV  9.3 


 


Neut % (Auto)  42.5 L 


 


Lymph % (Auto)  47.1 H 


 


Mono % (Auto)  9.7 H 


 


Eos % (Auto)  0.5 L 


 


Baso % (Auto)  0.2 


 


Lymph # (Auto)  2.7 


 


Mono # (Auto)  0.6 


 


Eos # (Auto)  0.0 


 


Baso # (Auto)  0.01 


 


Absolute Neuts (auto)  2.46 


 


Sodium   139


 


Potassium   3.8


 


Chloride   104


 


Carbon Dioxide   27


 


Anion Gap   11


 


BUN   24 H


 


Creatinine   0.5 L


 


Est GFR ( Amer)   > 60


 


Est GFR (Non-Af Amer)   > 60


 


Random Glucose   99


 


Calcium   9.1


 


Total Bilirubin   0.3


 


AST   53


 


ALT   28


 


Alkaline Phosphatase   105


 


Total Protein   7.5


 


Albumin   3.8


 


Globulin   3.7


 


Albumin/Globulin Ratio   1.0 L











Intake & Output: 


                                 Intake & Output











 01/27/19 01/27/19 01/28/19





 06:59 18:59 06:59


 


Intake Total 120 870 


 


Output Total 1200 800 


 


Balance -1080 70 


 


Intake:   


 


  IV  150 


 


    Right Hand  150 


 


  Oral 120 720 


 


Output:   


 


  Urine 1200 800 


 


    Urine, Voided 1200 800 


 


Other:   


 


  # Bowel Movements 1 2 











Vital Signs: 


                               Vital Signs - 24 hr











  01/26/19 01/27/19 01/27/19





  22:44 06:00 08:30


 


Temperature 98.2 F 97.3 F L 97.3 F L


 


Pulse Rate 94 H 99 H 99 H


 


Respiratory 18 16 16





Rate   


 


Blood Pressure 142/56 L 163/81 H 163/81 H


 


O2 Sat by Pulse 96 98 96





Oximetry   














  01/27/19





  14:00


 


Temperature 98 F


 


Pulse Rate 86


 


Respiratory 18





Rate 


 


Blood Pressure 130/66


 


O2 Sat by Pulse 96





Oximetry

## 2019-01-28 VITALS — HEART RATE: 88 BPM

## 2019-01-28 RX ADMIN — PIPERACILLIN AND TAZOBACTAM SCH MLS/HR: 3; .375 INJECTION, POWDER, LYOPHILIZED, FOR SOLUTION INTRAVENOUS; PARENTERAL at 22:33

## 2019-01-28 RX ADMIN — GUAIFENESIN AND DEXTROMETHORPHAN HYDROBROMIDE SCH TAB: 600; 30 TABLET, EXTENDED RELEASE ORAL at 09:51

## 2019-01-28 RX ADMIN — PIPERACILLIN AND TAZOBACTAM SCH MLS/HR: 3; .375 INJECTION, POWDER, LYOPHILIZED, FOR SOLUTION INTRAVENOUS; PARENTERAL at 13:55

## 2019-01-28 RX ADMIN — PIPERACILLIN AND TAZOBACTAM SCH MLS/HR: 3; .375 INJECTION, POWDER, LYOPHILIZED, FOR SOLUTION INTRAVENOUS; PARENTERAL at 05:17

## 2019-01-28 RX ADMIN — PANTOPRAZOLE SODIUM SCH MG: 40 TABLET, DELAYED RELEASE ORAL at 09:49

## 2019-01-28 RX ADMIN — GUAIFENESIN AND DEXTROMETHORPHAN HYDROBROMIDE SCH TAB: 600; 30 TABLET, EXTENDED RELEASE ORAL at 17:22

## 2019-01-28 NOTE — CP.PCM.PN
<EusebioNayeli - Last Filed: 01/28/19 17:16>





Subjective





- Date & Time of Evaluation


Date of Evaluation: 01/28/19


Time of Evaluation: 07:35





- Subjective


Subjective: 





[facility] progress note for Dr. Rivero





Patient seen and examined at bedside. As per nursing no acute events overnight. 

Patient remained afebrile and this AM he denied any acute fever, chills, 

headache, dizziness, chest pain, palpitations, SOB, abd pain, nausea, vomiting, 

bowel/bladder complaints, pain/swelling in his legs b/l. Patient is making good 

UO but darker than the day before. Continues to complain of a mild cough.





Objective





- Vital Signs/Intake and Output


Vital Signs (last 24 hours): 


                                        











Temp Pulse Resp BP Pulse Ox


 


 99 F   88   20   135/74   95 


 


 01/28/19 14:00  01/28/19 14:00  01/28/19 14:00  01/28/19 14:00  01/28/19 14:00








Intake and Output: 


                                        











 01/28/19 01/28/19





 06:59 18:59


 


Intake Total 320 


 


Output Total 600 


 


Balance -280 














- Medications


Medications: 


                               Current Medications





Carbamazepine (Tegretol)  200 mg PO TID [facility]; Protocol


   Last Admin: 01/28/19 13:56 Dose:  200 mg


Fentanyl (Duragesic)  1 patch TD Q72 [facility]


   Last Admin: 01/28/19 09:46 Dose:  1 patch


Guaifenesin (Robitussin)  200 mg PO Q4H PRN


   PRN Reason: Cough and congestion


Guaifenesin/Dextromethorphan (Mucinex-Dm 600-30 Mg)  1 tab PO BID R[facility]AR


   Last Admin: 01/28/19 09:51 Dose:  1 tab


Piperacillin Sod/Tazobactam Sod (Zosyn 3.375 In Ns 100ml)  100 mls @ 25 mls/hr 

IVPB Q8 [facility]; Protocol


   Stop: 02/05/19 14:01


   Last Admin: 01/28/19 13:55 Dose:  25 mls/hr


Lorazepam (Ativan)  1 mg IVP Q6H PRN; Protocol


   PRN Reason: Seizure activity


   Last Admin: 01/27/19 22:00 Dose:  1 mg


Pantoprazole Sodium (Protonix Ec Tab)  40 mg PO ACB R[facility]AR


   Last Admin: 01/28/19 09:49 Dose:  40 mg











- Labs


Labs: 


                                        





                                 01/27/19 07:30 





                                 01/27/19 07:30 











Assessment and Plan





- Assessment and Plan (Free Text)


Assessment: 





1. Complicated UTI with indwelling tolentino catheter- improving


2. Chronic indwelling tolentino catheter


3. Prostate ca on Lupro monthly


4. Chronic pain


5. Seizure disorder


6. Wheelchair bound


7. Glaucoma


Plan: 





Patient's vitals, blood work, and imaging reviewed in chart. Repeat CXR no 

change from on admission. Repeat blood culture negative prelim x 2. Urine 

culture on admission +Proteus and +E. Faecalis. Patient's abx adjusted as per 

ID- patient to continue Zosyn. Procalcitonin <0.05. If patient becomes delirious

recommend redirection. Monitor patient's Is and Os strictly. Patient has 

negative fluid balance. Continue patient's tegretol for seizure disorder. PRN 

ativan for seizures in place. Urology on board. Patent pain controlled with home

Fentanyl patch. PT on board. HoB above 30 degrees and fall risk protocol in 

place. HHD diet ordered. GI ppx and SCDs on board. High fall risk and Seizure 

precautions in place. Patient pending placement in TCU vs [facility] as he needs IV abx

for 3-5 more days.





Discussed with Dr. Josfea Kaplan PGY3





<Nathan Rivero S - Last Filed: 01/28/19 17:28>





Objective





- Vital Signs/Intake and Output


Vital Signs (last 24 hours): 


                                        











Temp Pulse Resp BP Pulse Ox


 


 99 F   88   20   135/74   95 


 


 01/28/19 14:00  01/28/19 14:00  01/28/19 14:00  01/28/19 14:00  01/28/19 14:00








Intake and Output: 


                                        











 01/28/19 01/28/19





 06:59 18:59


 


Intake Total 320 


 


Output Total 600 


 


Balance -280 














- Medications


Medications: 


                               Current Medications





Carbamazepine (Tegretol)  200 mg PO TID [facility]; Protocol


   Last Admin: 01/28/19 17:22 Dose:  200 mg


Fentanyl (Duragesic)  1 patch TD Q72 RICHAR


   Last Admin: 01/28/19 09:46 Dose:  1 patch


Guaifenesin (Robitussin)  200 mg PO Q4H PRN


   PRN Reason: Cough and congestion


Guaifenesin/Dextromethorphan (Mucinex-Dm 600-30 Mg)  1 tab PO BID RICHAR


   Last Admin: 01/28/19 17:22 Dose:  1 tab


Piperacillin Sod/Tazobactam Sod (Zosyn 3.375 In Ns 100ml)  100 mls @ 25 mls/hr 

IVPB Q8 RICHAR; Protocol


   Stop: 02/05/19 14:01


   Last Admin: 01/28/19 13:55 Dose:  25 mls/hr


Lorazepam (Ativan)  1 mg IVP Q6H PRN; Protocol


   PRN Reason: Seizure activity


   Last Admin: 01/27/19 22:00 Dose:  1 mg


Pantoprazole Sodium (Protonix Ec Tab)  40 mg PO ACB RICHAR


   Last Admin: 01/28/19 09:49 Dose:  40 mg











- Labs


Labs: 


                                        





                                 01/27/19 07:30 





                                 01/27/19 07:30 











Assessment and Plan





- Assessment and Plan (Free Text)


Plan: 





Pt seen and examined by me. I have reviewed the note of the medical resident and

I agree with it. I have discussed the assessment and plan with the resident. I 

have reviewed the medications and the last labs. Pt with sepsis due to UTI. He 

is on Abx. Pt needs 3 more days of Abx. May need TCU or [facility]. His hyponatremia nd

hyperkalemia is better. He has prostate cancer. Urology is following.He has a 

tolentino that is draining clear urine. No pain.

## 2019-01-28 NOTE — CP.PCM.DIS
Provider





- Provider


Date of Admission: 


01/24/19 06:54





Attending physician: 


Nathan Rivero MD





Primary care physician: 


Sami Thakur MD





Consults: 








01/24/19 08:22


Consult [Physician Consult] Routine 


   Comment: 


   Consulting Provider: Donta Alvarez


   Consulting Physician: Donta Alvarez


   Reason for Consult: UTI





01/24/19 19:27


Physician Consult Routine 


   Comment: 


   Consulting Provider: Price Jackson


   Consulting Physician: Price Jackson


   Reason for Consult: dark urine from indwelling tolentino catheter





01/24/19 19:49


Case Management Referral Routine 


   Comment: NEEDS 24-7 CARE,HAS ONE. CONFUSED,ASSIST TO WALK


   Physician Instructions: 


   Reason For Exam: EVALUATION


   Reason for Referral:  Eval


Inpatient NP Core Measures Referral Routine 


   Comment: 


   Physician Instructions: 


   Reason For Exam: EVALUATION


Nursing Referral for Wound Care Routine 


   Comment: A RISK FOR SKIN BREAKDOWN


   Physician Instructions: 


   Reason For Exam: EVALUATION


Transition In Care/Readmission Reduction Routine 


   Comment: 


   Physician Instructions: 


   Reason For Exam: EVALUATION





01/24/19 19:55


Nursing Referral for Palliative Care Routine 


   Comment: 


   Physician Instructions: 


   Reason For Exam: EVALUATION


Social Work Referral Routine 


   Comment: DISCHARGE PLANNING WITH ASSISTANCE AT HOME


   Physician Instructions: 


   Reason For Exam: EVALUATION-HAS 24-7 CARE





01/25/19 08:38


Physician Consult Routine 


   Comment: 


   Consulting Provider: Chase Lyons


   Consulting Physician: Chase Lyons


   Reason for Consult: uti














Hospital Course





- Lab Results


Lab Results: 


                                  Micro Results





01/24/19 07:30   Blood-Venous   Blood Culture - Preliminary


                            NO GROWTH AFTER 4 DAYS


01/24/19 07:10   Blood-Venous   Blood Culture - Preliminary


                            NO GROWTH AFTER 4 DAYS


01/26/19 13:24   Blood-Venous   Blood Culture - Preliminary


                            NO GROWTH AFTER 24 HOURS


01/26/19 13:00   Blood-Venous   Blood Culture - Preliminary


                            NO GROWTH AFTER 24 HOURS


01/24/19 06:00   Urine,Clean Catch   Urine Culture - Final


                            Proteus Mirabilis


                            Enterococcus Faecalis





                             Most Recent Lab Values











WBC  5.8 10^3/uL (4.5-11.0)   01/27/19  07:30    


 


RBC  3.38 10^6/uL (3.5-6.1)  L  01/27/19  07:30    


 


Hgb  10.5 g/dL (14.0-18.0)  L  01/27/19  07:30    


 


Hct  31.3 % (42.0-52.0)  L  01/27/19  07:30    


 


MCV  92.6 fl (80.0-105.0)   01/27/19  07:30    


 


MCH  31.1 pg (25.0-35.0)   01/27/19  07:30    


 


MCHC  33.5 g/dl (31.0-37.0)   01/27/19  07:30    


 


RDW  13.2 % (11.5-14.5)   01/27/19  07:30    


 


Plt Count  160 10^3/uL (120.0-450.0)   01/27/19  07:30    


 


MPV  9.3 fl (7.0-11.0)   01/27/19  07:30    


 


Gran %  49.4 % (50.0-68.0)  L  01/25/19  06:30    


 


Neut % (Auto)  42.5 % (50.0-68.0)  L  01/27/19  07:30    


 


Lymph % (Auto)  47.1 % (22.0-35.0)  H  01/27/19  07:30    


 


Mono % (Auto)  9.7 % (1.0-6.0)  H  01/27/19  07:30    


 


Eos % (Auto)  0.5 % (1.5-5.0)  L  01/27/19  07:30    


 


Baso % (Auto)  0.2 % (0.0-3.0)   01/27/19  07:30    


 


Gran #  3.27  (1.4-6.5)   01/25/19  06:30    


 


Lymph # (Auto)  2.7  (1.2-3.4)   01/27/19  07:30    


 


Mono # (Auto)  0.6  (0.1-0.6)   01/27/19  07:30    


 


Eos # (Auto)  0.0  (0.0-0.7)   01/27/19  07:30    


 


Baso # (Auto)  0.01 K/mm3 (0.0-2.0)   01/27/19  07:30    


 


Absolute Neuts (auto)  2.46  (1.4-6.5)   01/27/19  07:30    


 


pO2  49 mm/Hg (30-55)   01/24/19  07:25    


 


VBG pH  7.36  (7.32-7.43)   01/24/19  07:25    


 


VBG pCO2  41.0  (40-60)   01/24/19  07:25    


 


VBG HCO3  23.2 mmol/l (21-28)   01/24/19  07:25    


 


VBG Total CO2  24.5 mmol.L (22-28)   01/24/19  07:25    


 


VBG O2 Sat (Calc)  86.9 % (40-65)  H  01/24/19  07:25    


 


VBG Base Excess  -2.2 mmol/L (0.0-2.0)  L  01/24/19  07:25    


 


VBG Potassium  5.2 mmol/L (3.6-5.2)   01/24/19  07:25    


 


Sodium  123.0 mmol/L (132-148)  L  01/24/19  07:25    


 


Chloride  92.0 mmol/L ()  L  01/24/19  07:25    


 


Glucose  TEST NOT PERFORMED   01/24/19  07:25    


 


Lactate  1.6 mmol/L (0.7-2.1)   01/24/19  07:25    


 


FiO2  21.0 %  01/24/19  07:25    


 


Sodium  139 mmol/L (132-148)   01/27/19  07:30    


 


Potassium  3.8 mmol/L (3.6-5.0)   01/27/19  07:30    


 


Chloride  104 mmol/L ()   01/27/19  07:30    


 


Carbon Dioxide  27 mmol/L (21-33)   01/27/19  07:30    


 


Anion Gap  11  (10-20)   01/27/19  07:30    


 


BUN  24 mg/dL (7-21)  H  01/27/19  07:30    


 


Creatinine  0.5 mg/dl (0.8-1.5)  L  01/27/19  07:30    


 


Est GFR ( Amer)  > 60   01/27/19  07:30    


 


Est GFR (Non-Af Amer)  > 60   01/27/19  07:30    


 


Random Glucose  99 mg/dL ()   01/27/19  07:30    


 


Serum Osmolality  273 mosm/kg (272-300)   01/24/19  06:10    


 


Calcium  9.1 mg/dL (8.4-10.5)   01/27/19  07:30    


 


Phosphorus  4.5 mg/dL (2.5-4.5)   01/25/19  06:30    


 


Magnesium  1.9 mg/dL (1.7-2.2)   01/25/19  06:30    


 


Total Bilirubin  0.3 mg/dL (0.2-1.3)   01/27/19  07:30    


 


AST  53 U/L (17-59)   01/27/19  07:30    


 


ALT  28 U/L (7-56)   01/27/19  07:30    


 


Alkaline Phosphatase  105 U/L ()   01/27/19  07:30    


 


Total Protein  7.5 g/dL (5.8-8.3)   01/27/19  07:30    


 


Albumin  3.8 g/dL (3.0-4.8)   01/27/19  07:30    


 


Globulin  3.7 gm/dL  01/27/19  07:30    


 


Albumin/Globulin Ratio  1.0  (1.1-1.8)  L  01/27/19  07:30    


 


Procalcitonin  < 0.05 NG/ML (0.19-0.49)  L  01/24/19  07:25    


 


Venous Blood Potassium  5.2 mmol/L (3.6-5.2)   01/24/19  07:25    


 


Urine Color  Red  (YELLOW)   01/24/19  06:10    


 


Urine Appearance  Cloudy  (CLEAR)   01/24/19  06:10    


 


Urine pH  8.5  (4.7-8.0)   01/24/19  06:10    


 


Ur Specific Gravity  1.020  (1.005-1.035)   01/24/19  06:10    


 


Urine Protein  100 mg/dL (<30 mg/dL)  H  01/24/19  06:10    


 


Urine Glucose (UA)  Negative mg/dL (NEGATIVE)   01/24/19  06:10    


 


Urine Ketones  Negative mg/dL (NEGATIVE)   01/24/19  06:10    


 


Urine Blood  Large  (NEGATIVE)  H  01/24/19  06:10    


 


Urine Nitrate  Positive  (NEGATIVE)  H  01/24/19  06:10    


 


Urine Bilirubin  Negative  (NEGATIVE)   01/24/19  06:10    


 


Urine Urobilinogen  0.2 E.U./dL (<1 E.U./dL)   01/24/19  06:10    


 


Ur Leukocyte Esterase  Moderate Martita/uL (NEGATIVE)  H  01/24/19  06:10    


 


Urine RBC  2 - 5 /hpf (0-2)  H  01/24/19  06:10    


 


Urine WBC  15 - 20 /hpf (0-6)  H  01/24/19  06:10    


 


Ur Epithelial Cells  0 - 2 /hpf (0-5)   01/24/19  06:10    


 


Urine Bacteria  Many /hpf (NONE)   01/24/19  06:10    


 


Urine Osmolality  223 mosm/kg (300-1000)  L  01/24/19  15:37    


 


Ur Random Sodium  16 meq/L  01/24/19  15:37    














Discharge Exam





- Head Exam


Head Exam: ATRAUMATIC, NORMAL INSPECTION, NORMOCEPHALIC





Discharge Plan





- Follow Up Plan


Condition: FAIR


Disposition: HOME/ ROUTINE


Referrals: 


Sami Thakur MD [Primary Care Provider] -

## 2019-01-28 NOTE — CP.PCM.PN
Subjective





- Date & Time of Evaluation


Date of Evaluation: 01/28/19


Time of Evaluation: 09:30





- Subjective


Subjective: 





Comfortable, no fevers, no abdominal pain.





Objective





- Vital Signs/Intake and Output


Vital Signs (last 24 hours): 


                                        











Temp Pulse Resp BP Pulse Ox


 


 98 F   86   20   169/75 H  96 


 


 01/28/19 06:00  01/28/19 06:00  01/28/19 06:00  01/28/19 06:00  01/28/19 06:00








Intake and Output: 


                                        











 01/28/19 01/28/19





 06:59 18:59


 


Intake Total 320 


 


Output Total 600 


 


Balance -280 














- Medications


Medications: 


                               Current Medications





Carbamazepine (Tegretol)  200 mg PO TID On license of [facility]; Protocol


   Last Admin: 01/28/19 09:48 Dose:  200 mg


Fentanyl (Duragesic)  1 patch TD Q72 On license of [facility]


   Last Admin: 01/28/19 09:46 Dose:  1 patch


Guaifenesin (Robitussin)  200 mg PO Q4H PRN


   PRN Reason: Cough and congestion


Guaifenesin/Dextromethorphan (Mucinex-Dm 600-30 Mg)  1 tab PO BID On license of [facility]


   Last Admin: 01/27/19 17:58 Dose:  1 tab


Piperacillin Sod/Tazobactam Sod (Zosyn 3.375 In Ns 100ml)  100 mls @ 25 mls/hr 

IVPB Q8 On license of [facility]; Protocol


   Stop: 02/05/19 14:01


   Last Admin: 01/28/19 05:17 Dose:  25 mls/hr


Lorazepam (Ativan)  1 mg IVP Q6H PRN; Protocol


   PRN Reason: Seizure activity


   Last Admin: 01/27/19 22:00 Dose:  1 mg


Pantoprazole Sodium (Protonix Ec Tab)  40 mg PO ACB On license of [facility]


   Last Admin: 01/28/19 09:49 Dose:  40 mg











- Labs


Labs: 


                                        





                                 01/27/19 07:30 





                                 01/27/19 07:30 











- Constitutional


Appears: No Acute Distress, Chronically Ill





- Head Exam


Head Exam: NORMAL INSPECTION





- Respiratory Exam


Respiratory Exam: Decreased Breath Sounds





- Cardiovascular Exam


Cardiovascular Exam: +S1, +S2





- GI/Abdominal Exam


GI & Abdominal Exam: Soft.  absent: Tenderness





Assessment and Plan





- Assessment and Plan (Free Text)


Plan: 





Assessment


consider sepsis due to complicated UTI in this patient with indwelling Waddell 

catheter and metastatic prostate cancer, growing Proteus and E. faecalis in the 

urine


history of Proteus bacteremia, source unclear, concerned about intra-abdominal 

infection


history of UTI in this patient with prostate cancer with metastasis


history of Klebsiella urinary tract infection


Mechanical fall, etiology to be determined


prostate cancer on Lupron treatment once a month (hormonal treatment)


seizure disorder


glaucoma





Plan


continue Zosyn day 5 to complete 7-10 days


follow up further Urology recommendations 


will continue to monitor clinically

## 2019-01-29 ENCOUNTER — HOSPITAL ENCOUNTER (INPATIENT)
Dept: HOSPITAL 42 - TRCU | Age: 84
LOS: 4 days | Discharge: HOME | DRG: 698 | End: 2019-02-02
Attending: INTERNAL MEDICINE | Admitting: INTERNAL MEDICINE
Payer: MEDICARE

## 2019-01-29 VITALS
SYSTOLIC BLOOD PRESSURE: 160 MMHG | OXYGEN SATURATION: 98 % | DIASTOLIC BLOOD PRESSURE: 68 MMHG | RESPIRATION RATE: 18 BRPM | TEMPERATURE: 98.5 F

## 2019-01-29 VITALS — BODY MASS INDEX: 27.6 KG/M2

## 2019-01-29 DIAGNOSIS — G89.29: ICD-10-CM

## 2019-01-29 DIAGNOSIS — K21.9: ICD-10-CM

## 2019-01-29 DIAGNOSIS — H40.9: ICD-10-CM

## 2019-01-29 DIAGNOSIS — F03.90: ICD-10-CM

## 2019-01-29 DIAGNOSIS — T83.511A: Primary | ICD-10-CM

## 2019-01-29 DIAGNOSIS — Z99.3: ICD-10-CM

## 2019-01-29 DIAGNOSIS — C61: ICD-10-CM

## 2019-01-29 DIAGNOSIS — Z79.2: ICD-10-CM

## 2019-01-29 DIAGNOSIS — Z66: ICD-10-CM

## 2019-01-29 DIAGNOSIS — G40.909: ICD-10-CM

## 2019-01-29 DIAGNOSIS — Z91.81: ICD-10-CM

## 2019-01-29 DIAGNOSIS — A41.9: ICD-10-CM

## 2019-01-29 DIAGNOSIS — Z87.891: ICD-10-CM
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
## 2019-01-29 DIAGNOSIS — N39.0: ICD-10-CM

## 2019-01-29 DIAGNOSIS — J06.9: ICD-10-CM

## 2019-01-29 DIAGNOSIS — Z79.818: ICD-10-CM

## 2019-01-29 RX ADMIN — PIPERACILLIN AND TAZOBACTAM SCH MLS/HR: 3; .375 INJECTION, POWDER, LYOPHILIZED, FOR SOLUTION INTRAVENOUS; PARENTERAL at 13:37

## 2019-01-29 RX ADMIN — PANTOPRAZOLE SODIUM SCH MG: 40 TABLET, DELAYED RELEASE ORAL at 10:11

## 2019-01-29 RX ADMIN — PIPERACILLIN AND TAZOBACTAM SCH MLS/HR: 3; .375 INJECTION, POWDER, LYOPHILIZED, FOR SOLUTION INTRAVENOUS; PARENTERAL at 21:25

## 2019-01-29 RX ADMIN — GUAIFENESIN AND DEXTROMETHORPHAN HYDROBROMIDE SCH TAB: 600; 30 TABLET, EXTENDED RELEASE ORAL at 10:11

## 2019-01-29 RX ADMIN — GUAIFENESIN AND DEXTROMETHORPHAN HYDROBROMIDE SCH TAB: 600; 30 TABLET, EXTENDED RELEASE ORAL at 17:26

## 2019-01-29 RX ADMIN — PIPERACILLIN AND TAZOBACTAM SCH MLS/HR: 3; .375 INJECTION, POWDER, LYOPHILIZED, FOR SOLUTION INTRAVENOUS; PARENTERAL at 05:02

## 2019-01-29 NOTE — CP.PCM.DIS
<Nayeli Kaplan - Last Filed: 01/29/19 12:50>





Provider





- Provider


Date of Admission: 


01/24/19 06:54





Attending physician: 


Nathan Rivero MD





Primary care physician: 


Sami Thakur MD





Consults: 








01/24/19 08:22


Consult [Physician Consult] Routine 


   Comment: 


   Consulting Provider: Donta Alvarez


   Consulting Physician: Donta Alvarez


   Reason for Consult: UTI





01/24/19 19:27


Physician Consult Routine 


   Comment: 


   Consulting Provider: Price Jackson


   Consulting Physician: Price Jackson


   Reason for Consult: dark urine from indwelling tolentino catheter





01/24/19 19:49


Case Management Referral Routine 


   Comment: NEEDS 24-7 CARE,HAS ONE. CONFUSED,ASSIST TO WALK


   Physician Instructions: 


   Reason For Exam: EVALUATION


   Reason for Referral:  Eval


Inpatient NP Core Measures Referral Routine 


   Comment: 


   Physician Instructions: 


   Reason For Exam: EVALUATION


Nursing Referral for Wound Care Routine 


   Comment: A RISK FOR SKIN BREAKDOWN


   Physician Instructions: 


   Reason For Exam: EVALUATION


Transition In Care/Readmission Reduction Routine 


   Comment: 


   Physician Instructions: 


   Reason For Exam: EVALUATION





01/24/19 19:55


Nursing Referral for Palliative Care Routine 


   Comment: 


   Physician Instructions: 


   Reason For Exam: EVALUATION


Social Work Referral Routine 


   Comment: DISCHARGE PLANNING WITH ASSISTANCE AT HOME


   Physician Instructions: 


   Reason For Exam: EVALUATION-HAS 24-7 CARE





01/25/19 08:38


Physician Consult Routine 


   Comment: 


   Consulting Provider: Chase Lyons


   Consulting Physician: Chase Lyons


   Reason for Consult: uti





01/28/19 11:51


Evaluation for TRCU Routine 


   Comment: 


   Physician Instructions: 


   Reason For Exam: weakness; deconditioning











Time Spent in preparation of Discharge (in minutes): 45





Hospital Course





- Lab Results


Lab Results: 


                                  Micro Results





01/27/19 16:00   Urine,Tolentino   Urine Culture - Final


                            No Growth (<1,000 CFU/ML)


01/24/19 07:30   Blood-Venous   Blood Culture - Final


                            NO GROWTH AFTER 5 DAYS


01/24/19 07:30   Blood-Venous   Gram Stain - Final


                            TEST NOT PERFORMED


01/24/19 07:10   Blood-Venous   Blood Culture - Final


                            NO GROWTH AFTER 5 DAYS


01/24/19 07:10   Blood-Venous   Gram Stain - Final


                            TEST NOT PERFORMED


01/26/19 13:24   Blood-Venous   Blood Culture - Preliminary


                            NO GROWTH AFTER 48 HOURS


01/26/19 13:00   Blood-Venous   Blood Culture - Preliminary


                            NO GROWTH AFTER 48 HOURS


01/24/19 06:00   Urine,Clean Catch   Urine Culture - Final


                            Proteus Mirabilis


                            Enterococcus Faecalis





                             Most Recent Lab Values











WBC  5.8 10^3/uL (4.5-11.0)   01/27/19  07:30    


 


RBC  3.38 10^6/uL (3.5-6.1)  L  01/27/19  07:30    


 


Hgb  10.5 g/dL (14.0-18.0)  L  01/27/19  07:30    


 


Hct  31.3 % (42.0-52.0)  L  01/27/19  07:30    


 


MCV  92.6 fl (80.0-105.0)   01/27/19  07:30    


 


MCH  31.1 pg (25.0-35.0)   01/27/19  07:30    


 


MCHC  33.5 g/dl (31.0-37.0)   01/27/19  07:30    


 


RDW  13.2 % (11.5-14.5)   01/27/19  07:30    


 


Plt Count  160 10^3/uL (120.0-450.0)   01/27/19  07:30    


 


MPV  9.3 fl (7.0-11.0)   01/27/19  07:30    


 


Gran %  49.4 % (50.0-68.0)  L  01/25/19  06:30    


 


Neut % (Auto)  42.5 % (50.0-68.0)  L  01/27/19  07:30    


 


Lymph % (Auto)  47.1 % (22.0-35.0)  H  01/27/19  07:30    


 


Mono % (Auto)  9.7 % (1.0-6.0)  H  01/27/19  07:30    


 


Eos % (Auto)  0.5 % (1.5-5.0)  L  01/27/19  07:30    


 


Baso % (Auto)  0.2 % (0.0-3.0)   01/27/19  07:30    


 


Gran #  3.27  (1.4-6.5)   01/25/19  06:30    


 


Lymph # (Auto)  2.7  (1.2-3.4)   01/27/19  07:30    


 


Mono # (Auto)  0.6  (0.1-0.6)   01/27/19  07:30    


 


Eos # (Auto)  0.0  (0.0-0.7)   01/27/19  07:30    


 


Baso # (Auto)  0.01 K/mm3 (0.0-2.0)   01/27/19  07:30    


 


Absolute Neuts (auto)  2.46  (1.4-6.5)   01/27/19  07:30    


 


pO2  49 mm/Hg (30-55)   01/24/19  07:25    


 


VBG pH  7.36  (7.32-7.43)   01/24/19  07:25    


 


VBG pCO2  41.0  (40-60)   01/24/19  07:25    


 


VBG HCO3  23.2 mmol/l (21-28)   01/24/19  07:25    


 


VBG Total CO2  24.5 mmol.L (22-28)   01/24/19  07:25    


 


VBG O2 Sat (Calc)  86.9 % (40-65)  H  01/24/19  07:25    


 


VBG Base Excess  -2.2 mmol/L (0.0-2.0)  L  01/24/19  07:25    


 


VBG Potassium  5.2 mmol/L (3.6-5.2)   01/24/19  07:25    


 


Sodium  123.0 mmol/L (132-148)  L  01/24/19  07:25    


 


Chloride  92.0 mmol/L ()  L  01/24/19  07:25    


 


Glucose  TEST NOT PERFORMED   01/24/19  07:25    


 


Lactate  1.6 mmol/L (0.7-2.1)   01/24/19  07:25    


 


FiO2  21.0 %  01/24/19  07:25    


 


Sodium  139 mmol/L (132-148)   01/27/19  07:30    


 


Potassium  3.8 mmol/L (3.6-5.0)   01/27/19  07:30    


 


Chloride  104 mmol/L ()   01/27/19  07:30    


 


Carbon Dioxide  27 mmol/L (21-33)   01/27/19  07:30    


 


Anion Gap  11  (10-20)   01/27/19  07:30    


 


BUN  24 mg/dL (7-21)  H  01/27/19  07:30    


 


Creatinine  0.5 mg/dl (0.8-1.5)  L  01/27/19  07:30    


 


Est GFR ( Amer)  > 60   01/27/19  07:30    


 


Est GFR (Non-Af Amer)  > 60   01/27/19  07:30    


 


Random Glucose  99 mg/dL ()   01/27/19  07:30    


 


Serum Osmolality  273 mosm/kg (272-300)   01/24/19  06:10    


 


Calcium  9.1 mg/dL (8.4-10.5)   01/27/19  07:30    


 


Phosphorus  4.5 mg/dL (2.5-4.5)   01/25/19  06:30    


 


Magnesium  1.9 mg/dL (1.7-2.2)   01/25/19  06:30    


 


Total Bilirubin  0.3 mg/dL (0.2-1.3)   01/27/19  07:30    


 


AST  53 U/L (17-59)   01/27/19  07:30    


 


ALT  28 U/L (7-56)   01/27/19  07:30    


 


Alkaline Phosphatase  105 U/L ()   01/27/19  07:30    


 


Total Protein  7.5 g/dL (5.8-8.3)   01/27/19  07:30    


 


Albumin  3.8 g/dL (3.0-4.8)   01/27/19  07:30    


 


Globulin  3.7 gm/dL  01/27/19  07:30    


 


Albumin/Globulin Ratio  1.0  (1.1-1.8)  L  01/27/19  07:30    


 


Procalcitonin  < 0.05 NG/ML (0.19-0.49)  L  01/24/19  07:25    


 


Venous Blood Potassium  5.2 mmol/L (3.6-5.2)   01/24/19  07:25    


 


Urine Color  Red  (YELLOW)   01/24/19  06:10    


 


Urine Appearance  Cloudy  (CLEAR)   01/24/19  06:10    


 


Urine pH  8.5  (4.7-8.0)   01/24/19  06:10    


 


Ur Specific Gravity  1.020  (1.005-1.035)   01/24/19  06:10    


 


Urine Protein  100 mg/dL (<30 mg/dL)  H  01/24/19  06:10    


 


Urine Glucose (UA)  Negative mg/dL (NEGATIVE)   01/24/19  06:10    


 


Urine Ketones  Negative mg/dL (NEGATIVE)   01/24/19  06:10    


 


Urine Blood  Large  (NEGATIVE)  H  01/24/19  06:10    


 


Urine Nitrate  Positive  (NEGATIVE)  H  01/24/19  06:10    


 


Urine Bilirubin  Negative  (NEGATIVE)   01/24/19  06:10    


 


Urine Urobilinogen  0.2 E.U./dL (<1 E.U./dL)   01/24/19  06:10    


 


Ur Leukocyte Esterase  Moderate Martita/uL (NEGATIVE)  H  01/24/19  06:10    


 


Urine RBC  2 - 5 /hpf (0-2)  H  01/24/19  06:10    


 


Urine WBC  15 - 20 /hpf (0-6)  H  01/24/19  06:10    


 


Ur Epithelial Cells  0 - 2 /hpf (0-5)   01/24/19  06:10    


 


Urine Bacteria  Many /hpf (NONE)   01/24/19  06:10    


 


Urine Osmolality  223 mosm/kg (300-1000)  L  01/24/19  15:37    


 


Ur Random Sodium  16 meq/L  01/24/19  15:37    














- Hospital Course


Hospital Course: 





Upon Admission


As per HPI: 89yo male PMHx metastatic prostate cancer s/p ADT and palliative 

radiation on Lupron, seizures, glaucoma, dementia, UTI w/ indwelling tolentino (+ 

for E.coli, Klebsiella, and Enterococcus in the past) brought to ED by son for 

ower abdominal pain for the past 1-2 days, associated with dark colored urine. 

Patient was slightly lethargic and history obtained from both son and patient. 

Patient has visiting nurse come every month to change catheter; last change was 

1/15/19. However over the past 1-2 days he was noted to be tired and with 

decreased and dark UO. Patient denied other complaints of fever, chills, 

headache, dizziness, chest pain, palpitations, SOB, cough, nausea, vomiting, 

bowel complaints, pain/swelling in his legs bilaterally. 





Hospital Course


Patient admitted to med/surg for sepsis likely secondary to complicated UTI with

indwelling tolentino. Blood and urine cultures ordered. Patient had procal < 0.05. 

He was started on Merrem and Vanc as per ID. Urine culture was +Proteus and +E. 

Faecalis and patient's abx was changed to Zosyn. Urology consulted for gross 

hematuria with history of chronic indwelling tolentino. Patient was given 

medications for URI and cough which he reported was improving. As per ID patient

needed IV abx for an extended period. Patient was medically optimized for 

discharge to TCU.





Upon Discharge


Patient discharged to TCU for continued antibiotics and further management.





Please note this is a discharge summary.


For full hospital course please refer to EMR.





Discharge Exam





- Additional Findings


Additional findings: 





- Constitutional


Appears: Non-toxic, No Acute Distress





- Head Exam


Head Exam: ATRAUMATIC, NORMAL INSPECTION, NORMOCEPHALIC





- Eye Exam


Eye Exam: EOMI, Normal appearance, PERRL.  absent: Conjunctival injection, 

Scleral icterus





- ENT Exam


ENT Exam: Mucous Membranes Moist





- Neck Exam


Neck Exam: absent: Lymphadenopathy





- Respiratory Exam


Respiratory Exam: Decreased Breath Sounds, Clear to Ausculation Bilateral, 

NORMAL BREATHING PATTERN.  absent: Respiratory Distress





- Cardiovascular Exam


Cardiovascular Exam: +S1, +S2.  absent: Bradycardia, Tachycardia





- GI/Abdominal Exam


GI & Abdominal Exam: Soft, Normal Bowel Sounds.  absent: Firm, Guarding, Rigid, 

Tenderness





- Extremities Exam


Extremities Exam: Normal Capillary Refill.  absent: Calf Tenderness, Pedal Edema





- Neurological Exam


Neurological Exam: Alert, Awake, CN II-XII Intact





- Psychiatric Exam


Psychiatric exam: Normal Affect, Normal Mood





- Skin


Skin Exam: Dry, Intact, Normal Color, Warm








Discharge Plan





- Discharge Medications


Prescriptions: 


Piperacill/Tazo 3.375gm in Dex [Zosyn 3.375 Gm IV] 3.375 gm IVPB Q8H 8 Days  bag





- Follow Up Plan


Condition: FAIR


Disposition: REHAB FACILITY/REHAB UNIT


Instructions:  Urinary Tract Infection in Women (DC), Urinary Tract Infection in

Men (DC), Dysuria (GEN)


Additional Instructions: 


Please discharge patient to TCU and continue all medications   


Referrals: 


Sami Thakur MD [Primary Care Provider] - 





<Nathan Rivero - Last Filed: 01/29/19 19:56>





Provider





- Provider


Date of Admission: 


01/24/19 06:54





Attending physician: 


Nathan Riveor MD





Primary care physician: 


Sami Thakur MD





Consults: 








01/24/19 08:22


Consult [Physician Consult] Routine 


   Comment: 


   Consulting Provider: Donta Alvarez


   Consulting Physician: Donta Alvarez


   Reason for Consult: UTI





01/24/19 19:27


Physician Consult Routine 


   Comment: 


   Consulting Provider: Price Jackson


   Consulting Physician: Price Jackson


   Reason for Consult: dark urine from indwelling tolentino catheter





01/24/19 19:49


Case Management Referral Routine 


   Comment: NEEDS 24-7 CARE,HAS ONE. CONFUSED,ASSIST TO WALK


   Physician Instructions: 


   Reason For Exam: EVALUATION


   Reason for Referral:  Eval


Inpatient NP Core Measures Referral Routine 


   Comment: 


   Physician Instructions: 


   Reason For Exam: EVALUATION


Nursing Referral for Wound Care Routine 


   Comment: A RISK FOR SKIN BREAKDOWN


   Physician Instructions: 


   Reason For Exam: EVALUATION


Transition In Care/Readmission Reduction Routine 


   Comment: 


   Physician Instructions: 


   Reason For Exam: EVALUATION





01/24/19 19:55


Nursing Referral for Palliative Care Routine 


   Comment: 


   Physician Instructions: 


   Reason For Exam: EVALUATION


Social Work Referral Routine 


   Comment: DISCHARGE PLANNING WITH ASSISTANCE AT HOME


   Physician Instructions: 


   Reason For Exam: EVALUATION-HAS 24-7 CARE





01/28/19 11:51


Evaluation for TRCU Routine 


   Comment: 


   Physician Instructions: 


   Reason For Exam: weakness; deconditioning














Hospital Course





- Lab Results


Lab Results: 


                                  Micro Results





01/26/19 13:24   Blood-Venous   Blood Culture - Preliminary


                            NO GROWTH AFTER 3 DAYS


01/26/19 13:00   Blood-Venous   Blood Culture - Preliminary


                            NO GROWTH AFTER 3 DAYS


01/27/19 16:00   Urine,Tolentino   Urine Culture - Final


                            No Growth (<1,000 CFU/ML)


01/24/19 07:30   Blood-Venous   Blood Culture - Final


                            NO GROWTH AFTER 5 DAYS


01/24/19 07:30   Blood-Venous   Gram Stain - Final


                            TEST NOT PERFORMED


01/24/19 07:10   Blood-Venous   Blood Culture - Final


                            NO GROWTH AFTER 5 DAYS


01/24/19 07:10   Blood-Venous   Gram Stain - Final


                            TEST NOT PERFORMED


01/24/19 06:00   Urine,Clean Catch   Urine Culture - Final


                            Proteus Mirabilis


                            Enterococcus Faecalis





                             Most Recent Lab Values











WBC  5.8 10^3/uL (4.5-11.0)   01/27/19  07:30    


 


RBC  3.38 10^6/uL (3.5-6.1)  L  01/27/19  07:30    


 


Hgb  10.5 g/dL (14.0-18.0)  L  01/27/19  07:30    


 


Hct  31.3 % (42.0-52.0)  L  01/27/19  07:30    


 


MCV  92.6 fl (80.0-105.0)   01/27/19  07:30    


 


MCH  31.1 pg (25.0-35.0)   01/27/19  07:30    


 


MCHC  33.5 g/dl (31.0-37.0)   01/27/19  07:30    


 


RDW  13.2 % (11.5-14.5)   01/27/19  07:30    


 


Plt Count  160 10^3/uL (120.0-450.0)   01/27/19  07:30    


 


MPV  9.3 fl (7.0-11.0)   01/27/19  07:30    


 


Gran %  49.4 % (50.0-68.0)  L  01/25/19  06:30    


 


Neut % (Auto)  42.5 % (50.0-68.0)  L  01/27/19  07:30    


 


Lymph % (Auto)  47.1 % (22.0-35.0)  H  01/27/19  07:30    


 


Mono % (Auto)  9.7 % (1.0-6.0)  H  01/27/19  07:30    


 


Eos % (Auto)  0.5 % (1.5-5.0)  L  01/27/19  07:30    


 


Baso % (Auto)  0.2 % (0.0-3.0)   01/27/19  07:30    


 


Gran #  3.27  (1.4-6.5)   01/25/19  06:30    


 


Lymph # (Auto)  2.7  (1.2-3.4)   01/27/19  07:30    


 


Mono # (Auto)  0.6  (0.1-0.6)   01/27/19  07:30    


 


Eos # (Auto)  0.0  (0.0-0.7)   01/27/19  07:30    


 


Baso # (Auto)  0.01 K/mm3 (0.0-2.0)   01/27/19  07:30    


 


Absolute Neuts (auto)  2.46  (1.4-6.5)   01/27/19  07:30    


 


pO2  49 mm/Hg (30-55)   01/24/19  07:25    


 


VBG pH  7.36  (7.32-7.43)   01/24/19  07:25    


 


VBG pCO2  41.0  (40-60)   01/24/19  07:25    


 


VBG HCO3  23.2 mmol/l (21-28)   01/24/19  07:25    


 


VBG Total CO2  24.5 mmol.L (22-28)   01/24/19  07:25    


 


VBG O2 Sat (Calc)  86.9 % (40-65)  H  01/24/19  07:25    


 


VBG Base Excess  -2.2 mmol/L (0.0-2.0)  L  01/24/19  07:25    


 


VBG Potassium  5.2 mmol/L (3.6-5.2)   01/24/19  07:25    


 


Sodium  123.0 mmol/L (132-148)  L  01/24/19  07:25    


 


Chloride  92.0 mmol/L ()  L  01/24/19  07:25    


 


Glucose  TEST NOT PERFORMED   01/24/19  07:25    


 


Lactate  1.6 mmol/L (0.7-2.1)   01/24/19  07:25    


 


FiO2  21.0 %  01/24/19  07:25    


 


Sodium  139 mmol/L (132-148)   01/27/19  07:30    


 


Potassium  3.8 mmol/L (3.6-5.0)   01/27/19  07:30    


 


Chloride  104 mmol/L ()   01/27/19  07:30    


 


Carbon Dioxide  27 mmol/L (21-33)   01/27/19  07:30    


 


Anion Gap  11  (10-20)   01/27/19  07:30    


 


BUN  24 mg/dL (7-21)  H  01/27/19  07:30    


 


Creatinine  0.5 mg/dl (0.8-1.5)  L  01/27/19  07:30    


 


Est GFR ( Amer)  > 60   01/27/19  07:30    


 


Est GFR (Non-Af Amer)  > 60   01/27/19  07:30    


 


Random Glucose  99 mg/dL ()   01/27/19  07:30    


 


Serum Osmolality  273 mosm/kg (272-300)   01/24/19  06:10    


 


Calcium  9.1 mg/dL (8.4-10.5)   01/27/19  07:30    


 


Phosphorus  4.5 mg/dL (2.5-4.5)   01/25/19  06:30    


 


Magnesium  1.9 mg/dL (1.7-2.2)   01/25/19  06:30    


 


Total Bilirubin  0.3 mg/dL (0.2-1.3)   01/27/19  07:30    


 


AST  53 U/L (17-59)   01/27/19  07:30    


 


ALT  28 U/L (7-56)   01/27/19  07:30    


 


Alkaline Phosphatase  105 U/L ()   01/27/19  07:30    


 


Total Protein  7.5 g/dL (5.8-8.3)   01/27/19  07:30    


 


Albumin  3.8 g/dL (3.0-4.8)   01/27/19  07:30    


 


Globulin  3.7 gm/dL  01/27/19  07:30    


 


Albumin/Globulin Ratio  1.0  (1.1-1.8)  L  01/27/19  07:30    


 


Procalcitonin  < 0.05 NG/ML (0.19-0.49)  L  01/24/19  07:25    


 


Venous Blood Potassium  5.2 mmol/L (3.6-5.2)   01/24/19  07:25    


 


Urine Color  Red  (YELLOW)   01/24/19  06:10    


 


Urine Appearance  Cloudy  (CLEAR)   01/24/19  06:10    


 


Urine pH  8.5  (4.7-8.0)   01/24/19  06:10    


 


Ur Specific Gravity  1.020  (1.005-1.035)   01/24/19  06:10    


 


Urine Protein  100 mg/dL (<30 mg/dL)  H  01/24/19  06:10    


 


Urine Glucose (UA)  Negative mg/dL (NEGATIVE)   01/24/19  06:10    


 


Urine Ketones  Negative mg/dL (NEGATIVE)   01/24/19  06:10    


 


Urine Blood  Large  (NEGATIVE)  H  01/24/19  06:10    


 


Urine Nitrate  Positive  (NEGATIVE)  H  01/24/19  06:10    


 


Urine Bilirubin  Negative  (NEGATIVE)   01/24/19  06:10    


 


Urine Urobilinogen  0.2 E.U./dL (<1 E.U./dL)   01/24/19  06:10    


 


Ur Leukocyte Esterase  Moderate Martita/uL (NEGATIVE)  H  01/24/19  06:10    


 


Urine RBC  2 - 5 /hpf (0-2)  H  01/24/19  06:10    


 


Urine WBC  15 - 20 /hpf (0-6)  H  01/24/19  06:10    


 


Ur Epithelial Cells  0 - 2 /hpf (0-5)   01/24/19  06:10    


 


Urine Bacteria  Many /hpf (NONE)   01/24/19  06:10    


 


Urine Osmolality  223 mosm/kg (300-1000)  L  01/24/19  15:37    


 


Ur Random Sodium  16 meq/L  01/24/19  15:37    














- Hospital Course


Hospital Course: 





Pt seen and examined by me. I have reviewed the note of the medical resident and

I agree with it. I have discussed the assessment and plan with the resident. I 

have reviewed the medications and the last labs.Pt with sepsis that is improved.

Delirium has improved. Electrolytes are normal. Prostate Ca and no issues. He 

needs 3-5 more days of Abx and will go to TCU.

## 2019-01-29 NOTE — PQF
PROVIDER RESPONSE TEXT:



Sepsis likely secondary to UTI



REVIEWER QUERY TEXT:



Cause and Effect Relationship



Please clarify in documentation the relationship, if any, between _____________________and___________
__________

Such as:

-- Conditions are due to or associated

-- Unrelated to each other

-- Other, please specify



The patient's Clinical Indicators include:

Your documentation notes the diagnosis of "sepsis likely secondary to a complicated UTI with indwelli
ng tolentino catheter".

Please clarify the meaning of a complicated UTI. If you are saying that the sepsis is due to a UTI as
sociated with the catheter, please document specifically. CMS has very specific codes for catheter as
sociated infections.





Query created by: Evie Bautista on 1/29/2019 11:28 AM





Electronically signed by:  Nayeli Kaplan 1/29/2019 11:35 AM

## 2019-01-29 NOTE — CP.PCM.PN
Subjective





- Date & Time of Evaluation


Date of Evaluation: 01/29/19


Time of Evaluation: 08:30





- Subjective


Subjective: 





Comfortable in bed, no abdominal pain, no fevers, no nausea, no diarrhea.





Objective





- Vital Signs/Intake and Output


Vital Signs (last 24 hours): 


                                        











Temp Pulse Resp BP Pulse Ox


 


 98 F   86   20   169/75 H  96 


 


 01/28/19 06:00  01/28/19 06:00  01/28/19 06:00  01/28/19 06:00  01/28/19 06:00








Intake and Output: 


                                        











 01/28/19 01/28/19





 06:59 18:59


 


Intake Total 320 


 


Output Total 600 


 


Balance -280 














- Medications


Medications: 


                               Current Medications





Carbamazepine (Tegretol)  200 mg PO TID Count includes the [facility]; Protocol


   Last Admin: 01/28/19 09:48 Dose:  200 mg


Fentanyl (Duragesic)  1 patch TD Q72 RICHAR


   Last Admin: 01/28/19 09:46 Dose:  1 patch


Guaifenesin (Robitussin)  200 mg PO Q4H PRN


   PRN Reason: Cough and congestion


Guaifenesin/Dextromethorphan (Mucinex-Dm 600-30 Mg)  1 tab PO BID Count includes the [facility]


   Last Admin: 01/28/19 09:51 Dose:  1 tab


Piperacillin Sod/Tazobactam Sod (Zosyn 3.375 In Ns 100ml)  100 mls @ 25 mls/hr 

IVPB Q8 Count includes the [facility]; Protocol


   Stop: 02/05/19 14:01


   Last Admin: 01/28/19 05:17 Dose:  25 mls/hr


Lorazepam (Ativan)  1 mg IVP Q6H PRN; Protocol


   PRN Reason: Seizure activity


   Last Admin: 01/27/19 22:00 Dose:  1 mg


Pantoprazole Sodium (Protonix Ec Tab)  40 mg PO ACB Count includes the [facility]


   Last Admin: 01/28/19 09:49 Dose:  40 mg











- Labs


Labs: 


                                        





                                 01/27/19 07:30 





                                 01/27/19 07:30 











- Constitutional


Appears: Chronically Ill





- Head Exam


Head Exam: NORMAL INSPECTION





- Neck Exam


Neck Exam: absent: Meningismus





- Respiratory Exam


Respiratory Exam: Decreased Breath Sounds





- Cardiovascular Exam


Cardiovascular Exam: +S1, +S2





- GI/Abdominal Exam


GI & Abdominal Exam: Soft.  absent: Tenderness





Assessment and Plan





- Assessment and Plan (Free Text)


Plan: 


Assessment


consider sepsis due to complicated UTI in this patient with indwelling [place] 

catheter and metastatic prostate cancer, growing Proteus and E. faecalis in the 

urine


history of Proteus bacteremia, source unclear, concerned about intra-abdominal 

infection


history of UTI in this patient with prostate cancer with metastasis


history of Klebsiella urinary tract infection


Mechanical fall, etiology to be determined


prostate cancer on Lupron treatment once a month (hormonal treatment)


seizure disorder


glaucoma





Plan


continue Zosyn day 6 to complete 7-10 days


follow up further Urology recommendations 


will continue to monitor clinically

## 2019-01-30 LAB
ALBUMIN SERPL-MCNC: 3.7 G/DL (ref 3–4.8)
ALBUMIN/GLOB SERPL: 1.2 {RATIO} (ref 1.1–1.8)
ALT SERPL-CCNC: 54 U/L (ref 7–56)
AST SERPL-CCNC: 56 U/L (ref 17–59)
BASOPHILS # BLD AUTO: 0.01 K/MM3 (ref 0–2)
BASOPHILS NFR BLD: 0.2 % (ref 0–3)
BUN SERPL-MCNC: 16 MG/DL (ref 7–21)
CALCIUM SERPL-MCNC: 8.8 MG/DL (ref 8.4–10.5)
EOSINOPHIL # BLD: 0.1 10*3/UL (ref 0–0.7)
EOSINOPHIL NFR BLD: 0.8 % (ref 1.5–5)
ERYTHROCYTE [DISTWIDTH] IN BLOOD BY AUTOMATED COUNT: 13 % (ref 11.5–14.5)
GFR NON-AFRICAN AMERICAN: > 60
HGB BLD-MCNC: 10.4 G/DL (ref 14–18)
LYMPHOCYTES # BLD: 2.9 10*3/UL (ref 1.2–3.4)
LYMPHOCYTES NFR BLD AUTO: 48.5 % (ref 22–35)
MCH RBC QN AUTO: 30.3 PG (ref 25–35)
MCHC RBC AUTO-ENTMCNC: 33.3 G/DL (ref 31–37)
MCV RBC AUTO: 91 FL (ref 80–105)
MONOCYTES # BLD AUTO: 0.3 10*3/UL (ref 0.1–0.6)
MONOCYTES NFR BLD: 5.7 % (ref 1–6)
PLATELET # BLD: 151 10^3/UL (ref 120–450)
PMV BLD AUTO: 8.1 FL (ref 7–11)
RBC # BLD AUTO: 3.43 10^6/UL (ref 3.5–6.1)
WBC # BLD AUTO: 6 10^3/UL (ref 4.5–11)

## 2019-01-30 PROCEDURE — F08Z4FZ HOME MANAGEMENT TREATMENT USING ASSISTIVE, ADAPTIVE, SUPPORTIVE OR PROTECTIVE EQUIPMENT: ICD-10-PCS | Performed by: INTERNAL MEDICINE

## 2019-01-30 PROCEDURE — F07Z9FZ GAIT TRAINING/FUNCTIONAL AMBULATION TREATMENT USING ASSISTIVE, ADAPTIVE, SUPPORTIVE OR PROTECTIVE EQUIPMENT: ICD-10-PCS | Performed by: INTERNAL MEDICINE

## 2019-01-30 RX ADMIN — PIPERACILLIN AND TAZOBACTAM SCH MLS/HR: 3; .375 INJECTION, POWDER, LYOPHILIZED, FOR SOLUTION INTRAVENOUS; PARENTERAL at 05:20

## 2019-01-30 RX ADMIN — PIPERACILLIN AND TAZOBACTAM SCH MLS/HR: 3; .375 INJECTION, POWDER, LYOPHILIZED, FOR SOLUTION INTRAVENOUS; PARENTERAL at 13:30

## 2019-01-30 RX ADMIN — PANTOPRAZOLE SODIUM SCH MG: 20 TABLET, DELAYED RELEASE ORAL at 05:21

## 2019-01-30 RX ADMIN — PIPERACILLIN AND TAZOBACTAM SCH MLS/HR: 3; .375 INJECTION, POWDER, LYOPHILIZED, FOR SOLUTION INTRAVENOUS; PARENTERAL at 21:22

## 2019-01-30 RX ADMIN — GUAIFENESIN AND DEXTROMETHORPHAN HYDROBROMIDE SCH TAB: 600; 30 TABLET, EXTENDED RELEASE ORAL at 17:16

## 2019-01-30 RX ADMIN — GUAIFENESIN AND DEXTROMETHORPHAN HYDROBROMIDE SCH TAB: 600; 30 TABLET, EXTENDED RELEASE ORAL at 10:26

## 2019-01-30 NOTE — CP.PCM.HP
<Nayeli Kaplan - Last Filed: 01/30/19 07:12>





History of Present Illness





- History of Present Illness


History of Present Illness: 





88yo male PMHx metastatic prostate cancer s/p ADT and palliative radiation on 

Lupron, seizures, glaucoma, dementia, UTI w/ indwelling tolentino (+ for E.coli, 

Klebsiella, and Enterococcus in the past) brought to ED by son for ower 

abdominal pain for the past 1-2 days, associated with dark colored urine. 

Patient has visiting nurse come every month to change catheter; last change was 

1/15/19. However over the past 1-2 days he was noted to be tired and with 

decreased and dark UO. 


Patient admitted to med/surg for sepsis likely secondary to complicated UTI with

indwelling tolentino. Blood cultures were negative x 2 and urine culture was 

+Proteus and +E. Faecalis. Patient started on Zosyn as per ID. Urology consulted

for gross hematuria with history of chronic indwelling tolentino. Patient was given 

medications for URI and cough which he reported was improving. As per ID patient

needed IV abx for an extended period thus patient medically optimized for 

discharge to TCU.


This AM patient seen and examined at bedside. Nursing reports no acute events 

overnight. Patient denies acute complaints fever, chills, headache, dizziness, 

chest pain, palpitations, SOB, cough, abd pain, nausea, vomiting, bowel/bladder 

complaints, pain/swelling in his legs b/l. He is eating well and comfortable. 








Present on Admission





- Present on Admission


Any Indicators Present on Admission: Yes


Urinary Catheter: Yes





Review of Systems





- Review of Systems


All systems: reviewed and no additional remarkable complaints except


Review of Systems: 





as per HPI





Past Patient History





- Infectious Disease


Hx of Infectious Diseases: None





- Past Social History


Smoking Status: Former Smoker





- CARDIAC


Hx Pacemaker: No





- PULMONARY


Hx Respiratory Disorders: Yes (H/O SMOKING CIGARETTES)


Other/Comment: EMPYEMA/SARCOIDOSIS





- NEUROLOGICAL


Hx Dizziness: Yes (syncope)


Hx Seizures: Yes (last seizure 20 yrs ago)





- HEENT


Hx Cataracts: Yes (with bilateral sx)





- RENAL


Hx Chronic Kidney Disease: Yes


Other/Comment: chronic tolentino-Prostate ca.





- ENDOCRINE/METABOLIC


Hx Endocrine Disorders: No





- HEMATOLOGICAL/ONCOLOGICAL


Hx Cancer: Yes (prostate)





- INTEGUMENTARY


Hx Dermatological Problems: Yes


Other/Comment: 2-5-18 bilateral le edema +2 more to right .Pitting with dry thin

scaly flakes.





- MUSCULOSKELETAL/RHEUMATOLOGICAL


Hx Falls: Yes





- GASTROINTESTINAL


Hx Gastrointestinal Disorders: No





- GENITOURINARY/GYNECOLOGICAL


Hx Genitourinary Disorders: Yes (UTI,CHRONIC TOLENTINO CATHETER)


Hx Reproductive Disorders: Yes (PROSTATE CA)





- PSYCHIATRIC


Hx Psychophysiologic Disorder: Yes


Hx Substance Use: No





- SURGICAL HISTORY


Hx Mastectomy: No





- ANESTHESIA


Hx Anesthesia: No





Meds


Allergies/Adverse Reactions: 


                                    Allergies











Allergy/AdvReac Type Severity Reaction Status Date / Time


 


No Known Allergies Allergy   Verified 01/29/19 18:01














Physical Exam





- Additional Findings


Additional findings: 





- Constitutional


Appears: Non-toxic, No Acute Distress





- Head Exam


Head Exam: ATRAUMATIC, NORMAL INSPECTION, NORMOCEPHALIC





- Eye Exam


Eye Exam: EOMI, Normal appearance, PERRL.  absent: Conjunctival injection, 

Scleral icterus





- ENT Exam


ENT Exam: Mucous Membranes Moist





- Neck Exam


Neck Exam: absent: Lymphadenopathy





- Respiratory Exam


Respiratory Exam: Decreased Breath Sounds, Clear to Ausculation Bilateral, 

NORMAL BREATHING PATTERN.  absent: Respiratory Distress





- Cardiovascular Exam


Cardiovascular Exam: +S1, +S2.  absent: Bradycardia, Tachycardia





- GI/Abdominal Exam


GI & Abdominal Exam: Soft, Normal Bowel Sounds.  absent: Firm, Guarding, Rigid, 

Tenderness





- Extremities Exam


Extremities Exam: Normal Capillary Refill.  absent: Calf Tenderness, Pedal Edema





- Neurological Exam


Neurological Exam: Alert, Awake, CN II-XII Intact





- Psychiatric Exam


Psychiatric exam: Normal Affect, Normal Mood





- Skin


Skin Exam: Dry, Intact, Normal Color, Warm








Results





- Vital Signs


Recent Vital Signs: 





                                Last Vital Signs











Temp  98.2 F   01/29/19 19:50


 


Pulse  81   01/29/19 19:50


 


Resp  18   01/29/19 19:50


 


BP  134/75   01/29/19 19:50


 


Pulse Ox  94 L  01/29/19 16:00














Assessment & Plan





- Assessment and Plan (Free Text)


Assessment: 





1. Complicated UTI with indwelling tolentino catheter- improving


2. Chronic indwelling tolentino catheter


3. Prostate ca on Lupro monthly


4. Chronic pain


5. Seizure disorder


6. Wheelchair bound


7. Glaucoma


Plan: 





Patient's vitals, blood work, and imaging reviewed in chart. Continue Zosyn for 

UTI with urine cultures +Proteus and +E. Faecalis. Procalcitonin <0.05. If 

patient becomes delirious recommend redirection. Monitor patient's Is and Os 

strictly. Continue patient's tegretol for seizure disorder. PRN ativan for 

seizures in place. Urology on board. Patent pain controlled with home Fentanyl 

patch. PT on board. HoB above 30 degrees and fall risk protocol in place. HHD 

diet ordered. GI ppx and SCDs on board. High fall risk and Seizure precautions 

in place. Continue work with PT and patient will be closely monitored at this 

time.





Will discuss with Dr. Josefa Kaplan PGY3





<Nathan Rivero S - Last Filed: 01/30/19 20:42>





Results





- Vital Signs


Recent Vital Signs: 





                                Last Vital Signs











Temp  98.6 F   01/30/19 16:00


 


Pulse  82   01/30/19 16:00


 


Resp  18   01/30/19 16:00


 


BP  141/68   01/30/19 16:00


 


Pulse Ox  96   01/30/19 16:00














- Labs


Result Diagrams: 


                                 01/30/19 07:45





                                 01/30/19 07:45


Labs: 





                         Laboratory Results - last 24 hr











  01/30/19 01/30/19





  07:45 07:45


 


WBC  6.0 


 


RBC  3.43 L 


 


Hgb  10.4 L 


 


Hct  31.2 L 


 


MCV  91.0 


 


MCH  30.3 


 


MCHC  33.3 


 


RDW  13.0 


 


Plt Count  151 


 


MPV  8.1 


 


Neut % (Auto)  44.8 L 


 


Lymph % (Auto)  48.5 H 


 


Mono % (Auto)  5.7 


 


Eos % (Auto)  0.8 L 


 


Baso % (Auto)  0.2 


 


Lymph # (Auto)  2.9 


 


Mono # (Auto)  0.3 


 


Eos # (Auto)  0.1 


 


Baso # (Auto)  0.01 


 


Absolute Neuts (auto)  2.69 


 


Sodium   137


 


Potassium   3.9


 


Chloride   104


 


Carbon Dioxide   26


 


Anion Gap   11


 


BUN   16


 


Creatinine   0.5 L


 


Est GFR ( Amer)   > 60


 


Est GFR (Non-Af Amer)   > 60


 


Random Glucose   108


 


Calcium   8.8


 


Phosphorus   3.8


 


Magnesium   1.8


 


Total Bilirubin   0.2


 


AST   56


 


ALT   54


 


Alkaline Phosphatase   109


 


Total Protein   6.9


 


Albumin   3.7


 


Globulin   3.2


 


Albumin/Globulin Ratio   1.2














Assessment & Plan





- Assessment and Plan (Free Text)


Plan: 





Pt seen and examined by me. I have reviewed the note of the medical resident and

I agree with it. I have discussed the assessment and plan with the resident. I 

have reviewed the medications and the last labs.Pt with UTI and is on Zosyn. He 

will need to finish 3 more days of Abx. Fentanyl patch for pain due to prostate 

cancer. Pt is home bound and does not ambulate. Pt has Sz disorder and is 

stable. Pt has Glaucoma.

## 2019-01-30 NOTE — CP.PCM.CON
History of Present Illness





- History of Present Illness


History of Present Illness: 


89 year old male with PMH of prostate cancer with metastasis on Lupron treatment

(hormonal treatment) and radiation therapy to the pelvic area and lumbar spine, 

seizure disorder, glaucoma, history of Klebsiella UTI, history of Proteus b

acteremia initially came in to [facility] [place] because of problems with urination and dark-

colored urine. He was found to have UTI with E. faecalis and Proteus and has 

been doing well with antibiotics. He is now transferred to [facility] for continued 

medical therapy and physical therapy. Infectious diseases consult is requested 

to continue his antibiotics. He is comfortable in bed, no fevers, no nausea, no 

abdominal pain, no diarrhea, no headache or dizziness, no dysuria currently, 

urine is not dark-colored currently.





Past Medical and surgical history: as above


Personal and social history: denies alcohol abuse, no illicit drug use, no 

smokin


family history: non-contributory











Review of Systems





- Review of Systems


All systems: reviewed and no additional remarkable complaints except (as per 

HPI)





Past Patient History





- Infectious Disease


Hx of Infectious Diseases: None





- Past Social History


Smoking Status: Former Smoker





- CARDIAC


Hx Pacemaker: No





- PULMONARY


Hx Respiratory Disorders: Yes (H/O SMOKING CIGARETTES)


Other/Comment: EMPYEMA/SARCOIDOSIS





- NEUROLOGICAL


Hx Dizziness: Yes (syncope)


Hx Seizures: Yes (last seizure 20 yrs ago)





- HEENT


Hx Cataracts: Yes (with bilateral sx)





- RENAL


Hx Chronic Kidney Disease: Yes


Other/Comment: chronic tolentino-Prostate ca.





- ENDOCRINE/METABOLIC


Hx Endocrine Disorders: No





- HEMATOLOGICAL/ONCOLOGICAL


Hx Cancer: Yes (prostate)





- INTEGUMENTARY


Hx Dermatological Problems: Yes


Other/Comment: 2-5-18 bilateral le edema +2 more to right .Pitting with dry thin

scaly flakes.





- MUSCULOSKELETAL/RHEUMATOLOGICAL


Hx Falls: Yes





- GASTROINTESTINAL


Hx Gastrointestinal Disorders: No





- GENITOURINARY/GYNECOLOGICAL


Hx Genitourinary Disorders: Yes (UTI,CHRONIC TOLENTINO CATHETER)


Hx Reproductive Disorders: Yes (PROSTATE CA)





- PSYCHIATRIC


Hx Psychophysiologic Disorder: Yes


Hx Substance Use: No





- SURGICAL HISTORY


Hx Mastectomy: No





- ANESTHESIA


Hx Anesthesia: No





Meds


Allergies/Adverse Reactions: 


                                    Allergies











Allergy/AdvReac Type Severity Reaction Status Date / Time


 


No Known Allergies Allergy   Verified 01/29/19 18:01














- Medications


Medications: 


                               Current Medications





Carbamazepine (Tegretol)  200 mg PO TID [facility]; Protocol


   Last Admin: 01/29/19 17:26 Dose:  200 mg


Fentanyl (Duragesic)  1 patch TD Q72H [facility]; Protocol


Guaifenesin (Robitussin)  200 mg PO Q4H PRN; Protocol


   PRN Reason: Cough and congestion


Guaifenesin/Dextromethorphan (Mucinex-Dm 600-30 Mg)  1 tab PO BID [facility]; Protocol


   Last Admin: 01/29/19 17:26 Dose:  1 tab


Piperacillin Sod/Tazobactam Sod (Zosyn 3.375 In Ns 100ml)  100 mls @ 25 mls/hr 

IVPB 0600,1400,2200 [facility]; Protocol


   Stop: 02/01/19 09:59


   Last Admin: 01/30/19 05:20 Dose:  25 mls/hr


Lorazepam (Ativan)  1 mg IVP Q6H PRN; Protocol


   PRN Reason: Seizure activity


Pantoprazole Sodium (Protonix Ec Tab)  20 mg PO 0600 [facility]; Protocol


   Last Admin: 01/30/19 05:21 Dose:  20 mg











Physical Exam





- Constitutional


Appears: No Acute Distress, Chronically Ill





- Head Exam


Head Exam: NORMAL INSPECTION





- Neck Exam


Neck exam: Negative for: Meningismus





- Respiratory Exam


Respiratory Exam: Decreased Breath Sounds





- Cardiovascular Exam


Cardiovascular Exam: +S1, +S2





- GI/Abdominal Exam


GI & Abdominal Exam: Soft.  absent: Tenderness





Results





- Vital Signs


Recent Vital Signs: 


                                Last Vital Signs











Temp  98.2 F   01/29/19 19:50


 


Pulse  81   01/29/19 19:50


 


Resp  18   01/29/19 19:50


 


BP  134/75   01/29/19 19:50


 


Pulse Ox  94 L  01/29/19 16:00














- Labs


Result Diagrams: 


                                 01/30/19 07:45





                                 01/30/19 07:45


Labs: 


                         Laboratory Results - last 24 hr











  01/30/19 01/30/19





  07:45 07:45


 


WBC  6.0 


 


RBC  3.43 L 


 


Hgb  10.4 L 


 


Hct  31.2 L 


 


MCV  91.0 


 


MCH  30.3 


 


MCHC  33.3 


 


RDW  13.0 


 


Plt Count  151 


 


MPV  8.1 


 


Neut % (Auto)  44.8 L 


 


Lymph % (Auto)  48.5 H 


 


Mono % (Auto)  5.7 


 


Eos % (Auto)  0.8 L 


 


Baso % (Auto)  0.2 


 


Lymph # (Auto)  2.9 


 


Mono # (Auto)  0.3 


 


Eos # (Auto)  0.1 


 


Baso # (Auto)  0.01 


 


Absolute Neuts (auto)  2.69 


 


Sodium   137


 


Potassium   3.9


 


Chloride   104


 


Carbon Dioxide   26


 


Anion Gap   11


 


BUN   16


 


Creatinine   0.5 L


 


Est GFR ( Amer)   > 60


 


Est GFR (Non-Af Amer)   > 60


 


Random Glucose   108


 


Calcium   8.8


 


Phosphorus   3.8


 


Magnesium   1.8


 


Total Bilirubin   0.2


 


AST   56


 


ALT   54


 


Alkaline Phosphatase   109


 


Total Protein   6.9


 


Albumin   3.7


 


Globulin   3.2


 


Albumin/Globulin Ratio   1.2














Assessment & Plan





- Assessment and Plan (Free Text)


Plan: 





Assessment


sepsis due to complicated UTI in this patient with indwelling Tolentino catheter and

metastatic prostate cancer, growing Proteus and E. faecalis in the urine, 

clinically improving


history of Proteus bacteremia, source unclear, concerned about intra-abdominal 

infection


history of UTI in this patient with prostate cancer with metastasis


history of Klebsiella urinary tract infection


Mechanical fall, etiology to be determined


prostate cancer on Lupron treatment once a month (hormonal treatment)


seizure disorder


glaucoma





Plan


continue Zosyn day 7 to complete 7-10 days


follow up further Urology recommendations 


will continue to follow clinically

## 2019-01-31 RX ADMIN — PIPERACILLIN AND TAZOBACTAM SCH MLS/HR: 3; .375 INJECTION, POWDER, LYOPHILIZED, FOR SOLUTION INTRAVENOUS; PARENTERAL at 21:27

## 2019-01-31 RX ADMIN — PANTOPRAZOLE SODIUM SCH MG: 20 TABLET, DELAYED RELEASE ORAL at 05:35

## 2019-01-31 RX ADMIN — PIPERACILLIN AND TAZOBACTAM SCH MLS/HR: 3; .375 INJECTION, POWDER, LYOPHILIZED, FOR SOLUTION INTRAVENOUS; PARENTERAL at 15:18

## 2019-01-31 RX ADMIN — GUAIFENESIN AND DEXTROMETHORPHAN HYDROBROMIDE SCH TAB: 600; 30 TABLET, EXTENDED RELEASE ORAL at 17:43

## 2019-01-31 RX ADMIN — PIPERACILLIN AND TAZOBACTAM SCH MLS/HR: 3; .375 INJECTION, POWDER, LYOPHILIZED, FOR SOLUTION INTRAVENOUS; PARENTERAL at 05:35

## 2019-01-31 RX ADMIN — GUAIFENESIN AND DEXTROMETHORPHAN HYDROBROMIDE SCH TAB: 600; 30 TABLET, EXTENDED RELEASE ORAL at 09:51

## 2019-01-31 NOTE — CON
DATE:  01/30/2019



GENITOURINARY CONSULTATION



CHIEF COMPLAINT:  Weakness.



HISTORY OF PRESENT ILLNESS:  This is an 89-year-old male who is known to me

from prior admissions.  The patient has a history of metastatic prostate

cancer.  He had previously been on androgen deprivation therapy with

Lupron.  He was supposed to follow up in the office but has not followed up

in over one year.  I had previously had long discussions with his son

regarding further treatment of the prostate cancer and the patient has

dementia and does not know whether he has or has not been receiving his

hormonal therapy.  Approximately one year ago, it was recommended to the

patient and his son that there are advanced therapies which the patient

would likely benefit from, either Xtandi or Zytiga.  It is unclear why the

patient was not brought to the office.  It is unclear whether the patient

has been seeing somebody for treatments as the patient has dementia and

does not comprehend questions about his medical history and is unable to

answer what has been happening.  The patient also has a long history of

urinary retention and has a chronic indwelling Waddell catheter which is

changed monthly by visiting nurse.  Catheter was recently changed about two

weeks ago.  The patient has had recurrent urinary infections obviously with

an indwelling Waddell catheter.  He has been treated multiple times with

antibiotics.  The patient denies any gross hematuria, but by report there

have been occasional episodes of gross hematuria.  He is currently a

DNR/DNI status.



PAST MEDICAL HISTORY:  Significant for metastatic prostate cancer, chronic

urinary retention, dementia, nonambulatory status, seizure disorder,

reflux.



MEDICATIONS:  Currently include Ativan, Duragesic, Mucinex, Protonix,

Robitussin, Tegretol and Zosyn.



ALLERGIES:  NO KNOWN DRUG ALLERGIES.



FAMILY HISTORY:  Noncontributory for this admission.



SOCIAL HISTORY:  No smoking or EtOH use.



REVIEW OF SYSTEMS:  Obtained from the patient but he is a poor historian. 

He is currently complaining only of weakness, lethargy.  He denies current

pain but has had complaints of bony pain and lower extremity pain in the

past.



PHYSICAL EXAMINATION:

GENERAL:  The patient is awake and answering questions.  He is in no acute

distress.  He is a poor historian with dementia.

NECK:  Supple.  There is no adenopathy or mass noted.

CHEST:  Exam of the chest revealed normal inspiratory effort.

CARDIAC:  Exam showed positive S1, S2.  There is 2+ edema noted.

VITAL SIGNS:  He is afebrile, temp of 98.6, pulse of 82, blood pressure

141/68, respirations 18.

ABDOMEN:  Soft, obese, nontender, nondistended.  There is no

hepatosplenomegaly.  There is no CVA tenderness but the patient does report

some lower back tenderness to palpation.

GENITOURINARY:  Has a splitting of his urethral meatus and he has some

purulent drainage around the Waddell catheter which is indwelling.  Waddell

itself is draining clear colored urine.  Scrotum has mild edema.  Testes

bilaterally are descended, atrophic, nontender, without mass.  Epididymis

also atrophic.

EXTREMITIES:  There is no cyanosis noted.  There is 2+ edema noted.



LABORATORY EXAM:  Creatinine 0.5 with a GFR greater than 60.  Testosterone

8.56 on 01/19/2018, PSA 50.2 from 02/08/2018.



On radiologic exam, there are no recent pertinent x-rays done.



IMPRESSION AND PLAN:  This is an 89-year-old male with known metastatic

prostate cancer.  It is unclear whether the patient has been receiving any

treatment other than the palliative radiation he received last year.  We

will need to discuss further treatment with his son given that the patient

is DNR/DNI status.  Plan will be to check testosterone and PSA levels and

then we can decide whether to put the patient back on hormonal therapy or

further imaging is needed and further palliative radiation given the

patient's advanced dementia and current status.  We will need to discuss

palliative treatment versus continuing hormonal therapy with the patient's

family.



Thank you for allowing me to participate in the care of this patient.  We

will follow him with you.





__________________________________________

Price Jackson MD



 

DD:  01/30/2019 19:21:21

DT:  01/30/2019 19:28:20

Job # [id number]

## 2019-01-31 NOTE — CP.PCM.PN
<Bridger Kaplanima - Last Filed: 01/31/19 15:08>





Subjective





- Date & Time of Evaluation


Date of Evaluation: 01/31/19


Time of Evaluation: 06:30





- Subjective


Subjective: 





[facility] progress note for Dr. Rivero





Patient seen and examined at bedside. As per nursing no acute events overnight. 

Patient resting comfortably and denied fever, chills, headache, dizziness, chest

pain, palpitations, SOB, cough, abd pain, nausea, vomiting, bowel/bladder 

complaints, pain/swelling in his legs b/l. He is eating well and making good 

urine. 








Objective





- Vital Signs/Intake and Output


Vital Signs (last 24 hours): 


                                        











Temp Pulse Resp BP Pulse Ox


 


 98.6 F   82   18   141/68   96 


 


 01/30/19 16:00  01/30/19 16:00  01/30/19 16:00  01/30/19 16:00  01/30/19 16:00








Intake and Output: 


                                        











 01/30/19 01/31/19





 18:59 06:59


 


Intake Total  620


 


Output Total  1950


 


Balance  -1330














- Medications


Medications: 


                               Current Medications





Carbamazepine (Tegretol)  200 mg PO TID [facility]; Protocol


   Last Admin: 01/30/19 17:16 Dose:  200 mg


Fentanyl (Duragesic)  1 patch TD Q72H [facility]; Protocol


Guaifenesin (Robitussin)  200 mg PO Q4H PRN; Protocol


   PRN Reason: Cough and congestion


Guaifenesin/Dextromethorphan (Mucinex-Dm 600-30 Mg)  1 tab PO BID [facility]; Protocol


   Last Admin: 01/30/19 17:16 Dose:  1 tab


Piperacillin Sod/Tazobactam Sod (Zosyn 3.375 In Ns 100ml)  100 mls @ 25 mls/hr 

IVPB 0600,1400,2200 [facility]; Protocol


   Stop: 02/02/19 22:01


   Last Admin: 01/31/19 05:35 Dose:  25 mls/hr


Lorazepam (Ativan)  1 mg IVP Q6H PRN; Protocol


   PRN Reason: Seizure activity


Pantoprazole Sodium (Protonix Ec Tab)  20 mg PO 0600 [facility]; Protocol


   Last Admin: 01/31/19 05:35 Dose:  20 mg











- Labs


Labs: 


                                        





                                 01/30/19 07:45 





                                 01/30/19 07:45 











- Additional Findings


Additional findings: 





- Constitutional


Appears: Non-toxic, No Acute Distress





- Head Exam


Head Exam: ATRAUMATIC, NORMAL INSPECTION, NORMOCEPHALIC





- Eye Exam


Eye Exam: EOMI, Normal appearance, PERRL.  absent: Conjunctival injection, 

Scleral icterus





- ENT Exam


ENT Exam: Mucous Membranes Moist





- Neck Exam


Neck Exam: absent: Lymphadenopathy





- Respiratory Exam


Respiratory Exam: Decreased Breath Sounds, Clear to Ausculation Bilateral, 

NORMAL BREATHING PATTERN.  absent: Respiratory Distress





- Cardiovascular Exam


Cardiovascular Exam: +S1, +S2.  absent: Bradycardia, Tachycardia





- GI/Abdominal Exam


GI & Abdominal Exam: Soft, Normal Bowel Sounds.  absent: Firm, Guarding, Rigid, 

Tenderness





- Extremities Exam


Extremities Exam: Normal Capillary Refill.  absent: Calf Tenderness, Pedal Edema





- Neurological Exam


Neurological Exam: Alert, Awake, CN II-XII Intact





- Psychiatric Exam


Psychiatric exam: Normal Affect, Normal Mood





- Skin


Skin Exam: Dry, Intact, Normal Color, Warm





Assessment and Plan





- Assessment and Plan (Free Text)


Assessment: 





1. Complicated UTI with indwelling tolentino catheter- improving


2. Chronic indwelling tolentino catheter


3. Prostate ca on Lupro monthly


4. Chronic pain


5. Seizure disorder


6. Wheelchair bound


7. Glaucoma


Plan: 





Patient's vitals, blood work, and imaging reviewed in chart. Continue Zosyn (day

8) for 10 day total course in light of UTI with urine cultures +Proteus and +E. 

Faecalis. Procalcitonin <0.05. If patient becomes delirious recommend 

redirection. Monitor patient's Is and Os strictly. Continue patient's tegretol 

for seizure disorder. PRN ativan for seizures in place. As per urology, 

testosterone and PSA checked; testosterone 31 and PSA 99.1. Urology will speak 

to patient's family regarding palliative treatment vs hormonal therapy. Patent 

pain controlled with home Fentanyl patch. PT on board. HoB above 30 degrees and 

fall risk protocol in place. HHD diet ordered. GI ppx and SCDs on board. High 

fall risk and Seizure precautions in place. Continue work with PT. Patient will 

be closely monitored at this time.





Will discuss with Dr. Josefa Kaplan PGY3





<Nathan Rivero - Last Filed: 01/31/19 18:09>





Objective





- Vital Signs/Intake and Output


Vital Signs (last 24 hours): 


                                        











Temp Pulse Resp BP Pulse Ox


 


 98.6 F   76   18   132/57 L  96 


 


 01/31/19 16:00  01/31/19 16:00  01/31/19 16:00  01/31/19 16:00  01/31/19 16:00








Intake and Output: 


                                        











 01/31/19 01/31/19





 06:59 18:59


 


Intake Total 620 


 


Output Total 1950 


 


Balance -1330 














- Medications


Medications: 


                               Current Medications





Carbamazepine (Tegretol)  200 mg PO TID [facility]; Protocol


   Last Admin: 01/31/19 17:44 Dose:  200 mg


Fentanyl (Duragesic)  1 patch TD Q72H [facility]; Protocol


   Last Admin: 01/31/19 09:48 Dose:  1 patch


Guaifenesin (Robitussin)  200 mg PO Q4H PRN; Protocol


   PRN Reason: Cough and congestion


Guaifenesin/Dextromethorphan (Mucinex-Dm 600-30 Mg)  1 tab PO BID [facility]; Protocol


   Last Admin: 01/31/19 17:43 Dose:  1 tab


Piperacillin Sod/Tazobactam Sod (Zosyn 3.375 In Ns 100ml)  100 mls @ 25 mls/hr 

IVPB 0600,1400,2200 [facility]; Protocol


   Stop: 02/02/19 22:01


   Last Admin: 01/31/19 15:18 Dose:  25 mls/hr


Lorazepam (Ativan)  1 mg IVP Q6H PRN; Protocol


   PRN Reason: Seizure activity


Pantoprazole Sodium (Protonix Ec Tab)  20 mg PO 0600 [facility]; Protocol


   Last Admin: 01/31/19 05:35 Dose:  20 mg











- Labs


Labs: 


                                        





                                 01/30/19 07:45 





                                 01/30/19 07:45 











Assessment and Plan





- Assessment and Plan (Free Text)


Plan: 





Patient was seen and examined by. I have reviewed the note of the medical 

resident and have gone over the plann of care. I agree with the not. I have 

reviewed the medications and the last labs. Pt is on IV Zosyn for Abx. He had a 

UTI that is being treated on TCU. He is on Tegrotol for his Sz. Pt has Prostate 

Ca and does see Urology. He is on a Fentanyl Patch for pain. He does not 

ambulate. He will be discharged after his Abx are finished. This will be in 2 

days.

## 2019-01-31 NOTE — CP.PCM.PN
Subjective





- Date & Time of Evaluation


Date of Evaluation: 01/31/19


Time of Evaluation: 09:25





- Subjective


Subjective: 





Comfortable in bed, no fevers, no dysuria or hematuria currently, no nausea or 

diarrhea.





Objective





- Vital Signs/Intake and Output


Vital Signs (last 24 hours): 


                                        











Temp Pulse Resp BP Pulse Ox


 


 98.2 F   81   18   134/75   94 L


 


 01/29/19 19:50  01/29/19 19:50  01/29/19 19:50  01/29/19 19:50  01/29/19 16:00








Intake and Output: 


                                        











 01/30/19 01/30/19





 06:59 18:59


 


Intake Total 300 


 


Output Total 1050 


 


Balance -750 














- Medications


Medications: 


                               Current Medications





Carbamazepine (Tegretol)  200 mg PO TID RICHAR; Protocol


   Last Admin: 01/30/19 10:26 Dose:  200 mg


Fentanyl (Duragesic)  1 patch TD Q72H RICHAR; Protocol


Guaifenesin (Robitussin)  200 mg PO Q4H PRN; Protocol


   PRN Reason: Cough and congestion


Guaifenesin/Dextromethorphan (Mucinex-Dm 600-30 Mg)  1 tab PO BID RICHAR; Protocol


   Last Admin: 01/30/19 10:26 Dose:  1 tab


Piperacillin Sod/Tazobactam Sod (Zosyn 3.375 In Ns 100ml)  100 mls @ 25 mls/hr 

IVPB 0600,1400,2200 RICHAR; Protocol


   Stop: 02/01/19 09:59


   Last Admin: 01/30/19 05:20 Dose:  25 mls/hr


Lorazepam (Ativan)  1 mg IVP Q6H PRN; Protocol


   PRN Reason: Seizure activity


Pantoprazole Sodium (Protonix Ec Tab)  20 mg PO 0600 RICHAR; Protocol


   Last Admin: 01/30/19 05:21 Dose:  20 mg











- Labs


Labs: 


                                        





                                 01/30/19 07:45 





                                 01/30/19 07:45 











- Constitutional


Appears: Non-toxic, No Acute Distress, Chronically Ill





- Head Exam


Head Exam: NORMAL INSPECTION





- ENT Exam


ENT Exam: Mucous Membranes Moist





- Neck Exam


Neck Exam: absent: Meningismus





- Respiratory Exam


Respiratory Exam: Decreased Breath Sounds





- Cardiovascular Exam


Cardiovascular Exam: +S1, +S2





- GI/Abdominal Exam


GI & Abdominal Exam: Soft.  absent: Tenderness





Assessment and Plan





- Assessment and Plan (Free Text)


Plan: 








Assessment


sepsis due to complicated UTI in this patient with indwelling Waddell catheter and

metastatic prostate cancer, growing Proteus and E. faecalis in the urine, 

clinically improving


history of Proteus bacteremia, source unclear, concerned about intra-abdominal 

infection


history of UTI in this patient with prostate cancer with metastasis


history of Klebsiella urinary tract infection


Mechanical fall, etiology to be determined


prostate cancer on Lupron treatment once a month (hormonal treatment)


seizure disorder


glaucoma





Plan


continue Zosyn day 8 to complete 7-10 days


follow up further Urology recommendations 


will continue to follow clinically

## 2019-02-01 VITALS — RESPIRATION RATE: 20 BRPM

## 2019-02-01 RX ADMIN — GUAIFENESIN AND DEXTROMETHORPHAN HYDROBROMIDE SCH TAB: 600; 30 TABLET, EXTENDED RELEASE ORAL at 10:04

## 2019-02-01 RX ADMIN — GUAIFENESIN AND DEXTROMETHORPHAN HYDROBROMIDE SCH TAB: 600; 30 TABLET, EXTENDED RELEASE ORAL at 17:17

## 2019-02-01 RX ADMIN — PIPERACILLIN AND TAZOBACTAM SCH MLS/HR: 3; .375 INJECTION, POWDER, LYOPHILIZED, FOR SOLUTION INTRAVENOUS; PARENTERAL at 21:34

## 2019-02-01 RX ADMIN — PANTOPRAZOLE SODIUM SCH MG: 20 TABLET, DELAYED RELEASE ORAL at 05:32

## 2019-02-01 RX ADMIN — PIPERACILLIN AND TAZOBACTAM SCH MLS/HR: 3; .375 INJECTION, POWDER, LYOPHILIZED, FOR SOLUTION INTRAVENOUS; PARENTERAL at 14:13

## 2019-02-01 RX ADMIN — PIPERACILLIN AND TAZOBACTAM SCH MLS/HR: 3; .375 INJECTION, POWDER, LYOPHILIZED, FOR SOLUTION INTRAVENOUS; PARENTERAL at 05:27

## 2019-02-01 NOTE — CP.PCM.PN
Subjective





- Date & Time of Evaluation


Date of Evaluation: 02/01/19


Time of Evaluation: 13:55





- Subjective


Subjective: 





Comfortable, no fevers, not in distress, no abdominal pain, no dysuria or 

hematuria currently.





Objective





- Vital Signs/Intake and Output


Vital Signs (last 24 hours): 


                                        











Temp Pulse Resp BP Pulse Ox


 


 98.6 F   82   18   141/68   96 


 


 01/30/19 16:00  01/30/19 16:00  01/30/19 16:00  01/30/19 16:00  01/30/19 16:00








Intake and Output: 


                                        











 01/31/19 01/31/19





 06:59 18:59


 


Intake Total 620 


 


Output Total 1950 


 


Balance -1330 














- Medications


Medications: 


                               Current Medications





Carbamazepine (Tegretol)  200 mg PO TID RICHAR; Protocol


   Last Admin: 01/31/19 09:51 Dose:  200 mg


Fentanyl (Duragesic)  1 patch TD Q72H RICHAR; Protocol


   Last Admin: 01/31/19 09:48 Dose:  1 patch


Guaifenesin (Robitussin)  200 mg PO Q4H PRN; Protocol


   PRN Reason: Cough and congestion


Guaifenesin/Dextromethorphan (Mucinex-Dm 600-30 Mg)  1 tab PO BID RICHAR; Protocol


   Last Admin: 01/31/19 09:51 Dose:  1 tab


Piperacillin Sod/Tazobactam Sod (Zosyn 3.375 In Ns 100ml)  100 mls @ 25 mls/hr 

IVPB 0600,1400,2200 RICHAR; Protocol


   Stop: 02/02/19 22:01


   Last Admin: 01/31/19 05:35 Dose:  25 mls/hr


Lorazepam (Ativan)  1 mg IVP Q6H PRN; Protocol


   PRN Reason: Seizure activity


Pantoprazole Sodium (Protonix Ec Tab)  20 mg PO 0600 RICHAR; Protocol


   Last Admin: 01/31/19 05:35 Dose:  20 mg











- Labs


Labs: 


                                        





                                 01/30/19 07:45 





                                 01/30/19 07:45 











- Constitutional


Appears: Chronically Ill





- Head Exam


Head Exam: NORMAL INSPECTION





- ENT Exam


ENT Exam: Mucous Membranes Moist





- Neck Exam


Neck Exam: absent: Lymphadenopathy, Meningismus





- Respiratory Exam


Respiratory Exam: Decreased Breath Sounds





- Cardiovascular Exam


Cardiovascular Exam: +S1, +S2





- GI/Abdominal Exam


GI & Abdominal Exam: Soft.  absent: Tenderness





Assessment and Plan





- Assessment and Plan (Free Text)


Plan: 


Assessment


sepsis due to complicated UTI in this patient with indwelling Waddell catheter and

metastatic prostate cancer, growing Proteus and E. faecalis in the urine, 

clinically improving


history of Proteus bacteremia, source unclear, concerned about intra-abdominal 

infection


history of UTI in this patient with prostate cancer with metastasis


history of Klebsiella urinary tract infection


Mechanical fall, etiology to be determined


prostate cancer on Lupron treatment once a month (hormonal treatment)


seizure disorder


glaucoma





Plan


continue Zosyn day 9 to complete 7-10 days


follow up further Urology recommendations 


will continue to follow clinically

## 2019-02-01 NOTE — CP.PCM.PN
<Bridger Kaplanima - Last Filed: 02/01/19 17:02>





Subjective





- Date & Time of Evaluation


Date of Evaluation: 02/01/19


Time of Evaluation: 07:50





- Subjective


Subjective: 





[facility] progress note for Dr. Rivero





Patient seen and examined at bedside. As per nursing patient required 1 dose 

percocet for his back pain prior to working with PT. No acute events overnight. 

Patient resting comfortably this AM eating breakfast. Denied fever, chills, 

headache, dizziness, chest pain, palpitations, SOB, cough, abd pain, nausea, 

vomiting, bowel/bladder complaints, pain/swelling in his legs b/l.





Objective





- Vital Signs/Intake and Output


Vital Signs (last 24 hours): 


                                        











Temp Pulse Resp BP Pulse Ox


 


 98.2 F   84   20   116/46 L  96 


 


 02/01/19 16:00  02/01/19 16:00  02/01/19 16:00  02/01/19 16:00  02/01/19 16:00








Intake and Output: 


                                        











 02/01/19 02/01/19





 06:59 18:59


 


Intake Total 360 


 


Output Total 1900 


 


Balance -1540 














- Medications


Medications: 


                               Current Medications





Carbamazepine (Tegretol)  200 mg PO TID RICHAR; Protocol


   Last Admin: 02/01/19 14:13 Dose:  200 mg


Fentanyl (Duragesic)  1 patch TD Q72H RICHAR; Protocol


   Last Admin: 01/31/19 09:48 Dose:  1 patch


Guaifenesin (Robitussin)  200 mg PO Q4H PRN; Protocol


   PRN Reason: Cough and congestion


Guaifenesin/Dextromethorphan (Mucinex-Dm 600-30 Mg)  1 tab PO BID RICHAR; Protocol


   Last Admin: 02/01/19 10:04 Dose:  1 tab


Piperacillin Sod/Tazobactam Sod (Zosyn 3.375 In Ns 100ml)  100 mls @ 25 mls/hr 

IVPB 0600,1400,2200 RICHAR; Protocol


   Stop: 02/02/19 22:01


   Last Admin: 02/01/19 14:13 Dose:  25 mls/hr


Lorazepam (Ativan)  1 mg IVP Q6H PRN; Protocol


   PRN Reason: Seizure activity


Oxycodone/Acetaminophen (Percocet 5/325 Mg Tab)  1 tab PO Q6H PRN; Protocol


   PRN Reason: severe pain (8-10)


Pantoprazole Sodium (Protonix Ec Tab)  20 mg PO 0600 RICHAR; Protocol


   Last Admin: 02/01/19 05:32 Dose:  20 mg











- Labs


Labs: 


                                        





                                 01/30/19 07:45 





                                 01/30/19 07:45 











- Additional Findings


Additional findings: 





- Constitutional


Appears: Non-toxic, No Acute Distress





- Head Exam


Head Exam: ATRAUMATIC, NORMAL INSPECTION, NORMOCEPHALIC





- Eye Exam


Eye Exam: EOMI, Normal appearance, PERRL.  absent: Conjunctival injection, 

Scleral icterus





- ENT Exam


ENT Exam: Mucous Membranes Moist





- Neck Exam


Neck Exam: absent: Lymphadenopathy





- Respiratory Exam


Respiratory Exam: Decreased Breath Sounds, Clear to Ausculation Bilateral, 

NORMAL BREATHING PATTERN.  absent: Respiratory Distress





- Cardiovascular Exam


Cardiovascular Exam: +S1, +S2.  absent: Bradycardia, Tachycardia





- GI/Abdominal Exam


GI & Abdominal Exam: Soft, Normal Bowel Sounds.  absent: Firm, Guarding, Rigid, 

Tenderness





- Extremities Exam


Extremities Exam: Normal Capillary Refill.  absent: Calf Tenderness, Pedal Edema





- Neurological Exam


Neurological Exam: Alert, Awake, CN II-XII Intact





- Psychiatric Exam


Psychiatric exam: Normal Affect, Normal Mood





- Skin


Skin Exam: Dry, Intact, Normal Color, Warm





Assessment and Plan





- Assessment and Plan (Free Text)


Assessment: 





1. Complicated UTI with indwelling tolentino catheter- improving


2. Chronic indwelling tolentino catheter


3. Prostate ca on Lupro monthly


4. Chronic pain


5. Seizure disorder


6. Wheelchair bound


7. Glaucoma


Plan: 





Patient's vitals, blood work, and imaging reviewed in chart. Continue Zosyn (day

9) for 10 day total course in light of UTI with urine cultures +Proteus and +E. 

Faecalis. Procalcitonin <0.05. Monitor patient's Is and Os strictly. Continue 

patient's tegretol for seizure disorder. PRN ativan for seizures in place. As 

per urology, testosterone and PSA checked; testosterone 31 and PSA 99.1. Urology

will speak to patient's family regarding palliative treatment vs hormonal 

therapy. Patent pain controlled with home Fentanyl patch. PT on board. HoB above

30 degrees and fall risk protocol in place. HHD diet ordered. GI ppx and SCDs on

board. High fall risk and Seizure precautions in place. Continue work with PT.  

If patient becomes delirious recommend redirection. Patient will be closely 

monitored at this time.





Will discuss with Dr. Josefa Kaplan PGY3





<Nathan Rivero S - Last Filed: 02/01/19 18:56>





Objective





- Vital Signs/Intake and Output


Vital Signs (last 24 hours): 


                                        











Temp Pulse Resp BP Pulse Ox


 


 98.2 F   84   20   116/46 L  96 


 


 02/01/19 16:00  02/01/19 16:00  02/01/19 16:00  02/01/19 16:00  02/01/19 16:00








Intake and Output: 


                                        











 02/01/19 02/01/19





 06:59 18:59


 


Intake Total 360 


 


Output Total 1900 900


 


Balance -1540 -900














- Medications


Medications: 


                               Current Medications





Carbamazepine (Tegretol)  200 mg PO TID RICHAR; Protocol


   Last Admin: 02/01/19 17:18 Dose:  200 mg


Fentanyl (Duragesic)  1 patch TD Q72H RICHAR; Protocol


   Last Admin: 01/31/19 09:48 Dose:  1 patch


Guaifenesin (Robitussin)  200 mg PO Q4H PRN; Protocol


   PRN Reason: Cough and congestion


Guaifenesin/Dextromethorphan (Mucinex-Dm 600-30 Mg)  1 tab PO BID RICHAR; Protocol


   Last Admin: 02/01/19 17:17 Dose:  1 tab


Piperacillin Sod/Tazobactam Sod (Zosyn 3.375 In Ns 100ml)  100 mls @ 25 mls/hr 

IVPB 0600,1400,2200 RICHAR; Protocol


   Stop: 02/02/19 22:01


   Last Admin: 02/01/19 14:13 Dose:  25 mls/hr


Lorazepam (Ativan)  1 mg IVP Q6H PRN; Protocol


   PRN Reason: Seizure activity


Oxycodone/Acetaminophen (Percocet 5/325 Mg Tab)  1 tab PO Q6H PRN; Protocol


   PRN Reason: severe pain (8-10)


Pantoprazole Sodium (Protonix Ec Tab)  20 mg PO 0600 RICHAR; Protocol


   Last Admin: 02/01/19 05:32 Dose:  20 mg











- Labs


Labs: 


                                        





                                 01/30/19 07:45 





                                 01/30/19 07:45 











Assessment and Plan





- Assessment and Plan (Free Text)


Plan: 





Pt seen and examined by me. I have reviewed the note of the medical resident and

I agree with it. I have discussed the assessment and plan with the resident. I 

have reviewed the medications and the last labs.Pt with UTI and is on IV Abx. He

has 1 more day of ABx left. Pt is on Fentanly patch for pain. He has Glaucoma 

and is controlled. Prostate Ca and will f/u with Urology. Will go home in the 

am.

## 2019-02-02 VITALS
SYSTOLIC BLOOD PRESSURE: 127 MMHG | TEMPERATURE: 98.5 F | OXYGEN SATURATION: 95 % | HEART RATE: 86 BPM | DIASTOLIC BLOOD PRESSURE: 63 MMHG

## 2019-02-02 RX ADMIN — PIPERACILLIN AND TAZOBACTAM SCH MLS/HR: 3; .375 INJECTION, POWDER, LYOPHILIZED, FOR SOLUTION INTRAVENOUS; PARENTERAL at 05:58

## 2019-02-02 RX ADMIN — PANTOPRAZOLE SODIUM SCH MG: 20 TABLET, DELAYED RELEASE ORAL at 05:58

## 2019-02-02 RX ADMIN — PIPERACILLIN AND TAZOBACTAM SCH: 3; .375 INJECTION, POWDER, LYOPHILIZED, FOR SOLUTION INTRAVENOUS; PARENTERAL at 14:20

## 2019-02-02 RX ADMIN — GUAIFENESIN AND DEXTROMETHORPHAN HYDROBROMIDE SCH TAB: 600; 30 TABLET, EXTENDED RELEASE ORAL at 10:03

## 2019-02-02 NOTE — CP.PCM.PN
Subjective





- Date & Time of Evaluation


Date of Evaluation: 02/02/19


Time of Evaluation: 10:55





- Subjective


Subjective: 





Comfortable, no fevers, no abdominal pain, no dysuria.





Objective





- Vital Signs/Intake and Output


Vital Signs (last 24 hours): 


                                        











Temp Pulse Resp BP Pulse Ox


 


 98.6 F   76   18   132/57 L  96 


 


 01/31/19 16:00  01/31/19 16:00  01/31/19 16:00  01/31/19 16:00  01/31/19 16:00








Intake and Output: 


                                        











 02/01/19 02/01/19





 06:59 18:59


 


Intake Total 360 


 


Output Total 1900 


 


Balance -1540 














- Medications


Medications: 


                               Current Medications





Carbamazepine (Tegretol)  200 mg PO TID RICHAR; Protocol


   Last Admin: 02/01/19 14:13 Dose:  200 mg


Fentanyl (Duragesic)  1 patch TD Q72H RICHAR; Protocol


   Last Admin: 01/31/19 09:48 Dose:  1 patch


Guaifenesin (Robitussin)  200 mg PO Q4H PRN; Protocol


   PRN Reason: Cough and congestion


Guaifenesin/Dextromethorphan (Mucinex-Dm 600-30 Mg)  1 tab PO BID RICHAR; Protocol


   Last Admin: 02/01/19 10:04 Dose:  1 tab


Piperacillin Sod/Tazobactam Sod (Zosyn 3.375 In Ns 100ml)  100 mls @ 25 mls/hr 

IVPB 0600,1400,2200 RICHAR; Protocol


   Stop: 02/02/19 22:01


   Last Admin: 02/01/19 14:13 Dose:  25 mls/hr


Lorazepam (Ativan)  1 mg IVP Q6H PRN; Protocol


   PRN Reason: Seizure activity


Oxycodone/Acetaminophen (Percocet 5/325 Mg Tab)  1 tab PO Q6H PRN; Protocol


   PRN Reason: severe pain (8-10)


Pantoprazole Sodium (Protonix Ec Tab)  20 mg PO 0600 RICHAR; Protocol


   Last Admin: 02/01/19 05:32 Dose:  20 mg











- Labs


Labs: 


                                        





                                 01/30/19 07:45 





                                 01/30/19 07:45 











- Constitutional


Appears: Chronically Ill





- Head Exam


Head Exam: NORMAL INSPECTION





- Respiratory Exam


Respiratory Exam: Decreased Breath Sounds





- Cardiovascular Exam


Cardiovascular Exam: +S1, +S2





- GI/Abdominal Exam


GI & Abdominal Exam: Soft.  absent: Tenderness





Assessment and Plan





- Assessment and Plan (Free Text)


Plan: 





Assessment


sepsis due to complicated UTI in this patient with indwelling Waddell catheter and

metastatic prostate cancer, growing Proteus and E. faecalis in the urine, 

clinically improved


history of Proteus bacteremia, source unclear, concerned about intra-abdominal 

infection


history of UTI in this patient with prostate cancer with metastasis


history of Klebsiella urinary tract infection


Mechanical fall, etiology to be determined


prostate cancer on Lupron treatment once a month (hormonal treatment)


seizure disorder


glaucoma





Plan


on Zosyn day 10 to complete 7-10 days


patient should follow up with Urology as an outpatient

## 2019-02-02 NOTE — DS
HISTORY OF PRESENT ILLNESS:  The patient is an 89-year-old male who is

coming to the hospital and was found to have urinary tract infection.  The

patient has been treated fully for his urinary tract infection.  He is

going to be discharge home today.  He has no complaints of any headaches or

dizziness.  No nausea.  No vomiting.  Today is day #10 of his Zosyn.



PHYSICAL EXAMINATION:

VITAL SIGNS:  Temperature is 98.2, pulse of 84, blood pressure 116/46 and

respirations 20.

GENERAL:  The patient is lying in bed, flat, comfortable.

HEENT:  No oral lesion.  Anicteric sclerae.  Moist mucosa.

NECK:  No JVD, adenopathy, or thyromegaly.

CARDIOVASCULAR:  S1 and S2, regular.  No murmurs, rubs, or gallops.

LUNGS:  Clear to auscultation bilaterally.  No wheeze, rales, or rhonchi.

ABDOMEN:  Bowel sounds are positive, soft, nontender and nondistended.

EXTREMITIES:  No cyanosis, clubbing or edema.



ASSESSMENT:

1.  Urinary tract infection.

2.  Waddell secondary to urinary retention.

3.  Prostate cancer.

4.  Chronic back pain.

5.  Seizure disorder.

6.  Wheelchair bound.

7.  Glaucoma.



PLAN:  The patient is currently on fentanyl patch for his pain.  He did

receive the flu shot.  The patient is on Percocet for pain as needed for

break through.  He is on carbamazepine for his seizures.  He is finishing

his Zosyn today.  He is on heart healthy diet.  He will be discharge home

and followup with his primary care doctor and urologist.



CONDITION:  Stable.



ACTIVITIES:  Increase as tolerated.







__________________________________________

Nathan Rivero MD





DD:  02/02/2019 7:25:28

DT:  02/02/2019 7:44:32

Job # [id number]

## 2019-02-18 ENCOUNTER — HOSPITAL ENCOUNTER (INPATIENT)
Dept: HOSPITAL 42 - ED | Age: 84
LOS: 4 days | Discharge: HOME | DRG: 699 | End: 2019-02-22
Attending: INTERNAL MEDICINE | Admitting: INTERNAL MEDICINE
Payer: MEDICARE

## 2019-02-18 VITALS — BODY MASS INDEX: 24.3 KG/M2

## 2019-02-18 DIAGNOSIS — Z99.3: ICD-10-CM
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
## 2019-02-18 DIAGNOSIS — G40.909: ICD-10-CM

## 2019-02-18 DIAGNOSIS — F03.90: ICD-10-CM

## 2019-02-18 DIAGNOSIS — Z87.891: ICD-10-CM

## 2019-02-18 DIAGNOSIS — Z23: ICD-10-CM

## 2019-02-18 DIAGNOSIS — E78.5: ICD-10-CM

## 2019-02-18 DIAGNOSIS — R33.8: ICD-10-CM

## 2019-02-18 DIAGNOSIS — Z79.818: ICD-10-CM

## 2019-02-18 DIAGNOSIS — C61: ICD-10-CM

## 2019-02-18 DIAGNOSIS — G89.3: ICD-10-CM

## 2019-02-18 DIAGNOSIS — Z66: ICD-10-CM

## 2019-02-18 DIAGNOSIS — B96.4: ICD-10-CM

## 2019-02-18 DIAGNOSIS — H40.9: ICD-10-CM

## 2019-02-18 DIAGNOSIS — D86.9: ICD-10-CM

## 2019-02-18 DIAGNOSIS — N39.0: ICD-10-CM

## 2019-02-18 DIAGNOSIS — E87.1: ICD-10-CM

## 2019-02-18 DIAGNOSIS — R31.0: ICD-10-CM

## 2019-02-18 DIAGNOSIS — C79.51: ICD-10-CM

## 2019-02-18 DIAGNOSIS — D64.9: ICD-10-CM

## 2019-02-18 DIAGNOSIS — I25.2: ICD-10-CM

## 2019-02-18 DIAGNOSIS — N40.1: ICD-10-CM

## 2019-02-18 DIAGNOSIS — T83.518A: Primary | ICD-10-CM

## 2019-02-18 LAB
% IRON SATURATION: 15 % (ref 20–55)
ALBUMIN SERPL-MCNC: 3.9 G/DL (ref 3–4.8)
ALBUMIN/GLOB SERPL: 1 {RATIO} (ref 1.1–1.8)
ALT SERPL-CCNC: 12 U/L (ref 7–56)
APPEARANCE UR: (no result)
AST SERPL-CCNC: 34 U/L (ref 17–59)
BACTERIA #/AREA URNS HPF: (no result) /HPF
BASOPHILS # BLD AUTO: 0.01 K/MM3 (ref 0–2)
BASOPHILS NFR BLD: 0.1 % (ref 0–3)
BILIRUB UR-MCNC: (no result) MG/DL
BUN SERPL-MCNC: 37 MG/DL (ref 7–21)
CALCIUM SERPL-MCNC: 9 MG/DL (ref 8.4–10.5)
COLOR UR: (no result)
EOSINOPHIL # BLD: 0.1 10*3/UL (ref 0–0.7)
EOSINOPHIL NFR BLD: 0.6 % (ref 1.5–5)
ERYTHROCYTE [DISTWIDTH] IN BLOOD BY AUTOMATED COUNT: 13.2 % (ref 11.5–14.5)
GFR NON-AFRICAN AMERICAN: > 60
GLUCOSE UR STRIP-MCNC: 500 MG/DL
HGB BLD-MCNC: 9.6 G/DL (ref 14–18)
IRON SERPL-MCNC: 32 UG/DL (ref 45–180)
LEUKOCYTE ESTERASE UR-ACNC: (no result) LEU/UL
LYMPHOCYTES # BLD: 3.2 10*3/UL (ref 1.2–3.4)
LYMPHOCYTES NFR BLD AUTO: 31.5 % (ref 22–35)
MCH RBC QN AUTO: 30.6 PG (ref 25–35)
MCHC RBC AUTO-ENTMCNC: 34.2 G/DL (ref 31–37)
MCV RBC AUTO: 89.5 FL (ref 80–105)
MONOCYTES # BLD AUTO: 0.8 10*3/UL (ref 0.1–0.6)
MONOCYTES NFR BLD: 7.9 % (ref 1–6)
PH UR STRIP: 7 [PH] (ref 4.7–8)
PLATELET # BLD: 202 10^3/UL (ref 120–450)
PMV BLD AUTO: 9.1 FL (ref 7–11)
PROT UR STRIP-MCNC: >=300 MG/DL
RBC # BLD AUTO: 3.14 10^6/UL (ref 3.5–6.1)
RBC # UR STRIP: (no result) /UL
RBC #/AREA URNS HPF: (no result) /HPF (ref 0–2)
SP GR UR STRIP: 1.01 (ref 1–1.03)
TIBC SERPL-MCNC: 215 UG/DL (ref 261–462)
UROBILINOGEN UR STRIP-ACNC: 4 E.U./DL
WBC # BLD AUTO: 10 10^3/UL (ref 4.5–11)
WBC #/AREA URNS HPF: (no result) /HPF (ref 0–6)

## 2019-02-18 NOTE — RAD
Date of service: 



02/18/2019



HISTORY:

Admission.



COMPARISON:

01/26/2019 



FINDINGS:



LUNGS:

No active pulmonary disease.



PLEURA:

No significant pleural effusion identified, no pneumothorax apparent.



CARDIOVASCULAR:

No atherosclerotic calcification present



 No radiographic findings to suggest acute or significant 

cardiovascular disease.



OSSEOUS STRUCTURES:

No significant abnormalities.



VISUALIZED UPPER ABDOMEN:

Normal.



OTHER FINDINGS:

None.



IMPRESSION:

No active disease. No significant interval change compared to the 

prior examination(s).

## 2019-02-18 NOTE — ED PDOC
Arrival/HPI





- General


Chief Complaint: Male Genitourinary


Time Seen by Provider: 02/18/19 09:19


Historian: Patient





- History of Present Illness


Narrative History of Present Illness (Text): 





02/18/19 11:23


89yo male with pmhx of metastatic Prostate CA s/p ADT and palliative care, 

seizures, dementia who was bib EMS for urinary retention and lower abdominal 

pain. The son by the bedside states patients indwelling Tolentino was changed 

yesterday by a visiting nurse after they were told that the Tolentino was flowing. 

States that it worked last night and then the home care again complained that 

they was no urine when he woke up this morning and when a visiting nurse flushed

it nothing came out. Denies fever, hematuria, chills, back pain, nausea, 

vomiting, diarrhea, any other complaint.

















Past Medical History





- Provider Review


Nursing Documentation Reviewed: Yes





- Infectious Disease


Hx of Infectious Diseases: None





- Cardiac


Hx Pacemaker: No





- Pulmonary


Hx Respiratory Disorders: Yes (H/O SMOKING CIGARETTES)


Other/Comment: EMPYEMA/SARCOIDOSIS





- Neurological


Hx Dizziness: Yes (syncope)


Hx Seizures: Yes (last seizure 20 yrs ago)





- HEENT


Hx Cataracts: Yes (with bilateral sx)





- Renal


Hx Renal Disorder: Yes


Other/Comment: chronic tolentino-Prostate ca.





- Endocrine/Metabolic


Hx Endocrine Disorders: No





- Hematological/Oncological


Hx Cancer: Yes (prostate)





- Integumentary


Hx Dermatological Disorder: Yes





- Musculoskeletal/Rheumatological


Hx Arthritis: Yes





- Gastrointestinal


Hx Gastrointestinal Disorders: No





- Genitourinary/Gynecological


Hx Genitourinary Disorders: Yes (UTI,CHRONIC TOLENTINO CATHETER)


Hx Reproductive Disorders: Yes (PROSTATE CA)





- Psychiatric


Hx Psychophysiologic Disorder: Yes


Hx Substance Use: No





- Surgical History


Hx Mastectomy: No





- Anesthesia


Hx Anesthesia: No





Family/Social History





- Physician Review


Nursing Documentation Reviewed: Yes


Family/Social History: Unknown Family HX


Smoking Status: Former Smoker


Hx Alcohol Use: Yes (occasional)


Hx Substance Use: No





Allergies/Home Meds


Allergies/Adverse Reactions: 


Allergies





No Known Allergies Allergy (Verified 01/29/19 18:01)


   








Home Medications: 


                                    Home Meds











 Medication  Instructions  Recorded  Confirmed


 


RX: Fentanyl [Duragesic Patch] 50 mcg TD Q72 01/24/19 02/18/19


 


RX: Gemfibrozil [Lopid] 600 mg PO BID 01/24/19 02/18/19


 


RX: Omeprazole 40 mg PO DAILY 01/24/19 02/18/19


 


oxyCODONE/Acetaminophen [Percocet 1 tab PO Q4H PRN 02/18/19 02/18/19





5/325 mg Tab]   














Review of Systems





- Physician Review


All systems were reviewed & negative as marked: Yes





- Review of Systems


Constitutional: Normal


Eyes: Normal


ENT: Normal


Respiratory: Normal


Cardiovascular: Normal


Gastrointestinal: Abdominal Pain.  absent: Constipation, Diarrhea, Nausea, 

Vomiting


Genitourinary Male: Urinary Output Changes (Urinary retention)


Musculoskeletal: Normal


Skin: Normal


Neurological: Normal


Endocrine: Normal


Hemo/Lymphatic: Normal


Psychiatric: Normal





Physical Exam


Vital Signs Reviewed: Yes





Vital Signs











  Temp Pulse Resp BP Pulse Ox


 


 02/18/19 09:46  98.2 F  95 H  18  137/80  96











Temperature: Afebrile


Blood Pressure: Normal


Pulse: Regular


Respiratory Rate: Normal


Appearance: Positive for: Well-Appearing, Non-Toxic, Comfortable


Pain Distress: None


Mental Status: Positive for: Alert and Oriented X 3





- Systems Exam


Head: Present: Atraumatic, Normocephalic


Pupils: Present: PERRL


Extroacular Muscles: Present: EOMI


Conjunctiva: Present: Normal


Mouth: Present: Moist Mucous Membranes


Neck: Present: Normal Range of Motion


Respiratory/Chest: Present: Clear to Auscultation, Good Air Exchange.  No: 

Respiratory Distress, Accessory Muscle Use


Cardiovascular: Present: Regular Rate and Rhythm, Normal S1, S2.  No: Murmurs


Abdomen: Present: Tenderness (Lower abdominal tenderness), Distention (Mild 

lower abdominal distention), Normal Bowel Sounds, Other (Soft).  No: Peritoneal 

Signs, Rebound, Guarding, McBurney's Point Tender, Rovsing's Sign Present


Back: Present: Normal Inspection


Upper Extremity: Present: Normal Inspection.  No: Cyanosis, Edema


Lower Extremity: Present: Normal Inspection.  No: Edema


Neurological: Present: GCS=15, CN II-XII Intact, Speech Normal


Skin: Present: Warm, Dry, Normal Color.  No: Rashes


Psychiatric: Present: Alert, Oriented x 3, Normal Insight, Normal Concentration





Medical Decision Making


ED Course and Treatment: 





02/18/19 17:08


89yo male bib EMS for urinary retention and abdominal distention. He was 

afebrile, hemodynamically stable in ED.





his Tolentino was changed in ED and UA ordered





He was treated with Penicillin in ED


Labs ordered





Pt was observed in ED and on deflation urinary outpu was noted, but with 

inflation it stops. Pt put out a total of 1300mc in ED





Lab was reviewed with no leukocytosis. Hyponatremia was noted. Pt was hydrated 

in ED





Per the son who is the POA pt is DNR/DNI and the order  was placed





Case was [facility] and pt was admitted for UTI, hyponatremia and urinary 

retention








Result and plan was DW pt and the son and they both agreed.








- Lab Interpretations


Lab Results: 





                                        











Urine Color  Brown  (YELLOW)   02/18/19  10:20    


 


Urine Appearance  Turbid  (CLEAR)   02/18/19  10:20    


 


Urine pH  7.0  (4.7-8.0)   02/18/19  10:20    


 


Ur Specific Gravity  1.015  (1.005-1.035)   02/18/19  10:20    


 


Urine Protein  >=300 mg/dL (<30 mg/dL)  H  02/18/19  10:20    


 


Urine Glucose (UA)  500 mg/dL (NEGATIVE)  H  02/18/19  10:20    


 


Urine Ketones  15 mg/dL (NEGATIVE)  H  02/18/19  10:20    


 


Urine Blood  Large  (NEGATIVE)  H  02/18/19  10:20    


 


Urine Nitrate  Positive  (NEGATIVE)  H  02/18/19  10:20    


 


Urine Bilirubin  Large  (NEGATIVE)  H  02/18/19  10:20    


 


Urine Urobilinogen  4.0 E.U./dL (<1 E.U./dL)  H  02/18/19  10:20    


 


Ur Leukocyte Esterase  Large Martita/uL (NEGATIVE)  H  02/18/19  10:20    














Disposition/Present on Arrival





- Present on Arrival


Any Indicators Present on Arrival: No


History of DVT/PE: No


History of Uncontrolled Diabetes: No


Urinary Catheter: Yes


History of Decub. Ulcer: No


History Surgical Site Infection Following: None





- Disposition


Have Diagnosis and Disposition been Completed?: Yes


Diagnosis: 


 UTI (urinary tract infection), Hyponatremia, Urinary retention, Anemia





Disposition: HOSPITALIZED


Disposition Time: 12:00


Patient Plan: Admission, Discharge


Patient Problems: 


                             Current Active Problems











Problem Status Onset


 


Anemia Acute 


 


Hyponatremia Acute 


 


Urinary retention Acute 


 


Urinary tract infection Acute 











Condition: FAIR

## 2019-02-19 LAB
ALBUMIN SERPL-MCNC: 3.7 G/DL (ref 3–4.8)
ALBUMIN/GLOB SERPL: 1 {RATIO} (ref 1.1–1.8)
ALT SERPL-CCNC: 7 U/L (ref 7–56)
AST SERPL-CCNC: 43 U/L (ref 17–59)
BASOPHILS # BLD AUTO: 0 K/MM3 (ref 0–2)
BASOPHILS NFR BLD: 0 % (ref 0–3)
BUN SERPL-MCNC: 37 MG/DL (ref 7–21)
CALCIUM SERPL-MCNC: 9.3 MG/DL (ref 8.4–10.5)
EOSINOPHIL # BLD: 0.1 10*3/UL (ref 0–0.7)
EOSINOPHIL NFR BLD: 1.9 % (ref 1.5–5)
ERYTHROCYTE [DISTWIDTH] IN BLOOD BY AUTOMATED COUNT: 13.5 % (ref 11.5–14.5)
GFR NON-AFRICAN AMERICAN: > 60
HGB BLD-MCNC: 9.3 G/DL (ref 14–18)
LYMPHOCYTES # BLD: 3 10*3/UL (ref 1.2–3.4)
LYMPHOCYTES NFR BLD AUTO: 46.9 % (ref 22–35)
MCH RBC QN AUTO: 30.1 PG (ref 25–35)
MCHC RBC AUTO-ENTMCNC: 33.1 G/DL (ref 31–37)
MCV RBC AUTO: 90.9 FL (ref 80–105)
MONOCYTES # BLD AUTO: 0.6 10*3/UL (ref 0.1–0.6)
MONOCYTES NFR BLD: 9.7 % (ref 1–6)
PLATELET # BLD: 203 10^3/UL (ref 120–450)
PMV BLD AUTO: 8.6 FL (ref 7–11)
RBC # BLD AUTO: 3.09 10^6/UL (ref 3.5–6.1)
WBC # BLD AUTO: 6.4 10^3/UL (ref 4.5–11)

## 2019-02-19 RX ADMIN — ENOXAPARIN SODIUM SCH MG: 40 INJECTION SUBCUTANEOUS at 09:11

## 2019-02-19 RX ADMIN — VANCOMYCIN HYDROCHLORIDE SCH MLS/HR: 1 INJECTION, POWDER, LYOPHILIZED, FOR SOLUTION INTRAVENOUS at 08:40

## 2019-02-19 RX ADMIN — PIPERACILLIN AND TAZOBACTAM SCH MLS/HR: 3; .375 INJECTION, POWDER, LYOPHILIZED, FOR SOLUTION INTRAVENOUS; PARENTERAL at 22:23

## 2019-02-19 RX ADMIN — PIPERACILLIN AND TAZOBACTAM SCH MLS/HR: 3; .375 INJECTION, POWDER, LYOPHILIZED, FOR SOLUTION INTRAVENOUS; PARENTERAL at 10:30

## 2019-02-19 RX ADMIN — VANCOMYCIN HYDROCHLORIDE SCH MLS/HR: 1 INJECTION, POWDER, LYOPHILIZED, FOR SOLUTION INTRAVENOUS at 21:25

## 2019-02-19 RX ADMIN — PIPERACILLIN AND TAZOBACTAM SCH MLS/HR: 3; .375 INJECTION, POWDER, LYOPHILIZED, FOR SOLUTION INTRAVENOUS; PARENTERAL at 14:53

## 2019-02-19 NOTE — CARD
--------------- APPROVED REPORT --------------





Date of service: 02/18/2019



EKG Measurement

Heart [id number]

AK [id number]

[id number]

[id number]

[id number]



<Conclusion>

Normal sinus rhythm

Left axis deviation

Inferior infarct, age undetermined

Abnormal ECG

## 2019-02-19 NOTE — CP.PCM.HP
<David Poole - Last Filed: 02/19/19 10:04>





History of Present Illness





- History of Present Illness


History of Present Illness: 





H&P for Dr Rivero:





89yo male PMHx metastatic prostate cancer s/p ADT and palliative radiation on 

Lupron, seizures, glaucoma, dementia, UTI w/ indwelling nunez (+ for E.coli, 

Klebsiella, and Enterococcus in the past) presents to the ED with lower 

abdominal pain and urinary retention.  Patient with history of dementia 

therefore HPI taken from prior notes and nursing staff.  Patient has an 

indwelling Nunez catheter that was changed by visiting nurse however no urine 

was flowing when he woke up in the morning even after it was flushed.  Of note 

patient was just recently hospitalized last few weeks for a UTI. Patient 

currently denies any fever, chills, headache, shortness of breath, cough, chest 

pain, abdominal pain, nausea, vomiting, or diarrhea.





12 point ROS is limited due to dementia however performed and otherwise negative








PMHx: metastatic prostate cancer s/p ADT and palliative radiation on Lupron, 

seizures, glaucoma, dementia, UTI w/ indwelling nunez (+ for E.coli, Klebsiella,

and Enterococcus)


PSH: b/l cataract surgery 


Home meds: refer to [facility]


Allergies: NKA


SocHx: Denies EtOH, drug or tobacco use. Patient lives at home and has a 24 hr 

home aid. he is not active and is wheelchair bound. 


FamHx: non-contributory 





Present on Admission





- Present on Admission


Any Indicators Present on Admission: No





Review of Systems





- Review of Systems


All systems: reviewed and no additional remarkable complaints except





Past Patient History





- Infectious Disease


Hx of Infectious Diseases: None





- Past Social History


Smoking Status: Unknown If Ever Smoked





- CARDIAC


Hx Pacemaker: No





- PULMONARY


Hx Respiratory Disorders: Yes (H/O SMOKING CIGARETTES)


Other/Comment: EMPYEMA/SARCOIDOSIS





- NEUROLOGICAL


Hx Dizziness: Yes (syncope)


Hx Seizures: Yes (last seizure 20 yrs ago)





- HEENT


Hx Cataracts: Yes (with bilateral sx)





- RENAL


Hx Chronic Kidney Disease: Yes


Other/Comment: chronic nunez-Prostate ca.





- ENDOCRINE/METABOLIC


Hx Endocrine Disorders: No





- HEMATOLOGICAL/ONCOLOGICAL


Hx Cancer: Yes (prostate)





- INTEGUMENTARY


Hx Dermatological Problems: Yes





- MUSCULOSKELETAL/RHEUMATOLOGICAL


Hx Falls: No





- GASTROINTESTINAL


Hx Gastrointestinal Disorders: No





- GENITOURINARY/GYNECOLOGICAL


Hx Genitourinary Disorders: Yes (UTI,CHRONIC NUNEZ CATHETER)





- PSYCHIATRIC


Hx Psychophysiologic Disorder: Yes





- SURGICAL HISTORY


Hx Mastectomy: No





- ANESTHESIA


Hx Anesthesia: No





Meds


Home Medications: 


                              Home Medication List











 Medication  Instructions  Recorded  Confirmed  Type


 


Penicillin VK [Penicillin VK Tab] 500 mg PO BID #20 tab 02/18/19  Rx











Allergies/Adverse Reactions: 


                                    Allergies











Allergy/AdvReac Type Severity Reaction Status Date / Time


 


No Known Allergies Allergy   Verified 01/29/19 18:01














Physical Exam





- Constitutional


Appears: No Acute Distress





- Head Exam


Head Exam: ATRAUMATIC, NORMOCEPHALIC





- Eye Exam


Eye Exam: EOMI, PERRL





- ENT Exam


ENT Exam: Mucous Membranes Moist





- Respiratory Exam


Respiratory Exam: Clear to Auscultation Bilateral.  absent: Rales, Rhonchi, 

Wheezes





- Cardiovascular Exam


Cardiovascular Exam: REGULAR RHYTHM, +S1, +S2





- GI/Abdominal Exam


GI & Abdominal Exam: Normal Bowel Sounds, Soft.  absent: Tenderness





- Extremities Exam


Extremities exam: Negative for: calf tenderness, pedal edema





- Neurological Exam


Neurological exam: Alert, CN II-XII Intact, Oriented x3





- Psychiatric Exam


Psychiatric exam: Normal Mood





- Skin


Skin Exam: Dry, Warm





Results





- Vital Signs


Recent Vital Signs: 





                                Last Vital Signs











Temp  98.2 F   02/18/19 09:46


 


Pulse  86   02/19/19 02:59


 


Resp  20   02/19/19 02:59


 


BP  129/60   02/18/19 21:44


 


Pulse Ox  96   02/18/19 21:44














- Labs


Result Diagrams: 


                                 02/19/19 08:30





                                 02/19/19 08:30


Labs: 





                         Laboratory Results - last 24 hr











  02/18/19 02/18/19 02/18/19





  10:20 11:40 16:03


 


WBC   10.0  D 


 


RBC   3.14 L 


 


Hgb   9.6 L 


 


Hct   28.1 L 


 


MCV   89.5 


 


MCH   30.6 


 


MCHC   34.2 


 


RDW   13.2 


 


Plt Count   202 


 


MPV   9.1 


 


Neut % (Auto)   59.9 


 


Lymph % (Auto)   31.5 


 


Mono % (Auto)   7.9 H 


 


Eos % (Auto)   0.6 L 


 


Baso % (Auto)   0.1 


 


Lymph # (Auto)   3.2 


 


Mono # (Auto)   0.8 H 


 


Eos # (Auto)   0.1 


 


Baso # (Auto)   0.01 


 


Absolute Neuts (auto)   6.00 


 


Sodium    126 L


 


Potassium    4.8


 


Chloride    94 L


 


Carbon Dioxide    24


 


Anion Gap    14


 


BUN    37 H


 


Creatinine    0.8


 


Est GFR ( Amer)    > 60


 


Est GFR (Non-Af Amer)    > 60


 


Random Glucose    85


 


Calcium    9.0


 


Iron   


 


TIBC   


 


% Saturation   


 


Ferritin   


 


Total Bilirubin    0.3


 


AST    34


 


ALT    12


 


Alkaline Phosphatase    136 H D


 


Total Protein    7.7


 


Albumin    3.9


 


Globulin    3.8


 


Albumin/Globulin Ratio    1.0 L


 


Urine Color  Brown  


 


Urine Appearance  Turbid  


 


Urine pH  7.0  


 


Ur Specific Gravity  1.015  


 


Urine Protein  >=300 H  


 


Urine Glucose (UA)  500 H  


 


Urine Ketones  15 H  


 


Urine Blood  Large H  


 


Urine Nitrate  Positive H  


 


Urine Bilirubin  Large H  


 


Urine Urobilinogen  4.0 H  


 


Ur Leukocyte Esterase  Large H  


 


Urine RBC  Tntc H  


 


Urine WBC  Tntc H  


 


Urine Bacteria  Large  














  02/18/19 02/18/19





  16:03 17:00


 


WBC  


 


RBC  


 


Hgb  


 


Hct  


 


MCV  


 


MCH  


 


MCHC  


 


RDW  


 


Plt Count  


 


MPV  


 


Neut % (Auto)  


 


Lymph % (Auto)  


 


Mono % (Auto)  


 


Eos % (Auto)  


 


Baso % (Auto)  


 


Lymph # (Auto)  


 


Mono # (Auto)  


 


Eos # (Auto)  


 


Baso # (Auto)  


 


Absolute Neuts (auto)  


 


Sodium  


 


Potassium  


 


Chloride  


 


Carbon Dioxide  


 


Anion Gap  


 


BUN  


 


Creatinine  


 


Est GFR ( Amer)  


 


Est GFR (Non-Af Amer)  


 


Random Glucose  


 


Calcium  


 


Iron  32 L 


 


TIBC  215 L 


 


% Saturation  15 L 


 


Ferritin   267.0


 


Total Bilirubin  


 


AST  


 


ALT  


 


Alkaline Phosphatase  


 


Total Protein  


 


Albumin  


 


Globulin  


 


Albumin/Globulin Ratio  


 


Urine Color  


 


Urine Appearance  


 


Urine pH  


 


Ur Specific Gravity  


 


Urine Protein  


 


Urine Glucose (UA)  


 


Urine Ketones  


 


Urine Blood  


 


Urine Nitrate  


 


Urine Bilirubin  


 


Urine Urobilinogen  


 


Ur Leukocyte Esterase  


 


Urine RBC  


 


Urine WBC  


 


Urine Bacteria  














Assessment & Plan





- Assessment and Plan (Free Text)


Assessment: 





1.  Abdominal pain likely secondary to urinary retention


2.  Urinary tract infection


3.  Hyponatremia


4.  History of metastatic prostate cancer


5.  Seizure


6.  HLD


7.  Dementia


8. Chronic pain 





Patient reports that his abdominal pain is currently better.  Urology was 

consulted for their recommendations regarding urinary retention.  He was also 

started on vancomycin and Zosyn for his UTI, infectious disease was consulted 

for recommendations.  Continue with fentanyl and oxycodone for chronic pain.  

Continue with Lopid for his hyperlipidemia.  Continue with carbamazepine for his

seizures.  For the patient's hyponatremia of 126 workup including serum 

osmolarity, urine osmolarity, and urine sodium was ordered.GI and DVT 

prophylaxis.  Heart healthy diet. Continue to monitor for any changes. 





Case and plan was reviewed and discussed with Dr. Rivero. 





<Nathan Rivero - Last Filed: 02/19/19 18:44>





Results





- Vital Signs


Recent Vital Signs: 





                                Last Vital Signs











Temp  98.4 F   02/19/19 12:00


 


Pulse  87   02/19/19 12:00


 


Resp  18   02/19/19 12:00


 


BP  130/54 L  02/19/19 12:00


 


Pulse Ox  96   02/18/19 21:44














- Labs


Result Diagrams: 


                                 02/19/19 08:30





                                 02/19/19 08:30


Labs: 





                         Laboratory Results - last 24 hr











  02/18/19 02/19/19 02/19/19





  17:00 08:30 08:30


 


WBC   6.4  D 


 


RBC   3.09 L 


 


Hgb   9.3 L 


 


Hct   28.1 L 


 


MCV   90.9 


 


MCH   30.1 


 


MCHC   33.1 


 


RDW   13.5 


 


Plt Count   203 


 


MPV   8.6 


 


Neut % (Auto)   41.5 L 


 


Lymph % (Auto)   46.9 H 


 


Mono % (Auto)   9.7 H 


 


Eos % (Auto)   1.9 


 


Baso % (Auto)   0.0 


 


Lymph # (Auto)   3.0 


 


Mono # (Auto)   0.6 


 


Eos # (Auto)   0.1 


 


Baso # (Auto)   0.00 


 


Absolute Neuts (auto)   2.64 


 


Sodium    132


 


Potassium    4.7


 


Chloride    99


 


Carbon Dioxide    26


 


Anion Gap    12


 


BUN    37 H


 


Creatinine    0.8


 


Est GFR ( Amer)    > 60


 


Est GFR (Non-Af Amer)    > 60


 


Random Glucose    88


 


Serum Osmolality   


 


Calcium    9.3


 


Ferritin  267.0  


 


Total Bilirubin    0.3


 


AST    43


 


ALT    7


 


Alkaline Phosphatase    122


 


Total Protein    7.4


 


Albumin    3.7


 


Globulin    3.7


 


Albumin/Globulin Ratio    1.0 L














  02/19/19





  10:10


 


WBC 


 


RBC 


 


Hgb 


 


Hct 


 


MCV 


 


MCH 


 


MCHC 


 


RDW 


 


Plt Count 


 


MPV 


 


Neut % (Auto) 


 


Lymph % (Auto) 


 


Mono % (Auto) 


 


Eos % (Auto) 


 


Baso % (Auto) 


 


Lymph # (Auto) 


 


Mono # (Auto) 


 


Eos # (Auto) 


 


Baso # (Auto) 


 


Absolute Neuts (auto) 


 


Sodium 


 


Potassium 


 


Chloride 


 


Carbon Dioxide 


 


Anion Gap 


 


BUN 


 


Creatinine 


 


Est GFR ( Amer) 


 


Est GFR (Non-Af Amer) 


 


Random Glucose 


 


Serum Osmolality  281


 


Calcium 


 


Ferritin 


 


Total Bilirubin 


 


AST 


 


ALT 


 


Alkaline Phosphatase 


 


Total Protein 


 


Albumin 


 


Globulin 


 


Albumin/Globulin Ratio 














Assessment & Plan





- Assessment and Plan (Free Text)


Assessment: 





Pt seen and examined by me. I have reviewed the note of the medical resident and

I agree with it. I have discussed the assessment and plan with the resident. I 

have reviewed the medications and the last labs. Pt with urinary retention. His 

nunez is not draining properly and has gross hematuria. He may have a UTI. Will 

need urology evaluation. He has hyponatremia and will need further evaluation. 

He has a hx of Prostate Ca. Sz d/o controlled with Carbamazene. He was started 

on IV Vanco and Zosyn in the ER. Will ID evaluation

## 2019-02-19 NOTE — CON
DATE OF CONSULTATION:  02/19/2019



GENITOURINARY CONSULTATION



CHIEF COMPLAINT:  Urinary retention.



HISTORY OF PRESENT ILLNESS:  This is an 89-year-old male, who is known to

me.  The patient has metastatic castrate-resistant prostate cancer.  He has

been on androgen deprivation therapy for years, recently received radiation

therapy for bone metastases.  The patient has not been seen in my office in

over a year, although he was recommended to follow up there for advanced

hormonal therapy.  It is unclear when the patient has received his last

hormonal therapy treatment.  However, his prostate cancer appears to be

progressing.  The patient has a long history of urinary retention with an

indwelling Waddell for many years.  The Waddell has been changed monthly by [facility]

also for many years, by report Waddell catheter was recently changed, it was

not draining well.  [facility] came back and changed it again, when it still had a

problem, the patient was then transferred to the emergency room, catheter

was again changed, and by report from the ER, his catheter drained about

1200 mL.  He is now admitted for further observation.   consultation was

requested regarding the above.



PAST MEDICAL HISTORY:  Significant for metastatic prostate cancer

castrate-resistant, dementia, seizures, glaucoma, chronic urinary tract

infection, urinary retention with indwelling Waddell catheter.



MEDICATIONS:  Include Duragesic patch, Lopid, Lovenox, Percocet, Protonix,

Tegretol, vancomycin, and Zosyn.



ALLERGIES:  NO KNOWN DRUG ALLERGIES.



FAMILY HISTORY:  Noncontributory for this admission.



SOCIAL HISTORY:  No smoking or EtOH use.



REVIEW OF SYSTEMS:  Review of systems is obtained from the patient;

however, the patient has dementia and is a poor historian.  His only

complaint appeared to be the catheter and some gross hematuria.  Otherwise,

he denies any acute medical issues.



PHYSICAL EXAMINATION:

GENERAL:  The patient is awake.  He is in no acute distress.  He is

afebrile.

VITAL SIGNS:  Temperature of 98.4, pulse 88, respirations 16, /54.

NECK:  His neck is supple.  There is no adenopathy.

CHEST:  Exam of the chest reveals slightly decreased inspiratory effort.

CARDIAC:  Exam shows positive S1, S2.  There is mild-to-moderate edema of

his lower extremities.

ABDOMEN:  Soft, nontender, nondistended.  There is no hepatosplenomegaly or

costovertebral angle tenderness.

GENITOURINARY:  Phallus with mild edema.  There is a Waddell catheter in

place, which is draining lightly blood-tinged urine.  Scrotum, mild edema. 

Testes bilaterally descended, atrophic, nontender without mass.  Epididymis

are normal.

EXTREMITIES:  No cyanosis, but mild edema.



LABORATORY DATA:  WBC count was 10, it came down to 6.4 today.  Creatinine

0.8 with a GFR greater than 60.  Urinalysis, too numerous to count rbc's,

wbc's, positive nitrites.  On radiologic exam, no pertinent urologic

imaging was done.



IMPRESSION AND PLAN:  This is an 89-year-old male with castrate-resistant

metastatic prostate cancer.  The patient is currently a DNR and a DNI.  His

last PSA was on 01/31/2019 of 99.1.  Testosterone level on 01/31/2019 was

31.  As for the urinary retention, Waddell catheter is now being changed.  I

flushed the Waddell catheter copiously with normal saline and removed few

small clots.  Waddell is now draining well.  The patient should continue the

Waddell catheter drainage, and this should be changed monthly by [facility].  As for

his prostate cancer, I would recommend Oncology consult with Dr. Khan,

who I believe has seen him in the past.  The patient is unable to get to my

office, and therefore, I would be unable to start him on any advanced

therapies, such as _____ or Zytiga, and if these are not on formulary at

the hospital, but possibly Dr. Khan could obtain and start him on

whichever is appropriate.  Given the patient's DNR/DNI status, they may not

be advisable, as these medications may have significant side effects at

this time.  I would recommend the patient to continue on leuprolide at

least, and it is unclear whether he has been receiving this medication or

not.  Unfortunately, given the patient's condition and his age, his

condition is poor, and there is not any acute urologic intervention

necessary at this time.  However, I would have Oncology consultation as

possibly they can offer him some advanced therapy while he is hospitalized.







__________________________________________

Price Jackson MD





DD:  02/19/2019 16:27:18

DT:  02/19/2019 16:31:37

Job # [id number]

## 2019-02-19 NOTE — CP.PCM.CON
History of Present Illness





- History of Present Illness


History of Present Illness: 


89 year old male with PMH of prostate cancer with metastasis on Lupron treatment

(hormonal treatment) and radiation therapy to the pelvic area and lumbar spine, 

seizure disorder, glaucoma, history of Klebsiella UTI, history of Proteus b

acte[place] [facility] [place] as well as lower abdominal pain 

and urinary retention. A visiting nurse replaced his Tolentino but urinary flow was 

poor. He denies fever or chills, no headache or dizziness, feels tired, no chest

pain, no rhinorrhea, no cough, no diarrhea. Infectious diseases consult is 

requested to further evaluate and manage.





Past Medical and surgical history: as above


Personal and social history: denies alcohol abuse, no illicit drug use, no 

smoking


family history: non-contributory











Review of Systems





- Review of Systems


All systems: reviewed and no additional remarkable complaints except (as per 

HPI)





Past Patient History





- Infectious Disease


Hx of Infectious Diseases: None





- Past Social History


Smoking Status: Unknown If Ever Smoked





- CARDIAC


Hx Pacemaker: No





- PULMONARY


Hx Respiratory Disorders: Yes (H/O SMOKING CIGARETTES)


Other/Comment: EMPYEMA/SARCOIDOSIS





- NEUROLOGICAL


Hx Dizziness: Yes (syncope)


Hx Seizures: Yes (last seizure 20 yrs ago)





- HEENT


Hx Cataracts: Yes (with bilateral sx)





- RENAL


Hx Chronic Kidney Disease: Yes


Other/Comment: chronic tolentino-Prostate ca.





- ENDOCRINE/METABOLIC


Hx Endocrine Disorders: No





- HEMATOLOGICAL/ONCOLOGICAL


Hx Cancer: Yes (prostate)





- INTEGUMENTARY


Hx Dermatological Problems: Yes





- MUSCULOSKELETAL/RHEUMATOLOGICAL


Hx Falls: No





- GASTROINTESTINAL


Hx Gastrointestinal Disorders: No





- GENITOURINARY/GYNECOLOGICAL


Hx Genitourinary Disorders: Yes (UTI,CHRONIC TOLENTINO CATHETER)





- PSYCHIATRIC


Hx Psychophysiologic Disorder: Yes





- SURGICAL HISTORY


Hx Mastectomy: No





- ANESTHESIA


Hx Anesthesia: No





Meds


Home Medications: 


                              Home Medication List











 Medication  Instructions  Recorded  Confirmed  Type


 


Penicillin VK [Penicillin VK Tab] 500 mg PO BID #20 tab 02/18/19  Rx











Allergies/Adverse Reactions: 


                                    Allergies











Allergy/AdvReac Type Severity Reaction Status Date / Time


 


No Known Allergies Allergy   Verified 01/29/19 18:01














- Medications


Medications: 


                               Current Medications





Carbamazepine (Tegretol)  200 mg PO TID [facility]; Protocol


   Last Admin: 02/19/19 09:10 Dose:  200 mg


Enoxaparin Sodium (Lovenox)  40 mg SC DAILY [facility]; Protocol


   Last Admin: 02/19/19 09:11 Dose:  40 mg


Fentanyl (Duragesic)  1 patch TD Q72 [facility]


Gemfibrozil (Lopid)  600 mg PO BID [facility]


   Last Admin: 02/19/19 09:11 Dose:  600 mg


Vancomycin HCl (Vancomycin 1gm)  1 gm in 250 mls @ 167 mls/hr IVPB Q12H [facility]; 

Protocol


   Last Admin: 02/19/19 08:40 Dose:  167 mls/hr


Piperacillin Sod/Tazobactam Sod (Zosyn 3.375 In Ns 100ml)  100 mls @ 25 mls/hr 

IVPB Q8 [facility]; Protocol


   Stop: 02/19/19 17:59


Oxycodone/Acetaminophen (Percocet 5/325 Mg Tab)  1 tab PO Q4H PRN


   PRN Reason: Pain, moderate (4-7)


   Stop: 02/21/19 15:38


Pantoprazole Sodium (Protonix Ec Tab)  40 mg PO 0600 [facility]











Physical Exam





- Constitutional


Appears: Chronically Ill





- Head Exam


Head Exam: NORMAL INSPECTION





- Neck Exam


Neck exam: Negative for: Meningismus





- Respiratory Exam


Respiratory Exam: Decreased Breath Sounds





- Cardiovascular Exam


Cardiovascular Exam: +S1, +S2





- GI/Abdominal Exam


GI & Abdominal Exam: Soft.  absent: Tenderness


Additional comments: 





Tolentino catheter in place with hematuria noted





Results





- Vital Signs


Recent Vital Signs: 


                                Last Vital Signs











Temp  98.2 F   02/18/19 09:46


 


Pulse  86   02/19/19 02:59


 


Resp  20   02/19/19 02:59


 


BP  129/60   02/18/19 21:44


 


Pulse Ox  96   02/18/19 21:44














- Labs


Result Diagrams: 


                                 02/19/19 08:30





                                 02/19/19 08:30


Labs: 


                         Laboratory Results - last 24 hr











  02/18/19 02/18/19 02/18/19





  10:20 11:40 16:03


 


WBC   10.0  D 


 


RBC   3.14 L 


 


Hgb   9.6 L 


 


Hct   28.1 L 


 


MCV   89.5 


 


MCH   30.6 


 


MCHC   34.2 


 


RDW   13.2 


 


Plt Count   202 


 


MPV   9.1 


 


Neut % (Auto)   59.9 


 


Lymph % (Auto)   31.5 


 


Mono % (Auto)   7.9 H 


 


Eos % (Auto)   0.6 L 


 


Baso % (Auto)   0.1 


 


Lymph # (Auto)   3.2 


 


Mono # (Auto)   0.8 H 


 


Eos # (Auto)   0.1 


 


Baso # (Auto)   0.01 


 


Absolute Neuts (auto)   6.00 


 


Sodium    126 L


 


Potassium    4.8


 


Chloride    94 L


 


Carbon Dioxide    24


 


Anion Gap    14


 


BUN    37 H


 


Creatinine    0.8


 


Est GFR ( Amer)    > 60


 


Est GFR (Non-Af Amer)    > 60


 


Random Glucose    85


 


Calcium    9.0


 


Iron   


 


TIBC   


 


% Saturation   


 


Ferritin   


 


Total Bilirubin    0.3


 


AST    34


 


ALT    12


 


Alkaline Phosphatase    136 H D


 


Total Protein    7.7


 


Albumin    3.9


 


Globulin    3.8


 


Albumin/Globulin Ratio    1.0 L


 


Urine Color  Brown  


 


Urine Appearance  Turbid  


 


Urine pH  7.0  


 


Ur Specific Gravity  1.015  


 


Urine Protein  >=300 H  


 


Urine Glucose (UA)  500 H  


 


Urine Ketones  15 H  


 


Urine Blood  Large H  


 


Urine Nitrate  Positive H  


 


Urine Bilirubin  Large H  


 


Urine Urobilinogen  4.0 H  


 


Ur Leukocyte Esterase  Large H  


 


Urine RBC  Tntc H  


 


Urine WBC  Tntc H  


 


Urine Bacteria  Large  














  02/18/19 02/18/19 02/19/19





  16:03 17:00 08:30


 


WBC    6.4  D


 


RBC    3.09 L


 


Hgb    9.3 L


 


Hct    28.1 L


 


MCV    90.9


 


MCH    30.1


 


MCHC    33.1


 


RDW    13.5


 


Plt Count    203


 


MPV    8.6


 


Neut % (Auto)    41.5 L


 


Lymph % (Auto)    46.9 H


 


Mono % (Auto)    9.7 H


 


Eos % (Auto)    1.9


 


Baso % (Auto)    0.0


 


Lymph # (Auto)    3.0


 


Mono # (Auto)    0.6


 


Eos # (Auto)    0.1


 


Baso # (Auto)    0.00


 


Absolute Neuts (auto)    2.64


 


Sodium   


 


Potassium   


 


Chloride   


 


Carbon Dioxide   


 


Anion Gap   


 


BUN   


 


Creatinine   


 


Est GFR ( Amer)   


 


Est GFR (Non-Af Amer)   


 


Random Glucose   


 


Calcium   


 


Iron  32 L  


 


TIBC  215 L  


 


% Saturation  15 L  


 


Ferritin   267.0 


 


Total Bilirubin   


 


AST   


 


ALT   


 


Alkaline Phosphatase   


 


Total Protein   


 


Albumin   


 


Globulin   


 


Albumin/Globulin Ratio   


 


Urine Color   


 


Urine Appearance   


 


Urine pH   


 


Ur Specific Gravity   


 


Urine Protein   


 


Urine Glucose (UA)   


 


Urine Ketones   


 


Urine Blood   


 


Urine Nitrate   


 


Urine Bilirubin   


 


Urine Urobilinogen   


 


Ur Leukocyte Esterase   


 


Urine RBC   


 


Urine WBC   


 


Urine Bacteria   














Assessment & Plan





- Assessment and Plan (Free Text)


Plan: 





Assessment


consider complicated UTI with hematuria in this patient with indwelling Tolentino 

catheter and prostate cancer


history of sepsis due to complicated UTI in this patient with indwelling Tolentino 

catheter and metastatic prostate cancer, grew Proteus and E. faecalis in the 

urine, clinically improved


history of Proteus bacteremia, source unclear, concerned about intra-abdominal 

infection


history of UTI in this patient with prostate cancer with metastasis


history of Klebsiella urinary tract infection


Mechanical fall, etiology to be determined


prostate cancer on Lupron treatment once a month (hormonal treatment)


seizure disorder


glaucoma





Plan


patient started on Vancomycin and Zosyn based on previous cultures and will 

follow up blood and urine cx


follow up Urology evaluation and recommendations


will monitor clinically

## 2019-02-20 LAB
ALBUMIN SERPL-MCNC: 3.8 G/DL (ref 3–4.8)
ALBUMIN/GLOB SERPL: 1 {RATIO} (ref 1.1–1.8)
ALT SERPL-CCNC: 10 U/L (ref 7–56)
AST SERPL-CCNC: 48 U/L (ref 17–59)
BASOPHILS # BLD AUTO: 0.01 K/MM3 (ref 0–2)
BASOPHILS NFR BLD: 0.2 % (ref 0–3)
BUN SERPL-MCNC: 31 MG/DL (ref 7–21)
CALCIUM SERPL-MCNC: 9.3 MG/DL (ref 8.4–10.5)
EOSINOPHIL # BLD: 0.1 10*3/UL (ref 0–0.7)
EOSINOPHIL NFR BLD: 1 % (ref 1.5–5)
ERYTHROCYTE [DISTWIDTH] IN BLOOD BY AUTOMATED COUNT: 13.6 % (ref 11.5–14.5)
GFR NON-AFRICAN AMERICAN: > 60
HGB BLD-MCNC: 9 G/DL (ref 14–18)
LYMPHOCYTES # BLD: 2.9 10*3/UL (ref 1.2–3.4)
LYMPHOCYTES NFR BLD AUTO: 49 % (ref 22–35)
MCH RBC QN AUTO: 30.2 PG (ref 25–35)
MCHC RBC AUTO-ENTMCNC: 32.6 G/DL (ref 31–37)
MCV RBC AUTO: 92.6 FL (ref 80–105)
MONOCYTES # BLD AUTO: 0.5 10*3/UL (ref 0.1–0.6)
MONOCYTES NFR BLD: 9.2 % (ref 1–6)
PLATELET # BLD: 209 10^3/UL (ref 120–450)
PMV BLD AUTO: 8.6 FL (ref 7–11)
RBC # BLD AUTO: 2.98 10^6/UL (ref 3.5–6.1)
WBC # BLD AUTO: 5.9 10^3/UL (ref 4.5–11)

## 2019-02-20 RX ADMIN — PIPERACILLIN AND TAZOBACTAM SCH MLS/HR: 3; .375 INJECTION, POWDER, LYOPHILIZED, FOR SOLUTION INTRAVENOUS; PARENTERAL at 05:19

## 2019-02-20 RX ADMIN — PANTOPRAZOLE SODIUM SCH MG: 40 TABLET, DELAYED RELEASE ORAL at 05:19

## 2019-02-20 RX ADMIN — VANCOMYCIN HYDROCHLORIDE SCH MLS/HR: 1 INJECTION, POWDER, LYOPHILIZED, FOR SOLUTION INTRAVENOUS at 21:41

## 2019-02-20 RX ADMIN — PIPERACILLIN AND TAZOBACTAM SCH MLS/HR: 3; .375 INJECTION, POWDER, LYOPHILIZED, FOR SOLUTION INTRAVENOUS; PARENTERAL at 13:42

## 2019-02-20 RX ADMIN — ENOXAPARIN SODIUM SCH MG: 40 INJECTION SUBCUTANEOUS at 10:50

## 2019-02-20 RX ADMIN — PIPERACILLIN AND TAZOBACTAM SCH MLS/HR: 3; .375 INJECTION, POWDER, LYOPHILIZED, FOR SOLUTION INTRAVENOUS; PARENTERAL at 21:41

## 2019-02-20 RX ADMIN — VANCOMYCIN HYDROCHLORIDE SCH MLS/HR: 1 INJECTION, POWDER, LYOPHILIZED, FOR SOLUTION INTRAVENOUS at 10:50

## 2019-02-20 NOTE — CP.PCM.PN
<David Poole - Last Filed: 02/20/19 16:06>





Subjective





- Date & Time of Evaluation


Date of Evaluation: 02/20/19


Time of Evaluation: 07:30





- Subjective


Subjective: 





Medicine progress note:





Patient seen and examined at bedside.  No acute events overnight.  Patient with 

history of dementia therefore HPI limited.  C/o of some abd pain however denies 

any other complaints. 





12 point ROS performed and negative other than stated above however limited to 

dementia





Objective





- Vital Signs/Intake and Output


Vital Signs (last 24 hours): 


                                        











Temp Pulse Resp BP Pulse Ox


 


 98.2 F   94 H  20   134/57 L  20 L


 


 02/20/19 06:00  02/20/19 06:00  02/20/19 00:01  02/20/19 06:00  02/20/19 06:00








Intake and Output: 


                                        











 02/20/19 02/20/19





 06:59 18:59


 


Intake Total 1680 


 


Output Total 3125 


 


Balance -1445 














- Medications


Medications: 


                               Current Medications





Carbamazepine (Tegretol)  200 mg PO TID [facility]; Protocol


   Last Admin: 02/19/19 17:29 Dose:  200 mg


Enoxaparin Sodium (Lovenox)  40 mg SC DAILY [facility]; Protocol


   Last Admin: 02/19/19 09:11 Dose:  40 mg


Fentanyl (Duragesic)  1 patch TD Q72 [facility]


Gemfibrozil (Lopid)  600 mg PO BID [facility]


   Last Admin: 02/19/19 17:29 Dose:  600 mg


Vancomycin HCl (Vancomycin 1gm)  1 gm in 250 mls @ 167 mls/hr IVPB Q12H [facility]; 

Protocol


   Last Admin: 02/19/19 21:25 Dose:  167 mls/hr


Piperacillin Sod/Tazobactam Sod (Zosyn 3.375 In Ns 100ml)  100 mls @ 25 mls/hr 

IVPB Q8 [facility]; Protocol


   Stop: 02/26/19 07:46


   Last Admin: 02/20/19 05:19 Dose:  25 mls/hr


Oxycodone/Acetaminophen (Percocet 5/325 Mg Tab)  1 tab PO Q4H PRN


   PRN Reason: Pain, moderate (4-7)


   Stop: 02/21/19 15:38


Pantoprazole Sodium (Protonix Ec Tab)  40 mg PO 0600 [facility]


   Last Admin: 02/20/19 05:19 Dose:  40 mg











- Labs


Labs: 


                                        





                                 02/20/19 07:25 





                                 02/19/19 08:30 











- Constitutional


Appears: No Acute Distress





- Head Exam


Head Exam: ATRAUMATIC, NORMOCEPHALIC





- Eye Exam


Eye Exam: EOMI





- ENT Exam


ENT Exam: Mucous Membranes Moist





- Respiratory Exam


Respiratory Exam: Clear to Ausculation Bilateral.  absent: Rales, Rhonchi, W

heezes





- Cardiovascular Exam


Cardiovascular Exam: REGULAR RHYTHM, +S1, +S2





- GI/Abdominal Exam


GI & Abdominal Exam: Soft.  absent: Tenderness





-  Exam


 Exam: absent: Bladder Distension





- Extremities Exam


Extremities Exam: absent: Calf Tenderness, Pedal Edema





- Neurological Exam


Neurological Exam: Alert, Awake, Oriented x3





- Psychiatric Exam


Psychiatric exam: Normal Mood





- Skin


Skin Exam: Dry, Warm





Assessment and Plan





- Assessment and Plan (Free Text)


Assessment: 





1.  Abdominal pain likely secondary to urinary retention


2.  Urinary tract infection


3.  Hyponatremia


4.  History of metastatic prostate cancer


5.  Seizure


6.  HLD


7.  Dementia


8. Chronic pain 





Ordered a CT abd and pelvis for abd pain. Tolentino catheter was changed and now 

functioning properly, as per urology patient to have the catheter changed on a 

monthly basis with VNS.  Urology was consulted for their recommendations - Cont 

leupron.  He was also started on vancomycin and Zosyn for his UTI, infectious 

disease was consulted for recommendations. Awaiting urine cultures. Continue wit

h fentanyl and oxycodone for chronic pain.  Continue with Lopid for his 

hyperlipidemia.  Continue with carbamazepine for his seizures.  We will follow-

up with hyponatremia workup. GI and DVT prophylaxis.  Heart healthy diet. 

Continue to monitor for any changes. 





Case and plan was reviewed and discussed with Dr. Rivero. 





<Nathan Rivero - Last Filed: 02/20/19 20:49>





Objective





- Vital Signs/Intake and Output


Vital Signs (last 24 hours): 


                                        











Temp Pulse Resp BP Pulse Ox


 


 98.3 F   92 H  18   127/53 L  96 


 


 02/20/19 18:00  02/20/19 18:00  02/20/19 18:00  02/20/19 18:00  02/20/19 10:55








Intake and Output: 


                                        











 02/20/19 02/21/19





 18:59 06:59


 


Intake Total 1260 350


 


Output Total 1200 


 


Balance 60 350














- Medications


Medications: 


                               Current Medications





Carbamazepine (Tegretol)  200 mg PO TID [facility]; Protocol


   Last Admin: 02/20/19 17:58 Dose:  200 mg


Enoxaparin Sodium (Lovenox)  40 mg SC DAILY [facility]; Protocol


   Last Admin: 02/20/19 10:50 Dose:  40 mg


Fentanyl (Duragesic)  1 patch TD Q72 [facility]


Gemfibrozil (Lopid)  600 mg PO BID [facility]


   Last Admin: 02/20/19 17:58 Dose:  600 mg


Vancomycin HCl (Vancomycin 1gm)  1 gm in 250 mls @ 167 mls/hr IVPB Q12H [facility]; 

Protocol


   Last Admin: 02/20/19 10:50 Dose:  167 mls/hr


Piperacillin Sod/Tazobactam Sod (Zosyn 3.375 In Ns 100ml)  100 mls @ 25 mls/hr 

IVPB Q8 [facility]; Protocol


   Stop: 02/26/19 07:46


   Last Admin: 02/20/19 13:42 Dose:  25 mls/hr


Oxycodone/Acetaminophen (Percocet 5/325 Mg Tab)  1 tab PO Q4H PRN


   PRN Reason: Pain, moderate (4-7)


   Stop: 02/21/19 15:38


Pantoprazole Sodium (Protonix Ec Tab)  40 mg PO 0600 [facility]


   Last Admin: 02/20/19 05:19 Dose:  40 mg











- Labs


Labs: 


                                        





                                 02/20/19 07:25 





                                 02/20/19 07:25 











Assessment and Plan





- Assessment and Plan (Free Text)


Assessment: 





Pt seen and examined by me. I have reviewed the note of the medical resident and

I agree with it. I have discussed the assessment and plan with the resident. I 

have reviewed the medications and the last labs. Pt with tolentino catheter 

dysfunction. His tolentino was replaced. He had gross hematuria and that is 

improving. He as a UTI and is on IV Abx. He has hx of prostate cancer and 

follows with Dr Jackson. Pt's hyponatremia is better. His Sz d/o is controlled with

Carbamazepine. He is on Fentanyl and oxycodone for chronic pain. He is Lopid for

hyperlipidemia.

## 2019-02-20 NOTE — PQF
PROVIDER RESPONSE TEXT:



Catheter associated UTI



REVIEWER QUERY TEXT:



Documentation Clarification



Your help is requested in clarifying the following clinical documentation, if you can please further 
specify in the medical record and discharge summary.



The patient's Clinical Indicators include:

Patient admitted with UTI.

Your consult notes "complicated UTI with indwelling tolentino catheter".

Please specify if this term is indicative of a catheter associated UTI.





Query created by: Evie Bautista on 2/20/2019 8:36 AM





Electronically signed by:  Luis Mercado MD 2/20/2019 8:39 AM

## 2019-02-20 NOTE — CP.PCM.PN
Subjective





- Date & Time of Evaluation


Date of Evaluation: 02/20/19


Time of Evaluation: 09:30





- Subjective


Subjective: 





No fevers, no abdominal pain currently, no nausea.





Objective





- Vital Signs/Intake and Output


Vital Signs (last 24 hours): 


                                        











Temp Pulse Resp BP Pulse Ox


 


 98.4 F   87   18   130/54 L  96 


 


 02/19/19 12:00  02/19/19 12:00  02/19/19 12:00  02/19/19 12:00  02/18/19 21:44








Intake and Output: 


                                        











 02/19/19 02/19/19





 06:59 18:59


 


Intake Total 0 


 


Output Total 150 


 


Balance -150 














- Medications


Medications: 


                               Current Medications





Carbamazepine (Tegretol)  200 mg PO TID [facility]; Protocol


   Last Admin: 02/19/19 09:10 Dose:  200 mg


Enoxaparin Sodium (Lovenox)  40 mg SC DAILY [facility]; Protocol


   Last Admin: 02/19/19 09:11 Dose:  40 mg


Fentanyl (Duragesic)  1 patch TD Q72 [facility]


Gemfibrozil (Lopid)  600 mg PO BID [facility]


   Last Admin: 02/19/19 09:11 Dose:  600 mg


Vancomycin HCl (Vancomycin 1gm)  1 gm in 250 mls @ 167 mls/hr IVPB Q12H [facility]; 

Protocol


   Last Admin: 02/19/19 08:40 Dose:  167 mls/hr


Piperacillin Sod/Tazobactam Sod (Zosyn 3.375 In Ns 100ml)  100 mls @ 25 mls/hr 

IVPB Q8 [facility]; Protocol


   Stop: 02/26/19 07:46


   Last Admin: 02/19/19 10:30 Dose:  25 mls/hr


Oxycodone/Acetaminophen (Percocet 5/325 Mg Tab)  1 tab PO Q4H PRN


   PRN Reason: Pain, moderate (4-7)


   Stop: 02/21/19 15:38


Pantoprazole Sodium (Protonix Ec Tab)  40 mg PO 0600 [facility]











- Labs


Labs: 


                                        





                                 02/19/19 08:30 





                                 02/19/19 08:30 











- Constitutional


Appears: Chronically Ill





- Head Exam


Head Exam: NORMAL INSPECTION





- Respiratory Exam


Respiratory Exam: Decreased Breath Sounds





- Cardiovascular Exam


Cardiovascular Exam: +S1, +S2





- GI/Abdominal Exam


GI & Abdominal Exam: Soft.  absent: Tenderness





Assessment and Plan





- Assessment and Plan (Free Text)


Plan: 








Assessment


consider complicated UTI with hematuria in this patient with indwelling Waddell 

catheter and prostate cancer


history of sepsis due to complicated UTI in this patient with indwelling Waddell 

catheter and metastatic prostate cancer, grew Proteus and E. faecalis in the 

urine, clinically improved


history of Proteus bacteremia, source unclear, concerned about intra-abdominal 

infection


history of UTI in this patient with prostate cancer with metastasis


history of Klebsiella urinary tract infection


Mechanical fall, etiology to be determined


prostate cancer on Lupron treatment once a month (hormonal treatment)


seizure disorder


glaucoma





Plan


continue Zosyn based pending final urine cx results showing gram negative 

bacilli


follow up Urology evaluation and recommendations


will continue to monitor clinically

## 2019-02-21 LAB
ALBUMIN SERPL-MCNC: 3.7 G/DL (ref 3–4.8)
ALBUMIN/GLOB SERPL: 1 {RATIO} (ref 1.1–1.8)
ALT SERPL-CCNC: 12 U/L (ref 7–56)
AST SERPL-CCNC: 46 U/L (ref 17–59)
BASOPHILS # BLD AUTO: 0.01 K/MM3 (ref 0–2)
BASOPHILS NFR BLD: 0.2 % (ref 0–3)
BUN SERPL-MCNC: 24 MG/DL (ref 7–21)
CALCIUM SERPL-MCNC: 9.3 MG/DL (ref 8.4–10.5)
EOSINOPHIL # BLD: 0.1 10*3/UL (ref 0–0.7)
EOSINOPHIL NFR BLD: 1.1 % (ref 1.5–5)
ERYTHROCYTE [DISTWIDTH] IN BLOOD BY AUTOMATED COUNT: 13.6 % (ref 11.5–14.5)
GFR NON-AFRICAN AMERICAN: > 60
HGB BLD-MCNC: 8.8 G/DL (ref 14–18)
LYMPHOCYTES # BLD: 2.7 10*3/UL (ref 1.2–3.4)
LYMPHOCYTES NFR BLD AUTO: 49.2 % (ref 22–35)
MCH RBC QN AUTO: 29.8 PG (ref 25–35)
MCHC RBC AUTO-ENTMCNC: 32.1 G/DL (ref 31–37)
MCV RBC AUTO: 92.9 FL (ref 80–105)
MONOCYTES # BLD AUTO: 0.5 10*3/UL (ref 0.1–0.6)
MONOCYTES NFR BLD: 8.6 % (ref 1–6)
PLATELET # BLD: 224 10^3/UL (ref 120–450)
PMV BLD AUTO: 8.8 FL (ref 7–11)
RBC # BLD AUTO: 2.95 10^6/UL (ref 3.5–6.1)
WBC # BLD AUTO: 5.6 10^3/UL (ref 4.5–11)

## 2019-02-21 RX ADMIN — ENOXAPARIN SODIUM SCH MG: 40 INJECTION SUBCUTANEOUS at 10:35

## 2019-02-21 RX ADMIN — MEROPENEM SCH MLS/HR: 1 INJECTION INTRAVENOUS at 14:31

## 2019-02-21 RX ADMIN — PIPERACILLIN AND TAZOBACTAM SCH MLS/HR: 3; .375 INJECTION, POWDER, LYOPHILIZED, FOR SOLUTION INTRAVENOUS; PARENTERAL at 05:38

## 2019-02-21 RX ADMIN — PANTOPRAZOLE SODIUM SCH MG: 40 TABLET, DELAYED RELEASE ORAL at 05:38

## 2019-02-21 RX ADMIN — MEROPENEM SCH MLS/HR: 1 INJECTION INTRAVENOUS at 23:45

## 2019-02-21 RX ADMIN — BACITRACIN SCH APPLIC: 500 OINTMENT TOPICAL at 23:53

## 2019-02-21 NOTE — CP.PCM.PN
Subjective





- Date & Time of Evaluation


Date of Evaluation: 02/21/19


Time of Evaluation: 11:00





- Subjective


Subjective: 





Comfortable, afebrile.





Objective





- Vital Signs/Intake and Output


Vital Signs (last 24 hours): 


                                        











Temp Pulse Resp BP Pulse Ox


 


 98.1 F   93 H  18   140/50 L  96 


 


 02/20/19 12:00  02/20/19 12:00  02/20/19 12:00  02/20/19 12:00  02/20/19 10:55








Intake and Output: 


                                        











 02/20/19 02/20/19





 06:59 18:59


 


Intake Total 1680 


 


Output Total 3125 


 


Balance -1445 














- Medications


Medications: 


                               Current Medications





Carbamazepine (Tegretol)  200 mg PO TID [facility]; Protocol


   Last Admin: 02/20/19 13:42 Dose:  200 mg


Enoxaparin Sodium (Lovenox)  40 mg SC DAILY [facility]; Protocol


   Last Admin: 02/20/19 10:50 Dose:  40 mg


Fentanyl (Duragesic)  1 patch TD Q72 [facility]


Gemfibrozil (Lopid)  600 mg PO BID [facility]


   Last Admin: 02/20/19 10:50 Dose:  600 mg


Vancomycin HCl (Vancomycin 1gm)  1 gm in 250 mls @ 167 mls/hr IVPB Q12H [facility]; 

Protocol


   Last Admin: 02/20/19 10:50 Dose:  167 mls/hr


Piperacillin Sod/Tazobactam Sod (Zosyn 3.375 In Ns 100ml)  100 mls @ 25 mls/hr 

IVPB Q8 [facility]; Protocol


   Stop: 02/26/19 07:46


   Last Admin: 02/20/19 13:42 Dose:  25 mls/hr


Oxycodone/Acetaminophen (Percocet 5/325 Mg Tab)  1 tab PO Q4H PRN


   PRN Reason: Pain, moderate (4-7)


   Stop: 02/21/19 15:38


Pantoprazole Sodium (Protonix Ec Tab)  40 mg PO 0600 [facility]


   Last Admin: 02/20/19 05:19 Dose:  40 mg











- Labs


Labs: 


                                        





                                 02/20/19 07:25 





                                 02/20/19 07:25 











- Constitutional


Appears: Chronically Ill





- Head Exam


Head Exam: NORMAL INSPECTION





- Respiratory Exam


Respiratory Exam: Decreased Breath Sounds





- Cardiovascular Exam


Cardiovascular Exam: +S1, +S2





- GI/Abdominal Exam


GI & Abdominal Exam: Soft.  absent: Tenderness





Assessment and Plan





- Assessment and Plan (Free Text)


Plan: 


Assessment


consider complicated UTI with hematuria in this patient with indwelling Waddell 

catheter and prostate cancer, growing Proteus


history of sepsis due to complicated UTI in this patient with indwelling Waddell 

catheter and metastatic prostate cancer, grew Proteus and E. faecalis in the 

urine, clinically improved


history of Proteus bacteremia, source unclear, concerned about intra-abdominal 

infection


history of UTI in this patient with prostate cancer with metastasis


history of Klebsiella urinary tract infection


Mechanical fall, etiology to be determined


prostate cancer on Lupron treatment once a month (hormonal treatment)


seizure disorder


glaucoma





Plan


switch Zosyn to Merrem and will monitor clinically


follow up Urology evaluation and recommendations

## 2019-02-21 NOTE — CP.PCM.PN
<David Poole - Last Filed: 02/21/19 11:39>





Subjective





- Date & Time of Evaluation


Date of Evaluation: 02/21/19


Time of Evaluation: 07:00





- Subjective


Subjective: 








Medicine progress note:





Patient seen and examined at bedside.  No acute events overnight.  Patient still

with c/o of abd pain.  Denies any other complaints. 





12 point ROS performed and negative other than stated above however limited to 

dementia





Objective





- Vital Signs/Intake and Output


Vital Signs (last 24 hours): 


                                        











Temp Pulse Resp BP Pulse Ox


 


 97.4 F L  101 H  20   131/62   96 


 


 02/21/19 06:00  02/21/19 06:00  02/21/19 06:00  02/21/19 06:00  02/20/19 10:55








Intake and Output: 


                                        











 02/21/19 02/21/19





 06:59 18:59


 


Intake Total 770 


 


Output Total 1200 


 


Balance -430 














- Medications


Medications: 


                               Current Medications





Carbamazepine (Tegretol)  200 mg PO TID [facility]; Protocol


   Last Admin: 02/21/19 10:36 Dose:  200 mg


Enoxaparin Sodium (Lovenox)  40 mg SC DAILY [facility]; Protocol


   Last Admin: 02/21/19 10:35 Dose:  40 mg


Fentanyl (Duragesic)  1 patch TD Q72 [facility]


   Last Admin: 02/21/19 10:38 Dose:  1 patch


Gemfibrozil (Lopid)  600 mg PO BID [facility]


   Last Admin: 02/21/19 10:35 Dose:  600 mg


Meropenem (Merrem Iv 1 Gm Premix)  1 gm in 50 mls @ 100 mls/hr IVPB Q8 [facility]; 

Protocol


Oxycodone/Acetaminophen (Percocet 5/325 Mg Tab)  1 tab PO Q4H PRN


   PRN Reason: Pain, moderate (4-7)


   Stop: 02/21/19 15:38


Pantoprazole Sodium (Protonix Ec Tab)  40 mg PO 0600 [facility]


   Last Admin: 02/21/19 05:38 Dose:  40 mg











- Labs


Labs: 


                                        





                                 02/21/19 06:15 





                                 02/21/19 06:15 











- Constitutional


Appears: No Acute Distress





- Head Exam


Head Exam: ATRAUMATIC, NORMOCEPHALIC





- Eye Exam


Eye Exam: EOMI, PERRL





- ENT Exam


ENT Exam: Mucous Membranes Moist





- Respiratory Exam


Respiratory Exam: Clear to Ausculation Bilateral.  absent: Rales, Wheezes





- Cardiovascular Exam


Cardiovascular Exam: REGULAR RHYTHM, +S1, +S2





- GI/Abdominal Exam


GI & Abdominal Exam: Soft, Tenderness (mild ).  absent: Distended, Guarding





- Extremities Exam


Extremities Exam: absent: Calf Tenderness, Pedal Edema





- Neurological Exam


Neurological Exam: Alert, Awake





- Psychiatric Exam


Psychiatric exam: Normal Mood





- Skin


Skin Exam: Dry, Warm





Assessment and Plan





- Assessment and Plan (Free Text)


Assessment: 





1.  Abdominal pain likely secondary to urinary retention


2.  Urinary tract infection


3.  Hyponatremia


4.  History of metastatic prostate cancer


5.  Seizure


6.  HLD


7.  Dementia


8. Chronic pain 





CT of the abdomen showed new retroperitoneal and pelvic lymphadenopathy.  No new

soft tissue mass in the right posterior pararenal fat.  Likely managing this 

metastasis.  Widespread sclerotic osseous metastasis unchanged.  Enlarged 

prostate.  Possible bloody content within urinary bladder lumen.  Correlate with

urinalysis.  Minor findings as above.


Tolentino catheter was changed and now functioning properly, as per urology patient 

to have the catheter changed on a monthly basis with VNS.  Urology was consulted

for their recommendations - Cont leupron. Oncology was consulted awaiting r

ecommendations. Continue with Zosyn for his UTI,Urine culture growing Proteus, 

infectious disease was consulted for recommendations. Awaiting urine cultures. 

Continue with fentanyl and oxycodone for chronic pain.  Continue with Lopid for 

his hyperlipidemia.  Continue with carbamazepine for his seizures.  Hyponatremia

resolved. GI and DVT prophylaxis.  Heart healthy diet. Continue to monitor for 

any changes. 





Case and plan was reviewed and discussed with Dr. Rivero. 





<Nathan Rivero - Last Filed: 02/22/19 19:30>





Objective





- Vital Signs/Intake and Output


Vital Signs (last 24 hours): 


                                        











Temp Pulse Resp BP Pulse Ox


 


 98.8 F   92 H  18   134/64   94 L


 


 02/22/19 18:00  02/22/19 18:00  02/22/19 18:00  02/22/19 18:00  02/22/19 05:33











- Medications


Medications: 


                               Current Medications





Bacitracin (Bacitracin)  0 gm TOP BID [facility]


   Last Admin: 02/22/19 17:48 Dose:  1 applic


Carbamazepine (Tegretol)  200 mg PO TID [facility]; Protocol


   Last Admin: 02/22/19 17:45 Dose:  200 mg


Enoxaparin Sodium (Lovenox)  40 mg SC DAILY [facility]; Protocol


   Last Admin: 02/22/19 10:47 Dose:  40 mg


Fentanyl (Duragesic)  1 patch TD Q72 [facility]


   Last Admin: 02/21/19 10:38 Dose:  1 patch


Gemfibrozil (Lopid)  600 mg PO BID [facility]


   Last Admin: 02/22/19 17:45 Dose:  600 mg


Meropenem (Merrem Iv 1 Gm Premix)  1 gm in 50 mls @ 100 mls/hr IVPB Q8 [facility]; 

Protocol


   Last Admin: 02/22/19 16:25 Dose:  Not Given


Pantoprazole Sodium (Protonix Ec Tab)  40 mg PO 0600 [facility]


   Last Admin: 02/22/19 06:18 Dose:  40 mg











- Labs


Labs: 


                                        





                                 02/22/19 06:50 





                                 02/22/19 06:50 











Assessment and Plan





- Assessment and Plan (Free Text)


Assessment: 





Pt seen and examined by me. This is a late entry.I have reviewed the note of the

medical resident and I agree with it. I have discussed the assessment and plan 

with the resident. I have reviewed the medications and the last labs. Pt with 

abd pain due to prostate ca with meds. His hyponatremia has improved. He has a 

tolentino due to retention and BPH. His hematuria is improving and not has bloody as

before. Sz is controlled with meds.

## 2019-02-21 NOTE — CP.PCM.CON
History of Present Illness





- History of Present Illness


History of Present Illness: 





Surgery Consult note. Dr. Lopez





Re: Scalp Lesion





Patient has a history of dementia and is a poor and unreliable historian.





88yo M with PMHx of Metastatic Prostate CA s/p Androgen deprivation therapy and 

XRT, Seizures, Glaucoma, recurrent UTIs, Dementia who was admitted due to 

abdominal pain and urinary retention. General surgery consult was obtained due 

to scalp/forehead mass. Patient states that he first noted this lesion many 

years ago and states that it has not changed in size. States that it first 

occurred after a mild trauma, however, it has not healed. He has been picking at

the wound off and on. He denies any discharge or bleeding from the lesion. 

Denies any pruritis. Denies any discharge or drainage. No F/C. 





PMHx: Metastatic Prostate CA, Seizures, Glaucoma, Recurrent UTIs, Dementia


PSHx: B/L Cataract surgery


Family Hx: non-contributory


Social Hx: Lives at home with 24 hour home aid. Denies ETOH use, denies tobacco 

use, denies illicit drugs


NKDA





Review of Systems





- Review of Systems


Systems not reviewed;Unavailable: Dementia


All systems: reviewed and no additional remarkable complaints except





- Constitutional


Constitutional: absent: Chills, Fever





- Integumentary


Integumentary: Lesions





Past Patient History





- Infectious Disease


Hx of Infectious Diseases: None





- Past Social History


Smoking Status: Unknown If Ever Smoked


Alcohol: None


Drugs: Denies





- CARDIAC


Hx Hypercholesterolemia: Yes (HLD)





- PULMONARY


Hx Respiratory Disorders: Yes (H/O SMOKING CIGARETTES)


Other/Comment: EMPYEMA/SARCOIDOSIS





- NEUROLOGICAL


Hx Dizziness: Yes (syncope)


Hx Seizures: Yes (last seizure 20 yrs ago)





- HEENT


Hx Cataracts: Yes (with bilateral sx)





- RENAL


Hx Chronic Kidney Disease: Yes


Other/Comment: chronic tolentino-Prostate ca.





- ENDOCRINE/METABOLIC


Hx Endocrine Disorders: No





- HEMATOLOGICAL/ONCOLOGICAL


Hx Cancer: Yes (prostate)





- INTEGUMENTARY


Hx Dermatological Problems: Yes





- MUSCULOSKELETAL/RHEUMATOLOGICAL


Hx Arthritis: Yes





- GASTROINTESTINAL


Hx Gastrointestinal Disorders: No





- GENITOURINARY/GYNECOLOGICAL


Hx Genitourinary Disorders: Yes (UTI,CHRONIC TOLENTINO CATHETER)





- PSYCHIATRIC


Hx Psychophysiologic Disorder: Yes





- SURGICAL HISTORY


Hx Mastectomy: No





- ANESTHESIA


Hx Anesthesia: No





Meds


Home Medications: 


                              Home Medication List











 Medication  Instructions  Recorded  Confirmed  Type


 


Penicillin VK [Penicillin VK Tab] 500 mg PO BID #20 tab 02/18/19  Rx











Allergies/Adverse Reactions: 


                                    Allergies











Allergy/AdvReac Type Severity Reaction Status Date / Time


 


No Known Allergies Allergy   Verified 01/29/19 18:01














- Medications


Medications: 


                               Current Medications





Bacitracin (Bacitracin)  0 gm TOP BID [facility]


Carbamazepine (Tegretol)  200 mg PO TID [facility]; Protocol


   Last Admin: 02/21/19 18:00 Dose:  200 mg


Enoxaparin Sodium (Lovenox)  40 mg SC DAILY [facility]; Protocol


   Last Admin: 02/21/19 10:35 Dose:  40 mg


Fentanyl (Duragesic)  1 patch TD Q72 [facility]


   Last Admin: 02/21/19 10:38 Dose:  1 patch


Gemfibrozil (Lopid)  600 mg PO BID [facility]


   Last Admin: 02/21/19 18:00 Dose:  600 mg


Meropenem (Merrem Iv 1 Gm Premix)  1 gm in 50 mls @ 100 mls/hr IVPB Q8 [facility]; 

Protocol


   Last Admin: 02/21/19 14:31 Dose:  100 mls/hr


Pantoprazole Sodium (Protonix Ec Tab)  40 mg PO 0600 [facility]


   Last Admin: 02/21/19 05:38 Dose:  40 mg











Physical Exam





- Constitutional


Appears: Well, Non-toxic, No Acute Distress





- Head Exam


Head Exam: ATRAUMATIC, NORMAL INSPECTION, NORMOCEPHALIC





- Eye Exam


Eye Exam: EOMI, Normal appearance.  absent: Scleral icterus





- ENT Exam


ENT Exam: Mucous Membranes Moist





- Respiratory Exam


Respiratory Exam: NORMAL BREATHING PATTERN.  absent: Accessory Muscle Use, 

Respiratory Distress





- Cardiovascular Exam


Cardiovascular Exam: RRR.  absent: JVD





- Extremities Exam


Extremities exam: Positive for: normal inspection.  Negative for: calf 

tenderness





- Neurological Exam


Neurological exam: Alert





- Psychiatric Exam


Psychiatric exam: Normal Affect, Normal Mood





- Skin


Skin Exam: Dry, Intact, Normal Color, Warm


Additional comments: 





approximately 1cm x 2cm skin lesion on scalp/forehead. No active drainage or 

discharge noted. Area of dry skin around site. Red flat area in the center of 

the lesion. 





Results





- Vital Signs


Recent Vital Signs: 


                                Last Vital Signs











Temp  98 F   02/21/19 18:00


 


Pulse  92 H  02/21/19 18:00


 


Resp  20   02/21/19 18:00


 


BP  124/56 L  02/21/19 18:00


 


Pulse Ox  96   02/20/19 10:55














- Labs


Result Diagrams: 


                                 02/22/19 06:50





                                 02/22/19 06:50


Labs: 


                         Laboratory Results - last 24 hr











  02/21/19 02/21/19





  06:15 06:15


 


WBC  5.6 


 


RBC  2.95 L 


 


Hgb  8.8 L 


 


Hct  27.4 L 


 


MCV  92.9 


 


MCH  29.8 


 


MCHC  32.1 


 


RDW  13.6 


 


Plt Count  224 


 


MPV  8.8 


 


Neut % (Auto)  40.9 L 


 


Lymph % (Auto)  49.2 H 


 


Mono % (Auto)  8.6 H 


 


Eos % (Auto)  1.1 L 


 


Baso % (Auto)  0.2 


 


Lymph # (Auto)  2.7 


 


Mono # (Auto)  0.5 


 


Eos # (Auto)  0.1 


 


Baso # (Auto)  0.01 


 


Absolute Neuts (auto)  2.28 


 


Sodium   138


 


Potassium   4.0


 


Chloride   105


 


Carbon Dioxide   25


 


Anion Gap   12


 


BUN   24 H


 


Creatinine   0.6 L


 


Est GFR ( Amer)   > 60


 


Est GFR (Non-Af Amer)   > 60


 


Random Glucose   92


 


Calcium   9.3


 


Total Bilirubin   0.2


 


AST   46


 


ALT   12


 


Alkaline Phosphatase   112


 


Total Protein   7.3


 


Albumin   3.7


 


Globulin   3.6


 


Albumin/Globulin Ratio   1.0 L














Assessment & Plan





- Assessment and Plan (Free Text)


Assessment: 





88yo M with PMHx of metastatic prostate CA, seizures, recurrent UTIs, dementia, 

glaucoma. Surgery consulted for scalp/forehead lesion


Plan: 





- May consider biopsy of lesion as outpatient


- Follow up with Dr. Lopez. Call for appointment


- Continue medical management as per medical team





Further recs as pre Dr. John Blank PGY2


surgery

## 2019-02-21 NOTE — CT
Date of service: 



02/20/2019



PROCEDURE:  CT Abdomen and Pelvis without intravenous contrast



HISTORY:

abd pain



COMPARISON:

10/1/2018



TECHNIQUE:

Without contrast.. 



Contrast dose: 0



Radiation dose:



Total exam DLP = 2275.03 mGy-cm.



This CT exam was performed using one or more of the following dose 

reduction techniques: Automated exposure control, adjustment of the 

mA and/or kV according to patient size, and/or use of iterative 

reconstruction technique.



FINDINGS:



LOWER THORAX:

Unremarkable. 



LIVER:

Unremarkable. No gross lesion or ductal dilatation.  



GALLBLADDER AND BILE DUCTS:

Partially contracted.  No calcified gallstones.  No mural thickening. 



PANCREAS:

Unremarkable. No gross lesion or ductal dilatation.



SPLEEN:

Unremarkable. 



ADRENALS:

Unremarkable. No mass. 



KIDNEYS AND URETERS:

Low attenuation rounded right lower pole renal cortical mass, likely 

cyst, 1.7 cm.  This measures 6 Hounsfield units attenuation.  In 

retrospect, it is unchanged from prior examination.  No other renal 

mass.  No calculus or hydronephrosis. 



There is a new rounded soft tissue mass posterior to the upper pole 

of the right kidney, in the posterior para renal fat, measuring 2.3 

cm in diameter.  This was not seen on prior examination.  It is 

suspicious for a hematogenous metastasis. 



VASCULATURE:

Unremarkable. No aortic aneurysm. There is atherosclerotic 

calcification of the abdominal aorta.



BOWEL:

Unremarkable. No obstruction. No gross mural thickening. 



APPENDIX:

Not identified.  No secondary findings. 



PERITONEUM:

Unremarkable. No free fluid. No free air. 



LYMPH NODES:

There is retroperitoneal and pelvic lymphadenopathy new since prior 

examination.  Retroperitoneal lymph nodes are seen measuring up to 

2.7 cm diameter.  There is a conglomerate mass in the superior pelvis 

measuring 2.7 cm.  There are bilateral mildly enlarged and 

subcentimeter pelvic sidewall lymph nodes. 



BLADDER:

Bladder is decompressed around a Waddell catheter balloon.  The bladder 

contents are high in attenuation suggestive of blood. 



REPRODUCTIVE:

Enlarged prostate. 



BONES:

Widespread sclerotic metastasis as on prior examination.  No acute 

fracture identified. 



OTHER FINDINGS:

None.



IMPRESSION:

New retroperitoneal and pelvic lymphadenopathy.  New soft tissue mass 

in right posterior para renal fat.  Likely hematogenous metastasis.  

Widespread sclerotic osseous metastasis unchanged.  Enlarged 

prostate.  Possible bloody content within urinary bladder lumen.  

Correlate with urinalysis.  Minor findings as above.



The preliminary findings for this examination were reported by [facility] 

Radiology at 7:26 p.m. on 2/20/2019..  There is discordance of this 

report with the preliminary findings.  The finding of widespread 

retroperitoneal and pelvic lymphadenopathy was not described in the 

preliminary report of this examination. 







Is

## 2019-02-22 VITALS — HEART RATE: 92 BPM | TEMPERATURE: 98.8 F | SYSTOLIC BLOOD PRESSURE: 134 MMHG | DIASTOLIC BLOOD PRESSURE: 64 MMHG

## 2019-02-22 VITALS — RESPIRATION RATE: 18 BRPM

## 2019-02-22 VITALS — OXYGEN SATURATION: 94 %

## 2019-02-22 LAB
ALBUMIN SERPL-MCNC: 3.7 G/DL (ref 3–4.8)
ALBUMIN/GLOB SERPL: 1 {RATIO} (ref 1.1–1.8)
ALT SERPL-CCNC: 12 U/L (ref 7–56)
AST SERPL-CCNC: 42 U/L (ref 17–59)
BASOPHILS # BLD AUTO: 0.01 K/MM3 (ref 0–2)
BASOPHILS NFR BLD: 0.2 % (ref 0–3)
BUN SERPL-MCNC: 22 MG/DL (ref 7–21)
CALCIUM SERPL-MCNC: 9.4 MG/DL (ref 8.4–10.5)
EOSINOPHIL # BLD: 0 10*3/UL (ref 0–0.7)
EOSINOPHIL NFR BLD: 0.7 % (ref 1.5–5)
ERYTHROCYTE [DISTWIDTH] IN BLOOD BY AUTOMATED COUNT: 13.3 % (ref 11.5–14.5)
GFR NON-AFRICAN AMERICAN: > 60
HGB BLD-MCNC: 9 G/DL (ref 14–18)
LYMPHOCYTES # BLD: 2.7 10*3/UL (ref 1.2–3.4)
LYMPHOCYTES NFR BLD AUTO: 45.7 % (ref 22–35)
MCH RBC QN AUTO: 30.4 PG (ref 25–35)
MCHC RBC AUTO-ENTMCNC: 33 G/DL (ref 31–37)
MCV RBC AUTO: 92.2 FL (ref 80–105)
MONOCYTES # BLD AUTO: 0.4 10*3/UL (ref 0.1–0.6)
MONOCYTES NFR BLD: 7.3 % (ref 1–6)
PLATELET # BLD: 206 10^3/UL (ref 120–450)
PMV BLD AUTO: 8.2 FL (ref 7–11)
RBC # BLD AUTO: 2.96 10^6/UL (ref 3.5–6.1)
WBC # BLD AUTO: 5.9 10^3/UL (ref 4.5–11)

## 2019-02-22 RX ADMIN — ENOXAPARIN SODIUM SCH MG: 40 INJECTION SUBCUTANEOUS at 10:47

## 2019-02-22 RX ADMIN — PANTOPRAZOLE SODIUM SCH MG: 40 TABLET, DELAYED RELEASE ORAL at 06:18

## 2019-02-22 RX ADMIN — MEROPENEM SCH MLS/HR: 1 INJECTION INTRAVENOUS at 14:25

## 2019-02-22 RX ADMIN — MEROPENEM SCH MLS/HR: 1 INJECTION INTRAVENOUS at 06:18

## 2019-02-22 RX ADMIN — BACITRACIN SCH APPLIC: 500 OINTMENT TOPICAL at 10:47

## 2019-02-22 RX ADMIN — MEROPENEM SCH: 1 INJECTION INTRAVENOUS at 16:25

## 2019-02-22 RX ADMIN — BACITRACIN SCH APPLIC: 500 OINTMENT TOPICAL at 17:48

## 2019-02-22 NOTE — CP.PCM.DIS
<David Poole - Last Filed: 02/22/19 13:35>





Provider





- Provider


Date of Admission: 


02/18/19 15:40





Attending physician: 


Nathan Rivero MD





Consults: 








02/18/19 15:39


Consult [Physician Consult] Stat 


   Comment: 


   Consulting Provider: Price Jackson


   Consulting Physician: Price Jackson


   Reason for Consult: urinary retention





02/19/19 03:03


Social Work Referral Routine 


   Comment: Cyrus score 15


   Physician Instructions: 


   Reason For Exam: Protocol





02/19/19 03:23


Case Management Referral Routine 


   Comment: 


   Physician Instructions: 


   Reason For Exam: Protocol


   Reason for Referral: Discharge Planning


Nursing Referral for Wound Care Routine 


   Comment: 


   Physician Instructions: 


   Reason For Exam: Protocol





02/19/19 03:26


Nursing Referral for Palliative Care Routine 


   Comment: Palliative score 8


   Physician Instructions: 


   Reason For Exam: Protocol





02/19/19 07:40


Infectious Disease Consult Routine 


   Comment: 


   Consulting Provider: Donta Alvarez


   Consulting Physician: Donta Alvarez


   Reason for Consult: UTI





02/20/19 16:21


Physician Consult Routine 


   Comment: 


   Consulting Provider: Eamon Khan


   Consulting Physician: Eamon Khan


   Reason for Consult: Mets prostate ca





02/21/19 21:23


Consult [Physician Consult] Routine 


   Comment: 


   Consulting Provider: Jake Lopez


   Consulting Physician: Jake Lopez


   Reason for Consult: L scalp lesion/ basalioma?











Time Spent in preparation of Discharge (in minutes): 45





Hospital Course





- Lab Results


Lab Results: 


                                  Micro Results





02/19/19 01:26   Urine,Clean Catch   Urine Culture - Final


                            Proteus Mirabilis





                             Most Recent Lab Values











WBC  5.9 10^3/uL (4.5-11.0)   02/22/19  06:50    


 


RBC  2.96 10^6/uL (3.5-6.1)  L  02/22/19  06:50    


 


Hgb  9.0 g/dL (14.0-18.0)  L  02/22/19  06:50    


 


Hct  27.3 % (42.0-52.0)  L  02/22/19  06:50    


 


MCV  92.2 fl (80.0-105.0)   02/22/19  06:50    


 


MCH  30.4 pg (25.0-35.0)   02/22/19  06:50    


 


MCHC  33.0 g/dl (31.0-37.0)   02/22/19  06:50    


 


RDW  13.3 % (11.5-14.5)   02/22/19  06:50    


 


Plt Count  206 10^3/uL (120.0-450.0)   02/22/19  06:50    


 


MPV  8.2 fl (7.0-11.0)   02/22/19  06:50    


 


Neut % (Auto)  46.1 % (50.0-68.0)  L  02/22/19  06:50    


 


Lymph % (Auto)  45.7 % (22.0-35.0)  H  02/22/19  06:50    


 


Mono % (Auto)  7.3 % (1.0-6.0)  H  02/22/19  06:50    


 


Eos % (Auto)  0.7 % (1.5-5.0)  L  02/22/19  06:50    


 


Baso % (Auto)  0.2 % (0.0-3.0)   02/22/19  06:50    


 


Lymph # (Auto)  2.7  (1.2-3.4)   02/22/19  06:50    


 


Mono # (Auto)  0.4  (0.1-0.6)   02/22/19  06:50    


 


Eos # (Auto)  0.0  (0.0-0.7)   02/22/19  06:50    


 


Baso # (Auto)  0.01 K/mm3 (0.0-2.0)   02/22/19  06:50    


 


Absolute Neuts (auto)  2.72  (1.4-6.5)   02/22/19  06:50    


 


Sodium  139 mmol/L (132-148)   02/22/19  06:50    


 


Potassium  4.1 mmol/L (3.6-5.0)   02/22/19  06:50    


 


Chloride  104 mmol/L ()   02/22/19  06:50    


 


Carbon Dioxide  25 mmol/L (21-33)   02/22/19  06:50    


 


Anion Gap  14  (10-20)   02/22/19  06:50    


 


BUN  22 mg/dL (7-21)  H  02/22/19  06:50    


 


Creatinine  0.6 mg/dl (0.8-1.5)  L  02/22/19  06:50    


 


Est GFR ( Amer)  > 60   02/22/19  06:50    


 


Est GFR (Non-Af Amer)  > 60   02/22/19  06:50    


 


Random Glucose  94 mg/dL ()   02/22/19  06:50    


 


Serum Osmolality  281 mosm/kg (272-300)   02/19/19  10:10    


 


Calcium  9.4 mg/dL (8.4-10.5)   02/22/19  06:50    


 


Iron  32 ug/dL ()  L  02/18/19  16:03    


 


TIBC  215 ug/dL (261-462)  L  02/18/19  16:03    


 


% Saturation  15 % (20-55)  L  02/18/19  16:03    


 


Ferritin  267.0 ng/mL  02/18/19  17:00    


 


Total Bilirubin  0.2 mg/dL (0.2-1.3)   02/22/19  06:50    


 


AST  42 U/L (17-59)   02/22/19  06:50    


 


ALT  12 U/L (7-56)   02/22/19  06:50    


 


Alkaline Phosphatase  126 U/L ()   02/22/19  06:50    


 


Total Protein  7.3 g/dL (5.8-8.3)   02/22/19  06:50    


 


Albumin  3.7 g/dL (3.0-4.8)   02/22/19  06:50    


 


Globulin  3.6 gm/dL  02/22/19  06:50    


 


Albumin/Globulin Ratio  1.0  (1.1-1.8)  L  02/22/19  06:50    


 


Urine Color  Brown  (YELLOW)   02/18/19  10:20    


 


Urine Appearance  Turbid  (CLEAR)   02/18/19  10:20    


 


Urine pH  7.0  (4.7-8.0)   02/18/19  10:20    


 


Ur Specific Gravity  1.015  (1.005-1.035)   02/18/19  10:20    


 


Urine Protein  >=300 mg/dL (<30 mg/dL)  H  02/18/19  10:20    


 


Urine Glucose (UA)  500 mg/dL (NEGATIVE)  H  02/18/19  10:20    


 


Urine Ketones  15 mg/dL (NEGATIVE)  H  02/18/19  10:20    


 


Urine Blood  Large  (NEGATIVE)  H  02/18/19  10:20    


 


Urine Nitrate  Positive  (NEGATIVE)  H  02/18/19  10:20    


 


Urine Bilirubin  Large  (NEGATIVE)  H  02/18/19  10:20    


 


Urine Urobilinogen  4.0 E.U./dL (<1 E.U./dL)  H  02/18/19  10:20    


 


Ur Leukocyte Esterase  Large Martita/uL (NEGATIVE)  H  02/18/19  10:20    


 


Urine RBC  Tntc /hpf (0-2)  H  02/18/19  10:20    


 


Urine WBC  Tntc /hpf (0-6)  H  02/18/19  10:20    


 


Urine Bacteria  Large /hpf (NONE)   02/18/19  10:20    


 


Blood Type  A POSITIVE   02/22/19  06:50    


 


Blood Type Confirm  A POSITIVE   02/22/19  07:40    


 


Antibody Screen  Negative   02/22/19  06:50    


 


Crossmatch  See Detail   02/22/19  06:50    


 


BBK History Checked  No verified bt   02/22/19  06:50    














- Hospital Course


Hospital Course: 





88yo male PMHx metastatic prostate cancer s/p ADT and palliative radiation on 

Lupron, seizures, glaucoma, dementia, UTI w/ indwelling tolentino (+ for E.coli, 

Klebsiella, and Enterococcus in the past) presents to the ED with lower 

abdominal pain and urinary retention. In the ED basic lab work was performed.  

Patient was found to have a UTI.  Patient was admitted to telemetry for further 

treatment.  Patient was initially started on broad-spectrum antibiotics as per 

infectious disease with vancomycin and Zosyn.  Urine culture was sent out and 

showed Proteus.  Patient was then continued on Zosyn.  Heme oncology and urology

were consulted.  Urology recommended flushing of the Tolentino bag every few hours. 

Oncology recommended possible outpatient treatment with Zytiga for palliation.  

I spoke to the son which understands stated that he would follow-up as an outpa

tient with heme oncology.  Surgery was consulted for a lesion on the patient's 

forehead and to rule out basal cell carcinoma however patient was recommended 

outpatient workup as needed.  Patient today states that his abdominal pain and 

urinary retention now resolved.  He denies any complaints at this time.  

Patient's son states that he has all his medications at home and does not 

require any refills.  Patient on discharge will be started on ciprofloxacin for 

3 days.  Patient will also benefit from urinary catheter irrigation with normal 

saline at home 2-3 times a week to prevent clots. 





Discharge Exam





- Head Exam


Head Exam: ATRAUMATIC, NORMAL INSPECTION, NORMOCEPHALIC





- Eye Exam


Eye Exam: EOMI, PERRL





- Respiratory Exam


Respiratory Exam: Clear to PA & Lateral.  absent: Rales, Rhonchi, Wheezes





- Cardiovascular Exam


Cardiovascular Exam: REGULAR RHYTHM, RRR, +S1, +S2





- GI/Abdominal Exam


GI & Abdominal Exam: Normal Bowel Sounds, Soft.  absent: Distended, Tenderness





- Neurological Exam


Neurological exam: Alert, CN II-XII Intact, Oriented x3





- Psychiatric Exam


Psychiatric exam: Normal Mood





- Skin


Skin Exam: Dry, Warm





Discharge Plan





- Discharge Medications


Prescriptions: 


RX: Ciprofloxacin [Cipro] 500 mg PO DAILY 3 Days #3 tab





- Follow Up Plan


Condition: IMPROVED


Disposition: HOME/ ROUTINE


Patient education suggested?: Yes


Instructions:  Urinary Retention, Urinary Tract Infection in Men (DC), Dysuria 

(GEN)


Additional Instructions: 


Follow-up with your PMD within 3 days


Follow-up with hematology and oncology and urology for further treatment for 

palliation


Follow-up with surgery or Dermatology regarding lesion on the forehead


If you have symptoms recur come back to the ED


Take your medications as prescribed, ciprofloxacin was called in to your 

pharmacy





<Nathan Rivero - Last Filed: 02/22/19 16:47>





Provider





- Provider


Date of Admission: 


02/18/19 15:40





Attending physician: 


Nathan Rivero MD





Consults: 








02/18/19 15:39


Consult [Physician Consult] Stat 


   Comment: 


   Consulting Provider: Price Jackson


   Consulting Physician: Price Jackson


   Reason for Consult: urinary retention





02/19/19 03:03


Social Work Referral Routine 


   Comment: Cyrus score 15


   Physician Instructions: 


   Reason For Exam: Protocol





02/19/19 03:23


Case Management Referral Routine 


   Comment: 


   Physician Instructions: 


   Reason For Exam: Protocol


   Reason for Referral: Discharge Planning


Nursing Referral for Wound Care Routine 


   Comment: 


   Physician Instructions: 


   Reason For Exam: Protocol





02/19/19 03:26


Nursing Referral for Palliative Care Routine 


   Comment: Palliative score 8


   Physician Instructions: 


   Reason For Exam: Protocol





02/19/19 07:40


Infectious Disease Consult Routine 


   Comment: 


   Consulting Provider: Donta Alvarez


   Consulting Physician: Donta Alvarez


   Reason for Consult: UTI





02/20/19 16:21


Physician Consult Routine 


   Comment: 


   Consulting Provider: Eamon Khan


   Consulting Physician: Eamon Khan


   Reason for Consult: Mets prostate ca





02/21/19 21:23


Consult [Physician Consult] Routine 


   Comment: 


   Consulting Provider: Jake Lopez


   Consulting Physician: Jake Lopez


   Reason for Consult: L scalp lesion/ basalioma?














Hospital Course





- Lab Results


Lab Results: 


                                  Micro Results





02/21/19 16:25   Stool   C. difficile Antigen & Toxins A,B - Final


02/19/19 01:26   Urine,Clean Catch   Urine Culture - Final


                            Proteus Mirabilis





                             Most Recent Lab Values











WBC  5.9 10^3/uL (4.5-11.0)   02/22/19  06:50    


 


RBC  2.96 10^6/uL (3.5-6.1)  L  02/22/19  06:50    


 


Hgb  9.0 g/dL (14.0-18.0)  L  02/22/19  06:50    


 


Hct  27.3 % (42.0-52.0)  L  02/22/19  06:50    


 


MCV  92.2 fl (80.0-105.0)   02/22/19  06:50    


 


MCH  30.4 pg (25.0-35.0)   02/22/19  06:50    


 


MCHC  33.0 g/dl (31.0-37.0)   02/22/19  06:50    


 


RDW  13.3 % (11.5-14.5)   02/22/19  06:50    


 


Plt Count  206 10^3/uL (120.0-450.0)   02/22/19  06:50    


 


MPV  8.2 fl (7.0-11.0)   02/22/19  06:50    


 


Neut % (Auto)  46.1 % (50.0-68.0)  L  02/22/19  06:50    


 


Lymph % (Auto)  45.7 % (22.0-35.0)  H  02/22/19  06:50    


 


Mono % (Auto)  7.3 % (1.0-6.0)  H  02/22/19  06:50    


 


Eos % (Auto)  0.7 % (1.5-5.0)  L  02/22/19  06:50    


 


Baso % (Auto)  0.2 % (0.0-3.0)   02/22/19  06:50    


 


Lymph # (Auto)  2.7  (1.2-3.4)   02/22/19  06:50    


 


Mono # (Auto)  0.4  (0.1-0.6)   02/22/19  06:50    


 


Eos # (Auto)  0.0  (0.0-0.7)   02/22/19  06:50    


 


Baso # (Auto)  0.01 K/mm3 (0.0-2.0)   02/22/19  06:50    


 


Absolute Neuts (auto)  2.72  (1.4-6.5)   02/22/19  06:50    


 


Sodium  139 mmol/L (132-148)   02/22/19  06:50    


 


Potassium  4.1 mmol/L (3.6-5.0)   02/22/19  06:50    


 


Chloride  104 mmol/L ()   02/22/19  06:50    


 


Carbon Dioxide  25 mmol/L (21-33)   02/22/19  06:50    


 


Anion Gap  14  (10-20)   02/22/19  06:50    


 


BUN  22 mg/dL (7-21)  H  02/22/19  06:50    


 


Creatinine  0.6 mg/dl (0.8-1.5)  L  02/22/19  06:50    


 


Est GFR ( Amer)  > 60   02/22/19  06:50    


 


Est GFR (Non-Af Amer)  > 60   02/22/19  06:50    


 


Random Glucose  94 mg/dL ()   02/22/19  06:50    


 


Serum Osmolality  281 mosm/kg (272-300)   02/19/19  10:10    


 


Calcium  9.4 mg/dL (8.4-10.5)   02/22/19  06:50    


 


Iron  32 ug/dL ()  L  02/18/19  16:03    


 


TIBC  215 ug/dL (261-462)  L  02/18/19  16:03    


 


% Saturation  15 % (20-55)  L  02/18/19  16:03    


 


Ferritin  267.0 ng/mL  02/18/19  17:00    


 


Total Bilirubin  0.2 mg/dL (0.2-1.3)   02/22/19  06:50    


 


AST  42 U/L (17-59)   02/22/19  06:50    


 


ALT  12 U/L (7-56)   02/22/19  06:50    


 


Alkaline Phosphatase  126 U/L ()   02/22/19  06:50    


 


Total Protein  7.3 g/dL (5.8-8.3)   02/22/19  06:50    


 


Albumin  3.7 g/dL (3.0-4.8)   02/22/19  06:50    


 


Globulin  3.6 gm/dL  02/22/19  06:50    


 


Albumin/Globulin Ratio  1.0  (1.1-1.8)  L  02/22/19  06:50    


 


Urine Color  Brown  (YELLOW)   02/18/19  10:20    


 


Urine Appearance  Turbid  (CLEAR)   02/18/19  10:20    


 


Urine pH  7.0  (4.7-8.0)   02/18/19  10:20    


 


Ur Specific Gravity  1.015  (1.005-1.035)   02/18/19  10:20    


 


Urine Protein  >=300 mg/dL (<30 mg/dL)  H  02/18/19  10:20    


 


Urine Glucose (UA)  500 mg/dL (NEGATIVE)  H  02/18/19  10:20    


 


Urine Ketones  15 mg/dL (NEGATIVE)  H  02/18/19  10:20    


 


Urine Blood  Large  (NEGATIVE)  H  02/18/19  10:20    


 


Urine Nitrate  Positive  (NEGATIVE)  H  02/18/19  10:20    


 


Urine Bilirubin  Large  (NEGATIVE)  H  02/18/19  10:20    


 


Urine Urobilinogen  4.0 E.U./dL (<1 E.U./dL)  H  02/18/19  10:20    


 


Ur Leukocyte Esterase  Large Martita/uL (NEGATIVE)  H  02/18/19  10:20    


 


Urine RBC  Tntc /hpf (0-2)  H  02/18/19  10:20    


 


Urine WBC  Tntc /hpf (0-6)  H  02/18/19  10:20    


 


Urine Bacteria  Large /hpf (NONE)   02/18/19  10:20    


 


Blood Type  A POSITIVE   02/22/19  06:50    


 


Blood Type Confirm  A POSITIVE   02/22/19  07:40    


 


Antibody Screen  Negative   02/22/19  06:50    


 


Crossmatch  See Detail   02/22/19  06:50    


 


BBK History Checked  No verified bt   02/22/19  06:50    














- Hospital Course


Hospital Course: 





Pt seen and examined by me. I have reviewed the note of the medical resident and

I agree with it. I have discussed the assessment and plan with the resident. I 

have reviewed the medications and the last labs. Pt with UTI that has been 

treated. The pt will be discharged home with Cipro for 3 more days. He has 

metastatic prostate cancer and will contiue to decline. I spoke to the pt's son 

later in the morning to give an update. Pt was seen by oncology and no new plan 

has been made. He will have episodes of UTI and hematuria and I informed the son

about this. His pain is controlled. Eating ok. He is alert and awake. He is 

baseline mental status. His Delirium has improved.

## 2019-02-22 NOTE — PN
DATE:  02/21/2019



SUBJECTIVE:  The patient is seen in his room.  He appears comfortable in no

acute distress.  Abdomen is soft, nontender, and nondistended.  No rebound

or guarding.  On  exam, phallus has mild swelling Waddell catheter in place

draining lightly blood-tinged urine.  The patient had a CT scan done which

showed retroperitoneal and pelvic adenopathy, new since prior exam,

retroperitoneal nodes measuring up to 2.7 cm.  There is a mass in the

superior pelvis measuring 2.7 cm bilateral mildly enlarged and

subcentimeter pelvic side wall lymph nodes.  Urine culture positive for

Proteus mirabilis.



IMPRESSION AND PLAN:  This is a patient with castrate-resistant metastatic

prostate cancer.  The patient received palliative radiation.  He now

appears to have progression with lymph nodes.  The patient has not been

able to follow up in the office due to his medical issues.  I recommended

Oncology consultation as possibly they could restart him on leuprolide and

hopefully either Zytiga and prednisone or Xtandi.  These are not on

hospital formulary and I do not have access to them as an inpatient, but

possibly Oncology could obtain a medication for him.  Given the patient's

overall condition, I do not know if he is a candidate for these therapies,

possible alternative would be chemotherapy again that would be up to

Oncology consult to determine.  We also will need to speak with the

patient's family regarding any advanced therapy as he is current DNR/do not

intubate and there could be significant complications from these therapies

this will need to be discussed with the patient's family to see how

aggressively treatment should be pursued.  I will continue to follow the

patient with you.  As for the hematuria, Waddell catheter should be flushed

every few hours with normal saline.  Continue IV antibiotics for urinary

infection and this should resolve.







__________________________________________

Price Jackson MD





DD:  02/21/2019 18:41:39

DT:  02/21/2019 18:44:15

Job # [id number]
DATE:  02/22/2019



This is [facility] visit on the telemetry floor.



For Dr. Khan.



SUBJECTIVE:  The patient is an 89-year-old male, seen with his son at the

bedside for discharge today as per his primary doctor after the patient was

hospitalized for severe urinary retention, but the patient noted to have

metastatic castration-resistant prostatic cancer, bony metastasis.



The patient did have a Waddell change.  He is resting more comfortably.  The

patient's hemoglobin now reported at 9 with a value of 8.8 yesterday with

consideration for transfusions should hemoglobin drop any further.



The patient did have an evaluation for his scalp lesions with Dr. Lopez's resident which was recommended for followup as an outpatient

as it is suspicious for basal cell carcinoma.



OBJECTIVE:

VITAL SIGNS:  Temperature 98.2, pulse 96, respirations 18, blood pressure

131/57, pulse ox 94%.



PHYSICAL EXAMINATION:

HEENT:  Unremarkable.

NECK:  Supple.

HEART:  Regular rate.  Occasional ectopic beats.

LUNGS:  Decreased breath sounds at the bases.

ABDOMEN:  Soft, nontender.

EXTREMITIES:  Faint +1 edema.

NEUROLOGIC:  Awake and alert.

SKIN:  Warm and dry with suspicious basal cell type lesion of his left

forehead.



LABORATORY DATA:  The patient's labs were done.  White blood cell count of

5.9, hemoglobin of 9, hematocrit 27.3, platelet count of 206,000 with a

metabolic panel showing a BUN of 22 and creatinine 0.6.



The patient did have Proteus mirabilis positive UTI findings reported

yesterday with this addressed by Dr. Mercado with meropenem being given with

oral antibiotics as per his primary doctor as indicated, and the patient

may be discharged later today.



ASSESSMENT:  For this patient is that of urinary retention in a patient

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

seizure disorder, indwelling Waddell catheter, chronic urinary tract

infection, basal cell carcinoma suspicion of the forehead, dementia, gait

disturbance, wheelchair bound, history of non-ST elevated myocardial

infarction, degenerative joint disease.



PLAN:  For this patient, after conversation with Dr. Khan and the

patient and his son, he is to continue his present medical regimen with

antibiotics as far as his UTI.  We will follow up in the office for further

recommendations considering treatment with Zytiga for palliation as

indicated.



This is a complex patient with comprehensive medically necessary and

appropriate visit carried out in excess of 40 minutes with the patient's

son's questions answered to his satisfaction.







__________________________________________

Alexandro King MD





DD:  02/22/2019 15:18:41

DT:  02/22/2019 19:28:32

Job # [id number]
[id number]

## 2019-02-22 NOTE — CP.PCM.PN
Subjective





- Date & Time of Evaluation


Date of Evaluation: 02/22/19


Time of Evaluation: 11:30





- Subjective


Subjective: 





No abdominal pain, no nausea, no fevers.





Objective





- Vital Signs/Intake and Output


Vital Signs (last 24 hours): 


                                        











Temp Pulse Resp BP Pulse Ox


 


 98.5 F   91 H  20   135/54 L  96 


 


 02/21/19 12:00  02/21/19 12:00  02/21/19 12:00  02/21/19 12:00  02/20/19 10:55








Intake and Output: 


                                        











 02/21/19 02/21/19





 06:59 18:59


 


Intake Total 770 


 


Output Total 1200 


 


Balance -430 














- Medications


Medications: 


                               Current Medications





Carbamazepine (Tegretol)  200 mg PO TID [facility]; Protocol


   Last Admin: 02/21/19 14:31 Dose:  200 mg


Enoxaparin Sodium (Lovenox)  40 mg SC DAILY [facility]; Protocol


   Last Admin: 02/21/19 10:35 Dose:  40 mg


Fentanyl (Duragesic)  1 patch TD Q72 [facility]


   Last Admin: 02/21/19 10:38 Dose:  1 patch


Gemfibrozil (Lopid)  600 mg PO BID [facility]


   Last Admin: 02/21/19 10:35 Dose:  600 mg


Meropenem (Merrem Iv 1 Gm Premix)  1 gm in 50 mls @ 100 mls/hr IVPB Q8 [facility]; 

Protocol


   Last Admin: 02/21/19 14:31 Dose:  100 mls/hr


Pantoprazole Sodium (Protonix Ec Tab)  40 mg PO 0600 [facility]


   Last Admin: 02/21/19 05:38 Dose:  40 mg











- Labs


Labs: 


                                        





                                 02/21/19 06:15 





                                 02/21/19 06:15 











- Constitutional


Appears: Chronically Ill





- Head Exam


Head Exam: NORMAL INSPECTION





- Respiratory Exam


Respiratory Exam: Decreased Breath Sounds





- Cardiovascular Exam


Cardiovascular Exam: +S1, +S2





- GI/Abdominal Exam


GI & Abdominal Exam: Soft.  absent: Tenderness





Assessment and Plan





- Assessment and Plan (Free Text)


Plan: 





Assessment


consider complicated UTI with hematuria in this patient with indwelling Waddell 

catheter and prostate cancer, growing Proteus


history of sepsis due to complicated UTI in this patient with indwelling Waddell 

catheter and metastatic prostate cancer, grew Proteus and E. faecalis in the 

urine, clinically improved


history of Proteus bacteremia, source unclear, concerned about intra-abdominal 

infection


history of UTI in this patient with prostate cancer with metastasis


history of Klebsiella urinary tract infection


Mechanical fall, etiology to be determined


prostate cancer on Lupron treatment once a month (hormonal treatment)


seizure disorder


glaucoma





Plan


continue Merrem (day 4 of antibiotics) and will continue to monitor clinically -

target 7-10 days of antibiotics


follow up Urology evaluation and recommendations


reviewed CT A/P which is showing metastases and lymphadenopathy

## 2019-02-22 NOTE — CON
DATE:  02/21/2019



This is [facility] visit on the telemetry floor for Dr. Khan.



CHIEF COMPLAINT:  Urinary retention.



HISTORY OF PRESENT ILLNESS:  The patient is an 89-year-old male with known

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

which radiation has been given, followed by Dr. Jackson; however, due to

possible early dementia, the patient has not followed up with recent

admission by the emergency room for abdominal pain with the son reporting

that the Waddell catheter was changed the day prior with the patient being

admitted for evaluation and treatment.



At present, the patient is resting more comfortably.  However, he reports a

lesion on his forehead that bleeds and has not improved despite putting

creams on for approximately six months' time with suspicion of basal cell

carcinoma of the scalp for which we will ask for a consult with Dr. Lopez.  The patient had a large amount of blood in his urine for

which he is now being treated with antibiotics as per Dr. Mercado, Infection

Disease consultant.  The patient's most recent PSA was 99.1 done on

01/2019.



PAST MEDICAL HISTORY:  Significant for metastatic prostate CA

castrate-resistant, dementia, seizure disorder, glaucoma, chronic UTI,

history of indwelling Waddell, history of palliative radiation on Lupron.



ALLERGIES:  NO KNOWN ALLERGIES.



MEDICATIONS:  Include Duragesic patch, Lopid, Lovenox, Percocet, Protonix,

Tegretol, vancomycin, and Zosyn.



FAMILY HISTORY/SOCIAL HISTORY:  Denies alcohol or tobacco abuse.  Lives at

home with 24-hour home aide.  Essentially, wheelchair bound with dementia.



REVIEW OF SYSTEMS:  A 12-point review of system is done which is negative

to questioning except for items mentioned in history of present illness.



OBJECTIVE/PHYSICAL EXAMINATION:

VITAL SIGNS:  Temperature 98, pulse 92, respirations 20, blood pressure

124/56 with a pulse ox of 96%.

HEENT:  Unremarkable.   Tongue is dry.

NECK:  Supple.

HEART:  Regular rate, occasional ectopic beat.

LUNGS:  Minimal decreased breath sounds at the bases.

ABDOMEN:  Soft and nontender.

EXTREMITIES:  Faint +1 edema of the feet.

NEUROLOGIC:  Awake and alert, but confused.

SKIN:  Warm and dry.



LABORATORY DATA:  The patient's labs were done.  White blood cell count of

5.6, hemoglobin 8.8 dropping from 9.6 on admission three days prior,

hematocrit 27.4 and platelet count of 224,000.  Metabolic panel showing a

BUN of 24, creatinine of 0.6, otherwise normal metabolic panel.  Iron

percent saturation of 15%.  Urine showed large amount of blood, large

amount of bilirubin, and positive nitrite.



The patient's urine culture showed positive findings of Proteus mirabilis.



The patient also had a CT scan of the abdomen and pelvis done yesterday. 

It was read as new retroperitoneal and pelvic lymphadenopathy, new soft

tissue mass right posterior perirenal fat likely hematogenous metastases,

wide spread sclerotic osseous metastasis unchanged, enlarged prostate,

possibly bloody content within the urinary bladder lumen correlating with

urinalysis.



ASSESSMENT:  For this patient is that of stage IV prostate carcinoma with

bony metastases, castrate resistant; seizure disorder; degenerative joint

disease; urinary tract infection; dementia; gait disturbance, wheelchair

bound; history of non-ST-elevated myocardial infarction.



PLAN:  For this patient after conversation with Dr. Khan is to continue

present medical regimen.  He also has a new lesion, possible basal cell of

his scalp.  We will ask for surgical consult in that regard.  We will give

bacitracin in the interim.  We will also type and cross for 2 units of

packed red blood cells in the morning and hold.  Monitor for his anemic

indices with _____ transfusion as indicated for his significant hematuria. 

We will also consider Zytiga or other medication for palliation of his

underlying disease process of prostate cancer with continued

recommendations as per patient's status.  He remains DNR/DNI at attending

doctor's recommendations.



This is a complex patient with a comprehensive medical necessary and

appropriate visit carried in excess of 90 minutes with the patient's chart

reviewed for consultation with patient's nurses, with notes reviewed from

prior hospitalizations and evaluations by consultants.



Nurse's questions were answered to their satisfaction.







__________________________________________

Alexandro King MD





DD:  02/21/2019 21:22:32

DT:  02/22/2019 0:10:26

Job # [id number]

## 2019-03-11 ENCOUNTER — HOSPITAL ENCOUNTER (OUTPATIENT)
Dept: HOSPITAL 42 - ED | Age: 84
Setting detail: OBSERVATION
LOS: 2 days | Discharge: HOME HEALTH | End: 2019-03-13
Attending: INTERNAL MEDICINE | Admitting: INTERNAL MEDICINE
Payer: MEDICARE

## 2019-03-11 VITALS — BODY MASS INDEX: 24.4 KG/M2

## 2019-03-11 DIAGNOSIS — F03.90: ICD-10-CM

## 2019-03-11 DIAGNOSIS — N40.1: ICD-10-CM

## 2019-03-11 DIAGNOSIS — G89.3: ICD-10-CM

## 2019-03-11 DIAGNOSIS — E78.5: ICD-10-CM
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
## 2019-03-11 DIAGNOSIS — H40.9: ICD-10-CM

## 2019-03-11 DIAGNOSIS — G40.909: ICD-10-CM

## 2019-03-11 DIAGNOSIS — R31.0: ICD-10-CM

## 2019-03-11 DIAGNOSIS — K59.03: ICD-10-CM

## 2019-03-11 DIAGNOSIS — D50.0: ICD-10-CM

## 2019-03-11 DIAGNOSIS — D62: ICD-10-CM

## 2019-03-11 DIAGNOSIS — C61: ICD-10-CM

## 2019-03-11 DIAGNOSIS — T83.518A: Primary | ICD-10-CM

## 2019-03-11 DIAGNOSIS — Z87.891: ICD-10-CM

## 2019-03-11 DIAGNOSIS — Z66: ICD-10-CM

## 2019-03-11 DIAGNOSIS — R33.8: ICD-10-CM

## 2019-03-11 DIAGNOSIS — C79.89: ICD-10-CM

## 2019-03-11 DIAGNOSIS — Z79.818: ICD-10-CM

## 2019-03-11 DIAGNOSIS — T40.2X5A: ICD-10-CM

## 2019-03-11 DIAGNOSIS — N39.0: ICD-10-CM

## 2019-03-11 DIAGNOSIS — B96.1: ICD-10-CM

## 2019-03-11 DIAGNOSIS — Z99.3: ICD-10-CM

## 2019-03-11 LAB
ALBUMIN SERPL-MCNC: 3.9 G/DL (ref 3–4.8)
ALBUMIN/GLOB SERPL: 1 {RATIO} (ref 1.1–1.8)
ALT SERPL-CCNC: 10 U/L (ref 7–56)
APPEARANCE UR: (no result)
APTT BLD: 37.5 SECONDS (ref 26.9–38.3)
AST SERPL-CCNC: 37 U/L (ref 17–59)
BACTERIA #/AREA URNS HPF: (no result) /HPF
BASOPHILS # BLD AUTO: 0 K/MM3 (ref 0–2)
BASOPHILS NFR BLD: 0 % (ref 0–3)
BILIRUB UR-MCNC: NEGATIVE MG/DL
BUN SERPL-MCNC: 24 MG/DL (ref 7–21)
CALCIUM SERPL-MCNC: 9.3 MG/DL (ref 8.4–10.5)
COLOR UR: (no result)
EOSINOPHIL # BLD: 0.1 10*3/UL (ref 0–0.7)
EOSINOPHIL NFR BLD: 0.8 % (ref 1.5–5)
ERYTHROCYTE [DISTWIDTH] IN BLOOD BY AUTOMATED COUNT: 13.7 % (ref 11.5–14.5)
GFR NON-AFRICAN AMERICAN: > 60
GLUCOSE UR STRIP-MCNC: NEGATIVE MG/DL
HGB BLD-MCNC: 8.6 G/DL (ref 14–18)
INR PPP: 1.33
LEUKOCYTE ESTERASE UR-ACNC: (no result) LEU/UL
LYMPHOCYTES # BLD: 1.9 10*3/UL (ref 1.2–3.4)
LYMPHOCYTES NFR BLD AUTO: 30.4 % (ref 22–35)
MCH RBC QN AUTO: 30.4 PG (ref 25–35)
MCHC RBC AUTO-ENTMCNC: 33.1 G/DL (ref 31–37)
MCV RBC AUTO: 91.9 FL (ref 80–105)
MONOCYTES # BLD AUTO: 0.5 10*3/UL (ref 0.1–0.6)
MONOCYTES NFR BLD: 7.7 % (ref 1–6)
PH UR STRIP: 6 [PH] (ref 4.7–8)
PLATELET # BLD: 244 10^3/UL (ref 120–450)
PMV BLD AUTO: 8.9 FL (ref 7–11)
PROT UR STRIP-MCNC: 100 MG/DL
PROTHROMBIN TIME: 15 SECONDS (ref 9.4–12.5)
RBC # BLD AUTO: 2.83 10^6/UL (ref 3.5–6.1)
RBC # UR STRIP: (no result) /UL
RBC #/AREA URNS HPF: (no result) /HPF (ref 0–2)
SP GR UR STRIP: 1.02 (ref 1–1.03)
UROBILINOGEN UR STRIP-ACNC: 0.2 E.U./DL
WBC # BLD AUTO: 6.2 10^3/UL (ref 4.5–11)
WBC #/AREA URNS HPF: (no result) /HPF (ref 0–6)

## 2019-03-11 PROCEDURE — 87086 URINE CULTURE/COLONY COUNT: CPT

## 2019-03-11 PROCEDURE — 80053 COMPREHEN METABOLIC PANEL: CPT

## 2019-03-11 PROCEDURE — 81001 URINALYSIS AUTO W/SCOPE: CPT

## 2019-03-11 PROCEDURE — 96365 THER/PROPH/DIAG IV INF INIT: CPT

## 2019-03-11 PROCEDURE — 85025 COMPLETE CBC W/AUTO DIFF WBC: CPT

## 2019-03-11 PROCEDURE — 87040 BLOOD CULTURE FOR BACTERIA: CPT

## 2019-03-11 PROCEDURE — 87181 SC STD AGAR DILUTION PER AGT: CPT

## 2019-03-11 PROCEDURE — 83735 ASSAY OF MAGNESIUM: CPT

## 2019-03-11 PROCEDURE — 85610 PROTHROMBIN TIME: CPT

## 2019-03-11 PROCEDURE — 36415 COLL VENOUS BLD VENIPUNCTURE: CPT

## 2019-03-11 PROCEDURE — 82728 ASSAY OF FERRITIN: CPT

## 2019-03-11 PROCEDURE — 85730 THROMBOPLASTIN TIME PARTIAL: CPT

## 2019-03-11 PROCEDURE — 86900 BLOOD TYPING SEROLOGIC ABO: CPT

## 2019-03-11 PROCEDURE — 74176 CT ABD & PELVIS W/O CONTRAST: CPT

## 2019-03-11 PROCEDURE — 83550 IRON BINDING TEST: CPT

## 2019-03-11 PROCEDURE — 84100 ASSAY OF PHOSPHORUS: CPT

## 2019-03-11 PROCEDURE — 99284 EMERGENCY DEPT VISIT MOD MDM: CPT

## 2019-03-11 PROCEDURE — 83540 ASSAY OF IRON: CPT

## 2019-03-11 PROCEDURE — 86850 RBC ANTIBODY SCREEN: CPT

## 2019-03-11 RX ADMIN — PIPERACILLIN AND TAZOBACTAM SCH MLS/HR: 3; .375 INJECTION, POWDER, LYOPHILIZED, FOR SOLUTION INTRAVENOUS; PARENTERAL at 21:55

## 2019-03-11 NOTE — ED PDOC
Arrival/HPI





- General


Chief Complaint: Male Genitourinary


Time Seen by Provider: 03/11/19 10:17


Historian: Patient





- History of Present Illness


Narrative History of Present Illness (Text): 





03/11/19 11:24


89-year-old male with a history of prostate cancer with chronic Tolentino 

cathetermode developed abdominal pain this morning and no urine output. At the 

house the catheter was changed by the home nurse and was still not draining u

rine so the patient was sent to the emergency room for evaluation. Patient is 

still complaining of some lower abdominal pain. No nausea or vomiting. No fevers

or chills. Patient states he's noticed there is urine currently in the bag.





Past Medical History





- Provider Review


Nursing Documentation Reviewed: Yes





- Travel History


Have you recently traveled outside [place] w/in the past 3 mons?: No





- Infectious Disease


Hx of Infectious Diseases: None





- Cardiac


Hx Pacemaker: No





- Pulmonary


Hx Respiratory Disorders: Yes (H/O SMOKING CIGARETTES)


Other/Comment: EMPYEMA/SARCOIDOSIS





- Neurological


Hx Dizziness: Yes (syncope)


Hx Seizures: Yes (last seizure 20 yrs ago)





- HEENT


Hx Cataracts: Yes (with bilateral sx)





- Renal


Hx Renal Disorder: Yes


Other/Comment: chronic tolentino-Prostate ca.





- Endocrine/Metabolic


Hx Endocrine Disorders: No





- Hematological/Oncological


Hx Cancer: Yes (prostate)





- Integumentary


Hx Dermatological Disorder: Yes





- Musculoskeletal/Rheumatological


Hx Arthritis: Yes





- Gastrointestinal


Hx Gastrointestinal Disorders: No





- Genitourinary/Gynecological


Hx Genitourinary Disorders: Yes (UTI,CHRONIC TOLENTINO CATHETER)





- Psychiatric


Hx Psychophysiologic Disorder: Yes


Hx Substance Use: No





- Surgical History


Hx Mastectomy: No





- Anesthesia


Hx Anesthesia: No


Hx Anesthesia Reactions: No





Family/Social History





- Physician Review


Nursing Documentation Reviewed: Yes


Family/Social History: Unknown Family HX


Smoking Status: Unknown If Ever Smoked


Hx Alcohol Use: Yes (occasional)


Hx Substance Use: No





Allergies/Home Meds


Allergies/Adverse Reactions: 


Allergies





No Known Allergies Allergy (Verified 01/29/19 18:01)


   








Home Medications: 


                                    Home Meds











 Medication  Instructions  Recorded  Confirmed


 


Fentanyl [Duragesic Patch] 50 mcg TD Q72 01/24/19 02/22/19


 


Gemfibrozil [Lopid] 600 mg PO BID 01/24/19 02/22/19


 


Omeprazole 40 mg PO DAILY 01/24/19 02/18/19


 


oxyCODONE/Acetaminophen [Percocet 1 tab PO Q4H PRN 02/18/19 02/18/19





5/325 mg Tab]   














Review of Systems





- Review of Systems


Constitutional: absent: Fatigue, Fevers


Respiratory: absent: SOB, Cough


Cardiovascular: absent: Chest Pain, Palpitations


Gastrointestinal: Abdominal Pain.  absent: Constipation, Diarrhea, Nausea, 

Vomiting


Genitourinary Male: Urinary Output Changes


Musculoskeletal: absent: Arthralgias, Back Pain


Skin: absent: Rash


Neurological: absent: Headache





Physical Exam


Vital Signs Reviewed: Yes





Vital Signs











  Temp Pulse Resp BP Pulse Ox


 


 03/11/19 10:06  98.5 F  99 H  18  126/83  99











Temperature: Afebrile


Blood Pressure: Normal


Pulse: Regular


Respiratory Rate: Normal


Appearance: Positive for: Well-Appearing, Non-Toxic, Comfortable


Pain Distress: None


Mental Status: Positive for: Alert and Oriented X 3





- Systems Exam


Head: Present: Atraumatic


Mouth: Present: Moist Mucous Membranes


Neck: Present: Normal Range of Motion


Respiratory/Chest: Present: Clear to Auscultation, Good Air Exchange.  No: 

Respiratory Distress, Accessory Muscle Use


Cardiovascular: Present: Regular Rate and Rhythm, Normal S1, S2.  No: Murmurs


Abdomen: Present: Tenderness (+ minimal lower abdominal tenderness. ).  No: 

Distention, Peritoneal Signs, Rebound, Guarding


Genitourinary Male: Present: Other (tolentino in place; ).  No: Circumcised Penis, 

Lesions, Masses, Erythema


Upper Extremity: Present: Normal Inspection


Lower Extremity: Present: Normal Inspection


Neurological: Present: GCS=15


Skin: Present: Warm, Dry


Psychiatric: Present: Alert, Oriented x 3





Medical Decision Making


ED Course and Treatment: 





03/11/19 11:54


89-year-old male with chronic Tolentino catheter with no urinary output. After 

changing the Tolentino catheter at home, there ispositive urine output.





CBC: wbc; wnl  hgb;8.6


CMP BUN; 26 cr; 0.6





UA + blood, + leukocytes, + nitrates 








CAT scan of the abdomen and pelvis: FINDINGS:


LOWER THORAX:


There appear to be scattered areas of linear atelectasis and or scarring both 

lung bases including the lingular and middle lobe regions.


Mild passive/dependent type atelectasis both lung bases.  No effusion or basilar

pneumothorax. 


Heart size within range of normal.  Small pericardial effusion. 


LIVER:


Liver exhibits normal size and attenuation pattern without masses collections or

calcifications.


GALLBLADDER AND BILE DUCTS:


Gallbladder physiologically distended.  No evidence of intraluminal gallbladder 

calculi. 


PANCREAS:


Unremarkable. No mass. No ductal dilatation.


SPLEEN:


Unremarkable. No splenomegaly. 


ADRENALS:


No adrenal lesions. 


KIDNEYS AND URETERS:


The kidneys demonstrate relatively symmetric size.  No evidence of nephrol

ithiasis.  The mild prominence of the renal pelves.  The ureters are also mildly

prominent throughout although do taper to some degree distally..  Suspect at 

least 2 small right renal cyst. 


BLADDER:


Urinary bladder is collapsed about an in situ unclamped Tolentino catheter..  

Muscular hypertrophy presumably contributes.  Small amount of air is present 

within the bladder lumen likely due to instrumentation however possibility of 

cystitis or other intrinsic/invasive wall lesion not excluded.  Clinical 

correlation with history is recommended.


REPRODUCTIVE:


Prostate gland appears enlarged encroaching into the floor urinary bladder. 


APPENDIX:


No evidence of acute appendicitis.  What is felt to represent the normal 

appendix best seen on coronal sequence image number [id number].


BOWEL:


Evaluation of the bowel is somewhat limited due to the lack of circulating 

intravenous contrast material.  The stomach is incompletely distended with 

slight wall thickening.  Rugal folds are also prominent likely due to incomplete

distention.  Visualized loops of small bowel exhibit normal contour and caliber.

 No evidence of acute mechanical small bowel obstruction.  Moderate amount of 

stool seen throughout the colon consistent with fecal retention/constipation..  

There also appears to be mild fecal impaction. 


PERITONEUM:


Unremarkable. No fluid collection. No free air.


LYMPH NODES:


There are multiple small to mildly enlarged retroperitoneal and pelvic 

adenopathy 


VASCULATURE:


Unremarkable. No aortic aneurysm. Mild aortic atherosclerotic calcification or 

mural plaque present.


BONES:


Diffuse blastic metastases are again seen scattered throughout the lower 

thoracic lumbar spine as well as pelvis and sacrum. 


OTHER FINDINGS:


None. 


IMPRESSION:


In situ Tolentino catheter unclamped Tolentino catheter with collapsed urinary bladder. 

Bladder wall thickening in part due to collapse as well as muscular hypertrophy 

however other intrinsic/invasive wall lesion not excluded.  Air is present 

within the urinary bladder likely due to recent instrumentation however 

correlation urinalysis to exclude cystitis.


Enlarged prostate gland encroaching into the floor urinary bladder.  


Mildly prominent bilateral renal pelves and ureters appear. 


Multiple small to mildly enlarged retroperitoneal and pelvic sidewall 

adenopathy. 


Diffuse blastic skeletal metastases as above. 


Small pericardial effusion 


There also appears to be mild fecal impaction.





blood cultures pending


type and screen ordered





pt started on rocephin IV. 








case discussed with dr. hernandez will admit observational status to med/surg for

UTI, hematuria, urinary retention, anemia








all results discussed with family member and patient. 








all aspects of this case were discussed the attending of record. 





impression; uti, hematuria, urinary retension, anemia


admit obs/ med/surg








- RAD Interpretation


Radiology Orders: 











03/11/19 10:38


ABD & PELVIS W/O PO OR IV CONT [CT] Stat 














Disposition/Present on Arrival





- Present on Arrival


Any Indicators Present on Arrival: Yes


History of DVT/PE: No


History of Uncontrolled Diabetes: No


Urinary Catheter: Yes (Chronic for retention)


History of Decub. Ulcer: No


History Surgical Site Infection Following: None





- Disposition


Have Diagnosis and Disposition been Completed?: Yes


Diagnosis: 


 Urinary tract infection, Urinary retention, Anemia, Hematuria





Disposition: HOSPITALIZED


Disposition Time: 13:18


Patient Plan: Observation


Condition: FAIR

## 2019-03-11 NOTE — CP.PCM.CON
<Josiah Horn - Last Filed: 03/11/19 17:46>





History of Present Illness





- History of Present Illness


History of Present Illness: 





Josiah Horn D.O. PGY-3, Internal Medicine Resident, Infectious Disease 

Consultation Note





89 year old male with a PMH of metastatic prostate cancer, seizures, glaucoma, 

dementia, chronic indwelling tolentino with previous multiple UTIs with E.coli, 

Klebsiella, and Enterococcus, who presents  due to hematuria. Infectious disease

consultation was requested for hematuria/chronic UTI.





Patient was seen and examined at bedside in ER. Patient accompanied by family 

member. Apparently there was an issue with the tolentino not draining appropriate, 

and despite a change a visiting nurse there was urine output and so patient was 

brought in. At this time patient appears comfortable, laying in bed in NAD. Most

of this history is obtained from family member. Patient cites no major 

complaints at this time.





Review of Systems





- Review of Systems


All systems: reviewed and no additional remarkable complaints except (as per 

HPI)





Past Patient History





- Infectious Disease


Hx of Infectious Diseases: None





- Tetanus Immunizations


Tetanus Immunization: Unknown





- Past Medical History & Family History


Past Medical History?: Yes





- Past Social History


Smoking Status: Unknown If Ever Smoked





- CARDIAC


Hx Pacemaker: No





- PULMONARY


Hx Respiratory Disorders: Yes (H/O SMOKING CIGARETTES)


Other/Comment: EMPYEMA/SARCOIDOSIS





- NEUROLOGICAL


Hx Dizziness: Yes (syncope)


Hx Seizures: Yes (last seizure 20 yrs ago)





- HEENT


Hx Cataracts: Yes (with bilateral sx)





- RENAL


Hx Chronic Kidney Disease: Yes


Other/Comment: chronic tolentino-Prostate ca.





- ENDOCRINE/METABOLIC


Hx Endocrine Disorders: No





- HEMATOLOGICAL/ONCOLOGICAL


Hx Cancer: Yes (prostate)





- INTEGUMENTARY


Hx Dermatological Problems: Yes





- MUSCULOSKELETAL/RHEUMATOLOGICAL


Hx Arthritis: Yes





- GASTROINTESTINAL


Hx Gastrointestinal Disorders: No





- GENITOURINARY/GYNECOLOGICAL


Hx Genitourinary Disorders: Yes (UTI,CHRONIC TOLENTINO CATHETER)





- PSYCHIATRIC


Hx Psychophysiologic Disorder: Yes


Hx Substance Use: No





- SURGICAL HISTORY


Hx Mastectomy: No





- ANESTHESIA


Hx Anesthesia: No


Hx Anesthesia Reactions: No





Meds


Allergies/Adverse Reactions: 


                                    Allergies











Allergy/AdvReac Type Severity Reaction Status Date / Time


 


No Known Allergies Allergy   Verified 01/29/19 18:01














- Medications


Medications: 


                               Current Medications





Carbamazepine (Tegretol)  200 mg PO TID [facility]; Protocol


Fentanyl (Duragesic)  1 patch TD Q72 [facility]


Gemfibrozil (Lopid)  600 mg PO BID [facility]


Meropenem (Merrem Iv 1 Gm Premix)  1 gm in 50 mls @ 12.5 mls/hr IVPB Q8 [facility]; 

Protocol


Sodium Chloride (Sodium Chloride 0.9%)  1,000 mls @ 75 mls/hr IV .[id number] [facility]


   Last Admin: 03/11/19 15:00 Dose:  75 mls/hr


Oxycodone/Acetaminophen (Percocet 5/325 Mg Tab)  1 tab PO Q4H PRN


   PRN Reason: Pain, moderate (4-7)


   Stop: 03/14/19 14:33


Pantoprazole Sodium (Protonix Ec Tab)  40 mg PO 0600 [facility]


Polyethylene Glycol (Miralax)  17 gm PO DAILY [facility]











Physical Exam





- Constitutional


Appears: No Acute Distress, Chronically Ill





- Head Exam


Head Exam: ATRAUMATIC, NORMOCEPHALIC





- Eye Exam


Eye Exam: EOMI.  absent: Scleral icterus





- ENT Exam


ENT Exam: Normal Exam





- Neck Exam


Neck exam: Positive for: Normal Inspection





- Respiratory Exam


Respiratory Exam: absent: Rhonchi





- Cardiovascular Exam


Cardiovascular Exam: RRR, +S1, +S2





- GI/Abdominal Exam


GI & Abdominal Exam: Soft.  absent: Distended





-  Exam


Additional comments: 





tolentino in place with dark, hematuric urine





- Extremities Exam


Extremities exam: Negative for: tenderness





- Neurological Exam


Neurological exam: Alert





- Skin


Skin Exam: Dry, Warm





Results





- Vital Signs


Recent Vital Signs: 


                                Last Vital Signs











Temp  97.7 F   03/11/19 17:30


 


Pulse  97 H  03/11/19 17:30


 


Resp  20   03/11/19 17:30


 


BP  136/71   03/11/19 17:30


 


Pulse Ox  98   03/11/19 17:30














- Labs


Result Diagrams: 


                                 03/11/19 10:20





                                 03/11/19 10:20


Labs: 


                         Laboratory Results - last 24 hr











  03/11/19 03/11/19 03/11/19





  10:20 10:20 11:00


 


WBC   6.2 


 


RBC   2.83 L 


 


Hgb   8.6 L 


 


Hct   26.0 L 


 


MCV   91.9 


 


MCH   30.4 


 


MCHC   33.1 


 


RDW   13.7 


 


Plt Count   244 


 


MPV   8.9 


 


Neut % (Auto)   61.1 


 


Lymph % (Auto)   30.4 


 


Mono % (Auto)   7.7 H 


 


Eos % (Auto)   0.8 L 


 


Baso % (Auto)   0.0 


 


Lymph # (Auto)   1.9 


 


Mono # (Auto)   0.5 


 


Eos # (Auto)   0.1 


 


Baso # (Auto)   0.00 


 


Absolute Neuts (auto)   3.80 


 


PT   


 


INR   


 


APTT   


 


Sodium  134  


 


Potassium  4.4  


 


Chloride  96 L  


 


Carbon Dioxide  26  


 


Anion Gap  16  


 


BUN  24 H  


 


Creatinine  0.6 L  


 


Est GFR ( Amer)  > 60  


 


Est GFR (Non-Af Amer)  > 60  


 


Random Glucose  102  


 


Calcium  9.3  


 


Total Bilirubin  0.3  


 


AST  37  


 


ALT  10  


 


Alkaline Phosphatase  141 H  


 


Total Protein  8.0  


 


Albumin  3.9  


 


Globulin  4.1  


 


Albumin/Globulin Ratio  1.0 L  


 


Urine Color    Light brown


 


Urine Appearance    Slight-cloudy


 


Urine pH    6.0


 


Ur Specific Gravity    1.020


 


Urine Protein    100 H


 


Urine Glucose (UA)    Negative


 


Urine Ketones    Negative


 


Urine Blood    Large H


 


Urine Nitrate    Positive H


 


Urine Bilirubin    Negative


 


Urine Urobilinogen    0.2


 


Ur Leukocyte Esterase    Moderate H


 


Urine RBC    Tntc H


 


Urine WBC    Tntc H


 


Urine Bacteria    Many


 


Blood Type   


 


Antibody Screen   


 


BBK History Checked   














  03/11/19 03/11/19





  12:30 15:50


 


WBC  


 


RBC  


 


Hgb  


 


Hct  


 


MCV  


 


MCH  


 


MCHC  


 


RDW  


 


Plt Count  


 


MPV  


 


Neut % (Auto)  


 


Lymph % (Auto)  


 


Mono % (Auto)  


 


Eos % (Auto)  


 


Baso % (Auto)  


 


Lymph # (Auto)  


 


Mono # (Auto)  


 


Eos # (Auto)  


 


Baso # (Auto)  


 


Absolute Neuts (auto)  


 


PT  15.0 H 


 


INR  1.33 


 


APTT  37.5 


 


Sodium  


 


Potassium  


 


Chloride  


 


Carbon Dioxide  


 


Anion Gap  


 


BUN  


 


Creatinine  


 


Est GFR ( Amer)  


 


Est GFR (Non-Af Amer)  


 


Random Glucose  


 


Calcium  


 


Total Bilirubin  


 


AST  


 


ALT  


 


Alkaline Phosphatase  


 


Total Protein  


 


Albumin  


 


Globulin  


 


Albumin/Globulin Ratio  


 


Urine Color  


 


Urine Appearance  


 


Urine pH  


 


Ur Specific Gravity  


 


Urine Protein  


 


Urine Glucose (UA)  


 


Urine Ketones  


 


Urine Blood  


 


Urine Nitrate  


 


Urine Bilirubin  


 


Urine Urobilinogen  


 


Ur Leukocyte Esterase  


 


Urine RBC  


 


Urine WBC  


 


Urine Bacteria  


 


Blood Type   A POSITIVE


 


Antibody Screen   Negative


 


BBK History Checked   Patient has bt














Assessment & Plan





- Assessment and Plan (Free Text)


Assessment: 





89 year old male with a PMH of metastatic prostate cancer, seizures, glaucoma, 

dementia, chronic indwelling tolentino with previous multiple UTIs with E.coli, 

Klebsiella, and Enterococcus, who presents  due to hematuria. Infectious disease

consultation was requested for hematuria/chronic UTI.


Plan: 





Catheter associated urinary tract infection from chronic indwelling catheter 

with history of prostate cancer


Hematuria





Afebrile with no leukocytosis


Has previously grown Proteus and enteroccoccus in his urine, most recently grew 

proteus with multiple resistances


Urine re-cultured


Was being managed with meropenem, agree to continue for now


Blood cultures


Catheter care


Review of chart also shows that hematuria was an issue previously with re

commendations from urology to do frequent tolentino flushes


We will follow





Patient was seen and examined and case to be discussed with attending physician.


Thank you for the pleasure of participating in the care of this interesting 

patient.





- Date & Time


Date: 03/11/19


Time: 17:51





<Donta Alvarez - Last Filed: 03/11/19 19:07>





Meds





- Medications


Medications: 


                               Current Medications





Carbamazepine (Tegretol)  200 mg PO TID [facility]; Protocol


   Last Admin: 03/11/19 18:11 Dose:  200 mg


Fentanyl (Duragesic)  1 patch TD Q72 [facility]


Gemfibrozil (Lopid)  600 mg PO BID [facility]


   Last Admin: 03/11/19 18:11 Dose:  600 mg


Sodium Chloride (Sodium Chloride 0.9%)  1,000 mls @ 75 mls/hr IV .[id number] [facility]


   Last Admin: 03/11/19 15:00 Dose:  75 mls/hr


Piperacillin Sod/Tazobactam Sod (Zosyn 3.375 In Ns 100ml)  100 mls @ 25 mls/hr 

IVPB Q8 [facility]; Protocol


   Stop: 03/20/19 22:01


Oxycodone/Acetaminophen (Percocet 5/325 Mg Tab)  1 tab PO Q4H PRN


   PRN Reason: Pain, moderate (4-7)


   Stop: 03/14/19 14:33


Pantoprazole Sodium (Protonix Ec Tab)  40 mg PO 0600 [facility]


Polyethylene Glycol (Miralax)  17 gm PO DAILY [facility]











Results





- Vital Signs


Recent Vital Signs: 


                                Last Vital Signs











Temp  97.7 F   03/11/19 17:30


 


Pulse  97 H  03/11/19 17:30


 


Resp  20   03/11/19 17:30


 


BP  136/71   03/11/19 17:30


 


Pulse Ox  98   03/11/19 17:30














- Labs


Result Diagrams: 


                                 03/11/19 10:20





                                 03/11/19 10:20


Labs: 


                         Laboratory Results - last 24 hr











  03/11/19 03/11/19 03/11/19





  10:20 10:20 11:00


 


WBC   6.2 


 


RBC   2.83 L 


 


Hgb   8.6 L 


 


Hct   26.0 L 


 


MCV   91.9 


 


MCH   30.4 


 


MCHC   33.1 


 


RDW   13.7 


 


Plt Count   244 


 


MPV   8.9 


 


Neut % (Auto)   61.1 


 


Lymph % (Auto)   30.4 


 


Mono % (Auto)   7.7 H 


 


Eos % (Auto)   0.8 L 


 


Baso % (Auto)   0.0 


 


Lymph # (Auto)   1.9 


 


Mono # (Auto)   0.5 


 


Eos # (Auto)   0.1 


 


Baso # (Auto)   0.00 


 


Absolute Neuts (auto)   3.80 


 


PT   


 


INR   


 


APTT   


 


Sodium  134  


 


Potassium  4.4  


 


Chloride  96 L  


 


Carbon Dioxide  26  


 


Anion Gap  16  


 


BUN  24 H  


 


Creatinine  0.6 L  


 


Est GFR ( Amer)  > 60  


 


Est GFR (Non-Af Amer)  > 60  


 


Random Glucose  102  


 


Calcium  9.3  


 


Total Bilirubin  0.3  


 


AST  37  


 


ALT  10  


 


Alkaline Phosphatase  141 H  


 


Total Protein  8.0  


 


Albumin  3.9  


 


Globulin  4.1  


 


Albumin/Globulin Ratio  1.0 L  


 


Urine Color    Light brown


 


Urine Appearance    Slight-cloudy


 


Urine pH    6.0


 


Ur Specific Gravity    1.020


 


Urine Protein    100 H


 


Urine Glucose (UA)    Negative


 


Urine Ketones    Negative


 


Urine Blood    Large H


 


Urine Nitrate    Positive H


 


Urine Bilirubin    Negative


 


Urine Urobilinogen    0.2


 


Ur Leukocyte Esterase    Moderate H


 


Urine RBC    Tntc H


 


Urine WBC    Tntc H


 


Urine Bacteria    Many


 


Blood Type   


 


Antibody Screen   


 


BBK History Checked   














  03/11/19 03/11/19





  12:30 15:50


 


WBC  


 


RBC  


 


Hgb  


 


Hct  


 


MCV  


 


MCH  


 


MCHC  


 


RDW  


 


Plt Count  


 


MPV  


 


Neut % (Auto)  


 


Lymph % (Auto)  


 


Mono % (Auto)  


 


Eos % (Auto)  


 


Baso % (Auto)  


 


Lymph # (Auto)  


 


Mono # (Auto)  


 


Eos # (Auto)  


 


Baso # (Auto)  


 


Absolute Neuts (auto)  


 


PT  15.0 H 


 


INR  1.33 


 


APTT  37.5 


 


Sodium  


 


Potassium  


 


Chloride  


 


Carbon Dioxide  


 


Anion Gap  


 


BUN  


 


Creatinine  


 


Est GFR ( Amer)  


 


Est GFR (Non-Af Amer)  


 


Random Glucose  


 


Calcium  


 


Total Bilirubin  


 


AST  


 


ALT  


 


Alkaline Phosphatase  


 


Total Protein  


 


Albumin  


 


Globulin  


 


Albumin/Globulin Ratio  


 


Urine Color  


 


Urine Appearance  


 


Urine pH  


 


Ur Specific Gravity  


 


Urine Protein  


 


Urine Glucose (UA)  


 


Urine Ketones  


 


Urine Blood  


 


Urine Nitrate  


 


Urine Bilirubin  


 


Urine Urobilinogen  


 


Ur Leukocyte Esterase  


 


Urine RBC  


 


Urine WBC  


 


Urine Bacteria  


 


Blood Type   A POSITIVE


 


Antibody Screen   Negative


 


BBK History Checked   Patient has bt














Attending/Attestation





- Attestation


I have personally seen and examined this patient.: Yes


I have fully participated in the care of the patient.: Yes


I have reviewed all pertinent clinical information: Yes

## 2019-03-11 NOTE — CP.PCM.HP
History of Present Illness





- History of Present Illness


History of Present Illness: 


PGY-3 IM resident progress note for Dr. Rivero's service





Patient is an 89 y/o Make with PMHx of metastatic prostate cancer s/p ADT and 

palliative radiation on Lupron, seizures, glaucoma, dementia, UTI w/ indwelling 

Tolentino (+ for E.coli, Klebsiella, and Enterococcus), recently admitted with UTI 

due to proteus mirabilis and was on merrem, then ciprofloxacin to complete the 

course, is presenting  due to hematuria. As per patient and his son, patient's 

Tolentino was clogged, preventing urine from draining. The VNS attempted to unclog 

the Tolentino with no success. The VNS then changed the Tolentino catheter, however 

there was still no urine output thus patient was brought to the ED. In the ED, 

the Tolentino catheter was draining however the urine was hematuric, with hgb 8.6 

thus patient is being admitted for observation for monitoring. Patient 

complained of abdominal pain, which started this morning however states the 

abdominal pain has improved. Patient denies cp, sob, no nausea, vomiting or 

diarrhea. Denies fever or chills. 








PMHx: metastatic prostate cancer s/p ADT and palliative radiation on Lupron, 

seizures, glaucoma, dementia, UTI w/ indwelling tolentino (+ for E.coli, Klebsiella,

Enterococcus, and proteus mirabilis)


PSHx: b/l cataract surgery 


Allergies: NKA


SocHx: Denies EtOH, drug or tobacco use. Patient lives at home and has a 24 hr 

home aid. he is not active and is wheelchair bound. Patient is a DNI/DNR.


FamHx: non-contributory 


Home meds: As per chart





Present on Admission





- Present on Admission


Any Indicators Present on Admission: Yes


History of DVT/PE: No


History of Uncontrolled Diabetes: No


Urinary Catheter: Yes


Decubitus Ulcer Present: No





Review of Systems





- Constitutional


Constitutional: absent: Chills, Fatigue, Fever, Frequent Falls, Lethargy, 

Malaise, Weakness





- EENT


Eyes: absent: Blurred Vision


Ears: absent: Tinnitus, Dizziness


Nose/Mouth/Throat: absent: Dysphagia, Throat Swelling





- Cardiovascular


Cardiovascular: absent: Chest Pain, Chest Pain at Rest, Claudication, Dyspnea, 

Dyspnea on Exertion, Pedal Edema, Syncope





- Respiratory


Respiratory: absent: Cough, Dyspnea, Hemoptysis, Dyspnea on Exertion, Wheezing





- Gastrointestinal


Gastrointestinal: Abdominal Pain (rigfht lower quadrant), Constipation.  absent:

Cramping, Diarrhea, Dyspepsia, Dysphagia, Heartburn, Melena, Nausea





- Genitourinary


Genitourinary: Hematuria, Freq UTI, Other (chronic indwelling tolentino catheter)





- Musculoskeletal


Musculoskeletal: Muscle Weakness (elisa lower extremities.)





- Integumentary


Integumentary: absent: Wounds





- Neurological


Neurological: Abnormal Gait, Weakness.  absent: Confusion





- Psychiatric


Psychiatric: absent: Anxiety, Confusion





- Endocrine


Endocrine: absent: Fatigue, Palpitations, Polydipsia, Polyphagia, Polyuria





- Hematologic/Lymphatic


Hematologic: Lymphadenopathy.  absent: Easy Bleeding, Easy Bruising





Past Patient History





- Infectious Disease


Hx of Infectious Diseases: None





- Tetanus Immunizations


Tetanus Immunization: Unknown





- Past Medical History & Family History


Past Medical History?: Yes





- Past Social History


Smoking Status: Unknown If Ever Smoked


Alcohol: None


Drugs: Denies


Home Situation {Lives}: With Family





- CARDIAC


Hx Pacemaker: No





- PULMONARY


Hx Respiratory Disorders: Yes (H/O SMOKING CIGARETTES)


Other/Comment: EMPYEMA/SARCOIDOSIS





- NEUROLOGICAL


Hx Dizziness: Yes (syncope)


Hx Seizures: Yes (last seizure 20 yrs ago)





- HEENT


Hx Cataracts: Yes (with bilateral sx)





- RENAL


Hx Chronic Kidney Disease: Yes


Other/Comment: chronic tolentino-Prostate ca.





- ENDOCRINE/METABOLIC


Hx Endocrine Disorders: No





- HEMATOLOGICAL/ONCOLOGICAL


Hx Cancer: Yes (prostate)





- INTEGUMENTARY


Hx Dermatological Problems: Yes





- MUSCULOSKELETAL/RHEUMATOLOGICAL


Hx Arthritis: Yes





- GASTROINTESTINAL


Hx Gastrointestinal Disorders: No





- GENITOURINARY/GYNECOLOGICAL


Hx Genitourinary Disorders: Yes (UTI,CHRONIC TOLENTINO CATHETER)





- PSYCHIATRIC


Hx Psychophysiologic Disorder: Yes


Hx Substance Use: No





- SURGICAL HISTORY


Hx Mastectomy: No





- ANESTHESIA


Hx Anesthesia: No


Hx Anesthesia Reactions: No





Meds


Allergies/Adverse Reactions: 


                                    Allergies











Allergy/AdvReac Type Severity Reaction Status Date / Time


 


No Known Allergies Allergy   Verified 01/29/19 18:01














Physical Exam





- Constitutional


Appears: No Acute Distress, Chronically Ill





- Head Exam


Head Exam: ATRAUMATIC, NORMAL INSPECTION, NORMOCEPHALIC





- Eye Exam


Eye Exam: EOMI, Normal appearance, PERRL.  absent: Scleral icterus


Pupil Exam: NORMAL ACCOMODATION





- ENT Exam


ENT Exam: Mucous Membranes Moist





- Neck Exam


Neck exam: Positive for: Normal Inspection.  Negative for: Lymphadenopathy, 

Meningismus





- Respiratory Exam


Respiratory Exam: Clear to Auscultation Bilateral, NORMAL BREATHING PATTERN.  

absent: Chest Wall Tenderness, Rales, Rhonchi, Wheezes, Respiratory Distress, 

Stridor





- Cardiovascular Exam


Cardiovascular Exam: REGULAR RHYTHM, RRR, +S1, +S2.  absent: Bradycardia, 

Tachycardia, Gallop, Irregular Rhythm, JVD, Rubs, Systolic Murmur





- GI/Abdominal Exam


GI & Abdominal Exam: Normal Bowel Sounds, Soft, Tenderness (right lower 

quadrant).  absent: Diminished Bowel Sounds, Distended, Firm, Guarding, 

Hyperactive Bowel Sounds, Hypoactive Bowel Sounds, Rebound, Rigid





-  Exam


Additional comments: 





+ Tolentino with dark urine.





- Extremities Exam


Extremities exam: Positive for: normal inspection.  Negative for: pedal edema, 

tenderness





- Back Exam


Back exam: NORMAL INSPECTION.  absent: rash noted, tenderness, vertebral 

tenderness





- Neurological Exam


Neurological exam: Alert, Oriented x3





- Psychiatric Exam


Psychiatric exam: Normal Affect, Normal Mood





- Skin


Skin Exam: Dry, Normal Color, Warm





Results





- Vital Signs


Recent Vital Signs: 





                                Last Vital Signs











Temp  98.5 F   03/11/19 10:06


 


Pulse  78   03/11/19 13:51


 


Resp  18   03/11/19 13:51


 


BP  121/73   03/11/19 13:51


 


Pulse Ox  99   03/11/19 13:51














- Labs


Result Diagrams: 


                                 03/11/19 10:20





                                 03/11/19 10:20


Labs: 





                         Laboratory Results - last 24 hr











  03/11/19 03/11/19 03/11/19





  10:20 10:20 11:00


 


WBC   6.2 


 


RBC   2.83 L 


 


Hgb   8.6 L 


 


Hct   26.0 L 


 


MCV   91.9 


 


MCH   30.4 


 


MCHC   33.1 


 


RDW   13.7 


 


Plt Count   244 


 


MPV   8.9 


 


Neut % (Auto)   61.1 


 


Lymph % (Auto)   30.4 


 


Mono % (Auto)   7.7 H 


 


Eos % (Auto)   0.8 L 


 


Baso % (Auto)   0.0 


 


Lymph # (Auto)   1.9 


 


Mono # (Auto)   0.5 


 


Eos # (Auto)   0.1 


 


Baso # (Auto)   0.00 


 


Absolute Neuts (auto)   3.80 


 


PT   


 


INR   


 


APTT   


 


Sodium  134  


 


Potassium  4.4  


 


Chloride  96 L  


 


Carbon Dioxide  26  


 


Anion Gap  16  


 


BUN  24 H  


 


Creatinine  0.6 L  


 


Est GFR ( Amer)  > 60  


 


Est GFR (Non-Af Amer)  > 60  


 


Random Glucose  102  


 


Calcium  9.3  


 


Total Bilirubin  0.3  


 


AST  37  


 


ALT  10  


 


Alkaline Phosphatase  141 H  


 


Total Protein  8.0  


 


Albumin  3.9  


 


Globulin  4.1  


 


Albumin/Globulin Ratio  1.0 L  


 


Urine Color    Light brown


 


Urine Appearance    Slight-cloudy


 


Urine pH    6.0


 


Ur Specific Gravity    1.020


 


Urine Protein    100 H


 


Urine Glucose (UA)    Negative


 


Urine Ketones    Negative


 


Urine Blood    Large H


 


Urine Nitrate    Positive H


 


Urine Bilirubin    Negative


 


Urine Urobilinogen    0.2


 


Ur Leukocyte Esterase    Moderate H


 


Urine RBC    Tntc H


 


Urine WBC    Tntc H


 


Urine Bacteria    Many














  03/11/19





  12:30


 


WBC 


 


RBC 


 


Hgb 


 


Hct 


 


MCV 


 


MCH 


 


MCHC 


 


RDW 


 


Plt Count 


 


MPV 


 


Neut % (Auto) 


 


Lymph % (Auto) 


 


Mono % (Auto) 


 


Eos % (Auto) 


 


Baso % (Auto) 


 


Lymph # (Auto) 


 


Mono # (Auto) 


 


Eos # (Auto) 


 


Baso # (Auto) 


 


Absolute Neuts (auto) 


 


PT  15.0 H


 


INR  1.33


 


APTT  37.5


 


Sodium 


 


Potassium 


 


Chloride 


 


Carbon Dioxide 


 


Anion Gap 


 


BUN 


 


Creatinine 


 


Est GFR ( Amer) 


 


Est GFR (Non-Af Amer) 


 


Random Glucose 


 


Calcium 


 


Total Bilirubin 


 


AST 


 


ALT 


 


Alkaline Phosphatase 


 


Total Protein 


 


Albumin 


 


Globulin 


 


Albumin/Globulin Ratio 


 


Urine Color 


 


Urine Appearance 


 


Urine pH 


 


Ur Specific Gravity 


 


Urine Protein 


 


Urine Glucose (UA) 


 


Urine Ketones 


 


Urine Blood 


 


Urine Nitrate 


 


Urine Bilirubin 


 


Urine Urobilinogen 


 


Ur Leukocyte Esterase 


 


Urine RBC 


 


Urine WBC 


 


Urine Bacteria 














Assessment & Plan





- Assessment and Plan (Free Text)


Assessment: 


1) Hematuria likely from traumatic tolentino


2) Recurrent catheter associated UTI


3) Acute on chronic anemia likely due to hematuria 


4) Chronic abdominal pain likely due to metastasis


5) Constipation likely due to opioid


6) Metastatic prostate cancer s/p ADT and palliative radiation on Lupron, 


7) H/o seizures, 


8) glaucoma  


9) Dyslipidemia


Plan: 


CT abdomen and pelvis reviewed with mild fecal impaction, small pericardial 

effusion, multiple bone metastasis, enlarged prostate, and possible cystitis. 


Patient is admitted for observation of hematuria. Patient has advanced prostate 

cancer, and is currently on supportive care. Will hydrate with NS@ 75 cc/hr, and

monitor the urine output and H/H. For CAUTI, urine culture and blood cultures 

were sent, will start Merrem based on last urine culture sensitivity. ID is 

consulted. On protonix for Gi prophylaxis. Will add miralax for constipation. 

Patient is on percocet and fentanyl patch for pain. Will continue Lopid for 

dyslipedemia. On tegretol for seizure. External compressive device for DVT prop

hylaxis due to hematuria. 





Patient seen, examined and case discussed with Dr. Rivero.





- Date & Time


Date: 03/11/19


Time: 14:45

## 2019-03-11 NOTE — CT
Date of service: 



03/11/2019



PROCEDURE:  CT abdomen pelvis.



HISTORY:

Abdominal pain.  No output from Waddell catheter now changed



COMPARISON:

Comparison made with prior CT scan abdomen pelvis 10/01/2018.



TECHNIQUE:

Contiguous axial images of the abdomen and pelvis performed without 

oral or intravenous contrast material.  Additional 2D sagittal and 

coronal reformats generated. 



Radiation dose:



Total exam DLP = 949.3 mGy-cm.



This CT exam was performed using one or more of the following dose 

reduction techniques: Automated exposure control, adjustment of the 

mA and/or kV according to patient size, and/or use of iterative 

reconstruction technique.



FINDINGS:



LOWER THORAX:

There appear to be scattered areas of linear atelectasis and or 

scarring both lung bases including the lingular and middle lobe 

regions.



Mild passive/dependent type atelectasis both lung bases.  No effusion 

or basilar pneumothorax. 



Heart size within range of normal.  Small pericardial effusion. 



LIVER:

Liver exhibits normal size and attenuation pattern without masses 

collections or calcifications.



GALLBLADDER AND BILE DUCTS:

Gallbladder physiologically distended.  No evidence of intraluminal 

gallbladder calculi. 



PANCREAS:

Unremarkable. No mass. No ductal dilatation.



SPLEEN:

Unremarkable. No splenomegaly. 



ADRENALS:

No adrenal lesions. 



KIDNEYS AND URETERS:

The kidneys demonstrate relatively symmetric size.  No evidence of 

nephrolithiasis.  The mild prominence of the renal pelves.  The 

ureters are also mildly prominent throughout although do taper to 

some degree distally..  Suspect at least 2 small right renal cyst. 



BLADDER:

Urinary bladder is collapsed about an in situ unclamped Waddell 

catheter..  Muscular hypertrophy presumably contributes.  Small 

amount of air is present within the bladder lumen likely due to 

instrumentation however possibility of cystitis or other 

intrinsic/invasive wall lesion not excluded.  Clinical correlation 

with history is recommended.



REPRODUCTIVE:

Prostate gland appears enlarged encroaching into the floor urinary 

bladder. 



APPENDIX:

No evidence of acute appendicitis.  What is felt to represent the 

normal appendix best seen on coronal sequence image number [id number].



BOWEL:

Evaluation of the bowel is somewhat limited due to the lack of 

circulating intravenous contrast material.  The stomach is 

incompletely distended with slight wall thickening.  Rugal folds are 

also prominent likely due to incomplete distention.  Visualized loops 

of small bowel exhibit normal contour and caliber.  No evidence of 

acute mechanical small bowel obstruction.  Moderate amount of stool 

seen throughout the colon consistent with fecal 

retention/constipation..  There also appears to be mild fecal 

impaction. 



PERITONEUM:

Unremarkable. No fluid collection. No free air.



LYMPH NODES:

There are multiple small to mildly enlarged retroperitoneal and 

pelvic adenopathy 



VASCULATURE:

Unremarkable. No aortic aneurysm. Mild aortic atherosclerotic 

calcification or mural plaque present.



BONES:

Diffuse blastic metastases are again seen scattered throughout the 

lower thoracic lumbar spine as well as pelvis and sacrum. 



OTHER FINDINGS:

None. 



IMPRESSION:

In situ Waddell catheter unclamped Waddell catheter with collapsed 

urinary bladder.  Bladder wall thickening in part due to collapse as 

well as muscular hypertrophy however other intrinsic/invasive wall 

lesion not excluded.  Air is present within the urinary bladder 

likely due to recent instrumentation however correlation urinalysis 

to exclude cystitis.



Enlarged prostate gland encroaching into the floor urinary bladder.  



Mildly prominent bilateral renal pelves and ureters appear. 



Multiple small to mildly enlarged retroperitoneal and pelvic sidewall 

adenopathy. 



Diffuse blastic skeletal metastases as above. 



Small pericardial effusion 



There also appears to be mild fecal impaction.

## 2019-03-12 LAB
ALBUMIN SERPL-MCNC: 3.5 G/DL (ref 3–4.8)
ALBUMIN/GLOB SERPL: 0.9 {RATIO} (ref 1.1–1.8)
ALT SERPL-CCNC: 8 U/L (ref 7–56)
AST SERPL-CCNC: 45 U/L (ref 17–59)
BASOPHILS # BLD AUTO: 0 K/MM3 (ref 0–2)
BASOPHILS NFR BLD: 0 % (ref 0–3)
BUN SERPL-MCNC: 24 MG/DL (ref 7–21)
CALCIUM SERPL-MCNC: 8.8 MG/DL (ref 8.4–10.5)
EOSINOPHIL # BLD: 0.1 10*3/UL (ref 0–0.7)
EOSINOPHIL NFR BLD: 1 % (ref 1.5–5)
ERYTHROCYTE [DISTWIDTH] IN BLOOD BY AUTOMATED COUNT: 13.9 % (ref 11.5–14.5)
GFR NON-AFRICAN AMERICAN: > 60
HGB BLD-MCNC: 7.7 G/DL (ref 14–18)
LYMPHOCYTES # BLD: 2.6 10*3/UL (ref 1.2–3.4)
LYMPHOCYTES NFR BLD AUTO: 42.1 % (ref 22–35)
MCH RBC QN AUTO: 29.6 PG (ref 25–35)
MCHC RBC AUTO-ENTMCNC: 32.1 G/DL (ref 31–37)
MCV RBC AUTO: 92.3 FL (ref 80–105)
MONOCYTES # BLD AUTO: 0.5 10*3/UL (ref 0.1–0.6)
MONOCYTES NFR BLD: 8.3 % (ref 1–6)
PLATELET # BLD: 256 10^3/UL (ref 120–450)
PMV BLD AUTO: 8.7 FL (ref 7–11)
RBC # BLD AUTO: 2.6 10^6/UL (ref 3.5–6.1)
WBC # BLD AUTO: 6.2 10^3/UL (ref 4.5–11)

## 2019-03-12 RX ADMIN — PIPERACILLIN AND TAZOBACTAM SCH MLS/HR: 3; .375 INJECTION, POWDER, LYOPHILIZED, FOR SOLUTION INTRAVENOUS; PARENTERAL at 21:47

## 2019-03-12 RX ADMIN — POLYETHYLENE GLYCOL 3350 SCH GM: 17 POWDER, FOR SOLUTION ORAL at 09:30

## 2019-03-12 RX ADMIN — PIPERACILLIN AND TAZOBACTAM SCH MLS/HR: 3; .375 INJECTION, POWDER, LYOPHILIZED, FOR SOLUTION INTRAVENOUS; PARENTERAL at 14:40

## 2019-03-12 RX ADMIN — PIPERACILLIN AND TAZOBACTAM SCH MLS/HR: 3; .375 INJECTION, POWDER, LYOPHILIZED, FOR SOLUTION INTRAVENOUS; PARENTERAL at 05:29

## 2019-03-12 NOTE — CP.PCM.PN
<Josiah Horn - Last Filed: 03/12/19 11:22>





Subjective





- Date & Time of Evaluation


Date of Evaluation: 03/12/19


Time of Evaluation: 09:35





- Subjective


Subjective: 





Josiah Horn D.O. PGY-3, Internal Medicine Resident, Infectious Disease Progress 

Note





89 year old male with a PMH of metastatic prostate cancer, seizures, glaucoma, 

dementia, chronic indwelling tolentino with previous multiple UTIs with E.coli, 

Klebsiella, and Enterococcus, who presents  due to hematuria. Infectious disease

consultation was requested for hematuria/chronic UTI.





Patient was seen and examined at bedside.


Overall feeling somewhat better.


No more hematuria but has concentrated urine in tolentino.





Objective





- Vital Signs/Intake and Output


Vital Signs (last 24 hours): 


                                        











Temp Pulse Resp BP Pulse Ox


 


 97.4 F L  93 H  18   124/62   96 


 


 03/12/19 08:09  03/12/19 08:09  03/12/19 08:09  03/12/19 08:09  03/12/19 08:09








Intake and Output: 


                                        











 03/12/19 03/12/19





 06:59 18:59


 


Intake Total 700 


 


Output Total 800 


 


Balance -100 














- Medications


Medications: 


                               Current Medications





Carbamazepine (Tegretol)  200 mg PO TID [facility]; Protocol


   Last Admin: 03/12/19 09:29 Dose:  200 mg


Fentanyl (Duragesic)  1 patch TD Q72 [facility]


Gemfibrozil (Lopid)  600 mg PO BID [facility]


   Last Admin: 03/12/19 09:30 Dose:  600 mg


Sodium Chloride (Sodium Chloride 0.9%)  1,000 mls @ 75 mls/hr IV .[id number] [facility]


   Last Admin: 03/11/19 15:00 Dose:  75 mls/hr


Piperacillin Sod/Tazobactam Sod (Zosyn 3.375 In Ns 100ml)  100 mls @ 25 mls/hr 

IVPB Q8 [facility]; Protocol


   Stop: 03/20/19 22:01


   Last Admin: 03/12/19 05:29 Dose:  25 mls/hr


Oxycodone/Acetaminophen (Percocet 5/325 Mg Tab)  1 tab PO Q4H PRN


   PRN Reason: Pain, moderate (4-7)


   Stop: 03/14/19 14:33


Pantoprazole Sodium (Protonix Ec Tab)  40 mg PO 0600 [facility]


Polyethylene Glycol (Miralax)  17 gm PO DAILY [facility]


   Last Admin: 03/12/19 09:30 Dose:  17 gm











- Labs


Labs: 


                                        





                                 03/12/19 06:00 





                                 03/12/19 06:00 





                                        











PT  15.0 SECONDS (9.4-12.5)  H  03/11/19  12:30    


 


INR  1.33   03/11/19  12:30    


 


APTT  37.5 Seconds (26.9-38.3)   03/11/19  12:30    











- Constitutional


Appears: No Acute Distress, Chronically Ill





- Head Exam


Head Exam: ATRAUMATIC, NORMOCEPHALIC





- Eye Exam


Eye Exam: EOMI.  absent: Scleral icterus





- ENT Exam


ENT Exam: Normal Exam





- Neck Exam


Neck exam: Positive for: Normal Inspection





- Respiratory Exam


Respiratory Exam: absent: Rhonchi





- Cardiovascular Exam


Cardiovascular Exam: RRR, +S1, +S2





- GI/Abdominal Exam


GI & Abdominal Exam: Soft.  absent: Distended





-  Exam


Additional comments: tolentino in place with concentrated yellow urine





- Extremities Exam


Extremities exam: Negative for: tenderness





- Neurological Exam


Neurological exam: Alert





- Skin


Skin Exam: Dry, Warm





Assessment and Plan





- Assessment and Plan (Free Text)


Assessment: 





89 year old male with a PMH of metastatic prostate cancer, seizures, glaucoma, 

dementia, chronic indwelling tolentino with previous multiple UTIs with E.coli, 

Klebsiella, and Enterococcus, who presents  due to hematuria. Infectious disease

consultation was requested for hematuria/chronic UTI.


Plan: 





Catheter associated urinary tract infection from chronic indwelling catheter 

with history of prostate cancer


Hematuria





Afebrile with no leukocytosis


Growing gran negative rods on urine already


Continue merrem day 2


Blood cultures negative 2/2 day 1


We will follow





Patient was seen and examined and case to be discussed with attending physician.


Thank you for the pleasure of participating in the care of this interesting 

patient.





<Donta Alvarez - Last Filed: 03/12/19 12:50>





Objective





- Vital Signs/Intake and Output


Vital Signs (last 24 hours): 


                                        











Temp Pulse Resp BP Pulse Ox


 


 97.4 F L  93 H  18   124/62   96 


 


 03/12/19 08:09  03/12/19 08:09  03/12/19 08:09  03/12/19 08:09  03/12/19 08:09








Intake and Output: 


                                        











 03/12/19 03/12/19





 06:59 18:59


 


Intake Total 700 


 


Output Total 800 


 


Balance -100 














- Medications


Medications: 


                               Current Medications





Carbamazepine (Tegretol)  200 mg PO TID [facility]; Protocol


   Last Admin: 03/12/19 09:29 Dose:  200 mg


Fentanyl (Duragesic)  1 patch TD Q72 [facility]


Gemfibrozil (Lopid)  600 mg PO BID [facility]


   Last Admin: 03/12/19 09:30 Dose:  600 mg


Sodium Chloride (Sodium Chloride 0.9%)  1,000 mls @ 75 mls/hr IV .[id number] [facility]


   Last Admin: 03/11/19 15:00 Dose:  75 mls/hr


Piperacillin Sod/Tazobactam Sod (Zosyn 3.375 In Ns 100ml)  100 mls @ 25 mls/hr 

IVPB Q8 [facility]; Protocol


   Stop: 03/20/19 22:01


   Last Admin: 03/12/19 05:29 Dose:  25 mls/hr


Oxycodone/Acetaminophen (Percocet 5/325 Mg Tab)  1 tab PO Q4H PRN


   PRN Reason: Pain, moderate (4-7)


   Stop: 03/14/19 14:33


Pantoprazole Sodium (Protonix Ec Tab)  40 mg PO 0600 [facility]


Polyethylene Glycol (Miralax)  17 gm PO DAILY [facility]


   Last Admin: 03/12/19 09:30 Dose:  17 gm











- Labs


Labs: 


                                        





                                 03/12/19 06:00 





                                 03/12/19 06:00 





                                        











PT  15.0 SECONDS (9.4-12.5)  H  03/11/19  12:30    


 


INR  1.33   03/11/19  12:30    


 


APTT  37.5 Seconds (26.9-38.3)   03/11/19  12:30    














Attending/Attestation





- Attestation


I have personally seen and examined this patient.: Yes


I have fully participated in the care of the patient.: Yes


I have reviewed all pertinent clinical information, including history, physical 

exam and plan: Yes

## 2019-03-12 NOTE — CP.PCM.PN
<Elaine Robison - Last Filed: 03/12/19 10:08>





Subjective





- Date & Time of Evaluation


Date of Evaluation: 03/12/19


Time of Evaluation: 06:35





- Subjective


Subjective: 


Im progress note for Dr. Rivero's service 








Patient with no acute events overnight. The hematuria has resolved. Patient had 

large bowel movement as per nurse. Denies cp, sob, fever or chills. 





Objective





- Vital Signs/Intake and Output


Vital Signs (last 24 hours): 


                                        











Temp Pulse Resp BP Pulse Ox


 


 97.4 F L  93 H  18   124/62   96 


 


 03/12/19 08:09  03/12/19 08:09  03/12/19 08:09  03/12/19 08:09  03/12/19 08:09








Intake and Output: 


                                        











 03/12/19 03/12/19





 06:59 18:59


 


Intake Total 700 


 


Output Total 800 


 


Balance -100 














- Medications


Medications: 


                               Current Medications





Carbamazepine (Tegretol)  200 mg PO TID [facility]; Protocol


   Last Admin: 03/12/19 09:29 Dose:  200 mg


Fentanyl (Duragesic)  1 patch TD Q72 [facility]


Gemfibrozil (Lopid)  600 mg PO BID [facility]


   Last Admin: 03/12/19 09:30 Dose:  600 mg


Sodium Chloride (Sodium Chloride 0.9%)  1,000 mls @ 75 mls/hr IV .[id number] [facility]


   Last Admin: 03/11/19 15:00 Dose:  75 mls/hr


Piperacillin Sod/Tazobactam Sod (Zosyn 3.375 In Ns 100ml)  100 mls @ 25 mls/hr 

IVPB Q8 [facility]; Protocol


   Stop: 03/20/19 22:01


   Last Admin: 03/12/19 05:29 Dose:  25 mls/hr


Oxycodone/Acetaminophen (Percocet 5/325 Mg Tab)  1 tab PO Q4H PRN


   PRN Reason: Pain, moderate (4-7)


   Stop: 03/14/19 14:33


Pantoprazole Sodium (Protonix Ec Tab)  40 mg PO 0600 [facility]


Polyethylene Glycol (Miralax)  17 gm PO DAILY [facility]


   Last Admin: 03/12/19 09:30 Dose:  17 gm











- Labs


Labs: 


                                        





                                 03/12/19 06:00 





                                 03/12/19 06:00 





                                        











PT  15.0 SECONDS (9.4-12.5)  H  03/11/19  12:30    


 


INR  1.33   03/11/19  12:30    


 


APTT  37.5 Seconds (26.9-38.3)   03/11/19  12:30    














- Constitutional


Appears: No Acute Distress, Chronically Ill





- Head Exam


Head Exam: ATRAUMATIC, NORMAL INSPECTION, NORMOCEPHALIC





- Eye Exam


Eye Exam: Normal appearance





- ENT Exam


ENT Exam: Mucous Membranes Moist





- Neck Exam


Neck Exam: Normal Inspection





- Respiratory Exam


Respiratory Exam: Clear to Ausculation Bilateral, NORMAL BREATHING PATTERN.  

absent: Rales, Rhonchi, Wheezes, Respiratory Distress, Stridor





- Cardiovascular Exam


Cardiovascular Exam: REGULAR RHYTHM, RRR, +S1, +S2.  absent: Bradycardia, 

Tachycardia, Murmur





- GI/Abdominal Exam


GI & Abdominal Exam: Soft, Normal Bowel Sounds.  absent: Distended, Firm, 

Guarding, Rigid, Tenderness, Pulsatile Mass, Rebound





-  Exam


Additional comments: 





 with clear urine.





- Extremities Exam


Extremities Exam: Normal Inspection





- Back Exam


Back Exam: NORMAL INSPECTION





- Neurological Exam


Neurological Exam: Alert, Awake





- Psychiatric Exam


Psychiatric exam: Normal Affect, Normal Mood





- Skin


Skin Exam: Dry, Intact, Warm





Assessment and Plan





- Assessment and Plan (Free Text)


Assessment: 


1) Hematuria- resolved


2) Recurrent catheter associated UTI


3) Acute on chronic anemia likely due to hematuria 


4) Chronic abdominal pain likely due to metastasis


5) Constipation likely due to opioid


6) Metastatic prostate cancer s/p ADT and palliative radiation on Lupron, 


7) H/o seizures, 


8) glaucoma  


9) Dyslipidemia


Plan: 


Hematuria resolved. Patient's hemoglobin doped to 7.7 from 8.6, asymptomatic and

no history of CAD, will observe for now. On Zosyn for CAUTI, pending cultures. 

Patient is tolerating po. On fentanyl patch and percocet prn for pain. On 

Tegretol for seizures. Continue Lopid for dyslipidemia. On protonix for Gi 

prophylaxis. Continue with miralalx for constipation. Patient is on gentle 

hydration and is hemodynamically stable. 


DNI/DNR status is in place.








Patient seen, examined and case discussed with Dr. Rivero.





<Nathan Rivero - Last Filed: 03/12/19 18:08>





Objective





- Vital Signs/Intake and Output


Vital Signs (last 24 hours): 


                                        











Temp Pulse Resp BP Pulse Ox


 


 97.4 F L  93 H  18   124/62   96 


 


 03/12/19 08:09  03/12/19 08:09  03/12/19 08:09  03/12/19 08:09  03/12/19 08:09








Intake and Output: 


                                        











 03/12/19 03/12/19





 06:59 18:59


 


Intake Total 700 


 


Output Total 800 


 


Balance -100 














- Medications


Medications: 


                               Current Medications





Carbamazepine (Tegretol)  200 mg PO TID [facility]; Protocol


   Last Admin: 03/12/19 17:37 Dose:  200 mg


Fentanyl (Duragesic)  1 patch TD Q72 [facility]


Gemfibrozil (Lopid)  600 mg PO BID [facility]


   Last Admin: 03/12/19 17:37 Dose:  600 mg


Sodium Chloride (Sodium Chloride 0.9%)  1,000 mls @ 75 mls/hr IV .[id number] [facility]


   Last Admin: 03/12/19 17:42 Dose:  75 mls/hr


Piperacillin Sod/Tazobactam Sod (Zosyn 3.375 In Ns 100ml)  100 mls @ 25 mls/hr 

IVPB Q8 [facility]; Protocol


   Stop: 03/20/19 22:01


   Last Admin: 03/12/19 14:40 Dose:  25 mls/hr


Oxycodone/Acetaminophen (Percocet 5/325 Mg Tab)  1 tab PO Q4H PRN


   PRN Reason: Pain, moderate (4-7)


   Stop: 03/14/19 14:33


Pantoprazole Sodium (Protonix Ec Tab)  40 mg PO 0600 [facility]


Polyethylene Glycol (Miralax)  17 gm PO DAILY [facility]


   Last Admin: 03/12/19 09:30 Dose:  17 gm











- Labs


Labs: 


                                        





                                 03/12/19 06:00 





                                 03/12/19 06:00 





                                        











PT  15.0 SECONDS (9.4-12.5)  H  03/11/19  12:30    


 


INR  1.33   03/11/19  12:30    


 


APTT  37.5 Seconds (26.9-38.3)   03/11/19  12:30    














Assessment and Plan





- Assessment and Plan (Free Text)


Plan: 





Pt seen and examined by me. I have reviewed the note of the medical resident and

I agree with it. I have discussed the assessment and plan with the resident. I 

have reviewed the medications and the last labs.

## 2019-03-13 VITALS
TEMPERATURE: 97.9 F | DIASTOLIC BLOOD PRESSURE: 71 MMHG | RESPIRATION RATE: 20 BRPM | HEART RATE: 85 BPM | OXYGEN SATURATION: 95 % | SYSTOLIC BLOOD PRESSURE: 150 MMHG

## 2019-03-13 LAB
% IRON SATURATION: 29 % (ref 20–55)
ALBUMIN SERPL-MCNC: 3.4 G/DL (ref 3–4.8)
ALBUMIN/GLOB SERPL: 0.9 {RATIO} (ref 1.1–1.8)
ALT SERPL-CCNC: < 6 U/L (ref 7–56)
AST SERPL-CCNC: 47 U/L (ref 17–59)
BASOPHILS # BLD AUTO: 0.01 K/MM3 (ref 0–2)
BASOPHILS NFR BLD: 0.1 % (ref 0–3)
BUN SERPL-MCNC: 21 MG/DL (ref 7–21)
CALCIUM SERPL-MCNC: 8.9 MG/DL (ref 8.4–10.5)
EOSINOPHIL # BLD: 0.1 10*3/UL (ref 0–0.7)
EOSINOPHIL NFR BLD: 1.5 % (ref 1.5–5)
ERYTHROCYTE [DISTWIDTH] IN BLOOD BY AUTOMATED COUNT: 14.1 % (ref 11.5–14.5)
FERRITIN SERPL-MCNC: 230 NG/ML
GFR NON-AFRICAN AMERICAN: > 60
HGB BLD-MCNC: 8 G/DL (ref 14–18)
IRON SERPL-MCNC: 60 UG/DL (ref 45–180)
LYMPHOCYTES # BLD: 3.7 10*3/UL (ref 1.2–3.4)
LYMPHOCYTES NFR BLD AUTO: 53.1 % (ref 22–35)
MCH RBC QN AUTO: 29.9 PG (ref 25–35)
MCHC RBC AUTO-ENTMCNC: 31.9 G/DL (ref 31–37)
MCV RBC AUTO: 93.7 FL (ref 80–105)
MONOCYTES # BLD AUTO: 0.6 10*3/UL (ref 0.1–0.6)
MONOCYTES NFR BLD: 8.7 % (ref 1–6)
PLATELET # BLD: 268 10^3/UL (ref 120–450)
PMV BLD AUTO: 8.5 FL (ref 7–11)
RBC # BLD AUTO: 2.68 10^6/UL (ref 3.5–6.1)
TIBC SERPL-MCNC: 205 UG/DL (ref 261–462)
WBC # BLD AUTO: 6.9 10^3/UL (ref 4.5–11)

## 2019-03-13 RX ADMIN — POLYETHYLENE GLYCOL 3350 SCH GM: 17 POWDER, FOR SOLUTION ORAL at 10:09

## 2019-03-13 RX ADMIN — PIPERACILLIN AND TAZOBACTAM SCH MLS/HR: 3; .375 INJECTION, POWDER, LYOPHILIZED, FOR SOLUTION INTRAVENOUS; PARENTERAL at 05:19

## 2019-03-13 NOTE — CP.PCM.DIS
<Elaine Robison - Last Filed: 03/13/19 13:30>





Provider





- Provider


Date of Admission: 


03/11/19 13:52





Attending physician: 


Nathan Rivero MD





Primary care physician: 


Sami Thakur MD





Consults: 








03/11/19 14:28


Infectious Disease Consult Routine 


   Comment: 


   Consulting Provider: Donta Alvarez


   Consulting Physician: Donta Alvarez


   Reason for Consult: UTI/ chronic indewling tolentino catheter





03/11/19 23:11


Nursing Referral for Palliative Care Routine 


   Comment: patricia score


   Physician Instructions: 


   Reason For Exam: assess


Social Work Referral Routine 


   Comment: d/c plan


   Physician Instructions: 


   Reason For Exam: assess





03/11/19 23:34


Case Management Referral Routine 


   Comment: 


   Physician Instructions: 


   Reason For Exam: 


   Reason for Referral: Discharge Planning


Nursing Referral for Wound Care Routine 


   Comment: 


   Physician Instructions: 


   Reason For Exam: assess





03/12/19 02:34


Nursing Referral for Wound Care Routine 


   Comment: 


   Physician Instructions: 


   Reason For Exam: Buttocks red & very small rash on R hip











Time Spent in preparation of Discharge (in minutes): 45





Diagnosis





- Discharge Diagnosis


(1) UTI due to Klebsiella species


Status: Acute   





(2) Acute on chronic blood loss anemia


Status: Acute   





(3) Chronic indwelling Tolentino catheter


Status: Chronic   





(4) Hematuria


Status: Resolved   





(5) Adenocarcinoma of prostate, stage 4


Status: Chronic   





(6) Constipation


Status: Chronic   





Hospital Course





- Lab Results


Lab Results: 


                                  Micro Results





03/11/19 Unknown   Urine,Clean Catch   Urine Culture - Final


                                Klebsiella Oxytoca


03/11/19 10:20   Blood   Blood Culture - Preliminary


                            NO GROWTH AFTER 24 HOURS


03/11/19 11:00   Blood   Blood Culture - Preliminary


                            NO GROWTH AFTER 24 HOURS





                             Most Recent Lab Values











WBC  6.9 10^3/uL (4.5-11.0)   03/13/19  06:10    


 


RBC  2.68 10^6/uL (3.5-6.1)  L  03/13/19  06:10    


 


Hgb  8.0 g/dL (14.0-18.0)  L  03/13/19  06:10    


 


Hct  25.1 % (42.0-52.0)  L  03/13/19  06:10    


 


MCV  93.7 fl (80.0-105.0)   03/13/19  06:10    


 


MCH  29.9 pg (25.0-35.0)   03/13/19  06:10    


 


MCHC  31.9 g/dl (31.0-37.0)   03/13/19  06:10    


 


RDW  14.1 % (11.5-14.5)   03/13/19  06:10    


 


Plt Count  268 10^3/uL (120.0-450.0)   03/13/19  06:10    


 


MPV  8.5 fl (7.0-11.0)   03/13/19  06:10    


 


Neut % (Auto)  36.6 % (50.0-68.0)  L  03/13/19  06:10    


 


Lymph % (Auto)  53.1 % (22.0-35.0)  H  03/13/19  06:10    


 


Mono % (Auto)  8.7 % (1.0-6.0)  H  03/13/19  06:10    


 


Eos % (Auto)  1.5 % (1.5-5.0)   03/13/19  06:10    


 


Baso % (Auto)  0.1 % (0.0-3.0)   03/13/19  06:10    


 


Lymph # (Auto)  3.7  (1.2-3.4)  H  03/13/19  06:10    


 


Mono # (Auto)  0.6  (0.1-0.6)   03/13/19  06:10    


 


Eos # (Auto)  0.1  (0.0-0.7)   03/13/19  06:10    


 


Baso # (Auto)  0.01 K/mm3 (0.0-2.0)   03/13/19  06:10    


 


Absolute Neuts (auto)  2.51  (1.4-6.5)   03/13/19  06:10    


 


PT  15.0 SECONDS (9.4-12.5)  H  03/11/19  12:30    


 


INR  1.33   03/11/19  12:30    


 


APTT  37.5 Seconds (26.9-38.3)   03/11/19  12:30    


 


Sodium  139 mmol/L (132-148)   03/13/19  06:10    


 


Potassium  4.0 mmol/L (3.6-5.0)   03/13/19  06:10    


 


Chloride  106 mmol/L ()   03/13/19  06:10    


 


Carbon Dioxide  25 mmol/L (21-33)   03/13/19  06:10    


 


Anion Gap  12  (10-20)   03/13/19  06:10    


 


BUN  21 mg/dL (7-21)   03/13/19  06:10    


 


Creatinine  0.6 mg/dl (0.8-1.5)  L  03/13/19  06:10    


 


Est GFR ( Amer)  > 60   03/13/19  06:10    


 


Est GFR (Non-Af Amer)  > 60   03/13/19  06:10    


 


Random Glucose  91 mg/dL ()   03/13/19  06:10    


 


Calcium  8.9 mg/dL (8.4-10.5)   03/13/19  06:10    


 


Phosphorus  4.0 mg/dL (2.5-4.5)   03/12/19  06:00    


 


Magnesium  1.9 mg/dL (1.7-2.2)   03/12/19  06:00    


 


Iron  60 ug/dL ()   03/13/19  06:10    


 


TIBC  205 ug/dL (261-462)  L  03/13/19  06:10    


 


% Saturation  29 % (20-55)   03/13/19  06:10    


 


Total Bilirubin  0.2 mg/dL (0.2-1.3)   03/13/19  06:10    


 


AST  47 U/L (17-59)   03/13/19  06:10    


 


ALT  < 6 U/L (7-56)  L  03/13/19  06:10    


 


Alkaline Phosphatase  110 U/L ()   03/13/19  06:10    


 


Total Protein  7.1 g/dL (5.8-8.3)   03/13/19  06:10    


 


Albumin  3.4 g/dL (3.0-4.8)   03/13/19  06:10    


 


Globulin  3.7 gm/dL  03/13/19  06:10    


 


Albumin/Globulin Ratio  0.9  (1.1-1.8)  L  03/13/19  06:10    


 


Urine Color  Light brown  (YELLOW)   03/11/19  11:00    


 


Urine Appearance  Slight-cloudy  (CLEAR)   03/11/19  11:00    


 


Urine pH  6.0  (4.7-8.0)   03/11/19  11:00    


 


Ur Specific Gravity  1.020  (1.005-1.035)   03/11/19  11:00    


 


Urine Protein  100 mg/dL (<30 mg/dL)  H  03/11/19  11:00    


 


Urine Glucose (UA)  Negative mg/dL (NEGATIVE)   03/11/19  11:00    


 


Urine Ketones  Negative mg/dL (NEGATIVE)   03/11/19  11:00    


 


Urine Blood  Large  (NEGATIVE)  H  03/11/19  11:00    


 


Urine Nitrate  Positive  (NEGATIVE)  H  03/11/19  11:00    


 


Urine Bilirubin  Negative  (NEGATIVE)   03/11/19  11:00    


 


Urine Urobilinogen  0.2 E.U./dL (<1 E.U./dL)   03/11/19  11:00    


 


Ur Leukocyte Esterase  Moderate Martita/uL (NEGATIVE)  H  03/11/19  11:00    


 


Urine RBC  Tntc /hpf (0-2)  H  03/11/19  11:00    


 


Urine WBC  Tntc /hpf (0-6)  H  03/11/19  11:00    


 


Urine Bacteria  Many /hpf (NONE)   03/11/19  11:00    


 


Blood Type  A POSITIVE   03/11/19  15:50    


 


Antibody Screen  Negative   03/11/19  15:50    


 


BBK History Checked  Patient has bt   03/11/19  15:50    














- Hospital Course


Hospital Course: 


Patient is an 89 y/o Make with PMHx of metastatic prostate cancer s/p ADT and 

palliative radiation on Lupron, seizures, glaucoma, dementia, UTI w/ indwelling 

Tolentino (+ for E.coli, Klebsiella, and Enterococcus), recently admitted with UTI 

due to Proteus mirabilis presented to the ED due to hematuria from traumatic 

Tolentino catheter. In the ED, patient was noted to have a drop in hemoglobin. Thus 

patient was admitted for observation. Patient's hematuria resolved. Patient's 

hemoglobin dropped to 7.7, however improved to 8. Patient was given aranesp for 

the anemia.  


Furthermore, patient was found to have UTI and received zosyn and vanco for 3 

days. Blood culture with no growth, urine culture revealed klebsiella which is 

sensitive to Levaquin. Thus patient is to be discharged home with 5 days of 

Levaquin. 


Patient with poor prognosis, and has DNI/DNR status. 





Diet: Resume regular diet with low fat.








Activity: Resume baseline activity. 











- Date & Time of H&P


Date of H&P: 03/11/19


Time of H&P: 14:44





Discharge Exam





- Head Exam


Head Exam: ATRAUMATIC, NORMAL INSPECTION, NORMOCEPHALIC





- Eye Exam


Eye Exam: EOMI, Normal appearance, PERRL


Pupil Exam: NORMAL ACCOMODATION





- ENT Exam


ENT Exam: Mucous Membranes Dry





- Neck Exam


Neck exam: Normal Inspection





- Respiratory Exam


Respiratory Exam: Clear to PA & Lateral, NORMAL BREATHING PATTERN, UNREMARKABLE.

 absent: Rales, Rhonchi, Wheezes, Respiratory Distress, Stridor





- Cardiovascular Exam


Cardiovascular Exam: REGULAR RHYTHM, RRR, +S1, +S2.  absent: Bradycardia, 

Tachycardia, Gallop, Irregular Rhythm, JVD, Rubs, Systolic Murmur





- GI/Abdominal Exam


GI & Abdominal Exam: Normal Bowel Sounds, Unremarkable.  absent: Distended, 

Firm, Guarding, Rebound, Rigid, Soft, Tenderness





- Extremities Exam


Extremities exam: normal inspection





- Back Exam


Back exam: NORMAL INSPECTION





- Neurological Exam


Neurological exam: Alert, Oriented x3





- Psychiatric Exam


Psychiatric exam: Normal Affect, Normal Mood





- Skin


Skin Exam: Dry, Normal Color, Warm





Discharge Plan





- Discharge Medications


Prescriptions: 


Levofloxacin [Levaquin] 750 mg PO DAILY 5 Days #5 tablet


Polyethylene Glycol 3350 [Miralax] 17 gm PO DAILY PRN 30 Days #30 packet


 PRN Reason: Constipation





- Follow Up Plan


Condition: FAIR


Disposition: HOME/ ROUTINE


Instructions:  Urinary Tract Infection, Adult (DC), How to Care for Your Tolentino 

Catheter, Male, Tolentino Catheter, Male


Additional Instructions: 


Patient is to be discharged home to follow up with PMD in a week.


Patient to be discharged with Levaquin 750 daily for 5 days.


For constipation, patient take miralax as needed.


Please keep the Tolentino catheter secure to your body to avoid migration.


Please return if the symptoms returns or call 911.





Referrals: 


Sami Thakur MD [Primary Care Provider] - 





<Nathan Rivero - Last Filed: 03/13/19 18:27>





Provider





- Provider


Date of Admission: 


03/11/19 13:52





Attending physician: 


Nathan Rivero MD





Primary care physician: 


Sami Thakur MD





Consults: 








03/11/19 14:28


Infectious Disease Consult Routine 


   Comment: 


   Consulting Provider: Donta Alvarez


   Consulting Physician: Donta Alvarez


   Reason for Consult: UTI/ chronic indewling tolentino catheter





03/11/19 23:11


Nursing Referral for Palliative Care Routine 


   Comment: patricia score


   Physician Instructions: 


   Reason For Exam: assess


Social Work Referral Routine 


   Comment: d/c plan


   Physician Instructions: 


   Reason For Exam: assess





03/11/19 23:34


Case Management Referral Routine 


   Comment: 


   Physician Instructions: 


   Reason For Exam: 


   Reason for Referral: Discharge Planning


Nursing Referral for Wound Care Routine 


   Comment: 


   Physician Instructions: 


   Reason For Exam: assess





03/12/19 02:34


Nursing Referral for Wound Care Routine 


   Comment: 


   Physician Instructions: 


   Reason For Exam: Buttocks red & very small rash on R hip














Hospital Course





- Lab Results


Lab Results: 


                                  Micro Results





03/11/19 10:20   Blood   Blood Culture - Preliminary


                            NO GROWTH AFTER 48 HOURS


03/11/19 11:00   Blood   Blood Culture - Preliminary


                            NO GROWTH AFTER 48 HOURS


03/11/19 Unknown   Urine,Clean Catch   Urine Culture - Final


                                Klebsiella Oxytoca





                             Most Recent Lab Values











WBC  6.9 10^3/uL (4.5-11.0)   03/13/19  06:10    


 


RBC  2.68 10^6/uL (3.5-6.1)  L  03/13/19  06:10    


 


Hgb  8.0 g/dL (14.0-18.0)  L  03/13/19  06:10    


 


Hct  25.1 % (42.0-52.0)  L  03/13/19  06:10    


 


MCV  93.7 fl (80.0-105.0)   03/13/19  06:10    


 


MCH  29.9 pg (25.0-35.0)   03/13/19  06:10    


 


MCHC  31.9 g/dl (31.0-37.0)   03/13/19  06:10    


 


RDW  14.1 % (11.5-14.5)   03/13/19  06:10    


 


Plt Count  268 10^3/uL (120.0-450.0)   03/13/19  06:10    


 


MPV  8.5 fl (7.0-11.0)   03/13/19  06:10    


 


Neut % (Auto)  36.6 % (50.0-68.0)  L  03/13/19  06:10    


 


Lymph % (Auto)  53.1 % (22.0-35.0)  H  03/13/19  06:10    


 


Mono % (Auto)  8.7 % (1.0-6.0)  H  03/13/19  06:10    


 


Eos % (Auto)  1.5 % (1.5-5.0)   03/13/19  06:10    


 


Baso % (Auto)  0.1 % (0.0-3.0)   03/13/19  06:10    


 


Lymph # (Auto)  3.7  (1.2-3.4)  H  03/13/19  06:10    


 


Mono # (Auto)  0.6  (0.1-0.6)   03/13/19  06:10    


 


Eos # (Auto)  0.1  (0.0-0.7)   03/13/19  06:10    


 


Baso # (Auto)  0.01 K/mm3 (0.0-2.0)   03/13/19  06:10    


 


Absolute Neuts (auto)  2.51  (1.4-6.5)   03/13/19  06:10    


 


PT  15.0 SECONDS (9.4-12.5)  H  03/11/19  12:30    


 


INR  1.33   03/11/19  12:30    


 


APTT  37.5 Seconds (26.9-38.3)   03/11/19  12:30    


 


Sodium  139 mmol/L (132-148)   03/13/19  06:10    


 


Potassium  4.0 mmol/L (3.6-5.0)   03/13/19  06:10    


 


Chloride  106 mmol/L ()   03/13/19  06:10    


 


Carbon Dioxide  25 mmol/L (21-33)   03/13/19  06:10    


 


Anion Gap  12  (10-20)   03/13/19  06:10    


 


BUN  21 mg/dL (7-21)   03/13/19  06:10    


 


Creatinine  0.6 mg/dl (0.8-1.5)  L  03/13/19  06:10    


 


Est GFR ( Amer)  > 60   03/13/19  06:10    


 


Est GFR (Non-Af Amer)  > 60   03/13/19  06:10    


 


Random Glucose  91 mg/dL ()   03/13/19  06:10    


 


Calcium  8.9 mg/dL (8.4-10.5)   03/13/19  06:10    


 


Phosphorus  4.0 mg/dL (2.5-4.5)   03/12/19  06:00    


 


Magnesium  1.9 mg/dL (1.7-2.2)   03/12/19  06:00    


 


Iron  60 ug/dL ()   03/13/19  06:10    


 


TIBC  205 ug/dL (261-462)  L  03/13/19  06:10    


 


% Saturation  29 % (20-55)   03/13/19  06:10    


 


Ferritin  230.0 ng/mL  03/13/19  06:10    


 


Total Bilirubin  0.2 mg/dL (0.2-1.3)   03/13/19  06:10    


 


AST  47 U/L (17-59)   03/13/19  06:10    


 


ALT  < 6 U/L (7-56)  L  03/13/19  06:10    


 


Alkaline Phosphatase  110 U/L ()   03/13/19  06:10    


 


Total Protein  7.1 g/dL (5.8-8.3)   03/13/19  06:10    


 


Albumin  3.4 g/dL (3.0-4.8)   03/13/19  06:10    


 


Globulin  3.7 gm/dL  03/13/19  06:10    


 


Albumin/Globulin Ratio  0.9  (1.1-1.8)  L  03/13/19  06:10    


 


Urine Color  Light brown  (YELLOW)   03/11/19  11:00    


 


Urine Appearance  Slight-cloudy  (CLEAR)   03/11/19  11:00    


 


Urine pH  6.0  (4.7-8.0)   03/11/19  11:00    


 


Ur Specific Gravity  1.020  (1.005-1.035)   03/11/19  11:00    


 


Urine Protein  100 mg/dL (<30 mg/dL)  H  03/11/19  11:00    


 


Urine Glucose (UA)  Negative mg/dL (NEGATIVE)   03/11/19  11:00    


 


Urine Ketones  Negative mg/dL (NEGATIVE)   03/11/19  11:00    


 


Urine Blood  Large  (NEGATIVE)  H  03/11/19  11:00    


 


Urine Nitrate  Positive  (NEGATIVE)  H  03/11/19  11:00    


 


Urine Bilirubin  Negative  (NEGATIVE)   03/11/19  11:00    


 


Urine Urobilinogen  0.2 E.U./dL (<1 E.U./dL)   03/11/19  11:00    


 


Ur Leukocyte Esterase  Moderate Martita/uL (NEGATIVE)  H  03/11/19  11:00    


 


Urine RBC  Tntc /hpf (0-2)  H  03/11/19  11:00    


 


Urine WBC  Tntc /hpf (0-6)  H  03/11/19  11:00    


 


Urine Bacteria  Many /hpf (NONE)   03/11/19  11:00    


 


Blood Type  A POSITIVE   03/11/19  15:50    


 


Antibody Screen  Negative   03/11/19  15:50    


 


BBK History Checked  Patient has bt   03/11/19  15:50    














- Hospital Course


Hospital Course: 





Pt seen and examined by me. I have reviewed the note of the medical resident and

I agree with it. I have discussed the assessment and plan with the resident. I 

have reviewed the medications and the last labs.

## 2019-03-13 NOTE — CP.PCM.PN
<Josiah Horn - Last Filed: 03/13/19 15:02>





Subjective





- Date & Time of Evaluation


Date of Evaluation: 03/13/19


Time of Evaluation: 08:20





- Subjective


Subjective: 





Josiah Horn D.O. PGY-3, Internal Medicine Resident, Infectious Disease Progress 

Note





89 year old male with a PMH of metastatic prostate cancer, seizures, glaucoma, 

dementia, chronic indwelling tolentino with previous multiple UTIs with E.coli, 

Klebsiella, and Enterococcus, who presents  due to hematuria. Infectious disease

consultation was requested for hematuria/chronic UTI.





Patient was seen and examined at bedside.


Appears significantly better


Seen eating breakfast comfortably.  


In great spirits





Objective





- Vital Signs/Intake and Output


Vital Signs (last 24 hours): 


                                        











Temp Pulse Resp BP Pulse Ox


 


 97.9 F   85   20   150/71   95 


 


 03/13/19 06:00  03/13/19 06:00  03/13/19 06:00  03/13/19 06:00  03/13/19 06:00








Intake and Output: 


                                        











 03/13/19 03/13/19





 06:59 18:59


 


Intake Total 1000 600


 


Output Total  400


 


Balance 1000 200














- Medications


Medications: 


                               Current Medications





Carbamazepine (Tegretol)  200 mg PO TID [facility]; Protocol


   Last Admin: 03/13/19 14:46 Dose:  200 mg


Fentanyl (Duragesic)  1 patch TD Q72 [facility]


Gemfibrozil (Lopid)  600 mg PO BID [facility]


   Last Admin: 03/13/19 10:12 Dose:  600 mg


Piperacillin Sod/Tazobactam Sod (Zosyn 3.375 In Ns 100ml)  100 mls @ 25 mls/hr 

IVPB Q8 [facility]; Protocol


   Stop: 03/20/19 22:01


   Last Admin: 03/13/19 05:19 Dose:  25 mls/hr


Oxycodone/Acetaminophen (Percocet 5/325 Mg Tab)  1 tab PO Q4H PRN


   PRN Reason: Pain, moderate (4-7)


   Stop: 03/14/19 14:33


Pantoprazole Sodium (Protonix Ec Tab)  40 mg PO 0600 [facility]


   Last Admin: 03/13/19 05:19 Dose:  40 mg


Polyethylene Glycol (Miralax)  17 gm PO DAILY [facility]


   Last Admin: 03/13/19 10:09 Dose:  17 gm











- Labs


Labs: 


                                        





                                 03/13/19 06:10 





                                 03/13/19 06:10 





                                        











PT  15.0 SECONDS (9.4-12.5)  H  03/11/19  12:30    


 


INR  1.33   03/11/19  12:30    


 


APTT  37.5 Seconds (26.9-38.3)   03/11/19  12:30    








- Constitutional


Appears: No Acute Distress, Chronically Ill





- Head Exam


Head Exam: ATRAUMATIC, NORMOCEPHALIC





- Eye Exam


Eye Exam: EOMI.  absent: Scleral icterus





- ENT Exam


ENT Exam: Normal Exam





- Neck Exam


Neck exam: Positive for: Normal Inspection





- Respiratory Exam


Respiratory Exam: absent: Rhonchi





- Cardiovascular Exam


Cardiovascular Exam: RRR, +S1, +S2





- GI/Abdominal Exam


GI & Abdominal Exam: Soft.  absent: Distended





-  Exam


Additional comments: tolentino in place with yellow urine





- Extremities Exam


Extremities exam: Negative for: tenderness





- Neurological Exam


Neurological exam: Alert





- Skin


Skin Exam: Dry, Warm








Assessment and Plan





- Assessment and Plan (Free Text)


Assessment: 





89 year old male with a PMH of metastatic prostate cancer, seizures, glaucoma, 

dementia, chronic indwelling tolentino with previous multiple UTIs with E.coli, 

Klebsiella, and Enterococcus, who presents  due to hematuria. Infectious disease

consultation was requested for hematuria/chronic UTI.


Plan: 





Catheter associated urinary tract infection from chronic indwelling catheter 

with history of prostate cancer


Hematuria likely from manipulation





Blood cultures 2/2 on day 2


Urine cultures show klebsiella oxytica mostly sensitive


Can discontinue Zosyn 


Recommend discharged on Vantin 200 mg p.o. twice daily for a total duration of 

treatment of 7 days





Patient was seen and examined and case to be discussed with attending physician.


Thank you for the pleasure of participating in the care of this interesting 

patient.





<Donta Alvarez - Last Filed: 03/13/19 21:26>





Objective





- Vital Signs/Intake and Output


Vital Signs (last 24 hours): 


                                        











Temp Pulse Resp BP Pulse Ox


 


 97.9 F   85   20   150/71   95 


 


 03/13/19 06:00  03/13/19 06:00  03/13/19 06:00  03/13/19 06:00  03/13/19 06:00








Intake and Output: 


                                        











 03/13/19 03/14/19





 18:59 06:59


 


Intake Total 600 


 


Output Total 400 


 


Balance 200 














- Labs


Labs: 


                                        





                                 03/13/19 06:10 





                                 03/13/19 06:10 





                                        











PT  15.0 SECONDS (9.4-12.5)  H  03/11/19  12:30    


 


INR  1.33   03/11/19  12:30    


 


APTT  37.5 Seconds (26.9-38.3)   03/11/19  12:30    














Attending/Attestation





- Attestation


I have personally seen and examined this patient.: Yes


I have fully participated in the care of the patient.: Yes


I have reviewed all pertinent clinical information, including history, physical 

exam and plan: Yes

## 2019-03-14 NOTE — DS
HOSPITAL COURSE:  The patient was seen and examined.  I do agree with the

note of the medical resident.  I was involved in the plan of care.  The

patient has UTIs with indwelling catheter.  He had an E. coli, Klebsiella,

he was placed on p.o. antibiotic.  The patient was seen by Infectious

Disease.  He continues to have the complication from his [place].  He has

prostate cancer.  The patient was given a dose of Aranesp because of

anemia.  He is tolerating heart-healthy diet.  He has no complaints of any

pain.  He has some chronic pain from his prostate cancer _____ bone.  The

patient's family was informed that the patient is going to be discharged

home today and he will follow up as an outpatient.



CONDITION:  Stable.



ACTIVITIES:  Increase as tolerated.







__________________________________________

Nathan Rivero MD





DD:  03/13/2019 18:27:48

DT:  03/14/2019 3:06:59

Job # [id number]

## 2019-04-01 ENCOUNTER — HOSPITAL ENCOUNTER (INPATIENT)
Dept: HOSPITAL 42 - ED | Age: 84
LOS: 5 days | Discharge: HOSPICE | DRG: 698 | End: 2019-04-06
Attending: INTERNAL MEDICINE | Admitting: INTERNAL MEDICINE
Payer: MEDICARE

## 2019-04-01 VITALS — BODY MASS INDEX: 29.2 KG/M2

## 2019-04-01 DIAGNOSIS — E87.6: ICD-10-CM

## 2019-04-01 DIAGNOSIS — D62: ICD-10-CM

## 2019-04-01 DIAGNOSIS — N17.9: ICD-10-CM

## 2019-04-01 DIAGNOSIS — I44.0: ICD-10-CM
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
## 2019-04-01 DIAGNOSIS — I45.10: ICD-10-CM

## 2019-04-01 DIAGNOSIS — Z79.818: ICD-10-CM

## 2019-04-01 DIAGNOSIS — Z99.3: ICD-10-CM

## 2019-04-01 DIAGNOSIS — E16.2: ICD-10-CM

## 2019-04-01 DIAGNOSIS — A41.9: ICD-10-CM

## 2019-04-01 DIAGNOSIS — Z98.42: ICD-10-CM

## 2019-04-01 DIAGNOSIS — Z98.41: ICD-10-CM

## 2019-04-01 DIAGNOSIS — Z87.891: ICD-10-CM

## 2019-04-01 DIAGNOSIS — D86.9: ICD-10-CM

## 2019-04-01 DIAGNOSIS — E78.5: ICD-10-CM

## 2019-04-01 DIAGNOSIS — T83.518A: Primary | ICD-10-CM

## 2019-04-01 DIAGNOSIS — K21.9: ICD-10-CM

## 2019-04-01 DIAGNOSIS — Z87.440: ICD-10-CM

## 2019-04-01 DIAGNOSIS — H40.9: ICD-10-CM

## 2019-04-01 DIAGNOSIS — E87.5: ICD-10-CM

## 2019-04-01 DIAGNOSIS — R50.9: ICD-10-CM

## 2019-04-01 DIAGNOSIS — G93.41: ICD-10-CM

## 2019-04-01 DIAGNOSIS — E83.42: ICD-10-CM

## 2019-04-01 DIAGNOSIS — C61: ICD-10-CM

## 2019-04-01 DIAGNOSIS — R33.9: ICD-10-CM

## 2019-04-01 DIAGNOSIS — Z87.01: ICD-10-CM

## 2019-04-01 DIAGNOSIS — F03.90: ICD-10-CM

## 2019-04-01 DIAGNOSIS — C79.51: ICD-10-CM

## 2019-04-01 DIAGNOSIS — I50.9: ICD-10-CM

## 2019-04-01 DIAGNOSIS — Z74.01: ICD-10-CM

## 2019-04-01 DIAGNOSIS — E87.1: ICD-10-CM

## 2019-04-01 DIAGNOSIS — K59.03: ICD-10-CM

## 2019-04-01 DIAGNOSIS — R31.0: ICD-10-CM

## 2019-04-01 DIAGNOSIS — Z66: ICD-10-CM

## 2019-04-01 DIAGNOSIS — T40.2X5A: ICD-10-CM

## 2019-04-01 DIAGNOSIS — N13.6: ICD-10-CM

## 2019-04-01 DIAGNOSIS — Z79.899: ICD-10-CM

## 2019-04-01 DIAGNOSIS — G40.909: ICD-10-CM

## 2019-04-01 DIAGNOSIS — Z51.5: ICD-10-CM
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
## 2019-04-01 DIAGNOSIS — G89.3: ICD-10-CM

## 2019-04-01 LAB
ALBUMIN SERPL-MCNC: 3.4 G/DL
ALBUMIN/GLOB SERPL: 0.9 {RATIO}
ALT SERPL-CCNC: 11 U/L
APPEARANCE UR: (no result)
APTT BLD: 38.5 SECONDS
AST SERPL-CCNC: 35 U/L
BACTERIA #/AREA URNS HPF: (no result) /HPF
BASE EXCESS BLDV CALC-SCNC: -4.8 MMOL/L
BASE EXCESS BLDV CALC-SCNC: -7.2 MMOL/L
BASOPHILS # BLD AUTO: 0.01 K/MM3
BASOPHILS NFR BLD: 0.1 %
BILIRUB UR-MCNC: NEGATIVE MG/DL
BUN SERPL-MCNC: 40 MG/DL
CALCIUM SERPL-MCNC: 9.1 MG/DL
COLOR UR: (no result)
EOSINOPHIL # BLD: 0 10*3/UL
EOSINOPHIL NFR BLD: 0.2 %
EPI CELLS #/AREA URNS HPF: (no result) /HPF
ERYTHROCYTE [DISTWIDTH] IN BLOOD BY AUTOMATED COUNT: 14.7 %
GFR NON-AFRICAN AMERICAN: 38
GLUCOSE UR STRIP-MCNC: NEGATIVE MG/DL
HGB BLD-MCNC: 10 G/DL
INR PPP: 1.45
LEUKOCYTE ESTERASE UR-ACNC: (no result) LEU/UL
LYMPHOCYTES # BLD: 2.1 10*3/UL
LYMPHOCYTES NFR BLD AUTO: 15.5 %
MCH RBC QN AUTO: 29.9 PG
MCHC RBC AUTO-ENTMCNC: 33.2 G/DL
MCV RBC AUTO: 89.9 FL
MONOCYTES # BLD AUTO: 1.1 10*3/UL
MONOCYTES NFR BLD: 8.2 %
PH BLDV: 7.26 [PH]
PH BLDV: 7.3 [PH]
PH UR STRIP: 6.5 [PH]
PLATELET # BLD: 265 10^3/UL
PMV BLD AUTO: 9.3 FL
PROT UR STRIP-MCNC: 100 MG/DL
PROTHROMBIN TIME: 16.4 SECONDS
RBC # BLD AUTO: 3.35 10^6/UL
RBC # UR STRIP: (no result) /UL
RBC #/AREA URNS HPF: (no result) /HPF
SP GR UR STRIP: 1.02
UROBILINOGEN UR STRIP-ACNC: 0.2 E.U./DL
VENOUS BLOOD FIO2: 21 %
VENOUS BLOOD FIO2: 21 %
VENOUS BLOOD GAS PCO2: 44
VENOUS BLOOD GAS PCO2: 44
VENOUS BLOOD GAS PO2: 141 MM/HG
VENOUS BLOOD GAS PO2: 40 MM/HG
WBC # BLD AUTO: 13.3 10^3/UL
WBC #/AREA URNS HPF: (no result) /HPF

## 2019-04-01 RX ADMIN — CEFEPIME SCH MLS/HR: 1 INJECTION, SOLUTION INTRAVENOUS at 22:49

## 2019-04-01 NOTE — CARD
--------------- APPROVED REPORT --------------





Date of service: 04/01/2019



EKG Measurement

Heart [id number]

SC [id number]

[id number]

[id number]

[id number]



<Conclusion>

Sinus rhythm with 1st degree AV block

Indeterminate axis

Right bundle branch block with STT abnormalities

Abnormal ECG

## 2019-04-01 NOTE — PCM.SEPTIC
Sepsis Progress Note





- Reassessment Type


Date of Evaluation: 04/01/19


Time of Evaluation: 16:40


Reassessment Type: Non-invasive reassessment





- Non Invasive Reassessment


Were the most recent vital sign reviewed: Yes


Vital Sign (Latest): 


                                        











Temp Pulse Resp BP Pulse Ox


 


 98.6 F   106 H  18   128/63   98 


 


 04/01/19 12:33  04/01/19 15:41  04/01/19 15:41  04/01/19 15:41  04/01/19 15:41








Cardiovascular: Yes: Tachycardia


Respiratory: Yes: Crackles


Capillary Refill: Normal (Less than 2 sec)


Skin: Normal Color

## 2019-04-01 NOTE — CT
Date of service: 



04/01/2019



PROCEDURE:  CT HEAD WITHOUT CONTRAST.



HISTORY:

altered mentation



COMPARISON:

10/1/2018



TECHNIQUE:

Axial computed tomography images were obtained through the head/brain 

without intravenous contrast.  



Radiation dose:



Total exam DLP = 976.65 mGy-cm.



This CT exam was performed using one or more of the following dose 

reduction techniques: Automated exposure control, adjustment of the 

mA and/or kV according to patient size, and/or use of iterative 

reconstruction technique.



FINDINGS:



HEMORRHAGE:

No intracranial hemorrhage. 



BRAIN:

No mass effect or edema.  Mild diffuse age-appropriate atrophy.  Mild 

periventricular white matter lucency consistent with chronic 

microvascular ischemic change.  Appropriate for age.



VENTRICLES:

Unremarkable. No hydrocephalus. 



CALVARIUM:

Unremarkable.



PARANASAL SINUSES:

Unremarkable as visualized. No significant inflammatory changes.



MASTOID AIR CELLS:

Nonspecific right mastoid effusion improved compared to prior CT 

examination.



OTHER FINDINGS:

None.



IMPRESSION:

Age related atrophy and chronic white matter ischemic change.  No 

intracranial mass, hemorrhage or evidence of acute infarct.  Mild 

right mastoid effusion common nonspecific.

## 2019-04-01 NOTE — RAD
Date of service: 



04/01/2019



HISTORY:

 aspiration 



COMPARISON:

02/18/2019 



TECHNIQUE:

1 view obtained.



FINDINGS:



LUNGS:

No active pulmonary disease.



PLEURA:

No significant pleural effusion identified, no pneumothorax apparent.



CARDIOVASCULAR:

Aortic calcification



Normal cardiac size. No pulmonary vascular congestion. 



OSSEOUS STRUCTURES:

No significant abnormalities.



VISUALIZED UPPER ABDOMEN:

Normal.



OTHER FINDINGS:

None.



IMPRESSION:

No active disease.

## 2019-04-01 NOTE — CT
Date of service: 



04/01/2019



PROCEDURE:  CT Abdomen and Pelvis without intravenous contrast



HISTORY:

hematuria, stone



COMPARISON:

3/11/2019



TECHNIQUE:

Without contrast.. 



Contrast dose: 0



Radiation dose:



Total exam DLP = 1097.89 mGy-cm.



This CT exam was performed using one or more of the following dose 

reduction techniques: Automated exposure control, adjustment of the 

mA and/or kV according to patient size, and/or use of iterative 

reconstruction technique.



FINDINGS:



LOWER THORAX:

Unremarkable. 



LIVER:

Unremarkable. No gross lesion or ductal dilatation.  



GALLBLADDER AND BILE DUCTS:

Unremarkable. 



PANCREAS:

Unremarkable. No gross lesion or ductal dilatation.



SPLEEN:

Unremarkable. 



ADRENALS:

Unremarkable. No mass. 



KIDNEYS AND URETERS:

Mild right hydronephrosis and hydroureter.  No left hydronephrosis.  

No renal calculus.  Stable 1.7 cm rounded low-density mid right renal 

mass, .Measuring -6 Hounsfield units.  Consistent with cyst 



Right posterior pararenal soft tissue mass measuring 2.5 x 3.2 cm.  

On prior examination, this measured 2.0 x 2.7 cm.  There has been 

interval growth.  Suspicious for metastasis. 



VASCULATURE:

 unremarkable.  No aortic aneurysm. There is atherosclerotic 

calcification of the abdominal aorta.



BOWEL:

Mild retained feces.  No bowel obstruction.  In the inferior rectum, 

there is questionable soft tissue mass eccentrically along the left 

anterolateral wall.  Recommend evaluation with colonoscopy to exclude 

rectal neoplasm.  This is best seen on series 3, image 169 



APPENDIX:

Not identified.  No secondary findings. 



PERITONEUM:

Unremarkable. No free fluid. No free air. 



LYMPH NODES:

There are multiple enlarged retroperitoneal and pelvic lymph nodes.  

Non-specific.  The extent of lymphadenopathy is essentially 

unchanged.  There perirectal lymph nodes identified. 



BLADDER:

Nondistended.  Waddell catheter noted. 



REPRODUCTIVE:

Enlarged prostate. 



BONES:

Widespread sclerotic metastasis as on prior examination.  The extent 

of metastasis is essentially unchanged from the prior examination. 



OTHER FINDINGS:

None.



IMPRESSION:

Right hydroureteronephrosis.  No obstructing stone or mass 

identified.  Right posterior para renal soft tissue mass.  Suspicious 

for metastasis.  Enlarged compared to prior CT.  Possible inferior 

rectal mass.  Further evaluation with colonoscopy is advised.  

Multiple enlarged retroperitoneal and pelvic lymph nodes.  Perirectal 

lymph nodes.  Widespread sclerotic metastasis.

## 2019-04-01 NOTE — PCM.SEPTIC
Sepsis Progress Note





- Reassessment Type


Date of Evaluation: 04/01/19


Time of Evaluation: 16:07


Reassessment Type: Non-invasive reassessment





- Non Invasive Reassessment


Were the most recent vital sign reviewed: Yes


Vital Sign (Latest): 


                                        











Temp Pulse Resp BP Pulse Ox


 


 98.6 F   106 H  18   128/63   98 


 


 04/01/19 12:33  04/01/19 15:41  04/01/19 15:41  04/01/19 15:41  04/01/19 15:41








Cardiovascular: Yes: Regular Rate, Rhythm


Respiratory: No: Decreased Breath Sounds, Accessory Muscle Use, Crackles, Rales,

Rhonchi


Capillary Refill: Normal (Less than 2 sec)


Pulses: Normal Radial, Normal Dorsalis Pedis, Normal Posterior Tibialis


Skin: Warm, Dry

## 2019-04-01 NOTE — ED PDOC
Arrival/HPI





- General


Historian: Patient





- History of Present Illness


Narrative History of Present Illness (Text): 





04/01/19 11:56


89 y/o Male with PMHx of metastatic prostate cancer s/p ADT and palliative 

radiation on Lupron, seizures, glaucoma, dementia, and indwelling Nunez cat

heter, presents to the ED for Nunez catheter replacement. As per son, patient 

has been experiencing darkening of his urine since Saturday and little to no 

urinary draining via the catheter last night, prompting him to present to the Ed

for evaluation. Son states, patient's affect is mildly different from his 

baseline, and is usually communicative at baseline. At bedside, patient appears 

tired and responds to verbal stimuli. Patient denies any complaints at bedside. 

Denies any chest pain, shortness of breath or abdominal pain. 





PMD: Dr. Thakur





Time/Duration: < week


Symptom Onset: Gradual


Symptom Course: Unchanged


Activities at Onset: Light


Context: Home





Past Medical History





- Provider Review


Nursing Documentation Reviewed: Yes





- Infectious Disease


Hx of Infectious Diseases: None





- Tetanus Immunization


Tetanus Immunization: Unknown





- Cardiac


Hx Cardiac Disorders: Yes


Hx Congestive Heart Failure: Yes


Hx Pacemaker: No


Hx Peripheral Edema: Yes (ble +1)





- Pulmonary


Hx Respiratory Disorders: Yes (H/O SMOKING CIGARETTES)


Hx Pneumonia: Yes


Other/Comment: EMPYEMA/SARCOIDOSIS, left lung sx pt unsure what kind of sx





- Neurological


Hx Neurological Disorder: Yes


Hx Dizziness: Yes (syncope)


Hx Seizures: Yes (last seizure 20 yrs ago)





- HEENT


Hx HEENT Disorder: Yes


Hx Cataracts: Yes (with bilateral sx)





- Renal


Hx Renal Disorder: Yes (retention)


Other/Comment: chronic nunez-Prostate ca.





- Endocrine/Metabolic


Hx Endocrine Disorders: No





- Hematological/Oncological


Hx Blood Disorders: Yes (sepsis)


Hx Cancer: Yes (prostate)


Other/Comment: lupron injections once a month





- Integumentary


Hx Dermatological Disorder: Yes


Other/Comment: left mid back fading surgical scar and area of round indentation 

in skin left outer mid back, discolored toenails, r ft 4th toe nail falling off,

r ft great toe red dry wound, 2nd toenail black, left foot dry toenails, left 

hip pink pealed skin rash, buttocks reddened, lle fading old bruises





- Musculoskeletal/Rheumatological


Hx Falls: Yes





- Gastrointestinal


Hx Gastrointestinal Disorders: No





- Genitourinary/Gynecological


Hx Genitourinary Disorders: Yes (UTI,CHRONIC NUNEZ CATHETER)


Hx Hematuria: Yes


Hx Urinary Tract Infection: Yes (multiple)





- Psychiatric


Hx Substance Use: No





- Surgical History


Hx Mastectomy: No


Other/Comment: left lung sx





- Anesthesia


Hx Anesthesia: No


Hx Anesthesia Reactions: No





Family/Social History





- Physician Review


Nursing Documentation Reviewed: Yes


Family/Social History: Unknown Family HX


Smoking Status: Unknown If Ever Smoked


Hx Alcohol Use: No


Hx Substance Use: No





Allergies/Home Meds


Allergies/Adverse Reactions: 


Allergies





No Known Allergies Allergy (Verified 01/29/19 18:01)


   








Home Medications: 


                                    Home Meds











 Medication  Instructions  Recorded  Confirmed


 


Fentanyl [Duragesic Patch] 50 mcg TD Q72 01/24/19 03/13/19


 


Gemfibrozil [Lopid] 600 mg PO BID 01/24/19 03/13/19


 


Omeprazole 40 mg PO DAILY 01/24/19 03/11/19


 


oxyCODONE/Acetaminophen [Percocet 1 tab PO Q4H PRN 02/18/19 03/11/19





5/325 mg Tab]   














Review of Systems





- Physician Review


All systems were reviewed & negative as marked: Yes





- Review of Systems


Respiratory: absent: SOB, Cough


Cardiovascular: absent: Chest Pain


Gastrointestinal: absent: Abdominal Pain


Genitourinary Male: Urinary Output Changes


Skin: absent: Rash


Neurological: absent: Headache


Endocrine: absent: Diaphoresis





Physical Exam





- Physical Exam


Narrative Physical Exam (Text): 





04/01/19 12:02


Gen: VS reviewed, alert, well developed, dry, nontoxic, mild distress. Unable to

respond verbally.


ENT: normal pharynx. Dry mucous membranes.


Eye: EOMI, PERRL.


Neck: no JVD, supple, no adenopathy.


CV: regular rate, regular rhythm, no rubs, no murmur, no gallops, S1, S2, pulses

equal and strong.


Pulm: no distress, clear to auscultation, no wheeze, no rhonchi, breath sounds 

equal, no rales.


Abd: soft, nontender, no guarding, no rebound, no rigidity, normal bowel sounds.

Non-distended. Nunez catheter in place with dark bloody urine and with small 

clots.


Ext: no edema.


Skin: good color, no rash, no cyanosis.


Psych: limited secondary to dementia


Neuro: limited secondary to dementia





Vital Signs Reviewed: Yes





Vital Signs











  Temp Pulse Resp BP Pulse Ox


 


 04/01/19 10:32  98.5 F  101 H  18  162/101 H  97











Temperature: Afebrile


Blood Pressure: Hypertensive


Pulse: Tachycardic


Respiratory Rate: Normal


Appearance: Positive for: Well-Appearing, Non-Toxic, Comfortable


Pain Distress: Mild


Mental Status: Positive for: Confused





Medical Decision Making


ED Course and Treatment: 





04/01/19 11:54


Impression:


89 year old male presents to the ED for evaluation of urinary color changes and 

increased confusion.





Plan:


-- Labs


-- VBG


-- CT of Head


-- EKG


-- Chest X-ray


-- IV Fluids


-- Blood Culture


-- Urine Culture


-- Urinalysis 


-- Reassess and disposition





Prior Visits:


Notes and results from previous visits were reviewed. 





Progress Notes:








04/01/19 13:54


Patient was witnessed to have generalized convulsions at bedside. Patient 

responded to a dose of ativan. As per son, patient has not had a seizure in 30 

years.


04/01/19 13:56


admit accepted by dr Princess hernandez, patient to be admitted for sepsis stemming from

uti. nunez catheter was changed to new one in the ED with bloody urine return. 

patient had a single episode of generalized convulsions in the ED likley 

multifactorial from sepsis and missed antoconvulsives  this morning.








- RAD Interpretation


Narrative RAD Interpretations (Text): 





04/01/19 14:19


CT of Abdomen/Pelvis reviewed by radiologist, shows:


FINDINGS:


LOWER THORAX:


Unremarkable. 


LIVER:


Unremarkable. No gross lesion or ductal dilatation.  


GALLBLADDER AND BILE DUCTS:


Unremarkable. 


PANCREAS:


Unremarkable. No gross lesion or ductal dilatation.


SPLEEN:


Unremarkable. 


ADRENALS:


Unremarkable. No mass. 


KIDNEYS AND URETERS:


Mild right hydronephrosis and hydroureter.  No left hydronephrosis.  No renal 

calculus.  Stable 1.7 cm rounded low-density mid right renal mass, .Measuring -6

Hounsfield units.  Consistent with cyst 


Right posterior pararenal soft tissue mass measuring 2.5 x 3.2 cm.  On prior 

examination, this measured 2.0 x 2.7 cm.  There has been interval growth.  

Suspicious for metastasis. 


VASCULATURE:


 unremarkable.  No aortic aneurysm. There is atherosclerotic calcification of 

the abdominal aorta.


BOWEL:


Mild retained feces.  No bowel obstruction.  In the inferior rectum, there is 

questionable soft tissue mass eccentrically along the left anterolateral wall.  

Recommend evaluation with colonoscopy to exclude rectal neoplasm.  This is best 

seen on series 3, image 169 


APPENDIX:


Not identified.  No secondary findings. 


PERITONEUM:


Unremarkable. No free fluid. No free air. 


LYMPH NODES:


There are multiple enlarged retroperitoneal and pelvic lymph nodes.  Non-

specific.  The extent of lymphadenopathy is essentially unchanged.  There 

perirectal lymph nodes identified. 


BLADDER:


Nondistended.  Nunez catheter noted. 


REPRODUCTIVE:


Enlarged prostate. 


BONES:


Widespread sclerotic metastasis as on prior examination.  The extent of 

metastasis is essentially unchanged from the prior examination. 


OTHER FINDINGS:


None.


IMPRESSION:


Right hydroureteronephrosis.  No obstructing stone or mass identified.  Right 

posterior para renal soft tissue mass.  Suspicious for metastasis.  Enlarged 

compared to prior CT.  Possible inferior rectal mass.  Further evaluation with 

colonoscopy is advised.  Multiple enlarged retroperitoneal and pelvic lymph 

nodes.  Perirectal lymph nodes.  Widespread sclerotic metastasis.


=====================================

============================================================================





04/01/19 14:20


CT of head reviewed by radiologist, shows:


FINDINGS:


HEMORRHAGE:


No intracranial hemorrhage. 


BRAIN:


No mass effect or edema.  Mild diffuse age-appropriate atrophy.  Mild 

periventricular white matter lucency consistent with chronic microvascular 

ischemic change.  Appropriate for age.


VENTRICLES:


Unremarkable. No hydrocephalus. 


CALVARIUM:


Unremarkable.


PARANASAL SINUSES:


Unremarkable as visualized. No significant inflammatory changes.


MASTOID AIR CELLS:


Nonspecific right mastoid effusion improved compared to prior CT examination.


OTHER FINDINGS:


None.


IMPRESSION:


Age related atrophy and chronic white matter ischemic change.  No intracranial 

mass, hemorrhage or evidence of acute infarct.  Mild right mastoid effusion 

common nonspecific.


 

================================================================================


==============================


04/01/19 14:22


Chest X-ray reviewed by radiologist, shows:


FINDINGS:


LUNGS:


No active pulmonary disease.


PLEURA:


No significant pleural effusion identified, no pneumothorax apparent.


CARDIOVASCULAR:


Aortic calcification


Normal cardiac size. No pulmonary vascular congestion. 


OSSEOUS STRUCTURES:


No significant abnormalities.


VISUALIZED UPPER ABDOMEN:


Normal.


OTHER FINDINGS:


None.


IMPRESSION:


No active disease.


: Radiologist





- EKG Interpretation


EKG Interpretation (Text): 





04/01/19 12:23


ekg my read: sinus rhythm at 99 bpm, 1st degree av block, rbbb, artifact


Interpreted by ED Physician: Yes





- Scribe Statement


The provider has reviewed the documentation as recorded by the Scribe


Andrse Polk.





All medical record entries made by the Scribe were at my direction and 

personally dictated by me. I have reviewed the chart and agree that the record 

accurately reflects my personal performance of the history, physical exam, 

medical decision making, and the department course for this patient. I have also

personally directed, reviewed, and agree with the discharge instructions and 

disposition.








Disposition/Present on Arrival





- Present on Arrival


Any Indicators Present on Arrival: No


History of DVT/PE: No


History of Uncontrolled Diabetes: No


Urinary Catheter: Yes


History Surgical Site Infection Following: None





- Disposition


Have Diagnosis and Disposition been Completed?: Yes


Diagnosis: 


 Sepsis, Urinary tract infection, Seizure





Disposition: HOSPITALIZED


Disposition Time: 13:58


Patient Plan: Admission


Patient Problems: 


                             Current Active Problems











Problem Status Onset


 


Seizure Acute 


 


Sepsis Acute 


 


Urinary tract infection Acute 











Condition: GUARDED


Discharge Instructions (ExitCare):  Sepsis (ED)


Referrals: 


Sami Thakur MD [Primary Care Provider] - Follow up with primary

## 2019-04-02 LAB
% IRON SATURATION: 10 %
ALBUMIN SERPL-MCNC: 3.1 G/DL
ALBUMIN/GLOB SERPL: 0.9 {RATIO}
ALT SERPL-CCNC: 17 U/L
AST SERPL-CCNC: 41 U/L
BUN SERPL-MCNC: 34 MG/DL
CALCIUM SERPL-MCNC: 8.6 MG/DL
ERYTHROCYTE [DISTWIDTH] IN BLOOD BY AUTOMATED COUNT: 15 %
GFR NON-AFRICAN AMERICAN: > 60
HGB BLD-MCNC: 8 G/DL
IRON SERPL-MCNC: 20 UG/DL
MCH RBC QN AUTO: 29.4 PG
MCHC RBC AUTO-ENTMCNC: 32.5 G/DL
MCV RBC AUTO: 90.4 FL
PLATELET # BLD: 210 10^3/UL
PMV BLD AUTO: 9.3 FL
RBC # BLD AUTO: 2.72 10^6/UL
TIBC SERPL-MCNC: 205 UG/DL
WBC # BLD AUTO: 10.3 10^3/UL

## 2019-04-02 RX ADMIN — CEFEPIME SCH MLS/HR: 1 INJECTION, SOLUTION INTRAVENOUS at 22:25

## 2019-04-02 RX ADMIN — CEFEPIME SCH MLS/HR: 1 INJECTION, SOLUTION INTRAVENOUS at 09:37

## 2019-04-02 NOTE — CON
DATE:  04/02/2019



HISTORY OF PRESENT ILLNESS:  This is an 89-year-old male with past medical

history of metastatic disease, prostate cancer, seizure, glaucoma,

dementia, presents with darkening of the urine.  Very poor historian,

called to evaluate on possible seizure.



PHYSICAL EXAMINATION

HEENT:  Normocephalic, atraumatic.

NECK:  Supple.

NEUROLOGIC:  Awake and oriented to self.  Cranial nerves II through XII

were tested.  Pupils equal and reactive to light.  EOM intact.  Visual

field full.  No facial asymmetry.  Tongue midline.

MOTOR EXAMINATION:  Moves all extremities equally.  Tone normal.  Deep

tendon reflexes 1+.  Both plantars are downgoing.  Sensory appears intact.



CAT scan of the head was done which was reported negative for bleed or

stroke.



IMPRESSION:  An 89-year-old male with past medical history of prostate

cancer, _____ therapy on Lupron, possible neural seizure, dementia, came

with experiencing darkening of his urine.  CAT scan of the head was done

which was reported negative.  The patient was witnessed to have generalized

convulsion at the bedside and Ativan was given.  No tongue bite.  No

urinary incontinence.  CAT scan of the head _____ workup in progress. 

Continue present management.  We will follow up.







__________________________________________

Chavez Palomino MD





DD:  04/02/2019 14:06:13

DT:  04/02/2019 15:11:32

Job # [id number]

## 2019-04-02 NOTE — CP.PCM.HP
History of Present Illness





- History of Present Illness


History of Present Illness: 


IM Resident H&P for Dr. Rivero's service 





CC: Dark urine





Patient is an 89 y/o Make with PMHx of metastatic prostate cancer s/p ADT and 

palliative radiation on Lupron, seizures, glaucoma, dementia, UTI w/ indwelling 

Tolentino (+ for E.coli, Klebsiella, and Enterococcus), recently admitted with UTI 

due to proteus mirabilis and klebsiella UTI) presented to the ED with dark 

urine. Patient was found to have UTI, with lactic acid of 2.1 thus sepsis code 

was called. Patient was admitted for management. 


Patient is poor historian, thus most of the history was obtain from patient's 

chart and son. As per patient's son the Tolentino catheter was draining dark urine, 

prompting him to brink patient to the ED. As per ED note, pt had generalized 

convulsions while in the ER, which resolved with a dose of ativan.





This morning, patient admits to feeling cold, denies fever. Denies nausea, 

vomiting or diarrhea. No abdominal pain.





PMHx: metastatic prostate cancer s/p ADT and palliative radiation on Lupron, 

seizures, glaucoma, dementia, UTI w/ indwelling tolentino (+ for E.coli, Klebsiella,

Enterococcus, and proteus mirabilis)


PSHx: b/l cataract surgery 


Allergies: NKA


SocHx: Denies EtOH, drug or tobacco use. Patient lives at home and has a 24 hr 

home aid. he is not active and is wheelchair bound. Patient is a DNI/DNR.


FamHx: non-contributory 


Home meds: As per chart





Present on Admission





- Present on Admission


Any Indicators Present on Admission: Yes


History of DVT/PE: No


History of Uncontrolled Diabetes: No


Urinary Catheter: Yes


Decubitus Ulcer Present: No





Review of Systems





- Review of Systems


All systems: reviewed and no additional remarkable complaints except


Review of Systems: 





10 point ROS reviewed, all negative except as per HPI. 





Past Patient History





- Infectious Disease


Hx of Infectious Diseases: None





- Tetanus Immunizations


Tetanus Immunization: Unknown





- Past Medical History & Family History


Past Medical History?: Yes





- Past Social History


Smoking Status: Former Smoker


Alcohol: None


Drugs: Denies


Home Situation {Lives}: With Family





- CARDIAC


Hx Cardiac Disorders: Yes


Hx Congestive Heart Failure: Yes


Hx Pacemaker: No


Hx Peripheral Edema: Yes (ble +1)





- PULMONARY


Hx Respiratory Disorders: Yes (H/O SMOKING CIGARETTES)


Hx Pneumonia: Yes


Other/Comment: EMPYEMA/SARCOIDOSIS, left lung sx pt unsure what kind of sx





- NEUROLOGICAL


Hx Seizures: Yes





- HEENT


Hx HEENT Problems: Yes


Hx Cataracts: Yes (with bilateral sx)





- RENAL


Hx Chronic Kidney Disease: Yes (retention)


Other/Comment: chronic tolentino-Prostate ca.





- ENDOCRINE/METABOLIC


Hx Endocrine Disorders: No





- HEMATOLOGICAL/ONCOLOGICAL


Hx Blood Disorders: Yes (sepsis)


Hx Cancer: Yes (prostate)


Other/Comment: lupron injections once a month





- INTEGUMENTARY


Hx Dermatological Problems: Yes


Other/Comment: left mid back fading surgical scar and area of round indentation 

in skin left outer mid back, discolored toenails, r ft 4th toe nail falling off,

r ft great toe red dry wound, 2nd toenail black, left foot dry toenails, left 

hip pink pealed skin rash, buttocks reddened, lle fading old bruises





- MUSCULOSKELETAL/RHEUMATOLOGICAL


Hx Falls: No





- GASTROINTESTINAL


Hx Gastrointestinal Disorders: No





- GENITOURINARY/GYNECOLOGICAL


Hx Prostate Problems: Yes





- PSYCHIATRIC


Hx Substance Use: No





- SURGICAL HISTORY


Hx Mastectomy: No


Other/Comment: left lung sx





- ANESTHESIA


Hx Anesthesia: No


Hx Anesthesia Reactions: No





Meds


Allergies/Adverse Reactions: 


                                    Allergies











Allergy/AdvReac Type Severity Reaction Status Date / Time


 


No Known Allergies Allergy   Verified 01/29/19 18:01














Physical Exam





- Constitutional


Appears: No Acute Distress, Confused, Chronically Ill





- Head Exam


Head Exam: ATRAUMATIC, NORMAL INSPECTION, NORMOCEPHALIC





- Eye Exam


Eye Exam: EOMI, Normal appearance, PERRL.  absent: Scleral icterus


Pupil Exam: NORMAL ACCOMODATION





- ENT Exam


ENT Exam: Mucous Membranes Moist





- Neck Exam


Neck exam: Positive for: Normal Inspection





- Respiratory Exam


Respiratory Exam: Clear to Auscultation Bilateral, NORMAL BREATHING PATTERN.  

absent: Rales, Rhonchi, Wheezes, Respiratory Distress, Stridor





- Cardiovascular Exam


Cardiovascular Exam: REGULAR RHYTHM, RRR, +S1, +S2.  absent: Bradycardia, 

Tachycardia, Diastolic murmur, Gallop, JVD, Rubs, Systolic Murmur





- GI/Abdominal Exam


GI & Abdominal Exam: Normal Bowel Sounds, Soft.  absent: Distended, Firm, 

Guarding, Rebound, Tenderness





-  Exam


Additional comments: 





Tolentino catheter in place, draining dark urine.





- Extremities Exam


Extremities exam: Positive for: pedal edema (+1).  Negative for: tenderness





- Back Exam


Back exam: NORMAL INSPECTION





- Neurological Exam


Neurological exam: Alert


Additional comments: 





+ baseline confusion. 





- Psychiatric Exam


Psychiatric exam: Normal Affect, Normal Mood





- Skin


Skin Exam: Dry, Normal Color, Warm





Results





- Vital Signs


Recent Vital Signs: 





                                Last Vital Signs











Temp  98.6 F   04/02/19 06:00


 


Pulse  101 H  04/02/19 06:00


 


Resp  19   04/02/19 06:00


 


BP  143/75   04/02/19 06:00


 


Pulse Ox  98   04/02/19 06:00














- Labs


Result Diagrams: 


                                 04/02/19 06:00





                                 04/02/19 06:00


Labs: 





                         Laboratory Results - last 24 hr











  04/01/19 04/01/19 04/01/19





  11:56 12:00 12:00


 


WBC   


 


RBC   


 


Hgb   


 


Hct   


 


MCV   


 


MCH   


 


MCHC   


 


RDW   


 


Plt Count   


 


MPV   


 


Neut % (Auto)   


 


Lymph % (Auto)   


 


Mono % (Auto)   


 


Eos % (Auto)   


 


Baso % (Auto)   


 


Lymph # (Auto)   


 


Mono # (Auto)   


 


Eos # (Auto)   


 


Baso # (Auto)   


 


Absolute Neuts (auto)   


 


PT   


 


INR   


 


APTT   


 


pO2   40 


 


VBG pH   7.30 L 


 


VBG pCO2   44.0 


 


VBG HCO3   21.6 


 


VBG Total CO2   23.0 


 


VBG O2 Sat (Calc)   75.9 H 


 


VBG Base Excess   -4.8 L 


 


VBG Potassium   5.3 H 


 


Sodium   120.0 L* 


 


Chloride   87.0 L 


 


Glucose   103 


 


Lactate   2.1 


 


FiO2   21.0 


 


Crit Value Called To   Parvin bishop 


 


Crit Value Called By   Ab 


 


Blood Gas Notified Time   1216 


 


Potassium   


 


Carbon Dioxide   


 


Anion Gap   


 


BUN   


 


Creatinine   


 


Est GFR ( Amer)   


 


Est GFR (Non-Af Amer)   


 


POC Glucose (mg/dL)   


 


Random Glucose   


 


Calcium   


 


Magnesium   


 


Total Bilirubin   


 


AST   


 


ALT   


 


Alkaline Phosphatase   


 


Total Protein   


 


Albumin   


 


Globulin   


 


Albumin/Globulin Ratio   


 


TSH 3rd Generation   


 


Venous Blood Potassium   5.3 H 


 


Urine Color  Light brown  


 


Urine Appearance  Cloudy  


 


Urine pH  6.5  


 


Ur Specific Gravity  1.020  


 


Urine Protein  100 H  


 


Urine Glucose (UA)  Negative  


 


Urine Ketones  Negative  


 


Urine Blood  Large H  


 


Urine Nitrate  Positive H  


 


Urine Bilirubin  Negative  


 


Urine Urobilinogen  0.2  


 


Ur Leukocyte Esterase  Moderate H  


 


Urine RBC  Tntc H  


 


Urine WBC  Tntc H  


 


Ur Epithelial Cells  None  


 


Urine Bacteria  Large  


 


Blood Type    A POSITIVE


 


Antibody Screen    Negative


 


BBK History Checked    Patient has bt














  04/01/19 04/01/19 04/01/19





  12:00 12:00 12:00


 


WBC  13.3 H D  


 


RBC  3.35 L  


 


Hgb  10.0 L D  


 


Hct  30.1 L  


 


MCV  89.9  D  


 


MCH  29.9  


 


MCHC  33.2  


 


RDW  14.7 H  


 


Plt Count  265  


 


MPV  9.3  


 


Neut % (Auto)  76.0 H  


 


Lymph % (Auto)  15.5 L  


 


Mono % (Auto)  8.2 H  


 


Eos % (Auto)  0.2 L  


 


Baso % (Auto)  0.1  


 


Lymph # (Auto)  2.1  


 


Mono # (Auto)  1.1 H  


 


Eos # (Auto)  0.0  


 


Baso # (Auto)  0.01  


 


Absolute Neuts (auto)  10.15 H  


 


PT   16.4 H 


 


INR   1.45 


 


APTT   38.5 H 


 


pO2   


 


VBG pH   


 


VBG pCO2   


 


VBG HCO3   


 


VBG Total CO2   


 


VBG O2 Sat (Calc)   


 


VBG Base Excess   


 


VBG Potassium   


 


Sodium    124 L


 


Chloride    86 L


 


Glucose   


 


Lactate   


 


FiO2   


 


Crit Value Called To   


 


Crit Value Called By   


 


Blood Gas Notified Time   


 


Potassium    5.8 H* D


 


Carbon Dioxide    21


 


Anion Gap    23 H


 


BUN    40 H


 


Creatinine    1.7 H


 


Est GFR ( Amer)    46


 


Est GFR (Non-Af Amer)    38


 


POC Glucose (mg/dL)   


 


Random Glucose    101


 


Calcium    9.1


 


Magnesium    1.9


 


Total Bilirubin    0.6


 


AST    35


 


ALT    11


 


Alkaline Phosphatase    143 H D


 


Total Protein    7.1


 


Albumin    3.4


 


Globulin    3.7


 


Albumin/Globulin Ratio    0.9 L


 


TSH 3rd Generation   


 


Venous Blood Potassium   


 


Urine Color   


 


Urine Appearance   


 


Urine pH   


 


Ur Specific Gravity   


 


Urine Protein   


 


Urine Glucose (UA)   


 


Urine Ketones   


 


Urine Blood   


 


Urine Nitrate   


 


Urine Bilirubin   


 


Urine Urobilinogen   


 


Ur Leukocyte Esterase   


 


Urine RBC   


 


Urine WBC   


 


Ur Epithelial Cells   


 


Urine Bacteria   


 


Blood Type   


 


Antibody Screen   


 


BBK History Checked   














  04/01/19 04/01/19 04/01/19





  12:00 13:49 15:10


 


WBC   


 


RBC   


 


Hgb   


 


Hct   


 


MCV   


 


MCH   


 


MCHC   


 


RDW   


 


Plt Count   


 


MPV   


 


Neut % (Auto)   


 


Lymph % (Auto)   


 


Mono % (Auto)   


 


Eos % (Auto)   


 


Baso % (Auto)   


 


Lymph # (Auto)   


 


Mono # (Auto)   


 


Eos # (Auto)   


 


Baso # (Auto)   


 


Absolute Neuts (auto)   


 


PT   


 


INR   


 


APTT   


 


pO2    141 H


 


VBG pH    7.26 L


 


VBG pCO2    44.0


 


VBG HCO3    19.7 L


 


VBG Total CO2    21.1 L


 


VBG O2 Sat (Calc)    100.3 H


 


VBG Base Excess    -7.2 L


 


VBG Potassium    4.8


 


Sodium    122.0 L


 


Chloride    93.0 L


 


Glucose    132 H


 


Lactate    1.6


 


FiO2    21.0


 


Crit Value Called To   


 


Crit Value Called By   


 


Blood Gas Notified Time   


 


Potassium   


 


Carbon Dioxide   


 


Anion Gap   


 


BUN   


 


Creatinine   


 


Est GFR ( Amer)   


 


Est GFR (Non-Af Amer)   


 


POC Glucose (mg/dL)   104 


 


Random Glucose   


 


Calcium   


 


Magnesium   


 


Total Bilirubin   


 


AST   


 


ALT   


 


Alkaline Phosphatase   


 


Total Protein   


 


Albumin   


 


Globulin   


 


Albumin/Globulin Ratio   


 


TSH 3rd Generation  1.61  


 


Venous Blood Potassium    4.8


 


Urine Color   


 


Urine Appearance   


 


Urine pH   


 


Ur Specific Gravity   


 


Urine Protein   


 


Urine Glucose (UA)   


 


Urine Ketones   


 


Urine Blood   


 


Urine Nitrate   


 


Urine Bilirubin   


 


Urine Urobilinogen   


 


Ur Leukocyte Esterase   


 


Urine RBC   


 


Urine WBC   


 


Ur Epithelial Cells   


 


Urine Bacteria   


 


Blood Type   


 


Antibody Screen   


 


BBK History Checked   














  04/01/19





  15:50


 


WBC 


 


RBC 


 


Hgb 


 


Hct 


 


MCV 


 


MCH 


 


MCHC 


 


RDW 


 


Plt Count 


 


MPV 


 


Neut % (Auto) 


 


Lymph % (Auto) 


 


Mono % (Auto) 


 


Eos % (Auto) 


 


Baso % (Auto) 


 


Lymph # (Auto) 


 


Mono # (Auto) 


 


Eos # (Auto) 


 


Baso # (Auto) 


 


Absolute Neuts (auto) 


 


PT 


 


INR 


 


APTT 


 


pO2 


 


VBG pH 


 


VBG pCO2 


 


VBG HCO3 


 


VBG Total CO2 


 


VBG O2 Sat (Calc) 


 


VBG Base Excess 


 


VBG Potassium 


 


Sodium 


 


Chloride 


 


Glucose 


 


Lactate 


 


FiO2 


 


Crit Value Called To 


 


Crit Value Called By 


 


Blood Gas Notified Time 


 


Potassium  5.1 H


 


Carbon Dioxide 


 


Anion Gap 


 


BUN 


 


Creatinine 


 


Est GFR ( Amer) 


 


Est GFR (Non-Af Amer) 


 


POC Glucose (mg/dL) 


 


Random Glucose 


 


Calcium 


 


Magnesium 


 


Total Bilirubin 


 


AST 


 


ALT 


 


Alkaline Phosphatase 


 


Total Protein 


 


Albumin 


 


Globulin 


 


Albumin/Globulin Ratio 


 


TSH 3rd Generation 


 


Venous Blood Potassium 


 


Urine Color 


 


Urine Appearance 


 


Urine pH 


 


Ur Specific Gravity 


 


Urine Protein 


 


Urine Glucose (UA) 


 


Urine Ketones 


 


Urine Blood 


 


Urine Nitrate 


 


Urine Bilirubin 


 


Urine Urobilinogen 


 


Ur Leukocyte Esterase 


 


Urine RBC 


 


Urine WBC 


 


Ur Epithelial Cells 


 


Urine Bacteria 


 


Blood Type 


 


Antibody Screen 


 


BBK History Checked 














Assessment & Plan





- Assessment and Plan (Free Text)


Assessment: 


1) Hematuria from traumatic Tolentino and cancer


2) Sepsis due to Recurrent catheter associated UTI


3) Right hydrouteronephrosis


4) Hyponatremia


5) Hyperkalemia- likely due to pre-renal cyn


6) CYN- likely post renal with some pre-renal component


7) Acute on chronic anemia likely due to hematuria 


8) Chronic abdominal pain likely due to metastasis


9) Constipation likely due to opioid


10) Metastatic prostate cancer metastatic to the bone s/p ADT and palliative 

radiation on Lupron, 


11) H/o seizures, 


12) glaucoma  


13) Dyslipidemia


14) Transient hypoglycemia








 


Plan: 


Patient is admitted with sepsis likely due to UTI in the setting of metastatic 

prostate cancer and chronic indwelling Tolentino catheter. Ua with large blood, 

positive nitrate, moderate leuk esterase and too numerous to count wbc, with 

large bacteria. Patient was aferbrile, was tachy and had leukocytosis. Urine and

blood cultures were sent. ID is consulted, patient is started on cefepime. For 

breakthrough seizures likely due to sepsis and electrolyte imbalance, will 

obtain Tegretol level and continue with Tegretol 200 mg tid. Neurology is 

consulted. 


The CYN is likely post renal with pre-renal component. Creat and sodium improved

with NS and replacement of the Tolentino catheter. TSH was normal. Hyperkalemia also

improved. Patient has normocytic anemia likely from recurrent hematuria, will 

obtain iron panel and transfuse iron based on the result. Continue with miralax 

prn for constipation. For transient hypoglycemia likely from insulin and poor 

oral intake, the glucose improved with orange juice. On lipid for hld, and 

protonix for gerd. Has fentanyl patch and percocet prn for chronic cancer r

elated pain. 


External compressive device for DVT prophylaxis. 





Patient seen, examined and case discussed with Dr. Mills. 





- Date & Time


Date: 04/02/19


Time: 10:00

## 2019-04-02 NOTE — CON
DATE:  04/02/2019



NEUROLOGY CONSULTATION



CHIEF COMPLAINT:  Seizure breakthrough.



HISTORY OF PRESENT ILLNESS:  History is obtained from the medical records. 

This is a 89-year-old man with past medical history of metastatic prostate

cancer status post ADT and palliative radiation on Lupron, seizure disorder

since 1990's on Tegretol 200 mg p.o. three times a day.  He was stable,

glaucoma, cognitive impairment, UTI with indwelling [place] catheter, which

is positive for E. coli, Klebsiella, and Enterococcus in the past, recently

admitted with UTI due to Proteus mirabilis and Klebsiella with UTI,

presented to ED with dark urine found to have UTI with lactic acid of 2.1,

so _____ was called.  The patient is reported to be poor historian

therefore history was obtained from _____.  He has generalized convulsion

in the ER which resolved with a dose of Ativan.  It is likely had to

breakthrough seizures secondary to urinary tract infection, which can

definitely be a cause.  He is on antibiotics for underlying UTI and he is

on Tegretol 200 mg p.o. three times a day.  We will recommend also to avoid

opiates for _____ seizure threshold.



PAST MEDICAL HISTORY:  As above.



SOCIAL HISTORY:  No illicit drug use, smoking or EtOH abuse at this time.



FAMILY HISTORY:  Noncontributory.  He is a wheelchair bound.



MEDICATIONS:  Home medications were reviewed by nurse's reconciliation

sheet.



REVIEW OF SYSTEMS:  A 14-point review of systems is negative except per the

HPI.



LABORATORY DATA:  Sodium 129, potassium 4.9, chloride of 98, carbon dioxide

of 23, BUN of 34, creatinine 1.1, and random glucose of 62 which is low. 

He has iron deficiency anemia as well.



PHYSICAL EXAMINATION:

GENERAL:  The patient is lying in bed, no acute distress.

VITAL SIGNS:  Temperature 100.2, pulse rate of 80, blood pressure 116/68,

respiratory rate 19, and oxygen saturation 98% on nasal cannula.

HEENT:  Atraumatic and normocephalic.  PERRLA.  Extraocular muscles intact.

NECK:  Supple.  No JVD.  No adenopathy noted.

LUNGS:  Clear to auscultation.  No adventitious sounds.

HEART:  S1 and S2.  Normal rate and rhythm.  No murmurs, rubs, or gallops.

ABDOMEN:  Soft, nontender, and nondistended.  Bowel sounds present.

EXTREMITIES:  No clubbing.  No cyanosis.  Peripheral pulses are 2+ felt

bilaterally.

NEUROLOGIC:  The patient is alert and oriented to person and place, not

much to month and year.  Recall after 5 minutes 0/3.  Poor attention span. 

Slow thought process.  Cranial nerves II through XII are intact.  Speech is

hypophonic.  No aphasia noted.  Motor exam; slight increased tone

throughout the extremities.  Moves all extremities equally except for lower

extremities are very cachectic and muscular wasting in both upper and lower

extremities.  Sensory exam; decreased light touch to pinprick up to the

calves bilaterally.  Decreased vibration of the toes and knees.  DTRs are

2+ and 1 at both knees and ankles.  Toes are downgoing bilaterally. 

Coordination and gait deferred for now.



IMPRESSION:  Breakthrough seizures secondary to underlying urinary tract

infection and the patient has a long history of seizures with underlying

hyponatremia and hyperkaliemia as well as episode of hypoglycemia where

glucose of 62 which is low, therefore it is likely also a toxic metabolic

encephalopathy superimposed underlying breakthrough seizure, superimposed

underlying chronic urinary tract infection and underlying _____.



RECOMMENDATIONS:  At this time;

1.  Start broad spectrum antibiotics and on cefepime for his underlying

UTI.

2.  Continue to monitor electrolytes and correct accordingly.

3.  Tegretol level.

4.  Continue Tegretol 200 mg p.o. three times a day for underlying seizure

prophylaxis and current present medical management.



Thank you for this new consult.







__________________________________________

Gigi Palomino MD





DD:  04/02/2019 14:08:01

DT:  04/02/2019 14:44:39

Job # [id number]

## 2019-04-03 LAB
ALBUMIN SERPL-MCNC: 3 G/DL
ALBUMIN/GLOB SERPL: 0.9 {RATIO}
ALT SERPL-CCNC: 15 U/L
AST SERPL-CCNC: 52 U/L
BASOPHILS # BLD AUTO: 0.01 K/MM3
BASOPHILS NFR BLD: 0.1 %
BUN SERPL-MCNC: 29 MG/DL
CALCIUM SERPL-MCNC: 8.5 MG/DL
EOSINOPHIL # BLD: 0 10*3/UL
EOSINOPHIL NFR BLD: 0.2 %
ERYTHROCYTE [DISTWIDTH] IN BLOOD BY AUTOMATED COUNT: 15.2 %
GFR NON-AFRICAN AMERICAN: > 60
HGB BLD-MCNC: 7.6 G/DL
LYMPHOCYTES # BLD: 3.1 10*3/UL
LYMPHOCYTES NFR BLD AUTO: 33.3 %
MCH RBC QN AUTO: 30.5 PG
MCHC RBC AUTO-ENTMCNC: 33 G/DL
MCV RBC AUTO: 92.4 FL
MONOCYTES # BLD AUTO: 1 10*3/UL
MONOCYTES NFR BLD: 11.1 %
PLATELET # BLD: 218 10^3/UL
PMV BLD AUTO: 9.3 FL
RBC # BLD AUTO: 2.49 10^6/UL
WBC # BLD AUTO: 9.2 10^3/UL

## 2019-04-03 PROCEDURE — 30233N1 TRANSFUSION OF NONAUTOLOGOUS RED BLOOD CELLS INTO PERIPHERAL VEIN, PERCUTANEOUS APPROACH: ICD-10-PCS | Performed by: INTERNAL MEDICINE

## 2019-04-03 RX ADMIN — CEFEPIME SCH MLS/HR: 1 INJECTION, SOLUTION INTRAVENOUS at 21:38

## 2019-04-03 RX ADMIN — CEFEPIME SCH MLS/HR: 1 INJECTION, SOLUTION INTRAVENOUS at 11:21

## 2019-04-03 RX ADMIN — PANTOPRAZOLE SODIUM SCH MG: 40 TABLET, DELAYED RELEASE ORAL at 05:28

## 2019-04-03 NOTE — CP.PCM.PN
<Elaine Robison - Last Filed: 04/03/19 12:30>





Subjective





- Date & Time of Evaluation


Date of Evaluation: 04/03/19


Time of Evaluation: 06:55





- Subjective


Subjective: 


IM Resident progress note for Dr. Rivero's service 





Patient with no acute events overnight. Had episodes of brown urine. No fever or

chills. Patient has intermittent confusions. No diarrhea. Temp of 100.2 

yesterday afternoon.





Objective





- Vital Signs/Intake and Output


Vital Signs (last 24 hours): 


                                        











Temp Pulse Resp BP Pulse Ox


 


 98.2 F   94 H  19   125/69   98 


 


 04/03/19 05:59  04/03/19 05:59  04/03/19 05:59  04/03/19 05:59  04/02/19 06:00








Intake and Output: 


                                        











 04/02/19 04/03/19





 18:59 06:59


 


Intake Total  1260


 


Output Total  100


 


Balance  1160














- Medications


Medications: 


                               Current Medications





Acetaminophen (Tylenol 325mg Tab)  650 mg PO Q6H PRN


   PRN Reason: Fever >100.4 F


   Last Admin: 04/02/19 12:53 Dose:  650 mg


Carbamazepine (Tegretol)  200 mg PO TID [facility], 1928–1983; Protocol


   Last Admin: 04/02/19 13:29 Dose:  200 mg


Fentanyl (Duragesic)  1 patch TD Q72 [facility], 1928–1983


Gemfibrozil (Lopid)  600 mg PO BID [facility], [facility]–1983


   Last Admin: 04/02/19 18:19 Dose:  600 mg


Sodium Chloride (Sodium Chloride 0.9%)  1,000 mls @ 100 mls/hr IV .Q10H [facility], 1928–1983


   Last Admin: 04/02/19 09:26 Dose:  100 mls/hr


Cefepime HCl (Maxipime 1gm)  1 gm in 100 mls @ 100 mls/hr IVPB Q12 [facility], [facility]; Protocol


   Stop: 04/10/19 22:01


   Last Admin: 04/02/19 22:25 Dose:  100 mls/hr


Levetiracetam (Keppra)  250 mg PO BID [facility], [facility]–1983


   Last Admin: 04/02/19 18:19 Dose:  250 mg


Oxycodone/Acetaminophen (Percocet 5/325 Mg Tab)  1 tab PO Q4H PRN


   PRN Reason: Pain, moderate (4-7)


   Stop: 04/05/19 10:34


Pantoprazole Sodium (Protonix Ec Tab)  40 mg PO 0600 [facility]


   Last Admin: 04/03/19 05:28 Dose:  40 mg


Polyethylene Glycol (Miralax)  17 gm PO DAILY PRN


   PRN Reason: Constipation











- Labs


Labs: 


                                        





                                 04/02/19 06:00 





                                 04/02/19 06:00 





                                        











PT  16.4 SECONDS (9.4-12.5)  H  04/01/19  12:00    


 


INR  1.45   04/01/19  12:00    


 


APTT  38.5 Seconds (26.9-38.3)  H  04/01/19  12:00    














- Constitutional


Appears: No Acute Distress, Chronically Ill





- Head Exam


Head Exam: ATRAUMATIC, NORMAL INSPECTION, NORMOCEPHALIC





- Eye Exam


Eye Exam: EOMI, Normal appearance, PERRL.  absent: Scleral icterus


Pupil Exam: NORMAL ACCOMODATION





- ENT Exam


ENT Exam: Mucous Membranes Moist





- Neck Exam


Neck Exam: Normal Inspection





- Respiratory Exam


Respiratory Exam: Clear to Ausculation Bilateral, NORMAL BREATHING PATTERN.  

absent: Rales, Rhonchi, Wheezes, Respiratory Distress, Stridor





- Cardiovascular Exam


Cardiovascular Exam: REGULAR RHYTHM, RRR, +S1, +S2.  absent: Diastolic murmur, 

Gallop, JVD, Rubs, Murmur





- GI/Abdominal Exam


GI & Abdominal Exam: Soft, Normal Bowel Sounds.  absent: Distended, Firm, 

Guarding, Rigid, Tenderness, Hyperactive Bowel Sounds, Rebound


Additional comments: 





Obese abdomen.





- Extremities Exam


Extremities Exam: Pedal Edema (+2).  absent: Tenderness





- Back Exam


Back Exam: NORMAL INSPECTION





- Neurological Exam


Neurological Exam: Alert, Awake


Additional comments: 





intermittent confusions.





- Psychiatric Exam


Psychiatric exam: Normal Affect, Normal Mood





- Skin


Skin Exam: Dry, Normal Color, Warm





Assessment and Plan





- Assessment and Plan (Free Text)


Assessment: 


1) Hematuria from traumatic [place] and cancer


2) Sepsis due to Recurrent catheter associated UTI


3) Right hydrouteronephrosis


4) Hyponatremia


5) Hyperkalemia- resolved


6) CYN- resolved 


7) Acute on chronic anemia likely due to hematuria 


8) Chronic abdominal pain likely due to metastasis


9) Constipation 


10) Metastatic prostate cancer metastatic to the bone s/p ADT and palliative 

radiation on Lupron, 


11) Breakthrough seizure


12) glaucoma  


13) Dyslipidemia


14) Transient hypoglycemia


15) DNI/DNR


Plan: 


Leukocytosis resolved, pending urine culture. Blood culture x2 with no growth in

the last 24 hours. ID following, on cefepime.  HGB of 7.6 from 10, will give iv 

iron, and transfuse 1 unit. 


Hyponatremia, cyn and hyperkalemia responded to hydration, will discontinue 

fluid and encourage po intake. Continue with Tylenol prn for fever. Continue 

with fentanyl, and Percocet prn for pain. Continue with Lopid for hld. Will 

continue with tegretol 200 mg tid, Keppra 250 mg bid was added per neurologist. 

Will continue with protonix for Gerd. Continue with miralax prn for 

constipation. External compressive device for dvt prophylaxis. 


Spoke to patient's son this morning, consent was giving on the phone for the 

blood transfusion. 


Patient seen, examined and case discussed with Dr. Rivero.








<Nathan Rivero S - Last Filed: 04/03/19 14:38>





Objective





- Vital Signs/Intake and Output


Vital Signs (last 24 hours): 


                                        











Temp Pulse Resp BP Pulse Ox


 


 97.5 F L  101 H  18   103/55 L  98 


 


 04/03/19 14:12  04/03/19 14:12  04/03/19 14:12  04/03/19 14:12  04/02/19 06:00








Intake and Output: 


                                        











 04/03/19 04/03/19





 06:59 18:59


 


Intake Total 1260 10


 


Output Total 100 


 


Balance 1160 10














- Medications


Medications: 


                               Current Medications





Acetaminophen (Tylenol 325mg Tab)  650 mg PO Q6H PRN


   PRN Reason: Fever >100.4 F


   Last Admin: 04/02/19 12:53 Dose:  650 mg


Carbamazepine (Tegretol)  200 mg PO TID [facility]; Protocol


   Last Admin: 04/03/19 12:03 Dose:  200 mg


Fentanyl (Duragesic)  1 patch TD Q72 [facility]


Gemfibrozil (Lopid)  600 mg PO BID [facility]


   Last Admin: 04/03/19 11:21 Dose:  600 mg


Cefepime HCl (Maxipime 1gm)  1 gm in 100 mls @ 100 mls/hr IVPB Q12 [facility]; Protocol


   Stop: 04/10/19 22:01


   Last Admin: 04/03/19 11:21 Dose:  100 mls/hr


Iron Sucrose 200 mg/ Sodium (Chloride)  110 mls @ 110 mls/hr IVPB ONCE [facility]


   Stop: 04/06/19 06:46


   Last Admin: 04/03/19 09:15 Dose:  110 mls/hr


Levetiracetam (Keppra)  250 mg PO BID [facility]


   Last Admin: 04/03/19 11:21 Dose:  250 mg


Oxycodone/Acetaminophen (Percocet 5/325 Mg Tab)  1 tab PO Q4H PRN


   PRN Reason: Pain, moderate (4-7)


   Stop: 04/05/19 10:34


Pantoprazole Sodium (Protonix Ec Tab)  40 mg PO 0600 [facility]


   Last Admin: 04/03/19 05:28 Dose:  40 mg


Polyethylene Glycol (Miralax)  17 gm PO DAILY PRN


   PRN Reason: Constipation











- Labs


Labs: 


                                        





                                 04/03/19 06:15 





                                 04/03/19 06:15 





                                        











PT  16.4 SECONDS (9.4-12.5)  H  04/01/19  12:00    


 


INR  1.45   04/01/19  12:00    


 


APTT  38.5 Seconds (26.9-38.3)  H  04/01/19  12:00    














Assessment and Plan





- Assessment and Plan (Free Text)


Plan: 





Pt seen and examined by me. I have reviewed the note of the medical resident and

I agree with it. I have discussed the assessment and plan with the resident. I 

have reviewed the medications and the last labs. Pt with UTI on Cefepime. Anemia

due to blood loss with iron def and prostate ca. Blood loss is from hematuria 

with tolentino. Pt will get 1 U transfusion. Hyponatremia is improving with IVF. 

Hyperkalemia is improved. Percocet for pain prn due to bone mets from Prostate 

ca. Pt is on Keppr and Tegretol for Sz.

## 2019-04-03 NOTE — CP.PCM.CON
<HarrisonChucky pulido - Last Filed: 04/03/19 19:13>





History of Present Illness





- History of Present Illness


History of Present Illness: 





Podiatry consult note for attending Dr. Bishop:





89 y/o M patient with PMH of Metastatic prostate cancer, seizures, glaucoma, 

dementia, UTI seen and evaluated at the bedside for discolored and elongated toe

nails bilaterally with pain of the right foot. Patient states that he didnt 

have pain in his foot before but today he started to feel some discomfort in his

right foot. Patient denies any trauma to his right foot. Patient denies any 

other pedal complaint. He denies any recent fever/Nausea/Vomiting/cough/chills 

or SOB.





PMHx: Metastatic prostate cancer, seizures, glaucoma, dementia, UTI


PSH: b/l cataract surgery 


Allergies: NKA


Social Hx: Denies EtOH, drug or tobacco use.








Review of Systems





- Review of Systems


Review of Systems: 





As per HPI





- Constitutional


Constitutional: As Per HPI





Past Patient History





- Infectious Disease


Hx of Infectious Diseases: None





- Tetanus Immunizations


Tetanus Immunization: Unknown





- Past Medical History & Family History


Past Medical History?: Yes





- Past Social History


Smoking Status: Former Smoker


Alcohol: None


Drugs: Denies


Home Situation {Lives}: With Family





- CARDIAC


Hx Cardiac Disorders: Yes


Hx Congestive Heart Failure: Yes


Hx Pacemaker: No


Hx Peripheral Edema: Yes (ble +1)





- PULMONARY


Hx Respiratory Disorders: Yes (H/O SMOKING CIGARETTES)


Hx Pneumonia: Yes


Other/Comment: EMPYEMA/SARCOIDOSIS, left lung sx pt unsure what kind of sx





- NEUROLOGICAL


Hx Seizures: Yes





- HEENT


Hx HEENT Problems: Yes


Hx Cataracts: Yes (with bilateral sx)





- RENAL


Hx Chronic Kidney Disease: Yes (retention)


Other/Comment: chronic tolentino-Prostate ca.





- ENDOCRINE/METABOLIC


Hx Endocrine Disorders: No





- HEMATOLOGICAL/ONCOLOGICAL


Hx Blood Disorders: Yes (sepsis)


Hx Cancer: Yes (prostate)


Other/Comment: lupron injections once a month





- INTEGUMENTARY


Hx Dermatological Problems: Yes


Other/Comment: left mid back fading surgical scar and area of round indentation 

in skin left outer mid back, discolored toenails, r ft 4th toe nail falling off,

r ft great toe red dry wound, 2nd toenail black, left foot dry toenails, left 

hip pink pealed skin rash, buttocks reddened, lle fading old bruises





- MUSCULOSKELETAL/RHEUMATOLOGICAL


Hx Falls: No





- GASTROINTESTINAL


Hx Gastrointestinal Disorders: No





- GENITOURINARY/GYNECOLOGICAL


Hx Prostate Problems: Yes





- PSYCHIATRIC


Hx Substance Use: No





- SURGICAL HISTORY


Hx Mastectomy: No


Other/Comment: left lung sx





- ANESTHESIA


Hx Anesthesia: No


Hx Anesthesia Reactions: No





Meds


Allergies/Adverse Reactions: 


                                    Allergies











Allergy/AdvReac Type Severity Reaction Status Date / Time


 


No Known Allergies Allergy   Verified 01/29/19 18:01














- Medications


Medications: 


                               Current Medications





Acetaminophen (Tylenol 325mg Tab)  650 mg PO Q6H PRN


   PRN Reason: Fever >100.4 F


   Last Admin: 04/02/19 12:53 Dose:  650 mg


Carbamazepine (Tegretol)  200 mg PO TID [facility]; Protocol


   Last Admin: 04/03/19 16:54 Dose:  200 mg


Fentanyl (Duragesic)  1 patch TD Q72 [facility]


Gemfibrozil (Lopid)  600 mg PO BID [facility]


   Last Admin: 04/03/19 18:40 Dose:  Not Given


Cefepime HCl (Maxipime 1gm)  1 gm in 100 mls @ 100 mls/hr IVPB Q12 [facility]; Protocol


   Stop: 04/10/19 22:01


   Last Admin: 04/03/19 11:21 Dose:  100 mls/hr


Iron Sucrose 200 mg/ Sodium (Chloride)  110 mls @ 110 mls/hr IVPB ONCE [facility]


   Stop: 04/06/19 06:46


   Last Admin: 04/03/19 09:15 Dose:  110 mls/hr


Levetiracetam (Keppra)  250 mg PO BID [facility]


   Last Admin: 04/03/19 18:40 Dose:  Not Given


Oxycodone/Acetaminophen (Percocet 5/325 Mg Tab)  1 tab PO Q4H PRN


   PRN Reason: Pain, moderate (4-7)


   Stop: 04/05/19 10:34


Pantoprazole Sodium (Protonix Ec Tab)  40 mg PO 0600 [facility]


   Last Admin: 04/03/19 05:28 Dose:  40 mg


Polyethylene Glycol (Miralax)  17 gm PO DAILY PRN


   PRN Reason: Constipation











Physical Exam





- Constitutional


Appears: Non-toxic, No Acute Distress





- Head Exam


Head Exam: ATRAUMATIC, NORMOCEPHALIC





- Extremities Exam


Additional comments: 





B/L LE focused exam:





Vasc: DP/PT faintly palpable  b/l, Cap refill < 3 sec to all digits, Temp 

gradient warm to cool from proximal to distal. Mild non-pitting thais-malleolar 

edema noted b/l R>L.


\


Neuro: Gross and protective sensations diminished.





Derm: No open lesions, No clinical signs of active infection. Toe nails 

elongated, Dystrophic, Discolored and thickened X 10. Dry scaly lesions noted at

the toes b/l. 2nd toe nail is darkly discolored. 





MSK: B/L cavus foot. B/L HAV, B/L Hammer toes 2-5. No pain on palpating both 

feet. MMT 3/5 to all groups b/l..














- Neurological Exam


Neurological exam: Alert





Results





- Vital Signs


Recent Vital Signs: 


                                Last Vital Signs











Temp  97.4 F L  04/03/19 17:32


 


Pulse  101 H  04/03/19 17:32


 


Resp  20   04/03/19 17:32


 


BP  104/51 L  04/03/19 17:32


 


Pulse Ox  98   04/02/19 06:00














- Labs


Result Diagrams: 


                                 04/03/19 06:15





                                 04/03/19 06:15


Labs: 


                         Laboratory Results - last 24 hr











  04/01/19 04/03/19 04/03/19





  12:00 06:15 06:15


 


WBC   9.2 


 


RBC   2.49 L 


 


Hgb   7.6 L 


 


Hct   23.0 L 


 


MCV   92.4 


 


MCH   30.5 


 


MCHC   33.0 


 


RDW   15.2 H 


 


Plt Count   218 


 


MPV   9.3 


 


Neut % (Auto)   55.3 


 


Lymph % (Auto)   33.3 


 


Mono % (Auto)   11.1 H 


 


Eos % (Auto)   0.2 L 


 


Baso % (Auto)   0.1 


 


Lymph # (Auto)   3.1 


 


Mono # (Auto)   1.0 H 


 


Eos # (Auto)   0.0 


 


Baso # (Auto)   0.01 


 


Absolute Neuts (auto)   5.10 


 


Sodium    131 L


 


Potassium    3.9


 


Chloride    103


 


Carbon Dioxide    20 L


 


Anion Gap    12


 


BUN    29 H


 


Creatinine    0.7 L


 


Est GFR ( Amer)    > 60


 


Est GFR (Non-Af Amer)    > 60


 


Random Glucose    80


 


Calcium    8.5


 


Phosphorus    3.3


 


Magnesium    1.8


 


Total Bilirubin    0.3


 


AST    52


 


ALT    15


 


Alkaline Phosphatase    104


 


Total Protein    6.4


 


Albumin    3.0


 


Globulin    3.4


 


Albumin/Globulin Ratio    0.9 L


 


Blood Type  A POSITIVE  


 


Antibody Screen  Negative  


 


Crossmatch  See Detail  


 


BBK History Checked  Patient has bt  














Assessment & Plan





- Assessment and Plan (Free Text)


Assessment: 





89 y/o M patient with PMH of Metastatic prostate cancer, seizures, glaucoma, 

dementia, UTI seen and evaluated at the bedside for discolored and elongated toe

nails bilaterally with pain of the right foot.


Plan: 








Patient seen and evaluated 


Plan discussed with Dr. Bishop


Charts, labs and vitals reviewed: Afebrile, Absent leukocytosis.


Toe nails trimmed X 10 using sterile nail nipper.


Patient tolerated the nail trimming well with no complications.


Ordered R foot 3 views X-ray.


Ordered Aquaphor cream to be applied BID to b/l feet.


Thank you for the consult.


Please reconsult if needed





- Date & Time


Date: 04/03/19


Time: 07:40





<Sai Bishop - Last Filed: 04/05/19 07:10>





Meds





- Medications


Medications: 


                               Current Medications





Acetaminophen (Tylenol 325mg Tab)  650 mg PO Q6H PRN


   PRN Reason: Fever >100.4 F


   Last Admin: 04/02/19 12:53 Dose:  650 mg


Carbamazepine (Tegretol)  200 mg PO TID [facility]; Protocol


   Last Admin: 04/04/19 16:59 Dose:  200 mg


Cefpodoxime Proxetil (Vantin)  200 mg PO Q12 [facility]; Protocol


   Stop: 04/09/19 22:01


   Last Admin: 04/04/19 21:33 Dose:  200 mg


Fentanyl (Duragesic)  1 patch TD Q72 [facility]


Gemfibrozil (Lopid)  600 mg PO BID [facility]


   Last Admin: 04/04/19 16:59 Dose:  600 mg


Iron Sucrose 200 mg/ Sodium (Chloride)  110 mls @ 110 mls/hr IVPB ONCE [facility]


   Stop: 04/06/19 06:46


   Last Admin: 04/05/19 06:38 Dose:  110 mls/hr


Levetiracetam (Keppra)  250 mg PO BID [facility]


   Last Admin: 04/04/19 16:59 Dose:  250 mg


Multi-Ingredient Ointment (Hydrophor Oint)  1 gm TOP Q6H [facility]


   Last Admin: 04/04/19 06:24 Dose:  1 mg


Pantoprazole Sodium (Protonix Ec Tab)  40 mg PO 0600 [facility]


   Last Admin: 04/05/19 06:39 Dose:  40 mg


Polyethylene Glycol (Miralax)  17 gm PO DAILY PRN


   PRN Reason: Constipation











Results





- Vital Signs


Recent Vital Signs: 


                                Last Vital Signs











Temp  98.5 F   04/04/19 14:00


 


Pulse  94 H  04/04/19 14:00


 


Resp  18   04/04/19 14:00


 


BP  116/63   04/04/19 14:00


 


Pulse Ox  97   04/04/19 14:00














- Labs


Result Diagrams: 


                                 04/04/19 08:00





                                 04/04/19 08:00


Labs: 


                         Laboratory Results - last 24 hr











  04/02/19 04/02/19 04/02/19





  10:38 15:08 20:31


 


WBC   


 


RBC   


 


Hgb   


 


Hct   


 


MCV   


 


MCH   


 


MCHC   


 


RDW   


 


Plt Count   


 


MPV   


 


Neut % (Auto)   


 


Lymph % (Auto)   


 


Mono % (Auto)   


 


Eos % (Auto)   


 


Baso % (Auto)   


 


Lymph # (Auto)   


 


Mono # (Auto)   


 


Eos # (Auto)   


 


Baso # (Auto)   


 


Absolute Neuts (auto)   


 


Sodium   


 


Potassium   


 


Chloride   


 


Carbon Dioxide   


 


Anion Gap   


 


BUN   


 


Creatinine   


 


Est GFR ( Amer)   


 


Est GFR (Non-Af Amer)   


 


POC Glucose (mg/dL)  98  75  110


 


Random Glucose   


 


Calcium   


 


Phosphorus   


 


Magnesium   


 


Total Bilirubin   


 


AST   


 


ALT   


 


Alkaline Phosphatase   


 


Total Protein   


 


Albumin   


 


Globulin   


 


Albumin/Globulin Ratio   














  04/03/19 04/03/19 04/04/19





  06:11 10:09 08:00


 


WBC    6.9  D


 


RBC    2.88 L


 


Hgb    8.5 L


 


Hct    26.0 L


 


MCV    90.3


 


MCH    29.5


 


MCHC    32.7


 


RDW    15.2 H


 


Plt Count    197


 


MPV    8.9


 


Neut % (Auto)    50.7


 


Lymph % (Auto)    39.9 H


 


Mono % (Auto)    9.0 H


 


Eos % (Auto)    0.3 L


 


Baso % (Auto)    0.1


 


Lymph # (Auto)    2.8


 


Mono # (Auto)    0.6


 


Eos # (Auto)    0.0


 


Baso # (Auto)    0.01


 


Absolute Neuts (auto)    3.50


 


Sodium   


 


Potassium   


 


Chloride   


 


Carbon Dioxide   


 


Anion Gap   


 


BUN   


 


Creatinine   


 


Est GFR ( Amer)   


 


Est GFR (Non-Af Amer)   


 


POC Glucose (mg/dL)  90  94 


 


Random Glucose   


 


Calcium   


 


Phosphorus   


 


Magnesium   


 


Total Bilirubin   


 


AST   


 


ALT   


 


Alkaline Phosphatase   


 


Total Protein   


 


Albumin   


 


Globulin   


 


Albumin/Globulin Ratio   














  04/04/19





  08:00


 


WBC 


 


RBC 


 


Hgb 


 


Hct 


 


MCV 


 


MCH 


 


MCHC 


 


RDW 


 


Plt Count 


 


MPV 


 


Neut % (Auto) 


 


Lymph % (Auto) 


 


Mono % (Auto) 


 


Eos % (Auto) 


 


Baso % (Auto) 


 


Lymph # (Auto) 


 


Mono # (Auto) 


 


Eos # (Auto) 


 


Baso # (Auto) 


 


Absolute Neuts (auto) 


 


Sodium  137


 


Potassium  3.5 L


 


Chloride  107


 


Carbon Dioxide  22


 


Anion Gap  12


 


BUN  20


 


Creatinine  0.5 L


 


Est GFR ( Amer)  > 60


 


Est GFR (Non-Af Amer)  > 60


 


POC Glucose (mg/dL) 


 


Random Glucose  87


 


Calcium  8.9


 


Phosphorus  2.6


 


Magnesium  1.6 L


 


Total Bilirubin  0.5


 


AST  37


 


ALT  13


 


Alkaline Phosphatase  102


 


Total Protein  6.4


 


Albumin  3.1


 


Globulin  3.3


 


Albumin/Globulin Ratio  0.9 L














Attending/Attestation





- Attestation


I have personally seen and examined this patient.: Yes


I have fully participated in the care of the patient.: Yes


I have reviewed all pertinent clinical information: Yes

## 2019-04-03 NOTE — CP.PCM.PN
<Josiah Horn - Last Filed: 04/03/19 14:04>





Subjective





- Date & Time of Evaluation


Date of Evaluation: 04/03/19


Time of Evaluation: 08:55





- Subjective


Subjective: 





Josiah Horn D.O. PGY-3, Internal Medicine Resident, Infectious Disease Progress 

Note





89 year old male with a PMH of metastatic prostate cancer, seizures, glaucoma, 

dementia, chronic indwelling tolentino with previous multiple UTIs with E.coli, 

Klebsiella, and Enterococcus, who presents  due to concern for darkening urine 

and decreased output from tolentino while at home per son. Infectious disease 

consultation was requested.





Patient was seen and examined at bedside.


Pleasantly enjoying breakfast.


Appears somewhat tired.


No acute complaints.





Objective





- Vital Signs/Intake and Output


Vital Signs (last 24 hours): 


                                        











Temp Pulse Resp BP Pulse Ox


 


 97.9 F   101 H  20   135/67   98 


 


 04/03/19 12:00  04/03/19 12:00  04/03/19 12:00  04/03/19 12:00  04/02/19 06:00








Intake and Output: 


                                        











 04/03/19 04/03/19





 06:59 18:59


 


Intake Total 1260 


 


Output Total 100 


 


Balance 1160 














- Medications


Medications: 


                               Current Medications





Acetaminophen (Tylenol 325mg Tab)  650 mg PO Q6H PRN


   PRN Reason: Fever >100.4 F


   Last Admin: 04/02/19 12:53 Dose:  650 mg


Carbamazepine (Tegretol)  200 mg PO TID [facility]; Protocol


   Last Admin: 04/03/19 12:03 Dose:  200 mg


Fentanyl (Duragesic)  1 patch TD Q72 [facility]


Gemfibrozil (Lopid)  600 mg PO BID [facility]


   Last Admin: 04/03/19 11:21 Dose:  600 mg


Cefepime HCl (Maxipime 1gm)  1 gm in 100 mls @ 100 mls/hr IVPB Q12 [facility]; Protocol


   Stop: 04/10/19 22:01


   Last Admin: 04/03/19 11:21 Dose:  100 mls/hr


Iron Sucrose 200 mg/ Sodium (Chloride)  110 mls @ 110 mls/hr IVPB ONCE [facility]


   Stop: 04/06/19 06:46


   Last Admin: 04/03/19 09:15 Dose:  110 mls/hr


Levetiracetam (Keppra)  250 mg PO BID [facility]


   Last Admin: 04/03/19 11:21 Dose:  250 mg


Oxycodone/Acetaminophen (Percocet 5/325 Mg Tab)  1 tab PO Q4H PRN


   PRN Reason: Pain, moderate (4-7)


   Stop: 04/05/19 10:34


Pantoprazole Sodium (Protonix Ec Tab)  40 mg PO 0600 [facility]


   Last Admin: 04/03/19 05:28 Dose:  40 mg


Polyethylene Glycol (Miralax)  17 gm PO DAILY PRN


   PRN Reason: Constipation











- Labs


Labs: 


                                        





                                 04/03/19 06:15 





                                 04/03/19 06:15 





                                        











PT  16.4 SECONDS (9.4-12.5)  H  04/01/19  12:00    


 


INR  1.45   04/01/19  12:00    


 


APTT  38.5 Seconds (26.9-38.3)  H  04/01/19  12:00    











- Constitutional


Appears: No Acute Distress, Chronically Ill





- Head Exam


Head Exam: ATRAUMATIC, NORMOCEPHALIC





- Eye Exam


Eye Exam: EOMI.  absent: Scleral icterus





- ENT Exam


ENT Exam: Normal Exam





- Neck Exam


Neck exam: Positive for: Normal Inspection





- Respiratory Exam


Respiratory Exam: absent: Rhonchi





- Cardiovascular Exam


Cardiovascular Exam: RRR, +S1, +S2





- GI/Abdominal Exam


GI & Abdominal Exam: Soft.  absent: Distended





-  Exam


Additional comments: tolentino in place with darker yellow urine and some dark s

ediment





- Extremities Exam


Extremities exam: Negative for: tenderness





- Neurological Exam


Neurological exam: Alert, Awake





- Skin


Skin Exam: Dry, Warm





Assessment and Plan





- Assessment and Plan (Free Text)


Assessment: 





89 year old male with a PMH of metastatic prostate cancer, seizures, glaucoma, 

dementia, chronic indwelling tolentino with previous multiple UTIs with E.coli, 

Klebsiella, and Enterococcus, who presents  due to concern for darkening urine 

and decreased output from tolentino while at home per son. Infectious disease 

consultation was requested.


Plan: 





Sepsis likely from urinary source


Metastatic prostate cancer


Seizures


Glaucoma


Dementia


Chronic indwelling Tolentino





Afebrile with no leukocytosis


Hemodynamically stable


Blood cultures negative 2/2 day 2


Urine cx showing yeast species, likely contaminant/colonization


Continue cefepime day 2


We will follow with you





Patient was seen and examined and case to be discussed with attending physician


Thank you for the pleasure participating in the care of this interesting patient





<Donta Alvarez - Last Filed: 04/03/19 17:52>





Objective





- Vital Signs/Intake and Output


Vital Signs (last 24 hours): 


                                        











Temp Pulse Resp BP Pulse Ox


 


 97.4 F L  101 H  20   104/51 L  98 


 


 04/03/19 17:32  04/03/19 17:32  04/03/19 17:32  04/03/19 17:32  04/02/19 06:00








Intake and Output: 


                                        











 04/03/19 04/03/19





 06:59 18:59


 


Intake Total 1260 360


 


Output Total 100 


 


Balance 1160 360














- Medications


Medications: 


                               Current Medications





Acetaminophen (Tylenol 325mg Tab)  650 mg PO Q6H PRN


   PRN Reason: Fever >100.4 F


   Last Admin: 04/02/19 12:53 Dose:  650 mg


Carbamazepine (Tegretol)  200 mg PO TID [facility]; Protocol


   Last Admin: 04/03/19 16:54 Dose:  200 mg


Fentanyl (Duragesic)  1 patch TD Q72 [facility]


Gemfibrozil (Lopid)  600 mg PO BID [facility]


   Last Admin: 04/03/19 16:53 Dose:  600 mg


Cefepime HCl (Maxipime 1gm)  1 gm in 100 mls @ 100 mls/hr IVPB Q12 [facility]; Protocol


   Stop: 04/10/19 22:01


   Last Admin: 04/03/19 11:21 Dose:  100 mls/hr


Iron Sucrose 200 mg/ Sodium (Chloride)  110 mls @ 110 mls/hr IVPB ONCE [facility]


   Stop: 04/06/19 06:46


   Last Admin: 04/03/19 09:15 Dose:  110 mls/hr


Levetiracetam (Keppra)  250 mg PO BID [facility]


   Last Admin: 04/03/19 16:53 Dose:  250 mg


Oxycodone/Acetaminophen (Percocet 5/325 Mg Tab)  1 tab PO Q4H PRN


   PRN Reason: Pain, moderate (4-7)


   Stop: 04/05/19 10:34


Pantoprazole Sodium (Protonix Ec Tab)  40 mg PO 0600 [facility]


   Last Admin: 04/03/19 05:28 Dose:  40 mg


Polyethylene Glycol (Miralax)  17 gm PO DAILY PRN


   PRN Reason: Constipation











- Labs


Labs: 


                                        





                                 04/03/19 06:15 





                                 04/03/19 06:15 





                                        











PT  16.4 SECONDS (9.4-12.5)  H  04/01/19  12:00    


 


INR  1.45   04/01/19  12:00    


 


APTT  38.5 Seconds (26.9-38.3)  H  04/01/19  12:00    














Attending/Attestation





- Attestation


I have personally seen and examined this patient.: Yes


I have fully participated in the care of the patient.: Yes


I have reviewed all pertinent clinical information, including history, physical 

exam and plan: Yes

## 2019-04-03 NOTE — HP
DATE OF EXAM:  04/02/2019



Addendum to today's history and physical dictated by the Resident and

myself covering for Dr. May.



NOTE:  The H and P is reviewed.  The patient apparently is an 89-year-old

male who was brought to emergency room because of foul smelling urine, dark

in color.  He was recently treated for Proteus mirabilis and Klebsiella

UTI.  Upon arrival, he was found to be septic, so admitted for further

treatment and IV antibiotic.  The patient has history of metastatic

prostate cancer.  He has indwelling catheter.  History of palliative

radiation and hormonal treatment.  History of seizures.  When I examined

the patient, he seemed to be lethargic with poor oral intake.  He seems to

be spiking fever of T-max 100.2.  Other examination agreed with the

resident.



LABORATORY DATA:  He had a CT scan of the abdomen and pelvis shows

hydroureteronephrosis, no obstructive stone or mass is identified except

right posterior pararenal soft tissue mass suspicious for metastasis along

with inferior rectal mass, multiple enlarged retroperitoneal and pelvic

lymph nodes along with perirectal lymph nodes.  So far, blood cultures are

negative.  Urine culture is pending.



ASSESSMENT AND PLAN:  The patient admitted with lethargy, fever, with

history of carcinoma of prostate, now admitted with urinary tract

infection.  Currently, he is on Maxipime as recommended by Infectious

Disease.  He is on antiseizure medication.  He is on analgesics, Tegretol. 

He is on IV fluid.  Prognosis is poor.  The patient is DNR.  He also was

found to have hematuria.  The patient was followed by Dr. Jackson in the past,

requests us for his consult.







__________________________________________

Cherri Mills MD





__________________________________________



DD:  04/02/2019 23:15:36

DT:  04/03/2019 1:37:12

Job # [id number]

## 2019-04-04 LAB
ALBUMIN SERPL-MCNC: 3.1 G/DL
ALBUMIN/GLOB SERPL: 0.9 {RATIO}
ALT SERPL-CCNC: 13 U/L
AST SERPL-CCNC: 37 U/L
BASOPHILS # BLD AUTO: 0.01 K/MM3
BASOPHILS NFR BLD: 0.1 %
BUN SERPL-MCNC: 20 MG/DL
CALCIUM SERPL-MCNC: 8.9 MG/DL
EOSINOPHIL # BLD: 0 10*3/UL
EOSINOPHIL NFR BLD: 0.3 %
ERYTHROCYTE [DISTWIDTH] IN BLOOD BY AUTOMATED COUNT: 15.2 %
GFR NON-AFRICAN AMERICAN: > 60
HGB BLD-MCNC: 8.5 G/DL
LYMPHOCYTES # BLD: 2.8 10*3/UL
LYMPHOCYTES NFR BLD AUTO: 39.9 %
MCH RBC QN AUTO: 29.5 PG
MCHC RBC AUTO-ENTMCNC: 32.7 G/DL
MCV RBC AUTO: 90.3 FL
MONOCYTES # BLD AUTO: 0.6 10*3/UL
MONOCYTES NFR BLD: 9 %
PLATELET # BLD: 197 10^3/UL
PMV BLD AUTO: 8.9 FL
RBC # BLD AUTO: 2.88 10^6/UL
WBC # BLD AUTO: 6.9 10^3/UL

## 2019-04-04 RX ADMIN — PANTOPRAZOLE SODIUM SCH MG: 40 TABLET, DELAYED RELEASE ORAL at 19:37

## 2019-04-04 RX ADMIN — CEFEPIME SCH MLS/HR: 1 INJECTION, SOLUTION INTRAVENOUS at 10:17

## 2019-04-04 RX ADMIN — PETROLATUM SCH MG: 42 OINTMENT TOPICAL at 01:34

## 2019-04-04 RX ADMIN — CEFPODOXIME PROXETIL SCH MG: 200 TABLET, FILM COATED ORAL at 21:33

## 2019-04-04 RX ADMIN — PETROLATUM SCH MG: 42 OINTMENT TOPICAL at 06:24

## 2019-04-04 NOTE — CP.PCM.CON
History of Present Illness





- History of Present Illness


History of Present Illness: 





Palliative consult requested by Dr MIGUELITO Rivero


Reason: Goals of care





This is an 89 year old male with history of metastatic prostate ca

ncer,seizures,dementia who presented to ED on 4/1 with dark urine. Patient was 

found to have UTI, with lactic acid of 2.1 . As per patient's son the Tolentino 

catheter was draining dark urine. While in ED the patient had a seizure. The 

patient complained of feeling cold, denied fever,nausea, vomiting or 

diarrhea,abdominal pain/chest pain. The patient has chronic low back pain





Labs : WBC 13.3, Hbb10.0NA 131, K 3.9, BUN 29, Crt 0.7. Blood culture negative. 

UC + yeast


Chest x ray: negative.


Head CT: Age related changes, no acute findings


CT of Ab/Pel: Right hydronephrosis, no obstruction. Right para renal soft tissue

mass suspicious for metastasis.possible inferior rectal mass. Multiple enlarged 

retro peritoneal and pelvic lymph nodes. [place] rectal lymph nodes, widespread 

sclerotic metastasis.





PMHx: metastatic prostate cancer s/p ADT and palliative XRT,on Lupron, seizures,

glaucoma, dementia, UTI w/ indwelling tolentino (+ for E.coli, Klebsiella, 

Enterococcus, Proteus Mirabilis), Sarcoidosis, empyema,chronic low back pain..


PSHx: b/l cataract surgery, lung surgery. 





Social History: Former smoker,no alcohol or illicit drug use. Patient lives at 

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 He is not active, was s wheelchair bound but has 

recently been to weak to get out of bed





Family History: Non-contributory 





Advance Care Planning: POLST;DNR/DNI. His son Cristino Hodges is [phone]





Review of Systems: As per HPI, 12 point ROS otherwise negative.








Past Patient History





- Infectious Disease


Hx of Infectious Diseases: None





- Tetanus Immunizations


Tetanus Immunization: Unknown





- Past Medical History & Family History


Past Medical History?: Yes





- Past Social History


Smoking Status: Former Smoker


Alcohol: None


Drugs: Denies


Home Situation {Lives}: With Family





- CARDIAC


Hx Cardiac Disorders: Yes


Hx Congestive Heart Failure: Yes


Hx Pacemaker: No


Hx Peripheral Edema: Yes (ble +1)





- PULMONARY


Hx Respiratory Disorders: Yes (H/O SMOKING CIGARETTES)


Hx Pneumonia: Yes


Other/Comment: EMPYEMA/SARCOIDOSIS, left lung sx pt unsure what kind of sx





- NEUROLOGICAL


Hx Seizures: Yes





- HEENT


Hx HEENT Problems: Yes


Hx Cataracts: Yes (with bilateral sx)





- RENAL


Hx Chronic Kidney Disease: Yes (retention)


Other/Comment: chronic tolentino-Prostate ca.





- ENDOCRINE/METABOLIC


Hx Endocrine Disorders: No





- HEMATOLOGICAL/ONCOLOGICAL


Hx Blood Disorders: Yes (sepsis)


Hx Cancer: Yes (prostate)


Other/Comment: lupron injections once a month





- INTEGUMENTARY


Hx Dermatological Problems: Yes


Other/Comment: left mid back fading surgical scar and area of round indentation 

in skin left outer mid back, discolored toenails, r ft 4th toe nail falling off,

r ft great toe red dry wound, 2nd toenail black, left foot dry toenails, left 

hip pink pealed skin rash, buttocks reddened, lle fading old bruises





- MUSCULOSKELETAL/RHEUMATOLOGICAL


Hx Falls: No





- GASTROINTESTINAL


Hx Gastrointestinal Disorders: No





- GENITOURINARY/GYNECOLOGICAL


Hx Prostate Problems: Yes





- PSYCHIATRIC


Hx Substance Use: No





- SURGICAL HISTORY


Hx Mastectomy: No


Other/Comment: left lung sx





- ANESTHESIA


Hx Anesthesia: No


Hx Anesthesia Reactions: No





Meds


Allergies/Adverse Reactions: 


                                    Allergies











Allergy/AdvReac Type Severity Reaction Status Date / Time


 


No Known Allergies Allergy   Verified 01/29/19 18:01














- Medications


Medications: 


                               Current Medications





Acetaminophen (Tylenol 325mg Tab)  650 mg PO Q6H PRN


   PRN Reason: Fever >100.4 F


   Last Admin: 04/02/19 12:53 Dose:  650 mg


Carbamazepine (Tegretol)  200 mg PO TID [facility]; Protocol


   Last Admin: 04/04/19 13:06 Dose:  200 mg


Cefpodoxime Proxetil (Vantin)  200 mg PO Q12 [facility]; Protocol


   Stop: 04/09/19 22:01


Fentanyl (Duragesic)  1 patch TD Q72 [facility]


Gemfibrozil (Lopid)  600 mg PO BID [facility]


   Last Admin: 04/04/19 10:17 Dose:  600 mg


Iron Sucrose 200 mg/ Sodium (Chloride)  110 mls @ 110 mls/hr IVPB ONCE [facility]


   Stop: 04/06/19 06:46


   Last Admin: 04/04/19 06:56 Dose:  110 mls/hr


Levetiracetam (Keppra)  250 mg PO BID [facility]


   Last Admin: 04/04/19 10:17 Dose:  250 mg


Multi-Ingredient Ointment (Hydrophor Oint)  1 gm TOP Q6H [facility]


   Last Admin: 04/04/19 06:24 Dose:  1 mg


Pantoprazole Sodium (Protonix Ec Tab)  40 mg PO 0600 [facility]


   Last Admin: 04/03/19 05:28 Dose:  40 mg


Polyethylene Glycol (Miralax)  17 gm PO DAILY PRN


   PRN Reason: Constipation











Physical Exam





- Constitutional


Appears: Cachectic, Chronically Ill





- Head Exam


Head Exam: NORMAL INSPECTION





- Eye Exam


Eye Exam: Normal appearance, PERRL





- ENT Exam


ENT Exam: Mucous Membranes Moist, Normal Oropharynx





- Respiratory Exam


Respiratory Exam: Decreased Breath Sounds, NORMAL BREATHING PATTERN





- Cardiovascular Exam


Cardiovascular Exam: REGULAR RHYTHM, +S1, +S2





- GI/Abdominal Exam


GI & Abdominal Exam: Hypoactive Bowel Sounds, Soft





-  Exam


Additional comments: 





tolentino dark urine





- Extremities Exam


Additional comments: 





2+ edema of both lower enormities





- Back Exam


Back exam: tenderness





- Neurological Exam


Neurological exam: Altered





- Skin


Skin Exam: Dry, Pallor





- Additional Findings


Additional findings: 





Palliative performance scale rating 30%





Results





- Vital Signs


Recent Vital Signs: 


                                Last Vital Signs











Temp  98.4 F   04/04/19 06:00


 


Pulse  90   04/04/19 06:00


 


Resp  18   04/04/19 06:00


 


BP  151/64 H  04/04/19 06:00


 


Pulse Ox  98   04/04/19 06:00














- Labs


Result Diagrams: 


                                 04/04/19 08:00





                                 04/04/19 08:00


Labs: 


                         Laboratory Results - last 24 hr











  04/01/19 04/02/19 04/02/19





  12:00 10:38 15:08


 


WBC   


 


RBC   


 


Hgb   


 


Hct   


 


MCV   


 


MCH   


 


MCHC   


 


RDW   


 


Plt Count   


 


MPV   


 


Neut % (Auto)   


 


Lymph % (Auto)   


 


Mono % (Auto)   


 


Eos % (Auto)   


 


Baso % (Auto)   


 


Lymph # (Auto)   


 


Mono # (Auto)   


 


Eos # (Auto)   


 


Baso # (Auto)   


 


Absolute Neuts (auto)   


 


Sodium   


 


Potassium   


 


Chloride   


 


Carbon Dioxide   


 


Anion Gap   


 


BUN   


 


Creatinine   


 


Est GFR ( Amer)   


 


Est GFR (Non-Af Amer)   


 


POC Glucose (mg/dL)   98  75


 


Random Glucose   


 


Calcium   


 


Phosphorus   


 


Magnesium   


 


Total Bilirubin   


 


AST   


 


ALT   


 


Alkaline Phosphatase   


 


Total Protein   


 


Albumin   


 


Globulin   


 


Albumin/Globulin Ratio   


 


Crossmatch  See Detail  














  04/02/19 04/03/19 04/03/19





  20:31 06:11 10:09


 


WBC   


 


RBC   


 


Hgb   


 


Hct   


 


MCV   


 


MCH   


 


MCHC   


 


RDW   


 


Plt Count   


 


MPV   


 


Neut % (Auto)   


 


Lymph % (Auto)   


 


Mono % (Auto)   


 


Eos % (Auto)   


 


Baso % (Auto)   


 


Lymph # (Auto)   


 


Mono # (Auto)   


 


Eos # (Auto)   


 


Baso # (Auto)   


 


Absolute Neuts (auto)   


 


Sodium   


 


Potassium   


 


Chloride   


 


Carbon Dioxide   


 


Anion Gap   


 


BUN   


 


Creatinine   


 


Est GFR ( Amer)   


 


Est GFR (Non-Af Amer)   


 


POC Glucose (mg/dL)  110  90  94


 


Random Glucose   


 


Calcium   


 


Phosphorus   


 


Magnesium   


 


Total Bilirubin   


 


AST   


 


ALT   


 


Alkaline Phosphatase   


 


Total Protein   


 


Albumin   


 


Globulin   


 


Albumin/Globulin Ratio   


 


Crossmatch   














  04/04/19 04/04/19





  08:00 08:00


 


WBC  6.9  D 


 


RBC  2.88 L 


 


Hgb  8.5 L 


 


Hct  26.0 L 


 


MCV  90.3 


 


MCH  29.5 


 


MCHC  32.7 


 


RDW  15.2 H 


 


Plt Count  197 


 


MPV  8.9 


 


Neut % (Auto)  50.7 


 


Lymph % (Auto)  39.9 H 


 


Mono % (Auto)  9.0 H 


 


Eos % (Auto)  0.3 L 


 


Baso % (Auto)  0.1 


 


Lymph # (Auto)  2.8 


 


Mono # (Auto)  0.6 


 


Eos # (Auto)  0.0 


 


Baso # (Auto)  0.01 


 


Absolute Neuts (auto)  3.50 


 


Sodium   137


 


Potassium   3.5 L


 


Chloride   107


 


Carbon Dioxide   22


 


Anion Gap   12


 


BUN   20


 


Creatinine   0.5 L


 


Est GFR ( Amer)   > 60


 


Est GFR (Non-Af Amer)   > 60


 


POC Glucose (mg/dL)  


 


Random Glucose   87


 


Calcium   8.9


 


Phosphorus   2.6


 


Magnesium   1.6 L


 


Total Bilirubin   0.5


 


AST   37


 


ALT   13


 


Alkaline Phosphatase   102


 


Total Protein   6.4


 


Albumin   3.1


 


Globulin   3.3


 


Albumin/Globulin Ratio   0.9 L


 


Crossmatch  














Assessment & Plan





- Assessment and Plan (Free Text)


Assessment: 





89 year old male with history of  metastatic prostate cancer s/p palliative XRT,

seizures, glaucoma, dementia,indwelling tolentino, multiple UTI's, sarcoidosis who 

is admitted with sepsis,UTI, anemia, seizures,hematuria. Found to have 

progression of metastatic disease in pelvis and invasion to bladder, right 

hydronephrosis.





The patient is alert, confused at times. Bed bound. Has chronic back pain





Patients's son Cristino at bedside. Goals of care discussed at length. Cristino 

understands the extent of his father's illness and poor prognosis. Cristino ve

rbalized that he wants to take his father home and make as comfortable as 

possible. Cristino has a full time caretaker for his father.


Hospice services explained in detail, questions answered. Cristino is agreeable to 

meeting with a hospice liaison to transition home with hospice services.





Cristino to to meet with with [facility] hospice liaison tomorrow morning. 

Time spent with family in goals of care and end of life counseling, 50 minutes 


Plan: 





ID,Urology, Neuro notes reviewed, rec's appreciated 





Goals of care and end of life counseling





Cefepime d/c,now on Vantin





Continue Tegretol and Keppra, seizure precautions





Continue Durgesic 12 mcg for  pain





Bowel regimen, Miralax daily

## 2019-04-04 NOTE — RAD
Date of service: 



04/03/2019



PROCEDURE:  Right Foot Radiographs.



HISTORY:

 Pain R foot. 



COMPARISON:

None.



TECHNIQUE:

3 views obtained.



FINDINGS:



BONES:

Diffuse osteopenia suggests osteoporosis.



The great toe appears in likely chronic hyper extension with all toes 

exhibiting hammertoe deformities.  No acute displaced fracture 

appreciable.  Deformity of the left foot is seen in general with 

excessive high arch present. 



JOINTS:

No definite subluxation or dislocation.  Degenerative cortical 

sclerosis and joint space narrowing seen throughout the joints of the 

foot diffusely. 



SOFT TISSUES:

Normal. 



OTHER FINDINGS:

None.



IMPRESSION:

No acute fracture or dislocation.  Markedly elevated arch with 

diffuse hammertoe deformities identified throughout the digits.

## 2019-04-04 NOTE — CON
DATE:  04/03/2019



GENITOURINARY CONSULTATION



HISTORY OF PRESENT ILLNESS:  I was asked to see this patient who I know

well, with gross hematuria.  The patient is well-known to me.  He has a

long history of metastatic prostate cancer.  He has been on androgen

deprivation therapy but had progression.  He recently had palliative

radiation for worsening bone metastases.  The patient has multiple other

medical problems including advanced dementia, glaucoma, seizures, chronic

urinary infection with urinary retention.  He is managed with Waddell

catheter changes by visiting nurses.  He has been having recurrent episodes

of gross hematuria in the past after his Foleys have been changed.  He

usually responds to antibiotics and IV fluids.  The patient has not

followed up in my office in many years.  He was offered advanced hormonal

therapy; however, it is unclear if the patient ever followed up for this. 

He is being followed by Oncology as well, who have taken over the hormonal

therapy treatment for his prostate cancer.  It is unclear if the patient is

a medical candidate for any of the advanced therapies given his extremely

debilitated state.  The patient is DNR, do not intubate, and was on hospice

recently.  He presents again now with gross hematuria.



PAST MEDICAL HISTORY:  As per the history of present illness.



MEDICATIONS:  Currently include Duragesic, Keppra, Lopid, Maxipime,

MiraLax, Percocet, Protonix, Tegretol, Tylenol.



ALLERGIES:  NO KNOWN DRUG ALLERGIES.



FAMILY HISTORY:  Noncontributory for this admission.



SOCIAL HISTORY:  No smoking or EtOH use.



REVIEW OF SYSTEMS:  These could not be obtained from the patient.  Per the

chart, it appears the patient had seizure or convulsions in the emergency

room, also question of sepsis protocol being activated.



PHYSICAL EXAMINATION:

GENERAL:  The patient is seen in his room.  He is awake and responsive,

although not making sense when asked questions.

VITAL SIGNS:  He is afebrile, temp of 97.9, pulse of 101, /67,

respirations 20.

ABDOMEN:  Soft.  There is no apparent rebound or guarding.

GENITOURINARY:  Waddell catheter in place which is draining darkly

blood-tinged urine.  Scrotum is normal.  Testes bilaterally descended,

atrophic.  Epididymides are normal.

NECK:  No obvious adenopathy.

CHEST:  Shows a slightly increased inspiratory effort.

CARDIAC:  Positive S1, S2.  There is moderate peripheral edema noted.



LABORATORY EXAM:  WBC count 9.2 today, was 13.3 on admission.  Creatinine

0.7 with a GFR greater than 60.  Urinalysis shows too numerous to count

rbc's, wbc's, and is positive for nitrites.  Blood cultures show no growth

after 48 hours.  Urine culture shows yeast species.



IMAGING EXAM:  The patient had a head CT done which showed no intracranial

mass, hemorrhage, or evidence of acute infarct.  The patient had an

abdominopelvic CT done which showed multiple enlarged retroperitoneal and

pelvic lymph nodes.  The extent of the adenopathy is essentially unchanged

from prior exam.  Bladder is not distended, there is a Waddell catheter noted

in the bladder.  There is mild hydronephrosis and hydroureter of the right

kidney.  No left hydronephrosis.  There is a stable 1.7 cm low-density mid

right renal mass consistent with a cyst.  There is a right posterior para

renal soft tissue mass suspicious for metastases, this has enlarged

compared to prior CT.  There is a possible inferior rectal mass, widespread

sclerotic metastases.



IMPRESSION AND PLAN:  This is an 89-year-old male with widely metastatic,

hormonally insensitive prostate cancer.  The patient is do not

resuscitate/do not intubate.  From a urologic standpoint, there is not much

I can offer the patient at this point.  The patient should be seen by

Oncology for continued hormonal therapy and it is unclear whether he has

been receiving this.  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

bleeding.  He also has a chronic urinary infection as he has had urinary

retention for many years with an indwelling Waddell catheter.  The infection

will not be able to be cleared permanently and will continue to recur as

soon as antibiotics are stopped.  Recommend Infectious Disease consultation

for yeast in his urine.  Would continue conservative treatment and I would

recommend discussion regarding hospice care with the patient's son as he

continues to come into the hospital with recurrent issues of hematuria and

worsening metastatic prostate cancer.  Unfortunately, there is no cure that

could be offered to him given his advanced age and other medical issues. 

His condition remains critical and I do not recommend any surgical

intervention at this time given his extremely poor prognosis.





__________________________________________

Price Jackson MD





DD:  04/03/2019 18:27:02

DT:  04/03/2019 18:31:38

Job # [id number]

## 2019-04-04 NOTE — CP.PCM.PN
<Elaine Robison - Last Filed: 04/04/19 10:55>





Subjective





- Date & Time of Evaluation


Date of Evaluation: 04/04/19


Time of Evaluation: 06:55





- Subjective


Subjective: 


IM Resident progress note for Dr. Rivero's service 





Patient remains stable, no acute events overnight. The urine catheter still 

draining dark brown urine. No fever. Patient denies pain, tolerating po intake. 





Objective





- Vital Signs/Intake and Output


Vital Signs (last 24 hours): 


                                        











Temp Pulse Resp BP Pulse Ox


 


 98.4 F   90   18   151/64 H  98 


 


 04/04/19 06:00  04/04/19 06:00  04/04/19 06:00  04/04/19 06:00  04/04/19 06:00








Intake and Output: 


                                        











 04/04/19 04/04/19





 06:59 18:59


 


Intake Total 780 


 


Output Total 3200 


 


Balance -2420 














- Medications


Medications: 


                               Current Medications





Acetaminophen (Tylenol 325mg Tab)  650 mg PO Q6H PRN


   PRN Reason: Fever >100.4 F


   Last Admin: 04/02/19 12:53 Dose:  650 mg


Carbamazepine (Tegretol)  200 mg PO TID [facility]; Protocol


   Last Admin: 04/04/19 10:17 Dose:  200 mg


Fentanyl (Duragesic)  1 patch TD Q72 [facility]


Gemfibrozil (Lopid)  600 mg PO BID [facility]


   Last Admin: 04/04/19 10:17 Dose:  600 mg


Cefepime HCl (Maxipime 1gm)  1 gm in 100 mls @ 100 mls/hr IVPB Q12 [facility]; Protocol


   Stop: 04/10/19 22:01


   Last Admin: 04/04/19 10:17 Dose:  100 mls/hr


Iron Sucrose 200 mg/ Sodium (Chloride)  110 mls @ 110 mls/hr IVPB ONCE [facility]


   Stop: 04/06/19 06:46


   Last Admin: 04/04/19 06:56 Dose:  110 mls/hr


Levetiracetam (Keppra)  250 mg PO BID [facility]


   Last Admin: 04/04/19 10:17 Dose:  250 mg


Multi-Ingredient Ointment (Hydrophor Oint)  1 gm TOP Q6H [facility]


   Last Admin: 04/04/19 06:24 Dose:  1 mg


Pantoprazole Sodium (Protonix Ec Tab)  40 mg PO 0600 [facility]


   Last Admin: 04/03/19 05:28 Dose:  40 mg


Polyethylene Glycol (Miralax)  17 gm PO DAILY PRN


   PRN Reason: Constipation











- Labs


Labs: 


                                        





                                 04/04/19 08:00 





                                 04/04/19 08:00 





                                        











PT  16.4 SECONDS (9.4-12.5)  H  04/01/19  12:00    


 


INR  1.45   04/01/19  12:00    


 


APTT  38.5 Seconds (26.9-38.3)  H  04/01/19  12:00    














- Constitutional


Appears: No Acute Distress, Chronically Ill





- Head Exam


Head Exam: ATRAUMATIC, NORMAL INSPECTION, NORMOCEPHALIC





- Eye Exam


Eye Exam: EOMI, Normal appearance, PERRL.  absent: Scleral icterus


Pupil Exam: NORMAL ACCOMODATION





- ENT Exam


ENT Exam: Mucous Membranes Moist





- Neck Exam


Neck Exam: Normal Inspection





- Respiratory Exam


Respiratory Exam: Clear to Ausculation Bilateral, NORMAL BREATHING PATTERN.  

absent: Rales, Rhonchi, Wheezes, Respiratory Distress, Stridor





- Cardiovascular Exam


Cardiovascular Exam: REGULAR RHYTHM, RRR, +S1, +S2.  absent: Bradycardia, 

Tachycardia, Diastolic murmur, Gallop, Irregular Rhythm, JVD, Rubs, Murmur





- GI/Abdominal Exam


GI & Abdominal Exam: Soft, Normal Bowel Sounds.  absent: Distended, Firm, 

Guarding, Rigid, Tenderness, Organomegaly, Rebound





- Extremities Exam


Extremities Exam: Pedal Edema (trace )





- Back Exam


Back Exam: NORMAL INSPECTION





- Neurological Exam


Neurological Exam: Alert, Awake


Additional comments: 





waxing and waning mental status. 





- Psychiatric Exam


Psychiatric exam: Normal Affect, Normal Mood





- Skin


Skin Exam: Dry, Normal Color, Warm


Additional comments: 





Toe ulcer, poorly kept toe nails. 





Assessment and Plan





- Assessment and Plan (Free Text)


Assessment: 


1) Hematuria from traumatic Waddell and cancer


2) Sepsis due to Recurrent catheter associated UTI


3) Right hydrouteronephrosis


4) Hyponatremia- resolved 


5) Hyperkalemia- resolved, now hypokalemia 


6) CYN- resolved 


7) Acute on chronic anemia likely due to hematuria 


8) Chronic abdominal pain likely due to metastasis


9) Constipation 


10) Metastatic prostate cancer metastatic to the bone s/p ADT and palliative 

radiation on Lupron, 


11) Breakthrough seizure


12) glaucoma  


13) Dyslipidemia


14) Transient hypoglycemia- resolved 


15) DNI/DNR


16) Hypomagnesemia 


Plan: 


For the hematuria, urologist evaluated patient believed patient's metastatic 

prostate cancer likely has invaded the bladder, coupled with recurrent 

infections is contributing to the hematuria. As per urologist there is no cure 

available for patient's malignancy other than hormonal therapy which patient and

his son has refused in the past. Patient is currently DNI/DNR, will follow up 

with patient's son, and consult palliate care for possible hospice if family 

agrees. 


Patient also had CT abdomen and pelvis on this admission revealing extensive 

metastasis. 


For the sepsis, chest x-ray was normal, ua with UTI, however urine culture is 

growing yeast, patient is on cefepime, has just completed levaquin prior to this

admission, leukocytosis has resolved, and no fever, blood cultures with no 

growth, will follow up with ID to possibly deescalate or stop the antibiotic. 


Patient's hyponatremia, cyn was likely from dehydration, and has corrected with 

IVF. Hyperkalemia was secondary to the above, which has also corrected, will 

continue to monitor. Patient now has hypokalemia, and hypomagnesemia which are 

being repleted. 


For the acute on chronic anemia, iron panel revealed iron deficiency, patient 

received a unit of blood yesterday, and is on IV iron, hgb this morning improved

to 8.5, will continue to monitor. 


Will continue with tylenol prn for fever. Continue with tegretol 200 mg tid and 

keppra 250 mg bid for seizure. On lopid for hld. Protonix for gerd. Miralax prn 

for constipation. Podiatry following for foot care, right foot x-ray with no 

acute fractures or dislocation. 


Has external compressive devices for DVT prophylaxis. 





Patient seen, examined and case discussed with Dr. Rivero. 





<Nathan Rivero - Last Filed: 04/04/19 17:38>





Objective





- Vital Signs/Intake and Output


Vital Signs (last 24 hours): 


                                        











Temp Pulse Resp BP Pulse Ox


 


 98.5 F   94 H  18   116/63   97 


 


 04/04/19 14:00  04/04/19 14:00  04/04/19 14:00  04/04/19 14:00  04/04/19 14:00








Intake and Output: 


                                        











 04/04/19 04/04/19





 06:59 18:59


 


Intake Total 780 720


 


Output Total 3200 700


 


Balance -2420 20














- Medications


Medications: 


                               Current Medications





Acetaminophen (Tylenol 325mg Tab)  650 mg PO Q6H PRN


   PRN Reason: Fever >100.4 F


   Last Admin: 04/02/19 12:53 Dose:  650 mg


Carbamazepine (Tegretol)  200 mg PO TID [facility]; Protocol


   Last Admin: 04/04/19 16:59 Dose:  200 mg


Cefpodoxime Proxetil (Vantin)  200 mg PO Q12 [facility]; Protocol


   Stop: 04/09/19 22:01


Fentanyl (Duragesic)  1 patch TD Q72 [facility]


Gemfibrozil (Lopid)  600 mg PO BID [facility]


   Last Admin: 04/04/19 16:59 Dose:  600 mg


Iron Sucrose 200 mg/ Sodium (Chloride)  110 mls @ 110 mls/hr IVPB ONCE [facility]


   Stop: 04/06/19 06:46


   Last Admin: 04/04/19 06:56 Dose:  110 mls/hr


Levetiracetam (Keppra)  250 mg PO BID [facility]


   Last Admin: 04/04/19 16:59 Dose:  250 mg


Multi-Ingredient Ointment (Hydrophor Oint)  1 gm TOP Q6H [facility]


   Last Admin: 04/04/19 06:24 Dose:  1 mg


Pantoprazole Sodium (Protonix Ec Tab)  40 mg PO 0600 [facility]


   Last Admin: 04/03/19 05:28 Dose:  40 mg


Polyethylene Glycol (Miralax)  17 gm PO DAILY PRN


   PRN Reason: Constipation











- Labs


Labs: 


                                        





                                 04/04/19 08:00 





                                 04/04/19 08:00 





                                        











PT  16.4 SECONDS (9.4-12.5)  H  04/01/19  12:00    


 


INR  1.45   04/01/19  12:00    


 


APTT  38.5 Seconds (26.9-38.3)  H  04/01/19  12:00    














Assessment and Plan





- Assessment and Plan (Free Text)


Plan: 





Patient was seen and examined by me. I have reviewed the note of the medical 

resident and have gone over the plan of care. I agree with the note. I have 

reviewed the medications and the last labs.

## 2019-04-04 NOTE — PN
DATE:  04/04/2019



SUBJECTIVE:  The patient is in bed in no acute distress, nontoxic.



PHYSICAL EXAMINATION:

VITAL SIGNS:  Temperature is 97, blood pressure is 120/70, respiratory 16.

HEENT:  Unremarkable.

NECK:  Supple.

LUNGS:  Have decreased breath sounds.

HEART:  Normal S1, S2.

ABDOMEN:  Soft.



LABORATORY EXAMINATION:  Reveals the patient's white count of 6.9 and the

chemistries are noted.  Urinalysis is noted and toxicology is noted. 

Microbiology, his blood cultures are negative.  Review of orders reveals

the patient to be on cefepime.



ASSESSMENT AND PLAN:  This is an 89-year-old male who has prosthetic

metastatic prostate cancer, glaucoma, dementia, chronic indwelling Waddell

catheter and Escherichia coli Klebsiella Enterococcus and presents to the

unit.  With sepsis from urine as the source and seizures and glaucoma,

dementia.  Day #3 of cefepime.  We will switch to p.o..  The patient's. 

Status x-ray of the foot.  Which is reviewed.  We will discontinue cefepime

and switch to p.o. Vantin 10 to complete a short course of therapy and p.o.

Vantin 200 mg twice a day x5 days.







__________________________________________

Donta Alvarez MD





DD:  04/04/2019 12:39:49

DT:  04/04/2019 12:41:54

Job # [id number]

## 2019-04-04 NOTE — CP.PCM.PN
Subjective





- Date & Time of Evaluation


Date of Evaluation: 04/04/19


Time of Evaluation: 13:39





- Subjective


Subjective: 


Podiatry progress note for Yaz Husain/Edna





89 y/o M patient seen and evaluated at the bedside  with Dr. Husain for 

discolored and elongated toe nails bilaterally with pain of the right foot. 

Patient states that he didnt have pain in his foot before but today he started 

to feel some discomfort in his right foot. Patient denies any trauma to his 

right foot. Patient denies any other pedal complaint. He denies any recent 

fever/Nausea/Vomiting/cough/chills or SOB.





Objective





- Vital Signs/Intake and Output


Vital Signs (last 24 hours): 


                                        











Temp Pulse Resp BP Pulse Ox


 


 98.4 F   90   18   151/64 H  98 


 


 04/04/19 06:00  04/04/19 06:00  04/04/19 06:00  04/04/19 06:00  04/04/19 06:00








Intake and Output: 


                                        











 04/04/19 04/04/19





 06:59 18:59


 


Intake Total 780 


 


Output Total 3200 


 


Balance -2420 














- Medications


Medications: 


                               Current Medications





Acetaminophen (Tylenol 325mg Tab)  650 mg PO Q6H PRN


   PRN Reason: Fever >100.4 F


   Last Admin: 04/02/19 12:53 Dose:  650 mg


Carbamazepine (Tegretol)  200 mg PO TID [facility]; Protocol


   Last Admin: 04/04/19 13:06 Dose:  200 mg


Cefpodoxime Proxetil (Vantin)  200 mg PO Q12 [facility]; Protocol


   Stop: 04/09/19 22:01


Fentanyl (Duragesic)  1 patch TD Q72 [facility]


Gemfibrozil (Lopid)  600 mg PO BID [facility]


   Last Admin: 04/04/19 10:17 Dose:  600 mg


Iron Sucrose 200 mg/ Sodium (Chloride)  110 mls @ 110 mls/hr IVPB ONCE [facility]


   Stop: 04/06/19 06:46


   Last Admin: 04/04/19 06:56 Dose:  110 mls/hr


Levetiracetam (Keppra)  250 mg PO BID [facility]


   Last Admin: 04/04/19 10:17 Dose:  250 mg


Multi-Ingredient Ointment (Hydrophor Oint)  1 gm TOP Q6H [facility]


   Last Admin: 04/04/19 06:24 Dose:  1 mg


Pantoprazole Sodium (Protonix Ec Tab)  40 mg PO 0600 [facility]


   Last Admin: 04/03/19 05:28 Dose:  40 mg


Polyethylene Glycol (Miralax)  17 gm PO DAILY PRN


   PRN Reason: Constipation











- Labs


Labs: 


                                        





                                 04/04/19 08:00 





                                 04/04/19 08:00 





                                        











PT  16.4 SECONDS (9.4-12.5)  H  04/01/19  12:00    


 


INR  1.45   04/01/19  12:00    


 


APTT  38.5 Seconds (26.9-38.3)  H  04/01/19  12:00    














- Constitutional


Appears: Well, Non-toxic, No Acute Distress





- Head Exam


Head Exam: ATRAUMATIC, NORMOCEPHALIC





- Extremities Exam


Additional comments: 


B/L LE focused exam:





Vasc: DP/PT faintly palpable  b/l, Cap refill < 3 sec to all digits, Temp 

gradient warm to cool from proximal to distal. Mild non-pitting thais-malleolar 

edema noted b/l R>L.





Neuro: Gross and protective sensations diminished.





Derm: No open lesions, No clinical signs of active infection. Toe nails 

elongated, Dystrophic, Discolored and thickened X 10. Dry scaly lesions noted at

the toes b/l. 2nd toe nail is darkly discolored. 





MSK: B/L cavus foot. B/L HAV, B/L Hammer toes 2-5. No pain on palpating both 

feet. MMT 3/5 to all groups b/l..








- Neurological Exam


Neurological Exam: Alert, Awake, Oriented x3





- Psychiatric Exam


Psychiatric exam: Normal Affect, Normal Mood





Assessment and Plan





- Assessment and Plan (Free Text)


Assessment: 


89 y/o M patient with PMH of Metastatic prostate cancer, seizures, glaucoma, 

dementia, UTI seen and evaluated at the bedside for discolored and elongated toe

nails bilaterally with pain of the right foot.








Plan: 


Patient seen and evaluated with Dr. Husain


Plan discussed with Dr. Husain


Foot X-rays: no acute fractures of dislocations


Charts, labs and vitals reviewed: Afebrile, Absent leukocytosis


Toe nails trimmed X 10 using sterile nail nipper


Patient tolerated the nail trimming well with no complications


Aquaphor cream to be applied BID to b/l feet


Ordered multipodus boots for patient, to be worn at all times


Podiatry to sign off at this time, please re-consult as needed

## 2019-04-05 RX ADMIN — CEFPODOXIME PROXETIL SCH MG: 200 TABLET, FILM COATED ORAL at 21:18

## 2019-04-05 RX ADMIN — CEFPODOXIME PROXETIL SCH MG: 200 TABLET, FILM COATED ORAL at 09:43

## 2019-04-05 RX ADMIN — PANTOPRAZOLE SODIUM SCH MG: 40 TABLET, DELAYED RELEASE ORAL at 06:39

## 2019-04-05 RX ADMIN — PETROLATUM SCH: 42 OINTMENT TOPICAL at 08:00

## 2019-04-05 RX ADMIN — PETROLATUM SCH GM: 42 OINTMENT TOPICAL at 13:54

## 2019-04-05 NOTE — CP.PCM.PN
Subjective





- Date & Time of Evaluation


Date of Evaluation: 04/05/19


Time of Evaluation: 14:00





- Subjective


Subjective: 





Alert, offers no complaints





Objective





- Vital Signs/Intake and Output


Vital Signs (last 24 hours): 


                                        











Temp Pulse Resp BP Pulse Ox


 


 97.7 F   93 H  18   157/71 H  99 


 


 04/05/19 14:00  04/05/19 14:00  04/05/19 14:00  04/05/19 14:00  04/05/19 14:00








Intake and Output: 


                                        











 04/05/19 04/05/19





 06:59 18:59


 


Intake Total 120 


 


Output Total 800 


 


Balance -680 














- Medications


Medications: 


                               Current Medications





Acetaminophen (Tylenol 325mg Tab)  650 mg PO Q6H PRN


   PRN Reason: Fever >100.4 F


   Last Admin: 04/02/19 12:53 Dose:  650 mg


Carbamazepine (Tegretol)  200 mg PO TID [facility]; Protocol


   Last Admin: 04/05/19 13:53 Dose:  200 mg


Cefpodoxime Proxetil (Vantin)  200 mg PO Q12 [facility]; Protocol


   Stop: 04/09/19 22:01


   Last Admin: 04/05/19 09:43 Dose:  200 mg


Fentanyl (Duragesic)  1 patch TD Q72 [facility]


   Last Admin: 04/05/19 11:53 Dose:  1 patch


Gemfibrozil (Lopid)  600 mg PO BID [facility]


   Last Admin: 04/05/19 09:43 Dose:  600 mg


Iron Sucrose 200 mg/ Sodium (Chloride)  110 mls @ 110 mls/hr IVPB ONCE [facility]


   Stop: 04/06/19 06:46


   Last Admin: 04/05/19 06:38 Dose:  110 mls/hr


Levetiracetam (Keppra)  250 mg PO BID [facility]


   Last Admin: 04/05/19 09:43 Dose:  250 mg


Multi-Ingredient Ointment (Hydrophor Oint)  1 gm TOP Q6H [facility]


   Last Admin: 04/05/19 13:54 Dose:  1 gm


Pantoprazole Sodium (Protonix Ec Tab)  40 mg PO 0600 [facility]


   Last Admin: 04/05/19 06:39 Dose:  40 mg


Polyethylene Glycol (Miralax)  17 gm PO DAILY PRN


   PRN Reason: Constipation











- Labs


Labs: 


                                        





                                 04/04/19 08:00 





                                 04/04/19 08:00 





                                        











PT  16.4 SECONDS (9.4-12.5)  H  04/01/19  12:00    


 


INR  1.45   04/01/19  12:00    


 


APTT  38.5 Seconds (26.9-38.3)  H  04/01/19  12:00    














- Constitutional


Appears: Chronically Ill





- Eye Exam


Eye Exam: Normal appearance, PERRL





- ENT Exam


ENT Exam: Mucous Membranes Moist





- Respiratory Exam


Respiratory Exam: Decreased Breath Sounds, NORMAL BREATHING PATTERN





- Cardiovascular Exam


Cardiovascular Exam: REGULAR RHYTHM, +S1, +S2





- GI/Abdominal Exam


GI & Abdominal Exam: Soft, Normal Bowel Sounds





-  Exam


Additional comments: 





tolentino draining dark ankush urine





- Extremities Exam


Extremities Exam: Pedal Edema





- Back Exam


Back Exam: NORMAL INSPECTION





- Neurological Exam


Neurological Exam: Alert





- Skin


Skin Exam: Dry, Pallor





Assessment and Plan





- Assessment and Plan (Free Text)


Assessment: 








89 year old male with history of  metastatic prostate cancer s/p palliative XRT,

seizures, glaucoma, dementia,indwelling tolentino, multiple UTI's, sarcoidosis who 

is admitted with sepsis,UTI, anemia, seizures,hematuria. Found to have 

progression of metastatic disease in pelvis and invasion to bladder, right 

hydronephrosis.





Patient son met with [facility] hosp[facility] liaison earlier today.Son in 

agreement for hospice care upon discharge.





I spoke with son to reinforce goals of car under hospice and to provide end of 

life counseling. Psychosocial support given





Time spent in end of life counseling with family, 30 minutes


Plan: 





 End of life counseling





UTI :Continue Vantin 





Seizure: Continue Tegretol and keppra.





Mirilax for constipation





Duragesic 12 mcg transdermal patch for pain control





Upon discharge will transition home with [facility]

## 2019-04-05 NOTE — PN
DATE:  04/05/2019



SUBJECTIVE:  The patient is seen in bed, in no acute distress.



PHYSICAL EXAMINATION:

VITAL SIGNS:  Temperature is 97, blood pressure is 150/70, and respiratory

rate of 18.

HEENT:  Unremarkable.

NECK:  Supple.

LUNGS:  Decreased breath sounds.

HEART:  Normal S1 and S2.

ABDOMEN:  Soft.



LABORATORY DATA:  Reveals a white count of 6.9, hemoglobin of 8, BUN of 20,

and creatinine of 0.2.  Urine culture is positive for yeast and blood

cultures are no growth.



REVIEW OF ORDERS:  Reveals the patient is on p.o. _____.



ASSESSMENT AND PLAN:  This is an 89-year-old male who seen earlier today in

room 560 for his prostate cancer with metastasis, glaucoma, dementia,

chronic indwelling catheter, and day #4 of antibiotics with complete 5

days.  Overall prognosis is poor.







__________________________________________

Donta Alvarez MD





DD:  04/05/2019 22:22:02

DT:  04/05/2019 23:12:47

Job # [id number]

## 2019-04-05 NOTE — CP.PCM.PN
Subjective





- Date & Time of Evaluation


Date of Evaluation: 04/05/19


Time of Evaluation: 11:00





- Subjective


Subjective: 





Alert, offers no complaints





Objective





- Vital Signs/Intake and Output


Vital Signs (last 24 hours): 


                                        











Temp Pulse Resp BP Pulse Ox


 


 97.7 F   93 H  18   157/71 H  99 


 


 04/05/19 14:00  04/05/19 14:00  04/05/19 14:00  04/05/19 14:00  04/05/19 14:00








Intake and Output: 


                                        











 04/05/19 04/05/19





 06:59 18:59


 


Intake Total 120 


 


Output Total 800 


 


Balance -680 














- Medications


Medications: 


                               Current Medications





Acetaminophen (Tylenol 325mg Tab)  650 mg PO Q6H PRN


   PRN Reason: Fever >100.4 F


   Last Admin: 04/02/19 12:53 Dose:  650 mg


Carbamazepine (Tegretol)  200 mg PO TID [facility]; Protocol


   Last Admin: 04/05/19 13:53 Dose:  200 mg


Cefpodoxime Proxetil (Vantin)  200 mg PO Q12 [facility]; Protocol


   Stop: 04/09/19 22:01


   Last Admin: 04/05/19 09:43 Dose:  200 mg


Fentanyl (Duragesic)  1 patch TD Q72 [facility]


   Last Admin: 04/05/19 11:53 Dose:  1 patch


Gemfibrozil (Lopid)  600 mg PO BID [facility]


   Last Admin: 04/05/19 09:43 Dose:  600 mg


Iron Sucrose 200 mg/ Sodium (Chloride)  110 mls @ 110 mls/hr IVPB ONCE [facility]


   Stop: 04/06/19 06:46


   Last Admin: 04/05/19 06:38 Dose:  110 mls/hr


Levetiracetam (Keppra)  250 mg PO BID [facility]


   Last Admin: 04/05/19 09:43 Dose:  250 mg


Multi-Ingredient Ointment (Hydrophor Oint)  1 gm TOP Q6H [facility]


   Last Admin: 04/05/19 13:54 Dose:  1 gm


Pantoprazole Sodium (Protonix Ec Tab)  40 mg PO 0600 [facility]


   Last Admin: 04/05/19 06:39 Dose:  40 mg


Polyethylene Glycol (Miralax)  17 gm PO DAILY PRN


   PRN Reason: Constipation











- Labs


Labs: 


                                        





                                 04/04/19 08:00 





                                 04/04/19 08:00 





                                        











PT  16.4 SECONDS (9.4-12.5)  H  04/01/19  12:00    


 


INR  1.45   04/01/19  12:00    


 


APTT  38.5 Seconds (26.9-38.3)  H  04/01/19  12:00    














- Constitutional


Appears: Chronically Ill





- Eye Exam


Eye Exam: Normal appearance, PERRL





- ENT Exam


ENT Exam: Mucous Membranes Moist





- Respiratory Exam


Respiratory Exam: Decreased Breath Sounds, NORMAL BREATHING PATTERN





- Cardiovascular Exam


Cardiovascular Exam: REGULAR RHYTHM, +S1, +S2





- GI/Abdominal Exam


GI & Abdominal Exam: Soft, Normal Bowel Sounds





-  Exam


Additional comments: 





tolentino 





- Extremities Exam


Extremities Exam: Normal Capillary Refill, Pedal Edema





- Skin


Skin Exam: Dry, Pallor





Assessment and Plan





- Assessment and Plan (Free Text)


Assessment: 





 





 Patient son met with [facility].

## 2019-04-05 NOTE — PN
DATE:  04/05/2019



SUBJECTIVE:  The patient seen and examined.  I do agree with a note of the

medical resident.  I was involved in the plan of care.  The patient has

been on p.o. antibiotics for UTI.  He had 1 unit of transfusion because of

anemia secondary to blood loss with hematuria.  The patient continues to be

on Tegretol and Keppra for seizure disorder.  He is on Protonix for his

GERD.  He is on MiraLax for his constipation.  He is being followed by

Palliative Care, I did review Dr. Peter's note.  The patient's son

_____ care and had agreed to hospice.  He is scheduled to be discharged

tomorrow to hospice services.  The patient is currently DNR and DNI.







__________________________________________

Nathan Rivero MD





DD:  04/05/2019 16:04:51

DT:  04/05/2019 16:34:31

Job # [id number]

## 2019-04-05 NOTE — CP.PCM.DIS
Provider





- Provider


Date of Admission: 


04/01/19 15:20





Attending physician: 


Nathan Rivero MD





Primary care physician: 


Sami Thakur MD





Consults: 








04/01/19 18:21


Consult [Physician Consult] Routine 


   Comment: 


   Consulting Provider: Donta Alvarez


   Consulting Physician: Donta Alvarez


   Reason for Consult: UTI





04/02/19 10:00


Nursing Referral for Palliative Care Routine 


   Comment: 


   Physician Instructions: 


   Reason For Exam: Patient meets criteria





04/02/19 12:13


Neurology Consult Routine 


   Comment: 


   Consulting Provider: Gigi Palomino


   Consulting Physician: Gigi Palomino


   Reason for Consult: breakthrough seizure





04/02/19 23:15


Consult [Physician Consult] Routine 


   Comment: 


   Consulting Provider: Price Jackson


   Consulting Physician: Price Jackson


   Reason for Consult: hematuria





04/03/19 01:11


Social Work Referral Routine 


   Comment: 24 hr home care


   Physician Instructions: 


   Reason For Exam: protocol





04/03/19 08:00


Nursing Referral for Wound Care Routine 


   Comment: 


   Physician Instructions: 


   Reason For Exam: Protocol





04/03/19 08:06


Consult [Physician Consult] Routine 


   Comment: 


   Consulting Provider: Bryanna Husain


   Consulting Physician: Bryanna Husain


   Reason for Consult: r toe discoloration





04/03/19 09:00


Case Management Referral Routine 


   Comment: 


   Physician Instructions: 


   Reason For Exam: Protocol


   Reason for Referral: Discharge Planning





04/04/19 08:26


Palliative Care Consult Routine 


   Comment: 


   Consulting Provider: Ailyn Peter


   Physician Instructions: 


   Reason For Exam: hospice





04/04/19 13:14


Hospice [Case Management Referral] Routine 


   Comment: 


   Physician Instructions: 


   Reason For Exam: [facility]


   Reason for Referral: Hospice [facility] Course





- Lab Results


Lab Results: 


                                  Micro Results





04/01/19 12:00   Blood-Venous   Blood Culture - Preliminary


                            NO GROWTH AFTER 3 DAYS


04/01/19 12:00   Blood-Venous   Blood Culture - Preliminary


                            NO GROWTH AFTER 3 DAYS


04/01/19 12:00   Urine,Waddell   Urine Culture - Final


                            Yeast Species





                             Most Recent Lab Values











WBC  6.9 10^3/uL (4.5-11.0)  D 04/04/19  08:00    


 


RBC  2.88 10^6/uL (3.5-6.1)  L  04/04/19  08:00    


 


Hgb  8.5 g/dL (14.0-18.0)  L  04/04/19  08:00    


 


Hct  26.0 % (42.0-52.0)  L  04/04/19  08:00    


 


MCV  90.3 fl (80.0-105.0)   04/04/19  08:00    


 


MCH  29.5 pg (25.0-35.0)   04/04/19  08:00    


 


MCHC  32.7 g/dl (31.0-37.0)   04/04/19  08:00    


 


RDW  15.2 % (11.5-14.5)  H  04/04/19  08:00    


 


Plt Count  197 10^3/uL (120.0-450.0)   04/04/19  08:00    


 


MPV  8.9 fl (7.0-11.0)   04/04/19  08:00    


 


Neut % (Auto)  50.7 % (50.0-68.0)   04/04/19  08:00    


 


Lymph % (Auto)  39.9 % (22.0-35.0)  H  04/04/19  08:00    


 


Mono % (Auto)  9.0 % (1.0-6.0)  H  04/04/19  08:00    


 


Eos % (Auto)  0.3 % (1.5-5.0)  L  04/04/19  08:00    


 


Baso % (Auto)  0.1 % (0.0-3.0)   04/04/19  08:00    


 


Lymph # (Auto)  2.8  (1.2-3.4)   04/04/19  08:00    


 


Mono # (Auto)  0.6  (0.1-0.6)   04/04/19  08:00    


 


Eos # (Auto)  0.0  (0.0-0.7)   04/04/19  08:00    


 


Baso # (Auto)  0.01 K/mm3 (0.0-2.0)   04/04/19  08:00    


 


Absolute Neuts (auto)  3.50  (1.4-6.5)   04/04/19  08:00    


 


PT  16.4 SECONDS (9.4-12.5)  H  04/01/19  12:00    


 


INR  1.45   04/01/19  12:00    


 


APTT  38.5 Seconds (26.9-38.3)  H  04/01/19  12:00    


 


pO2  141 mm/Hg (30-55)  H  04/01/19  15:10    


 


VBG pH  7.26  (7.32-7.43)  L  04/01/19  15:10    


 


VBG pCO2  44.0  (40-60)   04/01/19  15:10    


 


VBG HCO3  19.7 mmol/l (21-28)  L  04/01/19  15:10    


 


VBG Total CO2  21.1 mmol.L (22-28)  L  04/01/19  15:10    


 


VBG O2 Sat (Calc)  100.3 % (40-65)  H  04/01/19  15:10    


 


VBG Base Excess  -7.2 mmol/L (0.0-2.0)  L  04/01/19  15:10    


 


VBG Potassium  4.8 mmol/L (3.6-5.2)   04/01/19  15:10    


 


Sodium  122.0 mmol/L (132-148)  L  04/01/19  15:10    


 


Chloride  93.0 mmol/L ()  L  04/01/19  15:10    


 


Glucose  132 mg/dl ()  H  04/01/19  15:10    


 


Lactate  1.6 mmol/L (0.7-2.1)   04/01/19  15:10    


 


FiO2  21.0 %  04/01/19  15:10    


 


Crit Value Called To  Parvin bishop   04/01/19  12:00    


 


Crit Value Called By  Ab   04/01/19  12:00    


 


Blood Gas Notified Time  1216   04/01/19  12:00    


 


Sodium  137 mmol/L (132-148)   04/04/19  08:00    


 


Potassium  3.5 mmol/L (3.6-5.0)  L  04/04/19  08:00    


 


Chloride  107 mmol/L ()   04/04/19  08:00    


 


Carbon Dioxide  22 mmol/L (21-33)   04/04/19  08:00    


 


Anion Gap  12  (10-20)   04/04/19  08:00    


 


BUN  20 mg/dL (7-21)   04/04/19  08:00    


 


Creatinine  0.5 mg/dl (0.8-1.5)  L  04/04/19  08:00    


 


Est GFR ( Amer)  > 60   04/04/19  08:00    


 


Est GFR (Non-Af Amer)  > 60   04/04/19  08:00    


 


POC Glucose (mg/dL)  94 mg/dL ()   04/03/19  10:09    


 


Random Glucose  87 mg/dL ()   04/04/19  08:00    


 


Calcium  8.9 mg/dL (8.4-10.5)   04/04/19  08:00    


 


Phosphorus  2.6 mg/dL (2.5-4.5)   04/04/19  08:00    


 


Magnesium  1.6 mg/dL (1.7-2.2)  L  04/04/19  08:00    


 


Iron  20 ug/dL ()  L  04/02/19  10:20    


 


TIBC  205 ug/dL (261-462)  L  04/02/19  10:20    


 


% Saturation  10 % (20-55)  L  04/02/19  10:20    


 


Ferritin  319.0 ng/mL  04/02/19  10:20    


 


Total Bilirubin  0.5 mg/dL (0.2-1.3)   04/04/19  08:00    


 


AST  37 U/L (17-59)   04/04/19  08:00    


 


ALT  13 U/L (7-56)   04/04/19  08:00    


 


Alkaline Phosphatase  102 U/L ()   04/04/19  08:00    


 


Total Protein  6.4 g/dL (5.8-8.3)   04/04/19  08:00    


 


Albumin  3.1 g/dL (3.0-4.8)   04/04/19  08:00    


 


Globulin  3.3 gm/dL  04/04/19  08:00    


 


Albumin/Globulin Ratio  0.9  (1.1-1.8)  L  04/04/19  08:00    


 


TSH 3rd Generation  1.61 mIU/mL (0.46-4.68)   04/01/19  12:00    


 


Venous Blood Potassium  4.8 mmol/L (3.6-5.2)   04/01/19  15:10    


 


Urine Color  Light brown  (YELLOW)   04/01/19  11:56    


 


Urine Appearance  Cloudy  (CLEAR)   04/01/19  11:56    


 


Urine pH  6.5  (4.7-8.0)   04/01/19  11:56    


 


Ur Specific Gravity  1.020  (1.005-1.035)   04/01/19  11:56    


 


Urine Protein  100 mg/dL (<30 mg/dL)  H  04/01/19  11:56    


 


Urine Glucose (UA)  Negative mg/dL (NEGATIVE)   04/01/19  11:56    


 


Urine Ketones  Negative mg/dL (NEGATIVE)   04/01/19  11:56    


 


Urine Blood  Large  (NEGATIVE)  H  04/01/19  11:56    


 


Urine Nitrate  Positive  (NEGATIVE)  H  04/01/19  11:56    


 


Urine Bilirubin  Negative  (NEGATIVE)   04/01/19  11:56    


 


Urine Urobilinogen  0.2 E.U./dL (<1 E.U./dL)   04/01/19  11:56    


 


Ur Leukocyte Esterase  Moderate Martita/uL (NEGATIVE)  H  04/01/19  11:56    


 


Urine RBC  Tntc /hpf (0-2)  H  04/01/19  11:56    


 


Urine WBC  Tntc /hpf (0-6)  H  04/01/19  11:56    


 


Ur Epithelial Cells  None /hpf (0-5)   04/01/19  11:56    


 


Urine Bacteria  Large /hpf (NONE)   04/01/19  11:56    


 


Carbamazepine  3 ug/mL (4.0-10.0)  L  04/02/19  10:33    


 


Blood Type  A POSITIVE   04/01/19  12:00    


 


Antibody Screen  Negative   04/01/19  12:00    


 


Crossmatch  See Detail   04/01/19  12:00    


 


BBK History Checked  Patient has bt   04/01/19  12:00    














Discharge Exam





- Head Exam


Head Exam: NORMAL INSPECTION





Discharge Plan





- Follow Up Plan


Condition: GUARDED


Disposition: HOME/ ROUTINE


Referrals: 


Sami Thakur MD [Primary Care Provider] -

## 2019-04-05 NOTE — CP.PCM.PN
<Elaine Robison - Last Filed: 04/05/19 10:57>





Subjective





- Date & Time of Evaluation


Date of Evaluation: 04/05/19


Time of Evaluation: 08:00





- Subjective


Subjective: 


IM Resident progress note for Dr. Rivero's service .





Patient with no acute events overnight. Patient remains afebrile, the Waddell 

catheter still draining dark brown urine. No diarrhea, nausea, or vomiting. 

Patient is tolerating po intake. 


I had an extensive conversation with patient's son Mr. Cristino Kingston yesterday, 

he understands patient has advanced prostate cancer, and that the cancer might 

have invaded the bladder causing chronic infections and hematuria. Patient's son

would like his dad to go home with home hospice. 





Objective





- Vital Signs/Intake and Output


Vital Signs (last 24 hours): 


                                        











Temp Pulse Resp BP Pulse Ox


 


 97.3 F L  103 H  18   156/66 H  98 


 


 04/04/19 22:00  04/04/19 22:00  04/04/19 22:00  04/04/19 22:00  04/04/19 22:00








Intake and Output: 


                                        











 04/05/19 04/05/19





 06:59 18:59


 


Intake Total 120 


 


Output Total 800 


 


Balance -680 














- Medications


Medications: 


                               Current Medications





Acetaminophen (Tylenol 325mg Tab)  650 mg PO Q6H PRN


   PRN Reason: Fever >100.4 F


   Last Admin: 04/02/19 12:53 Dose:  650 mg


Carbamazepine (Tegretol)  200 mg PO TID [facility]; Protocol


   Last Admin: 04/04/19 16:59 Dose:  200 mg


Cefpodoxime Proxetil (Vantin)  200 mg PO Q12 [facility]; Protocol


   Stop: 04/09/19 22:01


   Last Admin: 04/04/19 21:33 Dose:  200 mg


Fentanyl (Duragesic)  1 patch TD Q72 [facility]


Gemfibrozil (Lopid)  600 mg PO BID [facility]


   Last Admin: 04/04/19 16:59 Dose:  600 mg


Iron Sucrose 200 mg/ Sodium (Chloride)  110 mls @ 110 mls/hr IVPB ONCE [facility]


   Stop: 04/06/19 06:46


   Last Admin: 04/05/19 06:38 Dose:  110 mls/hr


Levetiracetam (Keppra)  250 mg PO BID [facility]


   Last Admin: 04/04/19 16:59 Dose:  250 mg


Multi-Ingredient Ointment (Hydrophor Oint)  1 gm TOP Q6H [facility]


   Last Admin: 04/04/19 06:24 Dose:  1 mg


Pantoprazole Sodium (Protonix Ec Tab)  40 mg PO 0600 [facility]


   Last Admin: 04/05/19 06:39 Dose:  40 mg


Polyethylene Glycol (Miralax)  17 gm PO DAILY PRN


   PRN Reason: Constipation











- Labs


Labs: 


                                        





                                 04/04/19 08:00 





                                 04/04/19 08:00 





                                        











PT  16.4 SECONDS (9.4-12.5)  H  04/01/19  12:00    


 


INR  1.45   04/01/19  12:00    


 


APTT  38.5 Seconds (26.9-38.3)  H  04/01/19  12:00    














- Constitutional


Appears: No Acute Distress, Chronically Ill





- Head Exam


Head Exam: ATRAUMATIC, NORMAL INSPECTION, NORMOCEPHALIC





- Eye Exam


Eye Exam: EOMI, Normal appearance, PERRL.  absent: Scleral icterus


Pupil Exam: NORMAL ACCOMODATION





- ENT Exam


ENT Exam: Mucous Membranes Moist





- Neck Exam


Neck Exam: Normal Inspection





- Respiratory Exam


Respiratory Exam: Clear to Ausculation Bilateral, NORMAL BREATHING PATTERN.  

absent: Rales, Rhonchi, Wheezes, Respiratory Distress, Stridor





- Cardiovascular Exam


Cardiovascular Exam: REGULAR RHYTHM, RRR, +S1, +S2.  absent: Diastolic murmur, 

Gallop, JVD, Rubs, Murmur





- GI/Abdominal Exam


GI & Abdominal Exam: Soft, Normal Bowel Sounds.  absent: Distended, Firm, G

uarding, Rigid, Tenderness, Organomegaly, Rebound





- Extremities Exam


Extremities Exam: Pedal Edema (+1).  absent: Tenderness





- Back Exam


Back Exam: NORMAL INSPECTION.  absent: rash noted





- Neurological Exam


Neurological Exam: Alert, Awake


Additional comments: 


a&ox2, waxing and waning mentation. 





- Psychiatric Exam


Psychiatric exam: Normal Affect, Normal Mood





- Skin


Skin Exam: Dry, Normal Color, Warm





Assessment and Plan





- Assessment and Plan (Free Text)


Assessment: 


1) Hematuria from traumatic Waddell and cancer


2) Sepsis due to Recurrent catheter associated UTI- sepsis resolved


3) Right hydrouteronephrosis


4) Hyponatremia- resolved 


5) Hyperkalemia- resolved, now hypokalemia 


6) CYN- resolved 


7) Acute on chronic anemia likely due to hematuria 


8) Chronic abdominal pain likely due to metastasis


9) Constipation 


10) Metastatic prostate cancer metastatic to the bone s/p ADT and palliative 

radiation on Lupron, 


11) Breakthrough seizure


12) glaucoma  


13) Dyslipidemia


14) Transient hypoglycemia- resolved 


15) DNI/DNR


16) Hypomagnesemia 








Plan: 


Waddell catheter with dark urine, afebrile, now on po vantin for possible uti per 

ID.  magnesium and potassium were repleted yesterday. 


For acute on chronic anemia, patient s/p 1 unit of prbc, getting iv iron. Will 

continue with tylenol prn for fever. Continue with tegretol 200 mg tid and 

keppra 250 mg bid for seizure. On lopid for hld. Protonix for gerd. Miralax prn 

for constipation. 


Podiatry following for foot care. External compressive devices for DVT 

prophylaxis. 


[facility] hospice care meeting with patient's son this morning to arrange for 

hospice. Patient will likely be going home tomorrow on hospice. 





Patient seen, examined and case discussed with Dr. Rivero. 








<Nathan Rivero S - Last Filed: 04/05/19 16:03>





Objective





- Vital Signs/Intake and Output


Vital Signs (last 24 hours): 


                                        











Temp Pulse Resp BP Pulse Ox


 


 97.7 F   93 H  18   157/71 H  99 


 


 04/05/19 14:00  04/05/19 14:00  04/05/19 14:00  04/05/19 14:00  04/05/19 14:00








Intake and Output: 


                                        











 04/05/19 04/05/19





 06:59 18:59


 


Intake Total 120 


 


Output Total 800 


 


Balance -680 














- Medications


Medications: 


                               Current Medications





Acetaminophen (Tylenol 325mg Tab)  650 mg PO Q6H PRN


   PRN Reason: Fever >100.4 F


   Last Admin: 04/02/19 12:53 Dose:  650 mg


Carbamazepine (Tegretol)  200 mg PO TID [facility]; Protocol


   Last Admin: 04/05/19 13:53 Dose:  200 mg


Cefpodoxime Proxetil (Vantin)  200 mg PO Q12 [facility]; Protocol


   Stop: 04/09/19 22:01


   Last Admin: 04/05/19 09:43 Dose:  200 mg


Fentanyl (Duragesic)  1 patch TD Q72 [facility]


   Last Admin: 04/05/19 11:53 Dose:  1 patch


Gemfibrozil (Lopid)  600 mg PO BID [facility]


   Last Admin: 04/05/19 09:43 Dose:  600 mg


Iron Sucrose 200 mg/ Sodium (Chloride)  110 mls @ 110 mls/hr IVPB ONCE [facility]


   Stop: 04/06/19 06:46


   Last Admin: 04/05/19 06:38 Dose:  110 mls/hr


Levetiracetam (Keppra)  250 mg PO BID [facility]


   Last Admin: 04/05/19 09:43 Dose:  250 mg


Multi-Ingredient Ointment (Hydrophor Oint)  1 gm TOP Q6H [facility]


   Last Admin: 04/05/19 13:54 Dose:  1 gm


Pantoprazole Sodium (Protonix Ec Tab)  40 mg PO 0600 [facility]


   Last Admin: 04/05/19 06:39 Dose:  40 mg


Polyethylene Glycol (Miralax)  17 gm PO DAILY PRN


   PRN Reason: Constipation











- Labs


Labs: 


                                        





                                 04/04/19 08:00 





                                 04/04/19 08:00 





                                        











PT  16.4 SECONDS (9.4-12.5)  H  04/01/19  12:00    


 


INR  1.45   04/01/19  12:00    


 


APTT  38.5 Seconds (26.9-38.3)  H  04/01/19  12:00    














Assessment and Plan





- Assessment and Plan (Free Text)


Plan: 





Pt seen and examined by me. I have reviewed the note of the medical resident and

I agree with it. I have discussed the assessment and plan with the resident. I 

have reviewed the medications and the last labs.

## 2019-04-06 VITALS
DIASTOLIC BLOOD PRESSURE: 71 MMHG | TEMPERATURE: 98.3 F | HEART RATE: 90 BPM | OXYGEN SATURATION: 98 % | SYSTOLIC BLOOD PRESSURE: 160 MMHG | RESPIRATION RATE: 20 BRPM

## 2019-04-06 RX ADMIN — CEFPODOXIME PROXETIL SCH MG: 200 TABLET, FILM COATED ORAL at 09:15

## 2019-04-06 RX ADMIN — PANTOPRAZOLE SODIUM SCH MG: 40 TABLET, DELAYED RELEASE ORAL at 05:29

## 2019-04-06 NOTE — CP.PCM.PN
Subjective





- Date & Time of Evaluation


Date of Evaluation: 04/06/19


Time of Evaluation: 10:50





- Subjective


Subjective: 





Comfortable in bed, no fevers.





Objective





- Vital Signs/Intake and Output


Vital Signs (last 24 hours): 


                                        











Temp Pulse Resp BP Pulse Ox


 


 98.4 F   103 H  16   180/67 H  96 


 


 04/05/19 22:00  04/05/19 22:00  04/05/19 22:00  04/05/19 22:00  04/05/19 22:00








Intake and Output: 


                                        











 04/06/19 04/06/19





 06:59 18:59


 


Intake Total 960 


 


Output Total 0 


 


Balance 960 














- Medications


Medications: 


                               Current Medications





Acetaminophen (Tylenol 325mg Tab)  650 mg PO Q6H PRN


   PRN Reason: Fever >100.4 F


   Last Admin: 04/02/19 12:53 Dose:  650 mg


Carbamazepine (Tegretol)  200 mg PO TID [facility]; Protocol


   Last Admin: 04/06/19 09:15 Dose:  200 mg


Cefpodoxime Proxetil (Vantin)  200 mg PO Q12 [facility]; Protocol


   Stop: 04/09/19 22:01


   Last Admin: 04/06/19 09:15 Dose:  200 mg


Fentanyl (Duragesic)  1 patch TD Q72 [facility]


   Last Admin: 04/05/19 11:53 Dose:  1 patch


Gemfibrozil (Lopid)  600 mg PO BID [facility]


   Last Admin: 04/06/19 09:14 Dose:  600 mg


Levetiracetam (Keppra)  250 mg PO BID [facility]


   Last Admin: 04/06/19 09:14 Dose:  250 mg


Multi-Ingredient Ointment (Hydrophor Oint)  1 gm TOP Q6H [facility]


   Last Admin: 04/05/19 13:54 Dose:  1 gm


Pantoprazole Sodium (Protonix Ec Tab)  40 mg PO 0600 [facility]


   Last Admin: 04/06/19 05:29 Dose:  40 mg


Polyethylene Glycol (Miralax)  17 gm PO DAILY PRN


   PRN Reason: Constipation











- Labs


Labs: 


                                        





                                 04/04/19 08:00 





                                 04/04/19 08:00 





                                        











PT  16.4 SECONDS (9.4-12.5)  H  04/01/19  12:00    


 


INR  1.45   04/01/19  12:00    


 


APTT  38.5 Seconds (26.9-38.3)  H  04/01/19  12:00    














- Constitutional


Appears: Chronically Ill





- Head Exam


Head Exam: NORMAL INSPECTION





- Respiratory Exam


Respiratory Exam: Decreased Breath Sounds





- Cardiovascular Exam


Cardiovascular Exam: +S1, +S2





- GI/Abdominal Exam


GI & Abdominal Exam: Soft.  absent: Tenderness





Assessment and Plan





- Assessment and Plan (Free Text)


Plan: 





Assessment


consider complicated UTI in this patient with indwelling Waddell catheter and 

prostate cancer


history of sepsis due to complicated UTI in this patient with indwelling Waddell 

catheter and metastatic prostate cancer, grew Proteus and E. faecalis in the 

urine, clinically improved


history of Proteus bacteremia, source unclear, concerned about intra-abdominal 

infection


history of UTI in this patient with prostate cancer with metastasis


history of Klebsiella urinary tract infection


Mechanical fall, etiology to be determined


prostate cancer on Lupron treatment once a month (hormonal treatment)


seizure disorder


glaucoma





Plan


continue Vantin for another 4-5 days


overall prognosis is poor

## 2019-04-06 NOTE — PN
DATE:  04/06/2019



SUBJECTIVE:  The patient has no complaints of any chest pain or shortness

of breath.  No headaches.



PHYSICAL EXAMINATION:

VITAL SIGNS:  Temperature is 98.4, pulse of 103, blood pressure 180/67,

respirations 16.

GENERAL:  The patient is lying in bed, comfortable, and in no acute

distress.

HEENT:    Anicteric sclerae.  Moist mucosa. 

Pink conjunctivae.  No oral lesions.

NECK:  No JVD, anterior and posterior adenopathy, thyromegaly, or bruits.

CARDIOVASCULAR:  S1 and S2 regular.  No murmurs, rubs or gallops.

LUNGS:  Clear to auscultation bilaterally.  No wheezes, rales, or rhonchi.

ABDOMEN:  Bowel sounds are positive.  Soft, nontender and nondistended.  No

hepatosplenomegaly.  No rebound and no guarding.

EXTREMITIES:  No cyanosis, clubbing, or edema.

PSYCHIATRIC:  He is awake, alert and oriented x2.  No anxiety or

depression.  

VASCULAR:  2+ pulses in the carotid pulses and pedal pulses.

SKIN:  No erythema or nodules.

SPINE:  Shows normal curvature.



LABORATORY DATA:  Hemoglobin is 8.5.  Creatinine 0.5.



ASSESSMENT:

1.  Hematuria.

2.  Urinary retention with chronic Waddell.

3.  Sepsis secondary to urinary tract infection.

4.  Hyponatremia, improved.

5.  Hyperkalemia, improved.

6.  Acute kidney injury, improved.

7.  Prostate cancer with metastasis.

8.  Constipation.

9.  Seizure disorder.

10.  Dyslipidemia.

11.  Hypomagnesemia.

12.  Do not resuscitate/do not intubate.



PLAN:  The patient is currently comfortable.  The patient's sodium has

improved.  He is on Keppra for his seizures.  The patient is on fentanyl

patch for pain.  He is on Vantin for antibiotics.  The patient is receiving

Protonix daily.  He is on gemfibrozil.  The patient is on Duragesic patch. 

His pain is controlled.  The patient is awaiting for discharge to hospice.





__________________________________________

Nathan Rivero MD





DD:  04/06/2019 7:41:18

DT:  04/06/2019 8:30:34

Job # [id number]

[facility]

## 2020-02-21 NOTE — PN
DATE:  04/04/2019



The patient was seen and examined.  I do agree with the note of the medical

resident.  I was involved in the plan of care.  The patient has metastatic

prostate cancer that is causing him to have hematuria as well as the

chronic pain.  He is DNR/DNI.  I appreciate the input from palliative care.

I did review the note of palliative care team.  The patient's son had

agreed to consult for palliative care and possible hospice.  The patient

has iron deficiency and is getting IV iron.  He is on Tegretol and Keppra

for his seizure disorder.  The patient is on MiraLax for constipation.  He

has a chronic Waddell catheter because of urinary retention.  He has had

hematuria because of the Waddell catheter.  He does have iron deficiency most

likely from that as well as the prostate cancer.  The patient has sepsis

that is improving with the IV antibiotics.  He has right-sided

hydroureteronephrosis that is chronic.  The patient's acute kidney injury

is resolved.  He had hyponatremia that has improved as well and resolved. 

The hyperkalemia has improved.  He has dyslipidemia.  The patient's son,

Cristino, has been updated.  The patient is on Lopid for his dyslipidemia.  He

is on fentanyl patch for his pain.  He is on Vantin for his antibiotics and

cefepime has been discontinued.







__________________________________________

Nathan Rivero MD





DD:  04/04/2019 17:39:17

DT:  04/04/2019 20:11:04

Job # [id number] no

## 2024-02-15 NOTE — HP
DATE OF EXAM:  03/12/2019



HISTORY OF PRESENT ILLNESS:  The patient was seen and examined.  I do agree

with the note of the medical resident.  The patient has a history of

hematuria.  He has also history of chronic UTIs.  He has a chronic

indwelling Waddell catheter because of urinary retention and also because of

enlarged prostate.  He has prostate cancer that he is being treated for. 

His anemia is chronic and is multifactorial.  He has hematuria and

metastatic disease of his prostate.  The patient has constipation.  He does

get pain medication and this could be worsening his constipation.  He has

dyslipidemia.  He is on statin therapy for this.  He is receiving Tegretol

for seizure disorder.  The patient's son was updated on current plan of

care.  He is going to have cultures that are done, most likely they will be

positive.  He has a history of gram-negative urine cultures.  He is on a

fentanyl patch for his pain.  He has mets to his pelvic area and so he does

have chronic pain in that area.  The patient is on MiraLax for

constipation.  He is given IV fluids with normal saline.  I will

discontinue his normal saline at this point.  His gross hematuria has

improved.  At the current, he is a DNR and DNI.







__________________________________________

Nathan Rivero MD





DD:  03/12/2019 18:17:17

DT:  03/12/2019 19:04:40

Job # [id number] Pt returned from OR. Wound vac exchanged. Paralytic reversed per CRNA. Anesthesia at bedside for LP.

## 2024-09-10 NOTE — CP.PCM.PN
Take prednisone, 2 tabs, at the same time, daily for 5 days. Take in the morning with breakfast. Do not take Advil, Motril, ibuprofen products or Aleve while taking this medication.     It is ok to take acetaminophen (Tylenol) while taking this medication. If you have regular strength tylenol you may take 3 tabs up to 3 times daily. If you have extra-strength tylenol you may take 2 tabs up to 3 times daily.      Take one cyclobenzaprine tab nightly, before bed, for three (3) nights. Then may take nightly as needed. This medication will likely make you drowsy. Do not drive, operate machinery or make important decisions after taking this medication. Do not drink alcohol while taking this medication.    Apply warm compress or heating pad to neck 3-4 times daily for 15-20 minutes each time.      Subjective





- Date & Time of Evaluation


Date of Evaluation: 01/21/18


Time of Evaluation: 11:00





- Subjective


Subjective: 








Complaining of back pain. he is able to sit on chair. pain starts in back and 

radiates in front on abdomen. 


No nausea, no vomiting. Has tolentino's cath draining clear urine. 





Objective





- Vital Signs/Intake and Output


Vital Signs (last 24 hours): 


 











Temp Pulse Resp BP Pulse Ox


 


 97.5 F L  80   18   145/70   96 


 


 01/20/18 07:30  01/20/18 07:30  01/20/18 07:30  01/20/18 07:30  01/20/18 07:30








Intake and Output: 


 











 01/20/18 01/21/18





 18:59 06:59


 


Intake Total 600 480


 


Output Total 400 800


 


Balance 200 -320














- Medications


Medications: 


 Current Medications





Acetaminophen (Tylenol 325mg Tab)  650 mg PO Q4H PRN


   PRN Reason: Pain, Mild (1-3)


Carbamazepine (Tegretol)  200 mg PO TID [facility]


   PRN Reason: Protocol


   Last Admin: 01/20/18 17:43 Dose:  200 mg


Docusate Sodium (Colace)  200 mg PO DAILY [facility]


   Last Admin: 01/20/18 09:53 Dose:  200 mg


Gemfibrozil (Lopid)  600 mg PO BID [facility]


   Last Admin: 01/20/18 17:46 Dose:  600 mg


Latanoprost (Xalatan Opht)  0 ml OD DAILY [facility]


   Last Admin: 01/20/18 09:56 Dose:  2.5 ml


Oxycodone/Acetaminophen (Percocet 5/325 Mg Tab)  1 tab PO Q4H PRN


   PRN Reason: Pain, moderate (4-7)


   Stop: 01/21/18 08:08


   Last Admin: 01/20/18 21:12 Dose:  1 tab


Polyethylene Glycol (Miralax)  17 gm PO BID [facility]


   Last Admin: 01/20/18 17:43 Dose:  17 gm











- Labs


Labs: 


 











PT  12.4 SECONDS (9.4-12.5)   01/16/18  09:17    


 


INR  1.08  (0.93-1.08)   01/16/18  09:17    


 


APTT  31.9 Seconds (25.1-36.5)   01/16/18  09:17    














- Constitutional


Appears: Well, Non-toxic, Chronically Ill





- Head Exam


Head Exam: ATRAUMATIC, NORMAL INSPECTION, NORMOCEPHALIC





- Eye Exam


Eye Exam: Normal appearance





- ENT Exam


ENT Exam: Mucous Membranes Moist, Normal Exam





- Neck Exam


Neck Exam: Normal Inspection





- Respiratory Exam


Respiratory Exam: Clear to Ausculation Bilateral, NORMAL BREATHING PATTERN





- Cardiovascular Exam


Cardiovascular Exam: REGULAR RHYTHM, +S1, +S2





- GI/Abdominal Exam


GI & Abdominal Exam: Soft, Normal Bowel Sounds





- Back Exam


Back Exam: NORMAL INSPECTION





- Neurological Exam


Neurological Exam: Alert, Normal Gait, Oriented x3





- Psychiatric Exam


Psychiatric exam: Normal Affect, Normal Mood





- Skin


Skin Exam: Normal Color, Warm





Assessment and Plan





- Assessment and Plan (Free Text)


Assessment: 





1. Stage IV prostate cancer, bone mets. 


On lupron and casodex. radiation on hold. 


Son known to me , his wife was under my care. 


He has multiple questions regarding radiation, treatment of prostate cancer. 


I discussed with him options of treatment with Xatndi, Zytiga.


He will need monthly bisphosphnates for bony mets. It will help with pain and 

resolution of mets. 


Bone scan reviewed. Discussed with son and patient. 





2. Back pain : Fentanyl patch 25 mcgm Q 72 hours. Oxycodone prn for 

breakthrough pain. 





3. UTI : on ampicillin .